# Patient Record
Sex: FEMALE | Race: WHITE | Employment: OTHER | URBAN - NONMETROPOLITAN AREA
[De-identification: names, ages, dates, MRNs, and addresses within clinical notes are randomized per-mention and may not be internally consistent; named-entity substitution may affect disease eponyms.]

---

## 2019-09-18 ENCOUNTER — HOSPITAL ENCOUNTER (OUTPATIENT)
Dept: MRI IMAGING | Age: 64
Discharge: HOME | End: 2019-09-18

## 2019-09-18 ENCOUNTER — OFFICE VISIT (OUTPATIENT)
Dept: NEUROSURGERY | Age: 64
End: 2019-09-18
Payer: MEDICARE

## 2019-09-18 VITALS
BODY MASS INDEX: 35.42 KG/M2 | SYSTOLIC BLOOD PRESSURE: 147 MMHG | HEIGHT: 66 IN | HEART RATE: 77 BPM | WEIGHT: 220.4 LBS | DIASTOLIC BLOOD PRESSURE: 86 MMHG

## 2019-09-18 DIAGNOSIS — Z00.6 EXAMINATION FOR NORMAL COMPARISON FOR CLINICAL RESEARCH: ICD-10-CM

## 2019-09-18 DIAGNOSIS — R41.89 COGNITIVE DECLINE: ICD-10-CM

## 2019-09-18 DIAGNOSIS — G93.9 BRAIN LESION: Primary | ICD-10-CM

## 2019-09-18 DIAGNOSIS — R26.89 BALANCE PROBLEM: ICD-10-CM

## 2019-09-18 DIAGNOSIS — R90.89 ABNORMAL BRAIN MRI: ICD-10-CM

## 2019-09-18 DIAGNOSIS — D17.79 BRAIN LIPOMA: ICD-10-CM

## 2019-09-18 PROCEDURE — 3209999900 MRI COMPARISON OF OUTSIDE FILMS

## 2019-09-18 PROCEDURE — 99204 OFFICE O/P NEW MOD 45 MIN: CPT | Performed by: NEUROLOGICAL SURGERY

## 2019-09-18 RX ORDER — OMEPRAZOLE 40 MG/1
40 CAPSULE, DELAYED RELEASE ORAL DAILY
Refills: 2 | COMMUNITY
Start: 2019-09-07

## 2019-09-18 RX ORDER — LINAGLIPTIN 5 MG/1
5 TABLET, FILM COATED ORAL DAILY
Refills: 2 | Status: ON HOLD | COMMUNITY
Start: 2019-06-27 | End: 2019-12-19 | Stop reason: HOSPADM

## 2019-09-18 RX ORDER — ATORVASTATIN CALCIUM 80 MG/1
80 TABLET, FILM COATED ORAL DAILY
Status: ON HOLD | COMMUNITY
Start: 2019-06-27 | End: 2019-11-04 | Stop reason: ALTCHOICE

## 2019-09-18 RX ORDER — POTASSIUM CHLORIDE 20 MEQ/1
20 TABLET, EXTENDED RELEASE ORAL DAILY
Status: ON HOLD | COMMUNITY
Start: 2019-06-27 | End: 2019-11-04 | Stop reason: ALTCHOICE

## 2019-09-18 RX ORDER — SERTRALINE HYDROCHLORIDE 100 MG/1
100 TABLET, FILM COATED ORAL DAILY
Refills: 2 | COMMUNITY
Start: 2019-08-04

## 2019-09-18 RX ORDER — ASPIRIN 325 MG
325 TABLET, DELAYED RELEASE (ENTERIC COATED) ORAL DAILY
COMMUNITY

## 2019-09-18 RX ORDER — DIPHENHYDRAMINE HCL 25 MG
50 CAPSULE ORAL NIGHTLY
COMMUNITY

## 2019-09-18 RX ORDER — FLUTICASONE PROPIONATE 50 MCG
SPRAY, SUSPENSION (ML) NASAL DAILY
Refills: 3 | Status: ON HOLD | COMMUNITY
Start: 2019-06-27 | End: 2019-11-04 | Stop reason: ALTCHOICE

## 2019-09-18 ASSESSMENT — ENCOUNTER SYMPTOMS
ABDOMINAL PAIN: 0
CHEST TIGHTNESS: 0
TROUBLE SWALLOWING: 1

## 2019-09-18 NOTE — LETTER
Cerebellar function:WNL  Sensation: Grossly intact  Straight leg raising test:Negativ. Reviewed MRI Type:  Film and Report    No results found for: WBC, HGB, HCT, PLT, CHOL, TRIG, HDL, LDLDIRECT, ALT, AST, NA, K, CL, CREATININE, BUN, CO2, TSH, PSA, INR, GLUF, LABA1C, LABMICR    Assessment and Plan      Diagnosis Orders   1. Brain lesion     2. Abnormal brain MRI     3. Cognitive decline     4. Balance problem     5. Brain lipoma         -I had long discussion with the patient and her  about the current patient neurological symptoms and the finding of her brain MRI. 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 I told them I do not believe this lesion is responsible about her current neurological symptoms that she has now. - However, patient and her , that given the  findings of her brain MRI and the combination of symptoms that you have namely the balance issue, cognitive decline and the episode of losing the controlling of her urination, the normal normal pressure hydrocephalus should be considered as underlying pathological process. - For  This reason,  I explained for the patient our normal pressure hydrocephalus protocol which include the lumbar drain trial associated with daily evaluation by our PT OT and speech (cognative team)  teams.  -All questions and concerns were addressed and answered.  -Patient and her  showed a deep interest in going through the lumbar drain trial for normal pressure hydrocephalus. In fact, patient told me that somebody told her in the past that may she need a shunt but she did not remember the details exactly. -The plan now for Amena Plush is to schedule her her for lumbar drain trial according our normal pressure hydrocephalus protocol. If you have questions, please do not hesitate to call me. I look forward to following Amena Plush along with you.     Sincerely, Lisa Alonso MD    CC providers:  Erica Davison, [address]  Alonzo Del Valle [address]: [phone]

## 2019-09-18 NOTE — PROGRESS NOTES
fluticasone (FLONASE) 50 MCG/ACT nasal spray, by Each Nostril route daily, Disp: , Rfl: 3    diphenhydrAMINE (BENADRYL) 25 MG capsule, Take 50 mg by mouth nightly, Disp: , Rfl:     atorvastatin (LIPITOR) 80 MG tablet, Take 80 mg by mouth daily, Disp: , Rfl:     aspirin 325 MG EC tablet, Take 325 mg by mouth daily, Disp: , Rfl:     The patient has No Known Allergies. Past Medical History  Kaiser Ty  has a past medical history of Depression, psychotic ([facility] [address] Diabetes ([facility] [address] Hyperlipidemia, and Hypertension. Past Surgical History  The patient  has a past surgical history that includes knee surgery (Right); Cholecystectomy; Appendectomy; Breast cyst excision (Right); and Tubal ligation. Family History  This patient's family history includes Depression in her mother; Heart Attack in her sister. Social History  Kaiser Ty  reports that she has been smoking cigarettes. She has never used smokeless tobacco. She reports that she drank alcohol. She reports that she does not use drugs. Subjective:      Review of Systems   Constitutional: Positive for activity change. Negative for fever. HENT: Positive for trouble swallowing (sometimes). Eyes: Positive for visual disturbance. Respiratory: Negative for chest tightness. Cardiovascular: Negative for chest pain. Gastrointestinal: Negative for abdominal pain. Genitourinary: Negative for difficulty urinating. Musculoskeletal: Positive for gait problem. Neurological: Positive for dizziness and headaches. Negative for seizures, weakness and numbness. Psychiatric/Behavioral: The patient is nervous/anxious.         Objective:     Ht 5' 6\" (1.676 m)   Wt 220 lb 6.4 oz (100 kg)   BMI 35.57 kg/m²      Examination of carotid arteries (puls, amplitude, bruits) or Examination of peripheral vascular system  (swelling, varicosities and pulses, temperature, edema,tenderness : WNL  Patient is A/A/Ox3  Recent and remote memory: Decline  Attention span and

## 2019-09-23 NOTE — COMMUNICATION BODY
Assessment:    HPI:     This is a 62-year old female who was referred to my office initially after she had a brain MRI that showed finding suggestive of intracranial lipoma. Patient underwent that brain MRI because of progressive balance issues that started last February and has been progressively getting worse with time. Patient had a couple of falls recently. Patient and her  also stated that the I have noticed a decline in patient cognitive and memory function since the last February also and has been progressively getting worse with time. Patient stated that she has a difficulty in controlling her urination sometimes. She stated that she has a blurry vision and headache from times to times. She denied any significant focal weakness or numbness. She de denied any seizures. On physical exam:    Examination of carotid arteries (puls, amplitude, bruits) or Examination of peripheral vascular system  (swelling, varicosities and pulses, temperature, edema,tenderness : WNL  Patient is A/A/Ox3  Recent and remote memory: Decline  Attention span and concentration: Declien  Language (naming objects;repeating phrases;spontaneous speech): slurred/scaned sppech  Fund of knowledge: decline  Cranial nerves:2-12: grossly inatct  Muscle strength, tone in all 4 extremities:5/5 thought out. DTR in all 4 extremities:2+  Babinski:Down response   Gait: slight wide based gait with balance issue. Cerebellar function:WNL  Sensation: Grossly intact  Straight leg raising test:Negativ. Reviewed MRI Type:  Film and Report    No results found for: WBC, HGB, HCT, PLT, CHOL, TRIG, HDL, LDLDIRECT, ALT, AST, NA, K, CL, CREATININE, BUN, CO2, TSH, PSA, INR, GLUF, LABA1C, LABMICR    Assessment and Plan      Diagnosis Orders   1. Brain lesion     2. Abnormal brain MRI     3. Cognitive decline     4. Balance problem     5.  Brain lipoma         -I had long discussion with the patient and her  about the current patient neurological symptoms and the finding of her brain MRI. 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 I told them I do not believe this lesion is responsible about her current neurological symptoms that she has now. - However, patient and her , that given the  findings of her brain MRI and the combination of symptoms that you have namely the balance issue, cognitive decline and the episode of losing the controlling of her urination, the normal normal pressure hydrocephalus should be considered as underlying pathological process. - For  This reason,  I explained for the patient our normal pressure hydrocephalus protocol which include the lumbar drain trial associated with daily evaluation by our PT OT and speech (cognative team)  teams.  -All questions and concerns were addressed and answered.  -Patient and her  showed a deep interest in going through the lumbar drain trial for normal pressure hydrocephalus. In fact, patient told me that somebody told her in the past that may she need a shunt but she did not remember the details exactly. -The plan now for Chava Grant is to schedule her her for lumbar drain trial according our normal pressure hydrocephalus protocol.

## 2019-10-22 ENCOUNTER — TELEPHONE (OUTPATIENT)
Dept: NEUROSURGERY | Age: 64
End: 2019-10-22

## 2019-11-04 ENCOUNTER — APPOINTMENT (OUTPATIENT)
Dept: INTERVENTIONAL RADIOLOGY/VASCULAR | Age: 64
DRG: 057 | End: 2019-11-04
Attending: INTERNAL MEDICINE
Payer: MEDICARE

## 2019-11-04 ENCOUNTER — HOSPITAL ENCOUNTER (INPATIENT)
Age: 64
LOS: 3 days | Discharge: HOME | DRG: 057 | End: 2019-11-07
Attending: INTERNAL MEDICINE | Admitting: INTERNAL MEDICINE
Payer: MEDICARE

## 2019-11-04 PROBLEM — D17.79 BRAIN LIPOMA: Status: ACTIVE | Noted: 2019-11-04

## 2019-11-04 PROBLEM — G91.2 NPH (NORMAL PRESSURE HYDROCEPHALUS) (HCC): Status: ACTIVE | Noted: 2019-11-04

## 2019-11-04 LAB
ANION GAP SERPL CALCULATED.3IONS-SCNC: 14 MEQ/L (ref 8–16)
APTT: 30.5 SECONDS (ref 22–38)
BUN BLDV-MCNC: 13 MG/DL (ref 7–22)
CALCIUM SERPL-MCNC: 8.9 MG/DL (ref 8.5–10.5)
CHLORIDE BLD-SCNC: 97 MEQ/L (ref 98–111)
CO2: 25 MEQ/L (ref 23–33)
CREAT SERPL-MCNC: 0.6 MG/DL (ref 0.4–1.2)
ERYTHROCYTE [DISTWIDTH] IN BLOOD BY AUTOMATED COUNT: 14.4 % (ref 11.5–14.5)
ERYTHROCYTE [DISTWIDTH] IN BLOOD BY AUTOMATED COUNT: 44.3 FL (ref 35–45)
GFR SERPL CREATININE-BSD FRML MDRD: > 90 ML/MIN/1.73M2
GLUCOSE BLD-MCNC: 230 MG/DL (ref 70–108)
GLUCOSE BLD-MCNC: 265 MG/DL (ref 70–108)
GLUCOSE BLD-MCNC: 293 MG/DL (ref 70–108)
HCT VFR BLD CALC: 37.2 % (ref 37–47)
HEMOGLOBIN: 11.8 GM/DL (ref 12–16)
INR BLD: 1.09 (ref 0.85–1.13)
MCH RBC QN AUTO: 27.2 PG (ref 26–33)
MCHC RBC AUTO-ENTMCNC: 31.7 GM/DL (ref 32.2–35.5)
MCV RBC AUTO: 85.7 FL (ref 81–99)
PLATELET # BLD: 274 THOU/MM3 (ref 130–400)
PMV BLD AUTO: 10.4 FL (ref 9.4–12.4)
POTASSIUM SERPL-SCNC: 4 MEQ/L (ref 3.5–5.2)
RBC # BLD: 4.34 MILL/MM3 (ref 4.2–5.4)
SODIUM BLD-SCNC: 136 MEQ/L (ref 135–145)
WBC # BLD: 9.5 THOU/MM3 (ref 4.8–10.8)

## 2019-11-04 PROCEDURE — 97166 OT EVAL MOD COMPLEX 45 MIN: CPT

## 2019-11-04 PROCEDURE — 82948 REAGENT STRIP/BLOOD GLUCOSE: CPT

## 2019-11-04 PROCEDURE — 2709999900 HC NON-CHARGEABLE SUPPLY

## 2019-11-04 PROCEDURE — 6360000002 HC RX W HCPCS

## 2019-11-04 PROCEDURE — 62272 THER SPI PNXR DRG CSF: CPT | Performed by: RADIOLOGY

## 2019-11-04 PROCEDURE — 6360000002 HC RX W HCPCS: Performed by: RADIOLOGY

## 2019-11-04 PROCEDURE — C1729 CATH, DRAINAGE: HCPCS

## 2019-11-04 PROCEDURE — 6370000000 HC RX 637 (ALT 250 FOR IP)

## 2019-11-04 PROCEDURE — 85730 THROMBOPLASTIN TIME PARTIAL: CPT

## 2019-11-04 PROCEDURE — 2060000000 HC ICU INTERMEDIATE R&B

## 2019-11-04 PROCEDURE — 85027 COMPLETE CBC AUTOMATED: CPT

## 2019-11-04 PROCEDURE — 80048 BASIC METABOLIC PNL TOTAL CA: CPT

## 2019-11-04 PROCEDURE — 2500000003 HC RX 250 WO HCPCS

## 2019-11-04 PROCEDURE — 92523 SPEECH SOUND LANG COMPREHEN: CPT

## 2019-11-04 PROCEDURE — 36415 COLL VENOUS BLD VENIPUNCTURE: CPT

## 2019-11-04 PROCEDURE — 77003 FLUOROGUIDE FOR SPINE INJECT: CPT | Performed by: RADIOLOGY

## 2019-11-04 PROCEDURE — 6370000000 HC RX 637 (ALT 250 FOR IP): Performed by: INTERNAL MEDICINE

## 2019-11-04 PROCEDURE — 85610 PROTHROMBIN TIME: CPT

## 2019-11-04 PROCEDURE — 97161 PT EVAL LOW COMPLEX 20 MIN: CPT

## 2019-11-04 PROCEDURE — 97535 SELF CARE MNGMENT TRAINING: CPT

## 2019-11-04 PROCEDURE — 009U30Z DRAINAGE OF SPINAL CANAL WITH DRAINAGE DEVICE, PERCUTANEOUS APPROACH: ICD-10-PCS | Performed by: RADIOLOGY

## 2019-11-04 PROCEDURE — 6370000000 HC RX 637 (ALT 250 FOR IP): Performed by: PHYSICIAN ASSISTANT

## 2019-11-04 RX ORDER — MIDAZOLAM HYDROCHLORIDE 1 MG/ML
1 INJECTION INTRAMUSCULAR; INTRAVENOUS ONCE
Status: COMPLETED | OUTPATIENT
Start: 2019-11-04 | End: 2019-11-04

## 2019-11-04 RX ORDER — ACETAMINOPHEN 325 MG/1
650 TABLET ORAL EVERY 4 HOURS PRN
Status: DISCONTINUED | OUTPATIENT
Start: 2019-11-04 | End: 2019-11-07 | Stop reason: HOSPADM

## 2019-11-04 RX ORDER — FENTANYL CITRATE 50 UG/ML
50 INJECTION, SOLUTION INTRAMUSCULAR; INTRAVENOUS ONCE
Status: COMPLETED | OUTPATIENT
Start: 2019-11-04 | End: 2019-11-04

## 2019-11-04 RX ORDER — PANTOPRAZOLE SODIUM 40 MG/1
40 TABLET, DELAYED RELEASE ORAL
Status: DISCONTINUED | OUTPATIENT
Start: 2019-11-05 | End: 2019-11-07 | Stop reason: HOSPADM

## 2019-11-04 RX ORDER — POTASSIUM CHLORIDE 1.5 G/1.77G
20 POWDER, FOR SOLUTION ORAL 2 TIMES DAILY
Status: ON HOLD | COMMUNITY
End: 2019-11-04 | Stop reason: ALTCHOICE

## 2019-11-04 RX ORDER — ATORVASTATIN CALCIUM 80 MG/1
80 TABLET, FILM COATED ORAL NIGHTLY
Status: DISCONTINUED | OUTPATIENT
Start: 2019-11-04 | End: 2019-11-07 | Stop reason: HOSPADM

## 2019-11-04 RX ORDER — POTASSIUM CHLORIDE 1500 MG/1
20 TABLET, FILM COATED, EXTENDED RELEASE ORAL DAILY
COMMUNITY

## 2019-11-04 RX ORDER — CALCIUM CARBONATE 200(500)MG
500 TABLET,CHEWABLE ORAL 3 TIMES DAILY PRN
Status: DISCONTINUED | OUTPATIENT
Start: 2019-11-04 | End: 2019-11-07 | Stop reason: HOSPADM

## 2019-11-04 RX ORDER — NICOTINE POLACRILEX 4 MG
15 LOZENGE BUCCAL PRN
Status: DISCONTINUED | OUTPATIENT
Start: 2019-11-04 | End: 2019-11-07 | Stop reason: HOSPADM

## 2019-11-04 RX ORDER — DIPHENHYDRAMINE HCL 25 MG
50 TABLET ORAL NIGHTLY
Status: DISCONTINUED | OUTPATIENT
Start: 2019-11-04 | End: 2019-11-07 | Stop reason: HOSPADM

## 2019-11-04 RX ORDER — DEXTROSE MONOHYDRATE 25 G/50ML
12.5 INJECTION, SOLUTION INTRAVENOUS PRN
Status: DISCONTINUED | OUTPATIENT
Start: 2019-11-04 | End: 2019-11-07 | Stop reason: HOSPADM

## 2019-11-04 RX ORDER — ACETAMINOPHEN 325 MG/1
650 TABLET ORAL EVERY 4 HOURS PRN
Status: DISCONTINUED | OUTPATIENT
Start: 2019-11-04 | End: 2019-11-04 | Stop reason: SDUPTHER

## 2019-11-04 RX ORDER — ONDANSETRON 2 MG/ML
4 INJECTION INTRAMUSCULAR; INTRAVENOUS EVERY 6 HOURS PRN
Status: DISCONTINUED | OUTPATIENT
Start: 2019-11-04 | End: 2019-11-07 | Stop reason: HOSPADM

## 2019-11-04 RX ORDER — SODIUM CHLORIDE 0.9 % (FLUSH) 0.9 %
10 SYRINGE (ML) INJECTION PRN
Status: DISCONTINUED | OUTPATIENT
Start: 2019-11-04 | End: 2019-11-07 | Stop reason: HOSPADM

## 2019-11-04 RX ORDER — SERTRALINE HYDROCHLORIDE 100 MG/1
100 TABLET, FILM COATED ORAL DAILY
Status: DISCONTINUED | OUTPATIENT
Start: 2019-11-04 | End: 2019-11-07 | Stop reason: HOSPADM

## 2019-11-04 RX ORDER — ATORVASTATIN CALCIUM 80 MG/1
80 TABLET, FILM COATED ORAL NIGHTLY
COMMUNITY

## 2019-11-04 RX ORDER — CEFAZOLIN SODIUM 1 G/50ML
1 INJECTION, SOLUTION INTRAVENOUS
Status: DISCONTINUED | OUTPATIENT
Start: 2019-11-04 | End: 2019-11-07 | Stop reason: HOSPADM

## 2019-11-04 RX ORDER — POTASSIUM CHLORIDE 750 MG/1
20 TABLET, FILM COATED, EXTENDED RELEASE ORAL DAILY
Status: DISCONTINUED | OUTPATIENT
Start: 2019-11-04 | End: 2019-11-07 | Stop reason: HOSPADM

## 2019-11-04 RX ORDER — FLUTICASONE PROPIONATE 50 MCG
1 SPRAY, SUSPENSION (ML) NASAL DAILY PRN
COMMUNITY

## 2019-11-04 RX ORDER — DEXTROSE MONOHYDRATE 50 MG/ML
100 INJECTION, SOLUTION INTRAVENOUS PRN
Status: DISCONTINUED | OUTPATIENT
Start: 2019-11-04 | End: 2019-11-07 | Stop reason: HOSPADM

## 2019-11-04 RX ORDER — SODIUM CHLORIDE 0.9 % (FLUSH) 0.9 %
10 SYRINGE (ML) INJECTION EVERY 12 HOURS SCHEDULED
Status: DISCONTINUED | OUTPATIENT
Start: 2019-11-04 | End: 2019-11-07 | Stop reason: HOSPADM

## 2019-11-04 RX ORDER — FLUTICASONE PROPIONATE 50 MCG
1 SPRAY, SUSPENSION (ML) NASAL DAILY PRN
Status: DISCONTINUED | OUTPATIENT
Start: 2019-11-04 | End: 2019-11-07 | Stop reason: HOSPADM

## 2019-11-04 RX ORDER — INSULIN GLARGINE 100 [IU]/ML
10 INJECTION, SOLUTION SUBCUTANEOUS DAILY
Status: DISCONTINUED | OUTPATIENT
Start: 2019-11-04 | End: 2019-11-07 | Stop reason: HOSPADM

## 2019-11-04 RX ORDER — SODIUM CHLORIDE 450 MG/100ML
INJECTION, SOLUTION INTRAVENOUS CONTINUOUS
Status: DISCONTINUED | OUTPATIENT
Start: 2019-11-04 | End: 2019-11-06

## 2019-11-04 RX ADMIN — ACETAMINOPHEN 650 MG: 325 TABLET ORAL at 19:11

## 2019-11-04 RX ADMIN — FENTANYL CITRATE 50 MCG: 50 INJECTION INTRAMUSCULAR; INTRAVENOUS at 11:10

## 2019-11-04 RX ADMIN — METOPROLOL TARTRATE 25 MG: 25 TABLET ORAL at 20:41

## 2019-11-04 RX ADMIN — POTASSIUM CHLORIDE 20 MEQ: 750 TABLET, EXTENDED RELEASE ORAL at 20:41

## 2019-11-04 RX ADMIN — MIDAZOLAM 1 MG: 1 INJECTION INTRAMUSCULAR; INTRAVENOUS at 11:10

## 2019-11-04 RX ADMIN — SERTRALINE 100 MG: 100 TABLET, FILM COATED ORAL at 20:41

## 2019-11-04 RX ADMIN — LINAGLIPTIN 5 MG: 5 TABLET, FILM COATED ORAL at 20:41

## 2019-11-04 RX ADMIN — INSULIN GLARGINE 10 UNITS: 100 INJECTION, SOLUTION SUBCUTANEOUS at 20:43

## 2019-11-04 RX ADMIN — MIDAZOLAM 1 MG: 1 INJECTION INTRAMUSCULAR; INTRAVENOUS at 11:17

## 2019-11-04 RX ADMIN — ANTACID TABLETS 500 MG: 500 TABLET, CHEWABLE ORAL at 22:21

## 2019-11-04 RX ADMIN — FENTANYL CITRATE 50 MCG: 50 INJECTION INTRAMUSCULAR; INTRAVENOUS at 11:17

## 2019-11-04 RX ADMIN — ATORVASTATIN CALCIUM 80 MG: 80 TABLET, FILM COATED ORAL at 20:42

## 2019-11-04 RX ADMIN — ACETAMINOPHEN 650 MG: 325 TABLET ORAL at 23:12

## 2019-11-04 RX ADMIN — INSULIN LISPRO 3 UNITS: 100 INJECTION, SOLUTION INTRAVENOUS; SUBCUTANEOUS at 20:44

## 2019-11-04 RX ADMIN — DIPHENHYDRAMINE HCL 50 MG: 25 TABLET ORAL at 20:42

## 2019-11-04 RX ADMIN — METFORMIN HYDROCHLORIDE 1000 MG: 500 TABLET ORAL at 20:41

## 2019-11-04 ASSESSMENT — PAIN DESCRIPTION - DESCRIPTORS
DESCRIPTORS: ACHING

## 2019-11-04 ASSESSMENT — PAIN DESCRIPTION - LOCATION
LOCATION: HEAD;KNEE

## 2019-11-04 ASSESSMENT — PAIN SCALES - GENERAL
PAINLEVEL_OUTOF10: 0
PAINLEVEL_OUTOF10: 0
PAINLEVEL_OUTOF10: 3
PAINLEVEL_OUTOF10: 0
PAINLEVEL_OUTOF10: 5
PAINLEVEL_OUTOF10: 3
PAINLEVEL_OUTOF10: 0
PAINLEVEL_OUTOF10: 4
PAINLEVEL_OUTOF10: 0
PAINLEVEL_OUTOF10: 5
PAINLEVEL_OUTOF10: 0
PAINLEVEL_OUTOF10: 3
PAINLEVEL_OUTOF10: 0

## 2019-11-04 ASSESSMENT — PAIN DESCRIPTION - PAIN TYPE
TYPE: CHRONIC PAIN

## 2019-11-04 ASSESSMENT — PAIN DESCRIPTION - FREQUENCY
FREQUENCY: CONTINUOUS

## 2019-11-04 ASSESSMENT — PAIN DESCRIPTION - ONSET
ONSET: ON-GOING

## 2019-11-04 ASSESSMENT — PAIN DESCRIPTION - ORIENTATION
ORIENTATION: RIGHT;LEFT

## 2019-11-04 ASSESSMENT — PAIN DESCRIPTION - PROGRESSION
CLINICAL_PROGRESSION: GRADUALLY IMPROVING
CLINICAL_PROGRESSION: NOT CHANGED

## 2019-11-04 ASSESSMENT — PAIN - FUNCTIONAL ASSESSMENT
PAIN_FUNCTIONAL_ASSESSMENT: PREVENTS OR INTERFERES SOME ACTIVE ACTIVITIES AND ADLS

## 2019-11-04 ASSESSMENT — 9 HOLE PEG TEST
TESTTIME_SECONDS: 32.33
TESTTIME_SECONDS: 30.38

## 2019-11-05 LAB
ANION GAP SERPL CALCULATED.3IONS-SCNC: 12 MEQ/L (ref 8–16)
BASOPHILS # BLD: 0.5 %
BASOPHILS ABSOLUTE: 0.1 THOU/MM3 (ref 0–0.1)
BUN BLDV-MCNC: 13 MG/DL (ref 7–22)
CALCIUM SERPL-MCNC: 9.2 MG/DL (ref 8.5–10.5)
CHLORIDE BLD-SCNC: 97 MEQ/L (ref 98–111)
CO2: 26 MEQ/L (ref 23–33)
CREAT SERPL-MCNC: 0.7 MG/DL (ref 0.4–1.2)
EOSINOPHIL # BLD: 2.9 %
EOSINOPHILS ABSOLUTE: 0.3 THOU/MM3 (ref 0–0.4)
ERYTHROCYTE [DISTWIDTH] IN BLOOD BY AUTOMATED COUNT: 14.6 % (ref 11.5–14.5)
ERYTHROCYTE [DISTWIDTH] IN BLOOD BY AUTOMATED COUNT: 45.7 FL (ref 35–45)
GFR SERPL CREATININE-BSD FRML MDRD: 84 ML/MIN/1.73M2
GLUCOSE BLD-MCNC: 185 MG/DL (ref 70–108)
GLUCOSE BLD-MCNC: 198 MG/DL (ref 70–108)
GLUCOSE BLD-MCNC: 220 MG/DL (ref 70–108)
GLUCOSE BLD-MCNC: 228 MG/DL (ref 70–108)
GLUCOSE BLD-MCNC: 237 MG/DL (ref 70–108)
HCT VFR BLD CALC: 37.3 % (ref 37–47)
HEMOGLOBIN: 11.8 GM/DL (ref 12–16)
IMMATURE GRANS (ABS): 0.09 THOU/MM3 (ref 0–0.07)
IMMATURE GRANULOCYTES: 0.8 %
LYMPHOCYTES # BLD: 33.2 %
LYMPHOCYTES ABSOLUTE: 4 THOU/MM3 (ref 1–4.8)
MCH RBC QN AUTO: 27.2 PG (ref 26–33)
MCHC RBC AUTO-ENTMCNC: 31.6 GM/DL (ref 32.2–35.5)
MCV RBC AUTO: 85.9 FL (ref 81–99)
MONOCYTES # BLD: 6.5 %
MONOCYTES ABSOLUTE: 0.8 THOU/MM3 (ref 0.4–1.3)
NUCLEATED RED BLOOD CELLS: 0 /100 WBC
PLATELET # BLD: 292 THOU/MM3 (ref 130–400)
PMV BLD AUTO: 10.6 FL (ref 9.4–12.4)
POTASSIUM REFLEX MAGNESIUM: 4.2 MEQ/L (ref 3.5–5.2)
RBC # BLD: 4.34 MILL/MM3 (ref 4.2–5.4)
SEG NEUTROPHILS: 56.1 %
SEGMENTED NEUTROPHILS ABSOLUTE COUNT: 6.7 THOU/MM3 (ref 1.8–7.7)
SODIUM BLD-SCNC: 135 MEQ/L (ref 135–145)
WBC # BLD: 11.9 THOU/MM3 (ref 4.8–10.8)

## 2019-11-05 PROCEDURE — 85025 COMPLETE CBC W/AUTO DIFF WBC: CPT

## 2019-11-05 PROCEDURE — 97530 THERAPEUTIC ACTIVITIES: CPT

## 2019-11-05 PROCEDURE — 6370000000 HC RX 637 (ALT 250 FOR IP): Performed by: INTERNAL MEDICINE

## 2019-11-05 PROCEDURE — 2709999900 HC NON-CHARGEABLE SUPPLY

## 2019-11-05 PROCEDURE — 97535 SELF CARE MNGMENT TRAINING: CPT

## 2019-11-05 PROCEDURE — 80048 BASIC METABOLIC PNL TOTAL CA: CPT

## 2019-11-05 PROCEDURE — 97116 GAIT TRAINING THERAPY: CPT

## 2019-11-05 PROCEDURE — 36415 COLL VENOUS BLD VENIPUNCTURE: CPT

## 2019-11-05 PROCEDURE — 2060000000 HC ICU INTERMEDIATE R&B

## 2019-11-05 PROCEDURE — 97127 HC SP THER IVNTJ W/FOCUS COG FUNCJ: CPT

## 2019-11-05 PROCEDURE — 94760 N-INVAS EAR/PLS OXIMETRY 1: CPT

## 2019-11-05 PROCEDURE — 99221 1ST HOSP IP/OBS SF/LOW 40: CPT | Performed by: NEUROLOGICAL SURGERY

## 2019-11-05 PROCEDURE — 82948 REAGENT STRIP/BLOOD GLUCOSE: CPT

## 2019-11-05 RX ADMIN — SERTRALINE 100 MG: 100 TABLET, FILM COATED ORAL at 10:37

## 2019-11-05 RX ADMIN — DIPHENHYDRAMINE HCL 50 MG: 25 TABLET ORAL at 20:55

## 2019-11-05 RX ADMIN — INSULIN GLARGINE 10 UNITS: 100 INJECTION, SOLUTION SUBCUTANEOUS at 10:37

## 2019-11-05 RX ADMIN — LINAGLIPTIN 5 MG: 5 TABLET, FILM COATED ORAL at 10:37

## 2019-11-05 RX ADMIN — POTASSIUM CHLORIDE 20 MEQ: 750 TABLET, EXTENDED RELEASE ORAL at 10:37

## 2019-11-05 RX ADMIN — METOPROLOL TARTRATE 25 MG: 25 TABLET ORAL at 10:37

## 2019-11-05 RX ADMIN — ACETAMINOPHEN 650 MG: 325 TABLET ORAL at 10:49

## 2019-11-05 RX ADMIN — PANTOPRAZOLE SODIUM 40 MG: 40 TABLET, DELAYED RELEASE ORAL at 10:37

## 2019-11-05 RX ADMIN — INSULIN LISPRO 2 UNITS: 100 INJECTION, SOLUTION INTRAVENOUS; SUBCUTANEOUS at 18:29

## 2019-11-05 RX ADMIN — INSULIN LISPRO 4 UNITS: 100 INJECTION, SOLUTION INTRAVENOUS; SUBCUTANEOUS at 13:27

## 2019-11-05 RX ADMIN — INSULIN LISPRO 2 UNITS: 100 INJECTION, SOLUTION INTRAVENOUS; SUBCUTANEOUS at 20:50

## 2019-11-05 ASSESSMENT — PAIN SCALES - GENERAL
PAINLEVEL_OUTOF10: 0
PAINLEVEL_OUTOF10: 3
PAINLEVEL_OUTOF10: 0
PAINLEVEL_OUTOF10: 3
PAINLEVEL_OUTOF10: 0
PAINLEVEL_OUTOF10: 0

## 2019-11-05 ASSESSMENT — PAIN - FUNCTIONAL ASSESSMENT
PAIN_FUNCTIONAL_ASSESSMENT: PREVENTS OR INTERFERES SOME ACTIVE ACTIVITIES AND ADLS
PAIN_FUNCTIONAL_ASSESSMENT: ACTIVITIES ARE NOT PREVENTED

## 2019-11-05 ASSESSMENT — PAIN DESCRIPTION - FREQUENCY: FREQUENCY: CONTINUOUS

## 2019-11-05 ASSESSMENT — PAIN DESCRIPTION - PAIN TYPE
TYPE: ACUTE PAIN
TYPE: CHRONIC PAIN
TYPE: ACUTE PAIN

## 2019-11-05 ASSESSMENT — PAIN DESCRIPTION - LOCATION
LOCATION: HEAD
LOCATION: HEAD
LOCATION: HEAD;KNEE

## 2019-11-05 ASSESSMENT — PAIN DESCRIPTION - DESCRIPTORS
DESCRIPTORS: ACHING
DESCRIPTORS: HEADACHE
DESCRIPTORS: HEADACHE

## 2019-11-05 ASSESSMENT — PAIN DESCRIPTION - ORIENTATION: ORIENTATION: RIGHT;LEFT

## 2019-11-05 ASSESSMENT — PAIN DESCRIPTION - PROGRESSION: CLINICAL_PROGRESSION: NOT CHANGED

## 2019-11-05 ASSESSMENT — PAIN DESCRIPTION - ONSET: ONSET: ON-GOING

## 2019-11-06 LAB
GLUCOSE BLD-MCNC: 118 MG/DL (ref 70–108)
GLUCOSE BLD-MCNC: 145 MG/DL (ref 70–108)
GLUCOSE BLD-MCNC: 175 MG/DL (ref 70–108)
GLUCOSE BLD-MCNC: 236 MG/DL (ref 70–108)

## 2019-11-06 PROCEDURE — 97116 GAIT TRAINING THERAPY: CPT

## 2019-11-06 PROCEDURE — 97530 THERAPEUTIC ACTIVITIES: CPT

## 2019-11-06 PROCEDURE — 2709999900 HC NON-CHARGEABLE SUPPLY

## 2019-11-06 PROCEDURE — 6370000000 HC RX 637 (ALT 250 FOR IP): Performed by: INTERNAL MEDICINE

## 2019-11-06 PROCEDURE — 97127 HC SP THER IVNTJ W/FOCUS COG FUNCJ: CPT

## 2019-11-06 PROCEDURE — 2060000000 HC ICU INTERMEDIATE R&B

## 2019-11-06 PROCEDURE — 94760 N-INVAS EAR/PLS OXIMETRY 1: CPT

## 2019-11-06 PROCEDURE — 99232 SBSQ HOSP IP/OBS MODERATE 35: CPT | Performed by: NEUROLOGICAL SURGERY

## 2019-11-06 PROCEDURE — 82948 REAGENT STRIP/BLOOD GLUCOSE: CPT

## 2019-11-06 RX ADMIN — DIPHENHYDRAMINE HCL 50 MG: 25 TABLET ORAL at 20:41

## 2019-11-06 RX ADMIN — INSULIN LISPRO 2 UNITS: 100 INJECTION, SOLUTION INTRAVENOUS; SUBCUTANEOUS at 13:29

## 2019-11-06 RX ADMIN — POTASSIUM CHLORIDE 20 MEQ: 750 TABLET, EXTENDED RELEASE ORAL at 09:07

## 2019-11-06 RX ADMIN — INSULIN LISPRO 1 UNITS: 100 INJECTION, SOLUTION INTRAVENOUS; SUBCUTANEOUS at 20:42

## 2019-11-06 RX ADMIN — METOPROLOL TARTRATE 25 MG: 25 TABLET ORAL at 09:08

## 2019-11-06 RX ADMIN — INSULIN GLARGINE 10 UNITS: 100 INJECTION, SOLUTION SUBCUTANEOUS at 09:34

## 2019-11-06 RX ADMIN — ATORVASTATIN CALCIUM 80 MG: 80 TABLET, FILM COATED ORAL at 20:42

## 2019-11-06 RX ADMIN — LINAGLIPTIN 5 MG: 5 TABLET, FILM COATED ORAL at 09:08

## 2019-11-06 RX ADMIN — PANTOPRAZOLE SODIUM 40 MG: 40 TABLET, DELAYED RELEASE ORAL at 09:09

## 2019-11-06 RX ADMIN — METOPROLOL TARTRATE 25 MG: 25 TABLET ORAL at 20:40

## 2019-11-06 RX ADMIN — SERTRALINE 100 MG: 100 TABLET, FILM COATED ORAL at 09:08

## 2019-11-06 RX ADMIN — INSULIN LISPRO 2 UNITS: 100 INJECTION, SOLUTION INTRAVENOUS; SUBCUTANEOUS at 09:35

## 2019-11-06 ASSESSMENT — PAIN SCALES - GENERAL
PAINLEVEL_OUTOF10: 0

## 2019-11-07 VITALS
OXYGEN SATURATION: 96 % | TEMPERATURE: 97.8 F | HEIGHT: 66 IN | SYSTOLIC BLOOD PRESSURE: 124 MMHG | RESPIRATION RATE: 18 BRPM | DIASTOLIC BLOOD PRESSURE: 58 MMHG | BODY MASS INDEX: 32.35 KG/M2 | HEART RATE: 60 BPM | WEIGHT: 201.3 LBS

## 2019-11-07 LAB
GLUCOSE BLD-MCNC: 193 MG/DL (ref 70–108)
GLUCOSE BLD-MCNC: 241 MG/DL (ref 70–108)

## 2019-11-07 PROCEDURE — 2580000003 HC RX 258: Performed by: INTERNAL MEDICINE

## 2019-11-07 PROCEDURE — 6370000000 HC RX 637 (ALT 250 FOR IP): Performed by: INTERNAL MEDICINE

## 2019-11-07 PROCEDURE — 2709999900 HC NON-CHARGEABLE SUPPLY

## 2019-11-07 PROCEDURE — 82948 REAGENT STRIP/BLOOD GLUCOSE: CPT

## 2019-11-07 PROCEDURE — 99232 SBSQ HOSP IP/OBS MODERATE 35: CPT | Performed by: NEUROLOGICAL SURGERY

## 2019-11-07 RX ADMIN — INSULIN LISPRO 2 UNITS: 100 INJECTION, SOLUTION INTRAVENOUS; SUBCUTANEOUS at 09:37

## 2019-11-07 RX ADMIN — POTASSIUM CHLORIDE 20 MEQ: 750 TABLET, EXTENDED RELEASE ORAL at 09:34

## 2019-11-07 RX ADMIN — SERTRALINE 100 MG: 100 TABLET, FILM COATED ORAL at 09:34

## 2019-11-07 RX ADMIN — METOPROLOL TARTRATE 25 MG: 25 TABLET ORAL at 09:35

## 2019-11-07 RX ADMIN — INSULIN GLARGINE 10 UNITS: 100 INJECTION, SOLUTION SUBCUTANEOUS at 12:20

## 2019-11-07 RX ADMIN — Medication 10 ML: at 09:34

## 2019-11-07 RX ADMIN — ACETAMINOPHEN 650 MG: 325 TABLET ORAL at 09:34

## 2019-11-07 RX ADMIN — LINAGLIPTIN 5 MG: 5 TABLET, FILM COATED ORAL at 09:35

## 2019-11-07 ASSESSMENT — PAIN SCALES - GENERAL
PAINLEVEL_OUTOF10: 3
PAINLEVEL_OUTOF10: 7
PAINLEVEL_OUTOF10: 0
PAINLEVEL_OUTOF10: 0

## 2019-11-07 ASSESSMENT — PAIN DESCRIPTION - DESCRIPTORS: DESCRIPTORS: HEADACHE

## 2019-11-07 ASSESSMENT — PAIN DESCRIPTION - LOCATION: LOCATION: HEAD

## 2019-11-07 ASSESSMENT — PAIN DESCRIPTION - FREQUENCY: FREQUENCY: CONTINUOUS

## 2019-11-07 ASSESSMENT — PAIN DESCRIPTION - ONSET: ONSET: GRADUAL

## 2019-11-14 ENCOUNTER — TELEPHONE (OUTPATIENT)
Dept: NEUROSURGERY | Age: 64
End: 2019-11-14

## 2019-11-18 ENCOUNTER — HOSPITAL ENCOUNTER (INPATIENT)
Age: 64
LOS: 22 days | Discharge: NURSING, REHAB OR LONG-TERM CARE | DRG: 856 | End: 2019-12-10
Attending: INTERNAL MEDICINE | Admitting: HOSPITALIST
Payer: MEDICARE

## 2019-11-18 ENCOUNTER — APPOINTMENT (OUTPATIENT)
Dept: GENERAL RADIOLOGY | Age: 64
DRG: 856 | End: 2019-11-18
Attending: INTERNAL MEDICINE
Payer: MEDICARE

## 2019-11-18 PROBLEM — R73.9 HYPERGLYCEMIA: Status: ACTIVE | Noted: 2019-11-18

## 2019-11-18 LAB
EKG ATRIAL RATE: 170 BPM
EKG Q-T INTERVAL: 298 MS
EKG QRS DURATION: 84 MS
EKG QTC CALCULATION (BAZETT): 498 MS
EKG R AXIS: 2 DEGREES
EKG T AXIS: 82 DEGREES
EKG VENTRICULAR RATE: 168 BPM
GLUCOSE BLD-MCNC: 247 MG/DL (ref 70–108)
LACTIC ACID: 3.5 MMOL/L (ref 0.5–2.2)

## 2019-11-18 PROCEDURE — 94002 VENT MGMT INPAT INIT DAY: CPT

## 2019-11-18 PROCEDURE — 2000000000 HC ICU R&B

## 2019-11-18 PROCEDURE — 0BH18EZ INSERTION OF ENDOTRACHEAL AIRWAY INTO TRACHEA, VIA NATURAL OR ARTIFICIAL OPENING ENDOSCOPIC: ICD-10-PCS | Performed by: HOSPITALIST

## 2019-11-18 PROCEDURE — 82010 KETONE BODYS QUAN: CPT

## 2019-11-18 PROCEDURE — 5A1945Z RESPIRATORY VENTILATION, 24-96 CONSECUTIVE HOURS: ICD-10-PCS | Performed by: INTERNAL MEDICINE

## 2019-11-18 PROCEDURE — 82948 REAGENT STRIP/BLOOD GLUCOSE: CPT

## 2019-11-18 PROCEDURE — 83605 ASSAY OF LACTIC ACID: CPT

## 2019-11-18 PROCEDURE — 31500 INSERT EMERGENCY AIRWAY: CPT | Performed by: PHYSICIAN ASSISTANT

## 2019-11-18 PROCEDURE — 80048 BASIC METABOLIC PNL TOTAL CA: CPT

## 2019-11-18 PROCEDURE — 71045 X-RAY EXAM CHEST 1 VIEW: CPT

## 2019-11-18 PROCEDURE — 6370000000 HC RX 637 (ALT 250 FOR IP)

## 2019-11-18 PROCEDURE — 2500000003 HC RX 250 WO HCPCS

## 2019-11-18 PROCEDURE — 51702 INSERT TEMP BLADDER CATH: CPT

## 2019-11-18 PROCEDURE — 85025 COMPLETE CBC W/AUTO DIFF WBC: CPT

## 2019-11-18 PROCEDURE — 93005 ELECTROCARDIOGRAM TRACING: CPT | Performed by: INTERNAL MEDICINE

## 2019-11-18 PROCEDURE — 2500000003 HC RX 250 WO HCPCS: Performed by: PHYSICIAN ASSISTANT

## 2019-11-18 PROCEDURE — 6360000002 HC RX W HCPCS: Performed by: PHYSICIAN ASSISTANT

## 2019-11-18 PROCEDURE — 36415 COLL VENOUS BLD VENIPUNCTURE: CPT

## 2019-11-18 PROCEDURE — 2580000003 HC RX 258: Performed by: PHYSICIAN ASSISTANT

## 2019-11-18 PROCEDURE — 6360000002 HC RX W HCPCS

## 2019-11-18 RX ORDER — DEXTROSE MONOHYDRATE 50 MG/ML
100 INJECTION, SOLUTION INTRAVENOUS PRN
Status: DISCONTINUED | OUTPATIENT
Start: 2019-11-18 | End: 2019-12-10 | Stop reason: HOSPADM

## 2019-11-18 RX ORDER — ACETAMINOPHEN 650 MG/1
650 SUPPOSITORY RECTAL EVERY 4 HOURS PRN
Status: DISCONTINUED | OUTPATIENT
Start: 2019-11-18 | End: 2019-12-10 | Stop reason: HOSPADM

## 2019-11-18 RX ORDER — SUCCINYLCHOLINE CHLORIDE 20 MG/ML
INJECTION INTRAMUSCULAR; INTRAVENOUS
Status: COMPLETED | OUTPATIENT
Start: 2019-11-18 | End: 2019-11-18

## 2019-11-18 RX ORDER — PROPOFOL 10 MG/ML
20 INJECTION, EMULSION INTRAVENOUS
Status: DISCONTINUED | OUTPATIENT
Start: 2019-11-19 | End: 2019-11-19

## 2019-11-18 RX ORDER — SODIUM CHLORIDE 0.9 % (FLUSH) 0.9 %
10 SYRINGE (ML) INJECTION PRN
Status: DISCONTINUED | OUTPATIENT
Start: 2019-11-18 | End: 2019-11-25 | Stop reason: SDUPTHER

## 2019-11-18 RX ORDER — ETOMIDATE 2 MG/ML
INJECTION INTRAVENOUS
Status: COMPLETED | OUTPATIENT
Start: 2019-11-18 | End: 2019-11-18

## 2019-11-18 RX ORDER — DILTIAZEM HYDROCHLORIDE 5 MG/ML
10 INJECTION INTRAVENOUS ONCE
Status: COMPLETED | OUTPATIENT
Start: 2019-11-19 | End: 2019-11-18

## 2019-11-18 RX ORDER — DILTIAZEM HYDROCHLORIDE 5 MG/ML
INJECTION INTRAVENOUS
Status: COMPLETED
Start: 2019-11-18 | End: 2019-11-18

## 2019-11-18 RX ORDER — 0.9 % SODIUM CHLORIDE 0.9 %
500 INTRAVENOUS SOLUTION INTRAVENOUS ONCE
Status: COMPLETED | OUTPATIENT
Start: 2019-11-19 | End: 2019-11-18

## 2019-11-18 RX ORDER — SODIUM CHLORIDE 0.9 % (FLUSH) 0.9 %
10 SYRINGE (ML) INJECTION EVERY 12 HOURS SCHEDULED
Status: DISCONTINUED | OUTPATIENT
Start: 2019-11-19 | End: 2019-11-25 | Stop reason: SDUPTHER

## 2019-11-18 RX ORDER — POTASSIUM CHLORIDE 7.45 MG/ML
10 INJECTION INTRAVENOUS PRN
Status: DISCONTINUED | OUTPATIENT
Start: 2019-11-18 | End: 2019-12-10 | Stop reason: HOSPADM

## 2019-11-18 RX ORDER — NICOTINE POLACRILEX 4 MG
15 LOZENGE BUCCAL PRN
Status: DISCONTINUED | OUTPATIENT
Start: 2019-11-18 | End: 2019-12-10 | Stop reason: HOSPADM

## 2019-11-18 RX ORDER — DEXTROSE MONOHYDRATE 25 G/50ML
12.5 INJECTION, SOLUTION INTRAVENOUS PRN
Status: DISCONTINUED | OUTPATIENT
Start: 2019-11-18 | End: 2019-12-10 | Stop reason: HOSPADM

## 2019-11-18 RX ORDER — PROPOFOL 10 MG/ML
INJECTION, EMULSION INTRAVENOUS
Status: COMPLETED
Start: 2019-11-18 | End: 2019-11-18

## 2019-11-18 RX ORDER — ONDANSETRON 2 MG/ML
4 INJECTION INTRAMUSCULAR; INTRAVENOUS EVERY 6 HOURS PRN
Status: DISCONTINUED | OUTPATIENT
Start: 2019-11-18 | End: 2019-11-25 | Stop reason: SDUPTHER

## 2019-11-18 RX ORDER — ACETAMINOPHEN 650 MG/1
SUPPOSITORY RECTAL
Status: COMPLETED
Start: 2019-11-18 | End: 2019-11-18

## 2019-11-18 RX ADMIN — PROPOFOL 20 MCG/KG/MIN: 10 INJECTION, EMULSION INTRAVENOUS at 23:41

## 2019-11-18 RX ADMIN — SODIUM CHLORIDE 500 ML: 9 INJECTION, SOLUTION INTRAVENOUS at 23:20

## 2019-11-18 RX ADMIN — SUCCINYLCHOLINE CHLORIDE 150 MG: 20 INJECTION, SOLUTION INTRAMUSCULAR; INTRAVENOUS at 23:27

## 2019-11-18 RX ADMIN — ACETAMINOPHEN 650 MG: 650 SUPPOSITORY RECTAL at 23:57

## 2019-11-18 RX ADMIN — DILTIAZEM HYDROCHLORIDE 10 MG: 5 INJECTION INTRAVENOUS at 23:49

## 2019-11-18 RX ADMIN — ETOMIDATE 20 MG: 2 INJECTION INTRAVENOUS at 23:26

## 2019-11-18 ASSESSMENT — PULMONARY FUNCTION TESTS: PIF_VALUE: 23

## 2019-11-19 ENCOUNTER — APPOINTMENT (OUTPATIENT)
Dept: INTERVENTIONAL RADIOLOGY/VASCULAR | Age: 64
DRG: 856 | End: 2019-11-19
Attending: INTERNAL MEDICINE
Payer: MEDICARE

## 2019-11-19 ENCOUNTER — APPOINTMENT (OUTPATIENT)
Dept: GENERAL RADIOLOGY | Age: 64
DRG: 856 | End: 2019-11-19
Attending: INTERNAL MEDICINE
Payer: MEDICARE

## 2019-11-19 PROBLEM — A41.9 SEPSIS (HCC): Status: ACTIVE | Noted: 2019-11-19

## 2019-11-19 PROBLEM — I48.91 ATRIAL FIBRILLATION WITH RVR (HCC): Status: ACTIVE | Noted: 2019-11-19

## 2019-11-19 PROBLEM — J96.01 ACUTE RESPIRATORY FAILURE WITH HYPOXIA (HCC): Status: ACTIVE | Noted: 2019-11-19

## 2019-11-19 PROBLEM — G93.40 ENCEPHALOPATHY: Status: ACTIVE | Noted: 2019-11-19

## 2019-11-19 PROBLEM — E11.10 DIABETIC KETOACIDOSIS ASSOCIATED WITH TYPE 2 DIABETES MELLITUS (HCC): Status: ACTIVE | Noted: 2019-11-19

## 2019-11-19 LAB
ALLEN TEST: POSITIVE
ALLEN TEST: POSITIVE
ANION GAP SERPL CALCULATED.3IONS-SCNC: 15 MEQ/L (ref 8–16)
ANION GAP SERPL CALCULATED.3IONS-SCNC: 16 MEQ/L (ref 8–16)
ANION GAP SERPL CALCULATED.3IONS-SCNC: 17 MEQ/L (ref 8–16)
ANION GAP SERPL CALCULATED.3IONS-SCNC: 19 MEQ/L (ref 8–16)
ANION GAP SERPL CALCULATED.3IONS-SCNC: 20 MEQ/L (ref 8–16)
ANION GAP SERPL CALCULATED.3IONS-SCNC: 24 MEQ/L (ref 8–16)
APTT: 25.9 SECONDS (ref 22–38)
APTT: 29.9 SECONDS (ref 22–38)
BASE EXCESS (CALCULATED): -2.5 MMOL/L (ref -2.5–2.5)
BASE EXCESS (CALCULATED): -2.9 MMOL/L (ref -2.5–2.5)
BASOPHILS # BLD: 0.2 %
BASOPHILS # BLD: 0.2 %
BASOPHILS ABSOLUTE: 0.1 THOU/MM3 (ref 0–0.1)
BASOPHILS ABSOLUTE: 0.1 THOU/MM3 (ref 0–0.1)
BETA-HYDROXYBUTYRATE: 25.04 MG/DL (ref 0.2–2.81)
BUN BLDV-MCNC: 19 MG/DL (ref 7–22)
BUN BLDV-MCNC: 20 MG/DL (ref 7–22)
BUN BLDV-MCNC: 23 MG/DL (ref 7–22)
BUN BLDV-MCNC: 25 MG/DL (ref 7–22)
BUN BLDV-MCNC: 28 MG/DL (ref 7–22)
BUN BLDV-MCNC: 29 MG/DL (ref 7–22)
CALCIUM SERPL-MCNC: 7.6 MG/DL (ref 8.5–10.5)
CALCIUM SERPL-MCNC: 7.9 MG/DL (ref 8.5–10.5)
CALCIUM SERPL-MCNC: 8 MG/DL (ref 8.5–10.5)
CALCIUM SERPL-MCNC: 8.2 MG/DL (ref 8.5–10.5)
CALCIUM SERPL-MCNC: 8.8 MG/DL (ref 8.5–10.5)
CALCIUM SERPL-MCNC: 8.8 MG/DL (ref 8.5–10.5)
CHARACTER, CSF: ABNORMAL
CHLORIDE BLD-SCNC: 101 MEQ/L (ref 98–111)
CHLORIDE BLD-SCNC: 103 MEQ/L (ref 98–111)
CHLORIDE BLD-SCNC: 104 MEQ/L (ref 98–111)
CO2: 16 MEQ/L (ref 23–33)
CO2: 17 MEQ/L (ref 23–33)
CO2: 20 MEQ/L (ref 23–33)
COLLECTED BY:: ABNORMAL
COLLECTED BY:: ABNORMAL
COLOR CSF: ABNORMAL
CORTISOL COLLECTION INFO: NORMAL
CORTISOL: 25.05 UG/DL
CREAT SERPL-MCNC: 0.9 MG/DL (ref 0.4–1.2)
CREAT SERPL-MCNC: 0.9 MG/DL (ref 0.4–1.2)
CREAT SERPL-MCNC: 1 MG/DL (ref 0.4–1.2)
CREAT SERPL-MCNC: 1.1 MG/DL (ref 0.4–1.2)
CRYPTOCOCCUS NEOFORMANS/GATTI CSF FILM ARR.: NOT DETECTED
CYTOMEGALOVIRUS (CMV) CSF FILM ARRAY: NOT DETECTED
DEVICE: ABNORMAL
DEVICE: ABNORMAL
DIFFERENTIAL TYPE: ABNORMAL
ENTEROVIRUS DETECTION PCR: NOT DETECTED
EOSINOPHIL # BLD: 0 %
EOSINOPHIL # BLD: 0 %
EOSINOPHILS ABSOLUTE: 0 THOU/MM3 (ref 0–0.4)
EOSINOPHILS ABSOLUTE: 0 THOU/MM3 (ref 0–0.4)
ERYTHROCYTE [DISTWIDTH] IN BLOOD BY AUTOMATED COUNT: 14.9 % (ref 11.5–14.5)
ERYTHROCYTE [DISTWIDTH] IN BLOOD BY AUTOMATED COUNT: 15.1 % (ref 11.5–14.5)
ERYTHROCYTE [DISTWIDTH] IN BLOOD BY AUTOMATED COUNT: 44 FL (ref 35–45)
ERYTHROCYTE [DISTWIDTH] IN BLOOD BY AUTOMATED COUNT: 44.1 FL (ref 35–45)
ESCHERICHIA COLI K1 CSF FILM ARRAY: NOT DETECTED
GFR SERPL CREATININE-BSD FRML MDRD: 50 ML/MIN/1.73M2
GFR SERPL CREATININE-BSD FRML MDRD: 56 ML/MIN/1.73M2
GFR SERPL CREATININE-BSD FRML MDRD: 63 ML/MIN/1.73M2
GFR SERPL CREATININE-BSD FRML MDRD: 63 ML/MIN/1.73M2
GLUCOSE BLD-MCNC: 117 MG/DL (ref 70–108)
GLUCOSE BLD-MCNC: 135 MG/DL (ref 70–108)
GLUCOSE BLD-MCNC: 145 MG/DL (ref 70–108)
GLUCOSE BLD-MCNC: 147 MG/DL (ref 70–108)
GLUCOSE BLD-MCNC: 148 MG/DL (ref 70–108)
GLUCOSE BLD-MCNC: 149 MG/DL (ref 70–108)
GLUCOSE BLD-MCNC: 150 MG/DL (ref 70–108)
GLUCOSE BLD-MCNC: 151 MG/DL (ref 70–108)
GLUCOSE BLD-MCNC: 157 MG/DL (ref 70–108)
GLUCOSE BLD-MCNC: 168 MG/DL (ref 70–108)
GLUCOSE BLD-MCNC: 170 MG/DL (ref 70–108)
GLUCOSE BLD-MCNC: 171 MG/DL (ref 70–108)
GLUCOSE BLD-MCNC: 178 MG/DL (ref 70–108)
GLUCOSE BLD-MCNC: 188 MG/DL (ref 70–108)
GLUCOSE BLD-MCNC: 208 MG/DL (ref 70–108)
GLUCOSE BLD-MCNC: 209 MG/DL (ref 70–108)
GLUCOSE BLD-MCNC: 216 MG/DL (ref 70–108)
GLUCOSE BLD-MCNC: 225 MG/DL (ref 70–108)
GLUCOSE BLD-MCNC: 244 MG/DL (ref 70–108)
GLUCOSE BLD-MCNC: 250 MG/DL (ref 70–108)
GLUCOSE BLD-MCNC: 251 MG/DL (ref 70–108)
GLUCOSE BLD-MCNC: 49 MG/DL (ref 70–108)
GLUCOSE, CSF: 9 MG/DL (ref 40–80)
GLUCOSE, WHOLE BLOOD: 147 MG/DL (ref 70–108)
GLUCOSE, WHOLE BLOOD: 152 MG/DL (ref 70–108)
GLUCOSE, WHOLE BLOOD: 207 MG/DL (ref 70–108)
HAEMOPHILUS INFLUENZA CSF FILM ARRAY: NOT DETECTED
HCO3: 18 MMOL/L (ref 23–28)
HCO3: 19 MMOL/L (ref 23–28)
HCT VFR BLD CALC: 34.6 % (ref 37–47)
HCT VFR BLD CALC: 36 % (ref 37–47)
HEMOGLOBIN: 11.4 GM/DL (ref 12–16)
HEMOGLOBIN: 11.8 GM/DL (ref 12–16)
HHV-6 (HERPESVIRUS 6) CSF FILM ARRAY: NOT DETECTED
HSV-1 CSF FILM ARRAY: NOT DETECTED
HSV-2 CSF FILM ARRAY: NOT DETECTED
IFIO2: 100
IFIO2: 40
IMMATURE GRANS (ABS): 0.54 THOU/MM3 (ref 0–0.07)
IMMATURE GRANS (ABS): 0.57 THOU/MM3 (ref 0–0.07)
IMMATURE GRANULOCYTES: 1.6 %
IMMATURE GRANULOCYTES: 1.9 %
LACTIC ACID: 3.3 MMOL/L (ref 0.5–2.2)
LACTIC ACID: 3.4 MMOL/L (ref 0.5–2.2)
LISTERIA MONOCYTOGENES CSF FILM ARRAY: NOT DETECTED
LYMPHOCYTES # BLD: 5.3 %
LYMPHOCYTES # BLD: 9 %
LYMPHOCYTES ABSOLUTE: 1.7 THOU/MM3 (ref 1–4.8)
LYMPHOCYTES ABSOLUTE: 2.7 THOU/MM3 (ref 1–4.8)
LYMPHS CSF: 4 % (ref 0–90)
MAGNESIUM: 1.4 MG/DL (ref 1.6–2.4)
MAGNESIUM: 1.8 MG/DL (ref 1.6–2.4)
MAGNESIUM: 1.8 MG/DL (ref 1.6–2.4)
MAGNESIUM: 2.3 MG/DL (ref 1.6–2.4)
MCH RBC QN AUTO: 26.9 PG (ref 26–33)
MCH RBC QN AUTO: 27 PG (ref 26–33)
MCHC RBC AUTO-ENTMCNC: 32.8 GM/DL (ref 32.2–35.5)
MCHC RBC AUTO-ENTMCNC: 32.9 GM/DL (ref 32.2–35.5)
MCV RBC AUTO: 81.8 FL (ref 81–99)
MCV RBC AUTO: 82 FL (ref 81–99)
MODE: ABNORMAL
MODE: ABNORMAL
MONOCYTE, CSF: 2 % (ref 0–45)
MONOCYTES # BLD: 6 %
MONOCYTES # BLD: 8.8 %
MONOCYTES ABSOLUTE: 1.8 THOU/MM3 (ref 0.4–1.3)
MONOCYTES ABSOLUTE: 2.9 THOU/MM3 (ref 0.4–1.3)
MRSA SCREEN RT-PCR: NEGATIVE
NEISSERIA MENIGITIDIS CSF FILM ARRAY: NOT DETECTED
NUCLEATED RED BLOOD CELLS: 0 /100 WBC
NUCLEATED RED BLOOD CELLS: 0 /100 WBC
O2 SATURATION: 100 %
O2 SATURATION: 100 %
PARECHOVIRUS CSF FILM ARRAY: NOT DETECTED
PATHOLOGIST REVIEW: ABNORMAL
PATHOLOGIST REVIEW: ABNORMAL
PCO2 (TEMP CORRECTED): 20 (ref 35–45)
PCO2: 19 MMHG (ref 35–45)
PCO2: 23 MMHG (ref 35–45)
PH (TEMPERATURE CORRECTED): 7.56 (ref 7.35–7.45)
PH BLOOD GAS: 7.52 (ref 7.35–7.45)
PH BLOOD GAS: 7.58 (ref 7.35–7.45)
PHOSPHORUS: 1.1 MG/DL (ref 2.4–4.7)
PHOSPHORUS: 1.1 MG/DL (ref 2.4–4.7)
PHOSPHORUS: 1.8 MG/DL (ref 2.4–4.7)
PHOSPHORUS: 1.9 MG/DL (ref 2.4–4.7)
PHOSPHORUS: < 0.4 MG/DL (ref 2.4–4.7)
PLATELET # BLD: 429 THOU/MM3 (ref 130–400)
PLATELET # BLD: 498 THOU/MM3 (ref 130–400)
PLATELET ESTIMATE: ABNORMAL
PMV BLD AUTO: 10.1 FL (ref 9.4–12.4)
PMV BLD AUTO: 10.5 FL (ref 9.4–12.4)
PO2 (TEMP CORRECTED): 183 (ref 71–104)
PO2: 175 MMHG (ref 71–104)
PO2: 506 MMHG (ref 71–104)
POTASSIUM SERPL-SCNC: 2 MEQ/L (ref 3.5–5.2)
POTASSIUM SERPL-SCNC: 2.1 MEQ/L (ref 3.5–5.2)
POTASSIUM SERPL-SCNC: 2.2 MEQ/L (ref 3.5–5.2)
POTASSIUM SERPL-SCNC: 2.2 MEQ/L (ref 3.5–5.2)
POTASSIUM SERPL-SCNC: 2.4 MEQ/L (ref 3.5–5.2)
POTASSIUM SERPL-SCNC: 3.3 MEQ/L (ref 3.5–5.2)
PROTEIN CSF: > 600 MG/DL (ref 12–60)
RBC # BLD: 4.23 MILL/MM3 (ref 4.2–5.4)
RBC # BLD: 4.39 MILL/MM3 (ref 4.2–5.4)
RBC CSF: 3000 /CUMM
REASON FOR REJECTION: NORMAL
REJECTED TEST: NORMAL
SCAN OF BLOOD SMEAR: NORMAL
SEG NEUTROPHILS: 82.9 %
SEG NEUTROPHILS: 84.1 %
SEGMENTED NEUTROPHILS ABSOLUTE COUNT: 24.5 THOU/MM3 (ref 1.8–7.7)
SEGMENTED NEUTROPHILS ABSOLUTE COUNT: 27.6 THOU/MM3 (ref 1.8–7.7)
SEGS, CSF: 94 % (ref 0–6)
SET PEEP: 6 MMHG
SET PEEP: 6 MMHG
SET PRESS SUPP: 14 CMH2O
SET PRESS SUPP: 14 CMH2O
SET RESPIRATORY RATE: 12 BPM
SET RESPIRATORY RATE: 12 BPM
SMUDGE CELLS: PRESENT
SODIUM BLD-SCNC: 136 MEQ/L (ref 135–145)
SODIUM BLD-SCNC: 138 MEQ/L (ref 135–145)
SODIUM BLD-SCNC: 140 MEQ/L (ref 135–145)
SODIUM BLD-SCNC: 141 MEQ/L (ref 135–145)
SOURCE, BLOOD GAS: ABNORMAL
SOURCE, BLOOD GAS: ABNORMAL
STREPTOCOCCUS AGALACTIAE CSF FILM ARRAY: NOT DETECTED
STREPTOCOCCUS PNEUMONIAE CSF FILM ARRAY: NOT DETECTED
TEMPERATURE: 38.4
TOTAL CK: 780 U/L (ref 30–135)
TOTAL NUCLEATED CELLS CSF: ABNORMAL /CUMM (ref 0–5)
TROPONIN T: 0.01 NG/ML
TROPONIN T: 0.02 NG/ML
TUBE VOLUME RECEIVED CSF: 2 ML
VANCOMYCIN RESISTANT ENTEROCOCCUS: NEGATIVE
VARICELLA-ZOSTER, PCR: NOT DETECTED
WBC # BLD: 29.5 THOU/MM3 (ref 4.8–10.8)
WBC # BLD: 32.8 THOU/MM3 (ref 4.8–10.8)

## 2019-11-19 PROCEDURE — 36556 INSERT NON-TUNNEL CV CATH: CPT | Performed by: INTERNAL MEDICINE

## 2019-11-19 PROCEDURE — 99291 CRITICAL CARE FIRST HOUR: CPT | Performed by: INTERNAL MEDICINE

## 2019-11-19 PROCEDURE — 87081 CULTURE SCREEN ONLY: CPT

## 2019-11-19 PROCEDURE — 87077 CULTURE AEROBIC IDENTIFY: CPT

## 2019-11-19 PROCEDURE — 6360000002 HC RX W HCPCS: Performed by: PHYSICIAN ASSISTANT

## 2019-11-19 PROCEDURE — 87801 DETECT AGNT MULT DNA AMPLI: CPT

## 2019-11-19 PROCEDURE — 87641 MR-STAPH DNA AMP PROBE: CPT

## 2019-11-19 PROCEDURE — 2500000003 HC RX 250 WO HCPCS

## 2019-11-19 PROCEDURE — 87070 CULTURE OTHR SPECIMN AEROBIC: CPT

## 2019-11-19 PROCEDURE — 84484 ASSAY OF TROPONIN QUANT: CPT

## 2019-11-19 PROCEDURE — 6370000000 HC RX 637 (ALT 250 FOR IP): Performed by: NURSE PRACTITIONER

## 2019-11-19 PROCEDURE — 85025 COMPLETE CBC W/AUTO DIFF WBC: CPT

## 2019-11-19 PROCEDURE — 83735 ASSAY OF MAGNESIUM: CPT

## 2019-11-19 PROCEDURE — 87798 DETECT AGENT NOS DNA AMP: CPT

## 2019-11-19 PROCEDURE — 87040 BLOOD CULTURE FOR BACTERIA: CPT

## 2019-11-19 PROCEDURE — 2700000000 HC OXYGEN THERAPY PER DAY

## 2019-11-19 PROCEDURE — 87147 CULTURE TYPE IMMUNOLOGIC: CPT

## 2019-11-19 PROCEDURE — 2580000003 HC RX 258: Performed by: PHYSICIAN ASSISTANT

## 2019-11-19 PROCEDURE — 99292 CRITICAL CARE ADDL 30 MIN: CPT | Performed by: INTERNAL MEDICINE

## 2019-11-19 PROCEDURE — 80048 BASIC METABOLIC PNL TOTAL CA: CPT

## 2019-11-19 PROCEDURE — 02HV33Z INSERTION OF INFUSION DEVICE INTO SUPERIOR VENA CAVA, PERCUTANEOUS APPROACH: ICD-10-PCS | Performed by: INTERNAL MEDICINE

## 2019-11-19 PROCEDURE — 87205 SMEAR GRAM STAIN: CPT

## 2019-11-19 PROCEDURE — 87500 VANOMYCIN DNA AMP PROBE: CPT

## 2019-11-19 PROCEDURE — 82947 ASSAY GLUCOSE BLOOD QUANT: CPT

## 2019-11-19 PROCEDURE — 6370000000 HC RX 637 (ALT 250 FOR IP): Performed by: INTERNAL MEDICINE

## 2019-11-19 PROCEDURE — 82803 BLOOD GASES ANY COMBINATION: CPT

## 2019-11-19 PROCEDURE — 71045 X-RAY EXAM CHEST 1 VIEW: CPT

## 2019-11-19 PROCEDURE — 2000000000 HC ICU R&B

## 2019-11-19 PROCEDURE — 89051 BODY FLUID CELL COUNT: CPT

## 2019-11-19 PROCEDURE — 36592 COLLECT BLOOD FROM PICC: CPT

## 2019-11-19 PROCEDURE — 87075 CULTR BACTERIA EXCEPT BLOOD: CPT

## 2019-11-19 PROCEDURE — 82945 GLUCOSE OTHER FLUID: CPT

## 2019-11-19 PROCEDURE — 62272 THER SPI PNXR DRG CSF: CPT | Performed by: RADIOLOGY

## 2019-11-19 PROCEDURE — 82533 TOTAL CORTISOL: CPT

## 2019-11-19 PROCEDURE — 99223 1ST HOSP IP/OBS HIGH 75: CPT | Performed by: PHYSICIAN ASSISTANT

## 2019-11-19 PROCEDURE — 6360000002 HC RX W HCPCS

## 2019-11-19 PROCEDURE — 82550 ASSAY OF CK (CPK): CPT

## 2019-11-19 PROCEDURE — 94003 VENT MGMT INPAT SUBQ DAY: CPT

## 2019-11-19 PROCEDURE — 2709999900 HC NON-CHARGEABLE SUPPLY

## 2019-11-19 PROCEDURE — 94761 N-INVAS EAR/PLS OXIMETRY MLT: CPT

## 2019-11-19 PROCEDURE — 85730 THROMBOPLASTIN TIME PARTIAL: CPT

## 2019-11-19 PROCEDURE — 83874 ASSAY OF MYOGLOBIN: CPT

## 2019-11-19 PROCEDURE — 2580000003 HC RX 258: Performed by: NURSE PRACTITIONER

## 2019-11-19 PROCEDURE — 009U3ZX DRAINAGE OF SPINAL CANAL, PERCUTANEOUS APPROACH, DIAGNOSTIC: ICD-10-PCS | Performed by: RADIOLOGY

## 2019-11-19 PROCEDURE — 87086 URINE CULTURE/COLONY COUNT: CPT

## 2019-11-19 PROCEDURE — 77003 FLUOROGUIDE FOR SPINE INJECT: CPT | Performed by: RADIOLOGY

## 2019-11-19 PROCEDURE — 6360000002 HC RX W HCPCS: Performed by: NURSE PRACTITIONER

## 2019-11-19 PROCEDURE — 6360000002 HC RX W HCPCS: Performed by: INTERNAL MEDICINE

## 2019-11-19 PROCEDURE — 36556 INSERT NON-TUNNEL CV CATH: CPT

## 2019-11-19 PROCEDURE — 2500000003 HC RX 250 WO HCPCS: Performed by: PHYSICIAN ASSISTANT

## 2019-11-19 PROCEDURE — 84100 ASSAY OF PHOSPHORUS: CPT

## 2019-11-19 PROCEDURE — 84157 ASSAY OF PROTEIN OTHER: CPT

## 2019-11-19 PROCEDURE — 83605 ASSAY OF LACTIC ACID: CPT

## 2019-11-19 PROCEDURE — 36600 WITHDRAWAL OF ARTERIAL BLOOD: CPT

## 2019-11-19 PROCEDURE — 6370000000 HC RX 637 (ALT 250 FOR IP): Performed by: PHYSICIAN ASSISTANT

## 2019-11-19 PROCEDURE — 82948 REAGENT STRIP/BLOOD GLUCOSE: CPT

## 2019-11-19 PROCEDURE — 2580000003 HC RX 258: Performed by: INTERNAL MEDICINE

## 2019-11-19 PROCEDURE — 36415 COLL VENOUS BLD VENIPUNCTURE: CPT

## 2019-11-19 PROCEDURE — 87186 SC STD MICRODIL/AGAR DIL: CPT

## 2019-11-19 RX ORDER — SODIUM CHLORIDE, SODIUM LACTATE, POTASSIUM CHLORIDE, CALCIUM CHLORIDE 600; 310; 30; 20 MG/100ML; MG/100ML; MG/100ML; MG/100ML
INJECTION, SOLUTION INTRAVENOUS CONTINUOUS
Status: DISCONTINUED | OUTPATIENT
Start: 2019-11-20 | End: 2019-11-19 | Stop reason: SDUPTHER

## 2019-11-19 RX ORDER — CHLORHEXIDINE GLUCONATE 0.12 MG/ML
15 RINSE ORAL 2 TIMES DAILY
Status: DISCONTINUED | OUTPATIENT
Start: 2019-11-19 | End: 2019-11-22

## 2019-11-19 RX ORDER — CISATRACURIUM BESYLATE 2 MG/ML
INJECTION, SOLUTION INTRAVENOUS
Status: DISCONTINUED
Start: 2019-11-19 | End: 2019-11-19 | Stop reason: WASHOUT

## 2019-11-19 RX ORDER — DEXTROSE MONOHYDRATE 25 G/50ML
12.5 INJECTION, SOLUTION INTRAVENOUS PRN
Status: DISCONTINUED | OUTPATIENT
Start: 2019-11-19 | End: 2019-12-10 | Stop reason: HOSPADM

## 2019-11-19 RX ORDER — SODIUM CHLORIDE, SODIUM LACTATE, POTASSIUM CHLORIDE, CALCIUM CHLORIDE 600; 310; 30; 20 MG/100ML; MG/100ML; MG/100ML; MG/100ML
INJECTION, SOLUTION INTRAVENOUS CONTINUOUS
Status: ACTIVE | OUTPATIENT
Start: 2019-11-19 | End: 2019-11-19

## 2019-11-19 RX ORDER — FENTANYL CITRATE 50 UG/ML
50 INJECTION, SOLUTION INTRAMUSCULAR; INTRAVENOUS ONCE
Status: COMPLETED | OUTPATIENT
Start: 2019-11-19 | End: 2019-11-19

## 2019-11-19 RX ORDER — PROPOFOL 10 MG/ML
20 INJECTION, EMULSION INTRAVENOUS
Status: DISCONTINUED | OUTPATIENT
Start: 2019-11-19 | End: 2019-11-22

## 2019-11-19 RX ORDER — HEPARIN SODIUM 10000 [USP'U]/100ML
18 INJECTION, SOLUTION INTRAVENOUS CONTINUOUS
Status: DISCONTINUED | OUTPATIENT
Start: 2019-11-19 | End: 2019-11-19

## 2019-11-19 RX ORDER — SODIUM CHLORIDE, SODIUM LACTATE, POTASSIUM CHLORIDE, CALCIUM CHLORIDE 600; 310; 30; 20 MG/100ML; MG/100ML; MG/100ML; MG/100ML
INJECTION, SOLUTION INTRAVENOUS CONTINUOUS
Status: DISCONTINUED | OUTPATIENT
Start: 2019-11-20 | End: 2019-11-19

## 2019-11-19 RX ORDER — IBUPROFEN 200 MG
CAPSULE ORAL ONCE
Status: DISCONTINUED | OUTPATIENT
Start: 2019-11-19 | End: 2019-11-19 | Stop reason: SDUPTHER

## 2019-11-19 RX ORDER — POTASSIUM CHLORIDE 7.45 MG/ML
10 INJECTION INTRAVENOUS
Status: DISCONTINUED | OUTPATIENT
Start: 2019-11-19 | End: 2019-11-19

## 2019-11-19 RX ORDER — DEXTROSE AND SODIUM CHLORIDE 5; .45 G/100ML; G/100ML
INJECTION, SOLUTION INTRAVENOUS CONTINUOUS PRN
Status: DISCONTINUED | OUTPATIENT
Start: 2019-11-19 | End: 2019-11-19

## 2019-11-19 RX ORDER — FENTANYL CITRATE 50 UG/ML
INJECTION, SOLUTION INTRAMUSCULAR; INTRAVENOUS
Status: COMPLETED
Start: 2019-11-19 | End: 2019-11-19

## 2019-11-19 RX ORDER — IBUPROFEN 200 MG
CAPSULE ORAL ONCE
Status: COMPLETED | OUTPATIENT
Start: 2019-11-19 | End: 2019-11-19

## 2019-11-19 RX ORDER — SODIUM CHLORIDE 9 MG/ML
INJECTION, SOLUTION INTRAVENOUS CONTINUOUS
Status: DISCONTINUED | OUTPATIENT
Start: 2019-11-19 | End: 2019-11-19

## 2019-11-19 RX ORDER — POTASSIUM CHLORIDE 29.8 MG/ML
20 INJECTION INTRAVENOUS
Status: COMPLETED | OUTPATIENT
Start: 2019-11-19 | End: 2019-11-19

## 2019-11-19 RX ORDER — HEPARIN SODIUM 1000 [USP'U]/ML
40 INJECTION, SOLUTION INTRAVENOUS; SUBCUTANEOUS PRN
Status: DISCONTINUED | OUTPATIENT
Start: 2019-11-19 | End: 2019-11-19

## 2019-11-19 RX ORDER — SODIUM CHLORIDE, SODIUM LACTATE, POTASSIUM CHLORIDE, CALCIUM CHLORIDE 600; 310; 30; 20 MG/100ML; MG/100ML; MG/100ML; MG/100ML
INJECTION, SOLUTION INTRAVENOUS CONTINUOUS
Status: DISCONTINUED | OUTPATIENT
Start: 2019-11-20 | End: 2019-11-24

## 2019-11-19 RX ORDER — POTASSIUM CHLORIDE AND SODIUM CHLORIDE 900; 300 MG/100ML; MG/100ML
INJECTION, SOLUTION INTRAVENOUS CONTINUOUS
Status: DISCONTINUED | OUTPATIENT
Start: 2019-11-19 | End: 2019-11-19 | Stop reason: SDUPTHER

## 2019-11-19 RX ORDER — DEXAMETHASONE SODIUM PHOSPHATE 4 MG/ML
4 INJECTION, SOLUTION INTRA-ARTICULAR; INTRALESIONAL; INTRAMUSCULAR; INTRAVENOUS; SOFT TISSUE EVERY 12 HOURS
Status: COMPLETED | OUTPATIENT
Start: 2019-11-19 | End: 2019-11-22

## 2019-11-19 RX ORDER — HEPARIN SODIUM 1000 [USP'U]/ML
80 INJECTION, SOLUTION INTRAVENOUS; SUBCUTANEOUS PRN
Status: DISCONTINUED | OUTPATIENT
Start: 2019-11-19 | End: 2019-11-19

## 2019-11-19 RX ORDER — HEPARIN SODIUM 1000 [USP'U]/ML
80 INJECTION, SOLUTION INTRAVENOUS; SUBCUTANEOUS ONCE
Status: COMPLETED | OUTPATIENT
Start: 2019-11-19 | End: 2019-11-19

## 2019-11-19 RX ORDER — POTASSIUM CHLORIDE AND SODIUM CHLORIDE 900; 300 MG/100ML; MG/100ML
INJECTION, SOLUTION INTRAVENOUS CONTINUOUS
Status: DISCONTINUED | OUTPATIENT
Start: 2019-11-19 | End: 2019-11-19

## 2019-11-19 RX ORDER — MAGNESIUM SULFATE IN WATER 40 MG/ML
2 INJECTION, SOLUTION INTRAVENOUS ONCE
Status: COMPLETED | OUTPATIENT
Start: 2019-11-19 | End: 2019-11-19

## 2019-11-19 RX ADMIN — POTASSIUM CHLORIDE 10 MEQ: 7.46 INJECTION, SOLUTION INTRAVENOUS at 04:28

## 2019-11-19 RX ADMIN — CEFTRIAXONE SODIUM 2 G: 2 INJECTION, POWDER, FOR SOLUTION INTRAMUSCULAR; INTRAVENOUS at 09:23

## 2019-11-19 RX ADMIN — Medication 15 ML: at 21:10

## 2019-11-19 RX ADMIN — POTASSIUM CHLORIDE: 2 INJECTION, SOLUTION, CONCENTRATE INTRAVENOUS at 17:12

## 2019-11-19 RX ADMIN — VANCOMYCIN HYDROCHLORIDE 1250 MG: 5 INJECTION, POWDER, LYOPHILIZED, FOR SOLUTION INTRAVENOUS at 03:24

## 2019-11-19 RX ADMIN — MAGNESIUM SULFATE HEPTAHYDRATE 1 G: 500 INJECTION, SOLUTION INTRAMUSCULAR; INTRAVENOUS at 21:36

## 2019-11-19 RX ADMIN — Medication 10 ML: at 09:33

## 2019-11-19 RX ADMIN — POTASSIUM BICARBONATE 40 MEQ: 782 TABLET, EFFERVESCENT ORAL at 02:07

## 2019-11-19 RX ADMIN — HEPARIN SODIUM 18 UNITS/KG/HR: 10000 INJECTION, SOLUTION INTRAVENOUS at 01:38

## 2019-11-19 RX ADMIN — POTASSIUM BICARBONATE 40 MEQ: 782 TABLET, EFFERVESCENT ORAL at 05:46

## 2019-11-19 RX ADMIN — POTASSIUM CHLORIDE 20 MEQ: 29.8 INJECTION, SOLUTION INTRAVENOUS at 22:56

## 2019-11-19 RX ADMIN — DEXMEDETOMIDINE 0.2 MCG/KG/HR: 100 INJECTION, SOLUTION, CONCENTRATE INTRAVENOUS at 03:25

## 2019-11-19 RX ADMIN — FAMOTIDINE 20 MG: 10 INJECTION, SOLUTION INTRAVENOUS at 21:11

## 2019-11-19 RX ADMIN — Medication 5.4 UNITS/HR: at 16:43

## 2019-11-19 RX ADMIN — POTASSIUM CHLORIDE 10 MEQ: 7.46 INJECTION, SOLUTION INTRAVENOUS at 02:57

## 2019-11-19 RX ADMIN — PROPOFOL 50 MCG/KG/MIN: 10 INJECTION, EMULSION INTRAVENOUS at 19:15

## 2019-11-19 RX ADMIN — POTASSIUM & SODIUM PHOSPHATES POWDER PACK 280-160-250 MG 250 MG: 280-160-250 PACK at 09:33

## 2019-11-19 RX ADMIN — POTASSIUM BICARBONATE 40 MEQ: 782 TABLET, EFFERVESCENT ORAL at 21:35

## 2019-11-19 RX ADMIN — PROPOFOL 50 MCG/KG/MIN: 10 INJECTION, EMULSION INTRAVENOUS at 03:41

## 2019-11-19 RX ADMIN — POTASSIUM & SODIUM PHOSPHATES POWDER PACK 280-160-250 MG 250 MG: 280-160-250 PACK at 13:44

## 2019-11-19 RX ADMIN — FAMOTIDINE 20 MG: 10 INJECTION, SOLUTION INTRAVENOUS at 09:53

## 2019-11-19 RX ADMIN — Medication 2 MG: at 05:28

## 2019-11-19 RX ADMIN — FENTANYL CITRATE 50 MCG: 50 INJECTION, SOLUTION INTRAMUSCULAR; INTRAVENOUS at 05:50

## 2019-11-19 RX ADMIN — DEXMEDETOMIDINE 0.6 MCG/KG/HR: 100 INJECTION, SOLUTION, CONCENTRATE INTRAVENOUS at 21:31

## 2019-11-19 RX ADMIN — Medication 15 ML: at 02:02

## 2019-11-19 RX ADMIN — POTASSIUM & SODIUM PHOSPHATES POWDER PACK 280-160-250 MG 250 MG: 280-160-250 PACK at 17:49

## 2019-11-19 RX ADMIN — POTASSIUM CHLORIDE: 2 INJECTION, SOLUTION, CONCENTRATE INTRAVENOUS at 08:33

## 2019-11-19 RX ADMIN — SODIUM PHOSPHATE, MONOBASIC, MONOHYDRATE 20 MMOL: 276; 142 INJECTION, SOLUTION INTRAVENOUS at 21:36

## 2019-11-19 RX ADMIN — DILTIAZEM HYDROCHLORIDE 5 MG/HR: 5 INJECTION INTRAVENOUS at 01:10

## 2019-11-19 RX ADMIN — SODIUM CHLORIDE, POTASSIUM CHLORIDE, SODIUM LACTATE AND CALCIUM CHLORIDE: 600; 310; 30; 20 INJECTION, SOLUTION INTRAVENOUS at 07:16

## 2019-11-19 RX ADMIN — POTASSIUM & SODIUM PHOSPHATES POWDER PACK 280-160-250 MG 250 MG: 280-160-250 PACK at 21:35

## 2019-11-19 RX ADMIN — POTASSIUM CHLORIDE 20 MEQ: 29.8 INJECTION, SOLUTION INTRAVENOUS at 21:51

## 2019-11-19 RX ADMIN — Medication 15 ML: at 09:36

## 2019-11-19 RX ADMIN — PROPOFOL 45 MCG/KG/MIN: 10 INJECTION, EMULSION INTRAVENOUS at 22:57

## 2019-11-19 RX ADMIN — VANCOMYCIN HYDROCHLORIDE 1250 MG: 5 INJECTION, POWDER, LYOPHILIZED, FOR SOLUTION INTRAVENOUS at 21:32

## 2019-11-19 RX ADMIN — MAGNESIUM SULFATE HEPTAHYDRATE 2 G: 40 INJECTION, SOLUTION INTRAVENOUS at 12:59

## 2019-11-19 RX ADMIN — POTASSIUM CHLORIDE 10 MEQ: 7.46 INJECTION, SOLUTION INTRAVENOUS at 01:49

## 2019-11-19 RX ADMIN — PROPOFOL 50 MCG/KG/MIN: 10 INJECTION, EMULSION INTRAVENOUS at 15:25

## 2019-11-19 RX ADMIN — HEPARIN SODIUM 7150 UNITS: 1000 INJECTION INTRAVENOUS; SUBCUTANEOUS at 01:35

## 2019-11-19 RX ADMIN — ACETAMINOPHEN 650 MG: 650 SUPPOSITORY RECTAL at 04:47

## 2019-11-19 RX ADMIN — SODIUM PHOSPHATE, MONOBASIC, MONOHYDRATE 20 MMOL: 276; 142 INJECTION, SOLUTION INTRAVENOUS at 06:39

## 2019-11-19 RX ADMIN — PROPOFOL 50 MCG/KG/MIN: 10 INJECTION, EMULSION INTRAVENOUS at 07:30

## 2019-11-19 RX ADMIN — FAMOTIDINE 20 MG: 10 INJECTION, SOLUTION INTRAVENOUS at 02:03

## 2019-11-19 RX ADMIN — POTASSIUM CHLORIDE 10 MEQ: 7.46 INJECTION, SOLUTION INTRAVENOUS at 05:43

## 2019-11-19 RX ADMIN — DEXAMETHASONE SODIUM PHOSPHATE 4 MG: 4 INJECTION, SOLUTION INTRAMUSCULAR; INTRAVENOUS at 15:04

## 2019-11-19 RX ADMIN — PIPERACILLIN AND TAZOBACTAM 3.38 G: 3; .375 INJECTION, POWDER, LYOPHILIZED, FOR SOLUTION INTRAVENOUS at 01:44

## 2019-11-19 RX ADMIN — PIPERACILLIN AND TAZOBACTAM 3.38 G: 3; .375 INJECTION, POWDER, LYOPHILIZED, FOR SOLUTION INTRAVENOUS at 11:03

## 2019-11-19 RX ADMIN — PIPERACILLIN AND TAZOBACTAM 3.38 G: 3; .375 INJECTION, POWDER, LYOPHILIZED, FOR SOLUTION INTRAVENOUS at 17:49

## 2019-11-19 RX ADMIN — POTASSIUM CHLORIDE 20 MEQ: 29.8 INJECTION, SOLUTION INTRAVENOUS at 21:04

## 2019-11-19 RX ADMIN — DEXMEDETOMIDINE 0.6 MCG/KG/HR: 100 INJECTION, SOLUTION, CONCENTRATE INTRAVENOUS at 12:59

## 2019-11-19 RX ADMIN — PROPOFOL 50 MCG/KG/MIN: 10 INJECTION, EMULSION INTRAVENOUS at 11:09

## 2019-11-19 RX ADMIN — Medication 4.4 UNITS/HR: at 00:43

## 2019-11-19 RX ADMIN — DEXTROSE AND SODIUM CHLORIDE 150 ML/HR: 5; 450 INJECTION, SOLUTION INTRAVENOUS at 01:13

## 2019-11-19 RX ADMIN — DILTIAZEM HYDROCHLORIDE 15 MG/HR: 5 INJECTION INTRAVENOUS at 09:09

## 2019-11-19 RX ADMIN — CEFTRIAXONE SODIUM 2 G: 2 INJECTION, POWDER, FOR SOLUTION INTRAMUSCULAR; INTRAVENOUS at 21:08

## 2019-11-19 RX ADMIN — Medication: at 12:58

## 2019-11-19 ASSESSMENT — PULMONARY FUNCTION TESTS
PIF_VALUE: 20
PIF_VALUE: 15
PIF_VALUE: 19
PIF_VALUE: 19
PIF_VALUE: 20

## 2019-11-19 NOTE — PLAN OF CARE
Problem: Nutrition  Goal: Optimal nutrition therapy  Outcome: Ongoing   Nutrition Problem: Inadequate oral intake  Intervention: Food and/or Nutrient Delivery: (If pt continues intubated, when able to start TF, start Pivot 1.5 TF & goal is 30 ml/hr & flush 1 ( 2.5 oz) liquid protein daily to provide 1184 kcals TF ( 1891 kcals with diprivan ), 94 grams protein  & 124 grams CHO/24 hours. )  Nutritional Goals: adequate nutrtition within 1-4 days

## 2019-11-19 NOTE — PROGRESS NOTES
Patient arrived to unit via [address] with RN, [employer] from [facility]. GCS of 8. Patient's respirations upon arrival were 44 and extremely labored. Heart rate was in the 160s, and it was unclear if rhythm was atrial fibrillation with rapid ventricular response--EKG was called to perform STAT. Rapid response called due to the patient's airway concerns and rapid heart rate. Patient was transferred to the ICU for stabilization and closer monitoring.

## 2019-11-19 NOTE — CARE COORDINATION
11/19/19, 9:20 AM  DISCHARGE PLANNING EVALUATION:    Mustapha Fermin       Admitted from:  from [facility] 11/18/2019/ [facility] day: 1   Location: [address] [place] [address]- Reason for admit: Hyperglycemia [R73.9] Status: IP  Admit order signed?: yes  PMH:  has a past medical history of Brain lipoma, Depression, psychotic ([address] GERD (gastroesophageal reflux disease), Hyperlipidemia, Hypertension, Nicotine abuse, NPH (normal pressure hydrocephalus) ([facility] [address] and Type 2 diabetes mellitus ([facility] [address] Procedure:   11/19 Intubated  11/19 CVC Left subclavian  11/19 CXR: Persistent slightly decreased lung volumes with coarse markings; Minimal right effusion not excluded  Medications:  Scheduled Meds:   chlorhexidine  15 mL Mouth/Throat BID    piperacillin-tazobactam  3.375 g Intravenous Q8H    vancomycin (VANCOCIN) intermittent dosing (placeholder)   Other RX Placeholder    vancomycin  1,250 mg Intravenous Q24H    sodium phosphate IVPB  20 mmol Intravenous Once    cefTRIAXone (ROCEPHIN) IV  2 g Intravenous Q12H    potassium & sodium phosphates  1 packet Oral 4x Daily    metoprolol tartrate  25 mg Oral BID    sodium chloride flush  10 mL Intravenous 2 times per day    famotidine (PEPCID) injection  20 mg Intravenous BID     Continuous Infusions:   propofol 50 mcg/kg/min (11/19/19 0730)    insulin 5.9 Units/hr (11/19/19 0713)    dexmedetomidine 0.5 mcg/kg/hr (11/19/19 0545)    norepinephrine 2 mcg/min (11/19/19 0703)    IV infusion builder 125 mL/hr at 11/19/19 0833    Followed by   Jade Oneal ON 11/20/2019] lactated ringers      diltiazem (CARDIZEM) 125 mg in dextrose 5% 125 mL infusion 15 mg/hr (11/19/19 0909)    dextrose        Pertinent Info/Orders/Treatment Plan: Presented to [facility] with c/o excruciating back pain x 5 days, difficulty ambulating, diarrhea, and difficulty urinating.  also reported patinet had 1-2 day history of hallucinating and confusion.  Transferred to [facility] with hyperglycemia and EKG spouse  Expected Discharge date:  11/21/19  Follow Up Appointment: Best Day/ Time: Tuesday AM    Patient Goals/Plan/Treatment Preferences: Spoke with Sonali's , Gene Layne; states Cathy Gibbs did not use any DME or have any HH services PTA. He states she has a cane, but won't use it. He states she takes her pills when she wants to and takes the ones she wants to. She does not drive, she uses [employer] for transportation, and has a PCP. He states she did not go to her hospital follow-up appointment with her PCP on 11/14; he reminded her, but she told him she didn't need to go. Bodelroy Roverto states plan is for Cathy Gibbs to return home at discharge, denies needs, and declines HH at this time. Continue to monitor as clinical course progresses. Transportation/Food Security/Housekeeping Addressed:  No issues identified.  Evaluation: no    ICU Understanding Delirium pamphlet given to patient's family.

## 2019-11-19 NOTE — CONSULTS
Assessment and Plan:        1. Septic shock: Etiology undetermined. Cultures are pending. Grossly, urine has pyuria and at the very least a urinary tract infection is contributing to the septic shock. Patient has a recent lumbar drain removal and subsequent back pain. Agree with MRI lumbar spine. Patient will undergo lumbar puncture. In the interim, patient will receive therapy to cover possible meningitis until the meningitis is excluded. Patient will receive high-dose Rocephin, vancomycin, and Zosyn. Patient receiving pressors to maintain blood pressure. Patient undergoing volume resuscitation. 2. Acute respiratory failure: Patient intubated 11/18/2019 for impending respiratory arrest.  Are going lung protection strategies targeting a peak pressure of 35 or less and a plateau pressure of 30 or less. 3. Atrial fibrillation with rapid ventricular response: New onset. [employer] can Jean-Pierre to septic shock. Heparin drip on hold pending lumbar puncture. Rate control with Cardizem. 4. Delirium: Secondary to septic shock with poor perfusion state. Possible association with meningitis. Awaiting results of lumbar puncture. 5. Lactic acidosis: Secondary to septic shock with poor perfusion. Undergoing volume resuscitation and support with pressors. Trending. 6. Hyperglycemia: On insulin drip. Convert to SQ insulin once gap closes. 7. Ketoacidosis: Diabetic vs poor oral intake. I suspect the latter with all the electrolyte abnormalities. 8. Type 2 diabetes mellitus: Currently on insulin drip. 9. Electrolyte abnormalities: Replacing phosphorus, potassium and magnesium. 10. Back pain: MRI back. 11. Brain lipoma: Identified 11/4/19.  12. Urinary tract infection:  Cultures pending. 13. Respiratory alkalosis: Secondary to septic shock. 14. Hypotension: Volume resuscitate. Pressors. Screen for adrenal insufficiency. CC:  Septic Shock. HPI: Patient is a 15-year-old white female active smoker.   She has a  insulin 4.5 Units/hr (11/19/19 3682)    dextrose 5 % and 0.45 % NaCl 150 mL/hr (11/19/19 0113)    dexmedetomidine 0.2 mcg/kg/hr (11/19/19 0325)    norepinephrine 4 mcg/min (11/19/19 0662)    0.9% NaCl with KCl 40 mEq      Followed by   Amrik Becerriler ON 11/20/2019] lactated ringers      lactated ringers      diltiazem (CARDIZEM) 125 mg in dextrose 5% 125 mL infusion 15 mg/hr (11/19/19 0232)    dextrose        chlorhexidine  15 mL Mouth/Throat BID    piperacillin-tazobactam  3.375 g Intravenous Q8H    vancomycin (VANCOCIN) intermittent dosing (placeholder)   Other RX Placeholder    vancomycin  1,250 mg Intravenous Q24H    sodium phosphate IVPB  20 mmol Intravenous Once    cefTRIAXone (ROCEPHIN) IV  2 g Intravenous Q12H    metoprolol tartrate  25 mg Oral BID    sodium chloride flush  10 mL Intravenous 2 times per day    famotidine (PEPCID) injection  20 mg Intravenous BID     Vital Signs: T: 101: P: 97 RR: 25 B/P: 88/66: FiO2: 30: O2 Sat:97: I/O: 1927/100  CAM-ICU:   RASS -2. General:   Acutely ill appearing white female. HEENT:  normocephalic and atraumatic. No scleral icterus. PERR  Neck: supple. No Thyromegaly. Lungs: clear to auscultation. No retractions. Positive tachypnea. Cardiac: Irregular. No JVD. Abdomen: soft. Nontender. Extremities:  No clubbing, cyanosis, or edema x 4. Vasculature: capillary refill < 3 seconds. Palpable dorsalis pedis pulses. Skin: Cool and pale. Psych:  Sedated on mechanical ventilator. Lymph:  No supraclavicular adenopathy. Neurologic:  No seizures. Data: (All radiographs, tracings, PFTs, and imaging are personally viewed and interpreted unless otherwise noted).  Telemetry shows atrial fibrillation rate .  CXR shows no infiltrates.  Sodium 140, potassium 2, chloride 103, bicarb 17, BUN 28, creatinine 1.1, glucose 188. Lactic acid 3.5. Anion gap 20. Phosphorus less than 0.4. . Troponin 0 0.014. Beta hydroxybutyrate 25.   White blood cell count 29.5, hemoglobin 11.4, platelets 799.  Urine is grossly purulent. CC time 80 minutes. Time does not include procedures. Electronically signed by Orion Mojica M.D.

## 2019-11-19 NOTE — FLOWSHEET NOTE
2320 To ICU from 4A; pt arrived with EMS as direct admit in resp. distress on 4A. A rapid response was called upon the patient's arrival and was immediately brought to ICU 13 per attending provider, Cheyenne RAY and RRT. Pt appears flushed & hot, with jerking and tremoring movements, eyes were also noted to be deviated up and roving side to side (as per report of RR nurse). Orders received to prepare for emergent intubation.  ml bolus initiated and Insulin gtt continued at 2 units/hr. D5NS infusion discontinued. Pt tachycardic, 160's, on arrival.      2331 Pt intubated and placed on [place] vent support (see sedation narrator for details). 2336 OGT inserted  w/o difficulty and placed to LIWS. 2337 Glucosestabilizer initiated for insulin infusion management     2339 Existing Cade catheter removed and 16fr temp sensing cade inserted, urine culture obtained per protocol    2341 Pt restless, Diprivan gtt initiated     2350 Pt remains tachy with 's, EKG reveals Afib with RVR. Cardizem 10 mg IVP given    2355 PCXR obtained for ETT placement and reviewed by Anna Marie Hoodma. Orders received to retract ETT by 2 cm. 0000 ETT pulled back to the 21 cm per RT staff. Rectal temp 102.8; tylenol supp given. 0006 PCXR repeated and reviewed by PA; ETT now in good position     0010 Pt's  in waiting room and updated per PA. Orders received to initiate Cardizem gtt for continued Afib with RVR.    0015 Care assumed by Sergio Lai RN.

## 2019-11-19 NOTE — H&P
Assessment and Plan:        1. Acute respiratory failure: intubation, setting by protocol  2. Sepsis: unknown source, start IV Zosyn and Vancomycin, need further imaging of the lumbar spine to ensure no paraspinal abscess  3. A fib with RVR: start cardizem drip and heparin as no history prior  4. Encephalopathy: most likely infectious, will continue to monitor  5. Hyperglycemia/DKA: continue insulin drip, q 4 hour BMPs  6. NPH: noted  7. Brain lipoma: noted    CC:  Hyperglycemia  HPI: Patient was transferred from [facility] with a complaint of hyperglycemia and EKG changes. En route to [facility]. Shauna's the patient was less responsive, tachypneic, hypoxic, and became very tachycardic. She was hot to touch. Upon arrival to the facility the patient was diverted to the ICU and promptly intubated to protect her airway. The patient was found to be febrile with a WBC 32.8, along with the respiratory rate of 44 was diagnosed with sepsis. Current source is unknown. Patient was recently admitted for a lumbar drain trial.  She reportedly removed the drain on her own and signed out of the hospital AMA. The patient's  reports a five day history of excruciating back pain, difficulty ambulating along with diarrhea and difficulty urinating. He reports a one to two day history of hallucinating and confusion.     ROS: Review of Systems   Unable to perform ROS: Intubated     PMH:    Past Medical History:   Diagnosis Date    Brain lipoma 11/4/2019    Depression, psychotic ([facility] [address]     GERD (gastroesophageal reflux disease)     Hyperlipidemia     Hypertension     Nicotine abuse     NPH (normal pressure hydrocephalus) ([facility] [address] 11/4/2019    Type 2 diabetes mellitus ([facility] [address]        SHX:    Social History     Socioeconomic History    Marital status:      Spouse name: Juan Brantley Number of children: 3    Years of education: Not on file    Highest education level: Not on file   Occupational History    Not on file   Social Needs    Financial resource strain: Not on file    Food insecurity:     Worry: Not on file     Inability: Not on file    Transportation needs:     Medical: Not on file     Non-medical: Not on file   Tobacco Use    Smoking status: Current Some Day Smoker     Packs/day: 0.50     Types: Cigarettes    Smokeless tobacco: Never Used   Substance and Sexual Activity    Alcohol use: Not Currently    Drug use: Never    Sexual activity: Not Currently   Lifestyle    Physical activity:     Days per week: Not on file     Minutes per session: Not on file    Stress: Not on file   Relationships    Social connections:     Talks on phone: Not on file     Gets together: Not on file     Attends [employer] service: Not on file     Active member of club or organization: Not on file     Attends meetings of clubs or organizations: Not on file     Relationship status: Not on file    Intimate partner violence:     Fear of current or ex partner: Not on file     Emotionally abused: Not on file     Physically abused: Not on file     Forced sexual activity: Not on file   Other Topics Concern    Not on file   Social History Narrative    Not on file       FHX:   Family History   Problem Relation Age of Onset    Depression Mother     Heart Attack Sister        Allergies: No Known Allergies    Medications:     insulin      propofol 25 mcg/kg/min (11/18/19 6722)    diltiazem (CARDIZEM) 125 mg in dextrose 5% 125 mL infusion      dextrose        insulin detemir  10 Units Subcutaneous Nightly    metoprolol tartrate  25 mg Oral BID    sodium chloride flush  10 mL Intravenous 2 times per day    enoxaparin  40 mg Subcutaneous Daily    famotidine (PEPCID) injection  20 mg Intravenous BID     sodium chloride flush, 10 mL, PRN  magnesium hydroxide, 30 mL, Daily PRN  ondansetron, 4 mg, Q6H PRN  potassium chloride, 10 mEq, PRN  magnesium sulfate, 1 g, PRN  acetaminophen, 650 mg, Q4H PRN  glucose, 15 g, PRN  dextrose, 12.5 g, Base Excess (Calculated) -2.9 (L) -2.5 - 2.5 mmol/l    O2 Sat 100 %    IFIO2 100     DEVICE Adult Vent     Mode PC     SET RESPIRATORY RATE 12 bpm    SET PEEP 6.0 mmhg    SET PRESS SUPP 14 cmH2O    [place] Test Positive     Source: L Radial     COLLECTED BY: [id number]    Glucose, Whole Blood    Collection Time: 11/19/19 12:40 AM   Result Value Ref Range    Glucose, Whole Blood 207 (H) 70 - 108 mg/dl   APTT    Collection Time: 11/19/19  1:07 AM   Result Value Ref Range    aPTT 25.9 22.0 - 38.0 seconds   SPECIMEN REJECTION    Collection Time: 11/19/19  1:26 AM   Result Value Ref Range    Rejected Test tropt     Reason for Rejection see below    Troponin    Collection Time: 11/19/19  1:30 AM   Result Value Ref Range    Troponin T 0.021 (A) ng/ml   CK    Collection Time: 11/19/19  1:30 AM   Result Value Ref Range    Total  (H) 30 - 135 U/L   POCT glucose    Collection Time: 11/19/19  1:41 AM   Result Value Ref Range    POC Glucose 49 (L) 70 - 108 mg/dl   POCT glucose    Collection Time: 11/19/19  1:43 AM   Result Value Ref Range    POC Glucose 209 (H) 70 - 108 mg/dl   POCT glucose - every hour    Collection Time: 11/19/19  2:53 AM   Result Value Ref Range    POC Glucose 216 (H) 70 - 108 mg/dl   POCT glucose - every hour    Collection Time: 11/19/19  3:59 AM   Result Value Ref Range    POC Glucose 178 (H) 70 - 108 mg/dl         Vital Signs: T: 102.8F P: 151 RR: 26 B/P: 147/104: FiO2: 100%: O2 Sat:100%: I/O: No intake or output data in the 24 hours ending 11/19/19 0425      General:   Moderate distress, unresponsive, breathing rapidly, unable to focus attention,   HEENT:  normocephalic and atraumatic. No scleral icterus. PEARLA, mucous membranes very dry  Neck: supple. Trachea midline. No JVD. Full ROM, no meningismus. Lungs: clear to auscultation. No retractions, no accessory muscle use. Breathing is very rapid, shallow  Cardiac: tachycardic and irregular, no murmur, weak peripheral pulses  Abdomen: soft. Nontender. Bowel sounds active  Extremities:  No clubbing, cyanosis x 4, no edema    Vasculature: capillary refill < 3 seconds. Skin:  hot and dry. no visible rashes  Psych: unable to answer questions, only responsive to painful stimuli x 4  Lymph:  No supraclavicular adenopathy. Neurologic:  . No focal deficit. Data: (All radiographs, tracings, PFTs, and imaging are personally viewed and interpreted unless otherwise noted).  Outside records reviewed   EKG on presentation A fib with RVR - new onset to family and medical record does not indicate a history   WBC 32.8, lactic 3.5    Greater than 55 minutes spent in the evaluation and treatment of the patient, greater than 1/2 of that time spent face to face with patient and family.     Electronically signed by  Daria Pulido PA-C

## 2019-11-19 NOTE — PROGRESS NOTES
1140 Patient received in IR for lumbar puncture. Pt. Intubated and sedated. ICU nurse with pt and RRT. Pt. on the monitor. 1142 This procedure has been fully reviewed with the family and written informed consent has been obtained from the family. 1151 Procedure started with Dr. Abby Grove. 1155 Puncture at L3-L4.  1201 2.5ml of cloudy, yellow fluid received in tube 1  1219 2.5ml of cloudy, yellow fluid in tube 2  1220 1ml of cloudy, yellow fluid in tube 3. Increased Levophed drip to 6mcg per min. BP 88/52 (61)  1221 JASMINA Campuzano RT took one staple out of the back. Site red and closed. 1233 1ml of cloudy, yellow fluid in tube 4  1234 BP 87/57 (63) increased Levophed to 8mcg/min  1236 Procedure completed; patient tolerated well. Bandaids to the sites and triple antibiotic ointment applied the one where the staple was removed; no bleeding noted. 1240 Patient on bed; comfort ensured. [id number] Patient taken to 4d via vent and monitor with ICU nurse and RRT.

## 2019-11-19 NOTE — PLAN OF CARE
Problem: Impaired respiratory status  Goal: Normal spontaneous ventilation  Outcome: Ongoing   Pt still requiring ventilatory support=will continue with current plan

## 2019-11-19 NOTE — PROGRESS NOTES
Nutrition Assessment    Type and Reason for Visit: Initial, Consult(TF per vent protocol)    Nutrition Recommendations: If pt continues intubated, recommend Pivot 1.5 TF increase to goal of 30 ml/hr & flush 1 ( 2.5 oz) liquid protein daily with current diprivan rate. This is in Dr's sticky notes. When pt extubated, recommend swallow evaluation. Nutrition Assessment:   Pt. nutritionally compromised AEB intubated & NPO. At risk for further nutrition compromise r/t admitted with AMS, hyperglycemia, UTI, ? meningitis, sepsis & s/p recent lumbar drain  removal. and underlying medical condition (back pain, DM, GERD, HTN & tobacco use. ). K+ 2- replacement, PO4 0.8- replacement, WBC 29.4, glucose 188. meds include insulin drip, electrolyte   replacement, precedex, & ATB. Nutrition recommendations/interventions as per above. Malnutrition Assessment:  · Malnutrition Status: At risk for malnutrition    Nutrition Risk Level: High    Nutrient Needs:  · Estimated Daily Total Kcal: ~1788 kcals ( 20 kcals/kg on admit weight of 89.4 kg)  · Estimated Daily Protein (g): ~ grams ( 1.5-1.7 grams protein/kg on IBW of 59 kg)  Monitor renal status    Nutrition Diagnosis:   · Problem: Inadequate oral intake  · Etiology: related to Impaired respiratory function-inability to consume food     Signs and symptoms:  as evidenced by NPO status due to medical condition, Intubation    Objective Information:  · Nutrition-Focused Physical Findings: Pt intubated & sedated with diprivan & precedex, NPO.  Plan  LP & MRI today, Reported some dificulty with swallowing harder foods  · Wound Type: (to include left lower back incision, back bruises , left foot bruising, left ankle abrasions. )  · Current Nutrition Therapies:  · Oral Diet Orders: NPO   · Oral Diet intake: NPO  · Oral Nutrition Supplement (ONS) Orders: None  · ONS intake: NPO  · Anthropometric Measures:  · Ht: 5' 6\" (167.6 cm)   · Current Body Wt: 197 lb 1.5 oz (89.4

## 2019-11-19 NOTE — PROGRESS NOTES
Spoke with Dr. Frederick Rutledge at [facility] re; Matty Bautista. She has been in bed for past 3-4 days with \"back issues\"  Glucose=687  WBC=30  Hem=12.3  K+=2.4  Cr=1.7  NEG troponin  NEG CT of Head  NEG CXR  UA + glucose/ketones  ABG= pH-7.37             PCO2-218             Bicarb-10.3             Sat 97% RA  K+ given. Insulin drip started 8u/hr. Glucose now 431. She is not talking to staff. Family says she was confused prior to arrival. This is not normal for her. Dr Berta Barrera admitted pt at the beginning of the month so transfer center was asked to call him to see if he wants to admit. Thanked Dr. Frederick Rutledge for transfer. 1945- per transfer center Dr Berta Barrera wants hospitalist to admit. Accepted pt on behalf of Dr Naomi Wheeler. Message sent to Dr Naomi Wheeler.

## 2019-11-20 ENCOUNTER — APPOINTMENT (OUTPATIENT)
Dept: MRI IMAGING | Age: 64
DRG: 856 | End: 2019-11-20
Attending: INTERNAL MEDICINE
Payer: MEDICARE

## 2019-11-20 LAB
ACINETOBACTER BAUMANNII FILM ARRAY: NOT DETECTED
ANION GAP SERPL CALCULATED.3IONS-SCNC: 16 MEQ/L (ref 8–16)
BOTTLE TYPE: ABNORMAL
BUN BLDV-MCNC: 16 MG/DL (ref 7–22)
BUN BLDV-MCNC: 17 MG/DL (ref 7–22)
BUN BLDV-MCNC: 17 MG/DL (ref 7–22)
CALCIUM IONIZED: 1.08 MMOL/L (ref 1.12–1.32)
CALCIUM SERPL-MCNC: 7.6 MG/DL (ref 8.5–10.5)
CALCIUM SERPL-MCNC: 7.9 MG/DL (ref 8.5–10.5)
CALCIUM SERPL-MCNC: 8 MG/DL (ref 8.5–10.5)
CANDIDA ALBICANS FILM ARRAY: NOT DETECTED
CANDIDA GLABRATA FILM ARRAY: NOT DETECTED
CANDIDA KRUSEI FILM ARRAY: NOT DETECTED
CANDIDA PARAPSILOSIS FILM ARRAY: NOT DETECTED
CANDIDA TROPICALIS FILM ARRAY: NOT DETECTED
CARBAPENEM RESITANT FILM ARRAY: ABNORMAL
CHLORIDE BLD-SCNC: 104 MEQ/L (ref 98–111)
CHLORIDE BLD-SCNC: 104 MEQ/L (ref 98–111)
CHLORIDE BLD-SCNC: 108 MEQ/L (ref 98–111)
CO2: 16 MEQ/L (ref 23–33)
CO2: 17 MEQ/L (ref 23–33)
CO2: 17 MEQ/L (ref 23–33)
CREAT SERPL-MCNC: 0.8 MG/DL (ref 0.4–1.2)
ENTERBACTER CLOACAE FILM ARRAY: NOT DETECTED
ENTERBACTERIACEAE FILM ARRAY: NOT DETECTED
ENTEROCOCCUS FILM ARRAY: NOT DETECTED
ESCHERICHIA COLI FILM ARRAY: NOT DETECTED
GFR SERPL CREATININE-BSD FRML MDRD: 72 ML/MIN/1.73M2
GLUCOSE BLD-MCNC: 115 MG/DL (ref 70–108)
GLUCOSE BLD-MCNC: 116 MG/DL (ref 70–108)
GLUCOSE BLD-MCNC: 139 MG/DL (ref 70–108)
GLUCOSE BLD-MCNC: 180 MG/DL (ref 70–108)
GLUCOSE BLD-MCNC: 198 MG/DL (ref 70–108)
GLUCOSE BLD-MCNC: 217 MG/DL (ref 70–108)
GLUCOSE BLD-MCNC: 231 MG/DL (ref 70–108)
GLUCOSE BLD-MCNC: 233 MG/DL (ref 70–108)
GLUCOSE BLD-MCNC: 246 MG/DL (ref 70–108)
GLUCOSE BLD-MCNC: 249 MG/DL (ref 70–108)
GLUCOSE BLD-MCNC: 347 MG/DL (ref 70–108)
HAEMOPHILUS INFLUENZA FILM ARRAY: NOT DETECTED
KLEBSIELLA OXYTOCA FILM ARRAY: NOT DETECTED
KLEBSIELLA PNEUMONIAE FILM ARRAY: NOT DETECTED
LACTIC ACID: 1.8 MMOL/L (ref 0.5–2.2)
LACTIC ACID: 3.5 MMOL/L (ref 0.5–2.2)
LACTIC ACID: 3.8 MMOL/L (ref 0.5–2.2)
LISTERIA MONOCYTOGENES FILM ARRAY: NOT DETECTED
MAGNESIUM: 1.5 MG/DL (ref 1.6–2.4)
MAGNESIUM: 1.6 MG/DL (ref 1.6–2.4)
MAGNESIUM: 1.8 MG/DL (ref 1.6–2.4)
MAGNESIUM: 2 MG/DL (ref 1.6–2.4)
METHICILLIN RESISTANT FILM ARRAY: NOT DETECTED
NEISSERIA MENIGITIDIS FILM ARRAY: NOT DETECTED
PHOSPHORUS: 1.9 MG/DL (ref 2.4–4.7)
PHOSPHORUS: 2.3 MG/DL (ref 2.4–4.7)
PHOSPHORUS: 3.3 MG/DL (ref 2.4–4.7)
POTASSIUM SERPL-SCNC: 3.2 MEQ/L (ref 3.5–5.2)
POTASSIUM SERPL-SCNC: 4.1 MEQ/L (ref 3.5–5.2)
POTASSIUM SERPL-SCNC: 4.2 MEQ/L (ref 3.5–5.2)
PROTEUS FILM ARRAY: NOT DETECTED
PSEUDOMONAS AERUGINOSA FILM ARRAY: NOT DETECTED
SERRATIA MARCESCENS FILM ARRAY: NOT DETECTED
SODIUM BLD-SCNC: 137 MEQ/L (ref 135–145)
SODIUM BLD-SCNC: 137 MEQ/L (ref 135–145)
SODIUM BLD-SCNC: 140 MEQ/L (ref 135–145)
SOURCE OF BLOOD CULTURE: ABNORMAL
STAPH AUREUS FILM ARRAY: DETECTED
STAPHYLOCOCCUS FILM ARRAY: DETECTED
STREP AGALACTIAE FILM ARRAY: NOT DETECTED
STREP PNEUMONIAE FILM ARRAY: NOT DETECTED
STREP PYOCGENES FILM ARRAY: NOT DETECTED
STREPTOCOCCUS FILM ARRAY: NOT DETECTED
VANCOMYCIN RESISTANT FILM ARRAY: ABNORMAL

## 2019-11-20 PROCEDURE — 94003 VENT MGMT INPAT SUBQ DAY: CPT

## 2019-11-20 PROCEDURE — 94770 HC ETCO2 MONITOR DAILY: CPT

## 2019-11-20 PROCEDURE — 2000000000 HC ICU R&B

## 2019-11-20 PROCEDURE — 2580000003 HC RX 258: Performed by: PHYSICIAN ASSISTANT

## 2019-11-20 PROCEDURE — 6360000002 HC RX W HCPCS: Performed by: INTERNAL MEDICINE

## 2019-11-20 PROCEDURE — 84100 ASSAY OF PHOSPHORUS: CPT

## 2019-11-20 PROCEDURE — 2500000003 HC RX 250 WO HCPCS: Performed by: INTERNAL MEDICINE

## 2019-11-20 PROCEDURE — 6360000004 HC RX CONTRAST MEDICATION: Performed by: INTERNAL MEDICINE

## 2019-11-20 PROCEDURE — 99291 CRITICAL CARE FIRST HOUR: CPT | Performed by: NEUROLOGICAL SURGERY

## 2019-11-20 PROCEDURE — 2700000000 HC OXYGEN THERAPY PER DAY

## 2019-11-20 PROCEDURE — 6370000000 HC RX 637 (ALT 250 FOR IP): Performed by: PHYSICIAN ASSISTANT

## 2019-11-20 PROCEDURE — 2500000003 HC RX 250 WO HCPCS: Performed by: PHYSICIAN ASSISTANT

## 2019-11-20 PROCEDURE — 36415 COLL VENOUS BLD VENIPUNCTURE: CPT

## 2019-11-20 PROCEDURE — 72157 MRI CHEST SPINE W/O & W/DYE: CPT

## 2019-11-20 PROCEDURE — 99291 CRITICAL CARE FIRST HOUR: CPT | Performed by: INTERNAL MEDICINE

## 2019-11-20 PROCEDURE — 2709999900 HC NON-CHARGEABLE SUPPLY

## 2019-11-20 PROCEDURE — 6370000000 HC RX 637 (ALT 250 FOR IP): Performed by: NURSE PRACTITIONER

## 2019-11-20 PROCEDURE — A9579 GAD-BASE MR CONTRAST NOS,1ML: HCPCS | Performed by: INTERNAL MEDICINE

## 2019-11-20 PROCEDURE — 6360000002 HC RX W HCPCS: Performed by: PHYSICIAN ASSISTANT

## 2019-11-20 PROCEDURE — 83605 ASSAY OF LACTIC ACID: CPT

## 2019-11-20 PROCEDURE — 6370000000 HC RX 637 (ALT 250 FOR IP): Performed by: INTERNAL MEDICINE

## 2019-11-20 PROCEDURE — 2500000003 HC RX 250 WO HCPCS

## 2019-11-20 PROCEDURE — 2580000003 HC RX 258: Performed by: INTERNAL MEDICINE

## 2019-11-20 PROCEDURE — 94761 N-INVAS EAR/PLS OXIMETRY MLT: CPT

## 2019-11-20 PROCEDURE — 84132 ASSAY OF SERUM POTASSIUM: CPT

## 2019-11-20 PROCEDURE — 82330 ASSAY OF CALCIUM: CPT

## 2019-11-20 PROCEDURE — 82948 REAGENT STRIP/BLOOD GLUCOSE: CPT

## 2019-11-20 PROCEDURE — 83735 ASSAY OF MAGNESIUM: CPT

## 2019-11-20 PROCEDURE — 6360000002 HC RX W HCPCS

## 2019-11-20 PROCEDURE — 72158 MRI LUMBAR SPINE W/O & W/DYE: CPT

## 2019-11-20 PROCEDURE — 6360000002 HC RX W HCPCS: Performed by: NURSE PRACTITIONER

## 2019-11-20 PROCEDURE — 80048 BASIC METABOLIC PNL TOTAL CA: CPT

## 2019-11-20 RX ORDER — METOPROLOL TARTRATE 5 MG/5ML
5 INJECTION INTRAVENOUS EVERY 6 HOURS
Status: DISCONTINUED | OUTPATIENT
Start: 2019-11-20 | End: 2019-11-22

## 2019-11-20 RX ORDER — LORAZEPAM 2 MG/ML
INJECTION INTRAMUSCULAR
Status: COMPLETED
Start: 2019-11-20 | End: 2019-11-20

## 2019-11-20 RX ORDER — MEPERIDINE HYDROCHLORIDE 25 MG/ML
25 INJECTION INTRAMUSCULAR; INTRAVENOUS; SUBCUTANEOUS ONCE
Status: COMPLETED | OUTPATIENT
Start: 2019-11-20 | End: 2019-11-20

## 2019-11-20 RX ORDER — INSULIN GLARGINE 100 [IU]/ML
30 INJECTION, SOLUTION SUBCUTANEOUS 2 TIMES DAILY
Status: DISCONTINUED | OUTPATIENT
Start: 2019-11-20 | End: 2019-11-21

## 2019-11-20 RX ORDER — POTASSIUM CHLORIDE 29.8 MG/ML
20 INJECTION INTRAVENOUS
Status: COMPLETED | OUTPATIENT
Start: 2019-11-20 | End: 2019-11-20

## 2019-11-20 RX ADMIN — FAMOTIDINE 20 MG: 10 INJECTION, SOLUTION INTRAVENOUS at 20:20

## 2019-11-20 RX ADMIN — POTASSIUM & SODIUM PHOSPHATES POWDER PACK 280-160-250 MG 250 MG: 280-160-250 PACK at 09:45

## 2019-11-20 RX ADMIN — DEXAMETHASONE SODIUM PHOSPHATE 4 MG: 4 INJECTION, SOLUTION INTRAMUSCULAR; INTRAVENOUS at 14:48

## 2019-11-20 RX ADMIN — SODIUM CHLORIDE, POTASSIUM CHLORIDE, SODIUM LACTATE AND CALCIUM CHLORIDE: 600; 310; 30; 20 INJECTION, SOLUTION INTRAVENOUS at 07:54

## 2019-11-20 RX ADMIN — NAFCILLIN SODIUM 2 G: 2 INJECTION, POWDER, LYOPHILIZED, FOR SOLUTION INTRAMUSCULAR; INTRAVENOUS at 14:00

## 2019-11-20 RX ADMIN — SODIUM PHOSPHATE, MONOBASIC, MONOHYDRATE 10 MMOL: 276; 142 INJECTION, SOLUTION INTRAVENOUS at 09:00

## 2019-11-20 RX ADMIN — INSULIN GLARGINE 30 UNITS: 100 INJECTION, SOLUTION SUBCUTANEOUS at 21:09

## 2019-11-20 RX ADMIN — MEPERIDINE HYDROCHLORIDE 25 MG: 25 INJECTION INTRAMUSCULAR; INTRAVENOUS; SUBCUTANEOUS at 04:59

## 2019-11-20 RX ADMIN — INSULIN GLARGINE 30 UNITS: 100 INJECTION, SOLUTION SUBCUTANEOUS at 09:47

## 2019-11-20 RX ADMIN — NAFCILLIN SODIUM 2 G: 2 INJECTION, POWDER, LYOPHILIZED, FOR SOLUTION INTRAMUSCULAR; INTRAVENOUS at 21:43

## 2019-11-20 RX ADMIN — POTASSIUM CHLORIDE 20 MEQ: 29.8 INJECTION, SOLUTION INTRAVENOUS at 03:14

## 2019-11-20 RX ADMIN — PIPERACILLIN AND TAZOBACTAM 3.38 G: 3; .375 INJECTION, POWDER, LYOPHILIZED, FOR SOLUTION INTRAVENOUS at 01:55

## 2019-11-20 RX ADMIN — METOPROLOL TARTRATE 5 MG: 5 INJECTION INTRAVENOUS at 21:47

## 2019-11-20 RX ADMIN — PROPOFOL 35 MCG/KG/MIN: 10 INJECTION, EMULSION INTRAVENOUS at 05:19

## 2019-11-20 RX ADMIN — DEXAMETHASONE SODIUM PHOSPHATE 4 MG: 4 INJECTION, SOLUTION INTRAMUSCULAR; INTRAVENOUS at 02:04

## 2019-11-20 RX ADMIN — Medication 10 ML: at 20:20

## 2019-11-20 RX ADMIN — Medication 15 ML: at 08:59

## 2019-11-20 RX ADMIN — GADOTERIDOL 20 ML: 279.3 INJECTION, SOLUTION INTRAVENOUS at 13:38

## 2019-11-20 RX ADMIN — PROPOFOL 45 MCG/KG/MIN: 10 INJECTION, EMULSION INTRAVENOUS at 09:42

## 2019-11-20 RX ADMIN — INSULIN LISPRO 8 UNITS: 100 INJECTION, SOLUTION INTRAVENOUS; SUBCUTANEOUS at 21:09

## 2019-11-20 RX ADMIN — PROPOFOL 45 MCG/KG/MIN: 10 INJECTION, EMULSION INTRAVENOUS at 16:01

## 2019-11-20 RX ADMIN — Medication 15 ML: at 20:20

## 2019-11-20 RX ADMIN — POTASSIUM & SODIUM PHOSPHATES POWDER PACK 280-160-250 MG 250 MG: 280-160-250 PACK at 14:47

## 2019-11-20 RX ADMIN — PROPOFOL 40 MCG/KG/MIN: 10 INJECTION, EMULSION INTRAVENOUS at 20:10

## 2019-11-20 RX ADMIN — METOPROLOL TARTRATE 5 MG: 5 INJECTION INTRAVENOUS at 15:42

## 2019-11-20 RX ADMIN — ACETAMINOPHEN 650 MG: 650 SUPPOSITORY RECTAL at 07:49

## 2019-11-20 RX ADMIN — FAMOTIDINE 20 MG: 10 INJECTION, SOLUTION INTRAVENOUS at 08:59

## 2019-11-20 RX ADMIN — SODIUM CHLORIDE, POTASSIUM CHLORIDE, SODIUM LACTATE AND CALCIUM CHLORIDE: 600; 310; 30; 20 INJECTION, SOLUTION INTRAVENOUS at 20:20

## 2019-11-20 RX ADMIN — PROPOFOL 45 MCG/KG/MIN: 10 INJECTION, EMULSION INTRAVENOUS at 11:22

## 2019-11-20 RX ADMIN — NAFCILLIN SODIUM 2 G: 2 INJECTION, POWDER, LYOPHILIZED, FOR SOLUTION INTRAMUSCULAR; INTRAVENOUS at 18:26

## 2019-11-20 RX ADMIN — VANCOMYCIN HYDROCHLORIDE 1250 MG: 5 INJECTION, POWDER, LYOPHILIZED, FOR SOLUTION INTRAVENOUS at 15:41

## 2019-11-20 RX ADMIN — POTASSIUM & SODIUM PHOSPHATES POWDER PACK 280-160-250 MG 250 MG: 280-160-250 PACK at 20:20

## 2019-11-20 RX ADMIN — PROPOFOL 45 MCG/KG/MIN: 10 INJECTION, EMULSION INTRAVENOUS at 23:35

## 2019-11-20 RX ADMIN — POTASSIUM CHLORIDE 20 MEQ: 29.8 INJECTION, SOLUTION INTRAVENOUS at 04:35

## 2019-11-20 RX ADMIN — POTASSIUM CHLORIDE: 2 INJECTION, SOLUTION, CONCENTRATE INTRAVENOUS at 00:29

## 2019-11-20 ASSESSMENT — PULMONARY FUNCTION TESTS
PIF_VALUE: 19
PIF_VALUE: 16
PIF_VALUE: 19
PIF_VALUE: 19
PIF_VALUE: 20
PIF_VALUE: 22

## 2019-11-20 NOTE — FLOWSHEET NOTE
0800  Assessment completed. Pt responds to painful stimuli. Intubated and sedated with diprivan and precedex. Oral care provided. Old bloody brown secretions noted from mouth. Scant amount clear sputum suctioned from ETT.  at bedside and updated. 1120  Transported to IR for LP. Consent obtained from . Assisted with turning pt onto abdomen in specials. Staple to lower mid back removed. Small amount purulent drainage noted at site. Antibiotic ointment and bandaid applied. 1255  Back to room from specials. Tolerated LP well. 1320  Discussed plan of care with Dr. Jessica Roman. MRI rescheduled until am due to critical condition of pt at this time. Multiple drips infusing and not compatible with MRI.    1400  Updated Dr. Jessica Roman on lab results. Continuing to replace electrolytes per protocol. Melody Woods continues with insulin drip infusing. Updated pts  several times at bedside. 1800  Condition unchanged. Family at bedside and updated. Electrolyte replacements continue. Band aids to back D/I,  Lumbar puncture and staple removal site.

## 2019-11-20 NOTE — PLAN OF CARE
Problem: Nutrition  Goal: Optimal nutrition therapy  Outcome: Ongoing   Nutrition Problem: Inadequate oral intake  Intervention: Food and/or Nutrient Delivery: Continue NPO, Start Tube Feeding  Nutritional Goals: TF to provide % nutrient needs while intubated

## 2019-11-20 NOTE — CONSULTS
Historical Provider, MD   aspirin 325 MG EC tablet Take 325 mg by mouth daily    Historical Provider, MD       Current Facility-Administered Medications   Medication Dose Route Frequency Provider Last Rate Last Dose    insulin glargine (LANTUS) injection vial 30 Units  30 Units Subcutaneous BID Eddie Mock MD   30 Units at 11/20/19 0947    metoprolol (LOPRESSOR) injection 5 mg  5 mg Intravenous Q6H Eddie Mock MD   5 mg at 11/20/19 1542    nafcillin 2 g in dextrose 5 % 100 mL IVPB  2 g Intravenous Q4H Juwan Marcos MD   Stopped at 11/20/19 1500    chlorhexidine (PERIDEX) 0.12 % solution 15 mL  15 mL Mouth/Throat BID Fredrica Monica, PA-C   15 mL at 11/20/19 0859    propofol injection  20 mcg/kg/min Intravenous Titrated Eddie Mock MD 24.1 mL/hr at 11/20/19 1122 45 mcg/kg/min at 11/20/19 1122    dextrose 50 % IV solution  12.5 g Intravenous PRN Fredrica Monica, PA-C        sodium phosphate 10 mmol in dextrose 5 % 250 mL IVPB  10 mmol Intravenous PRN Fredrica Monica, PA-C        Or    sodium phosphate 15 mmol in dextrose 5 % 250 mL IVPB  15 mmol Intravenous PRN Fredrica Monica, PA-C        Or    sodium phosphate 20 mmol in dextrose 5 % 500 mL IVPB  20 mmol Intravenous PRN Fredrica Monica, PA-C        vancomycin (VANCOCIN) intermittent dosing (placeholder)   Other RX Placeholder [facility]        potassium & sodium phosphates (PHOS-NAK) 280-160-250 MG packet 250 mg  1 packet Oral 4x Daily Eddie Mock MD   250 mg at 11/20/19 1447    lactated ringers infusion   Intravenous Continuous Eddie Mock  mL/hr at 11/20/19 0754      Dexamethasone Sodium Phosphate injection 4 mg  4 mg Intravenous Q12H Eddie Mock MD   4 mg at 11/20/19 1448    vancomycin (VANCOCIN) 1,250 mg in dextrose 5 % 250 mL IVPB  1,250 mg Intravenous Q18H Fredrica Monica, PA-C 166.7 mL/hr at 11/20/19 1541 1,250 mg at 11/20/19 1541    potassium (CARDIAC) replacement protocol   Other RX Placeholder Maricarmen Romero, Hyperlipidemia, Hypertension, Nicotine abuse, NPH (normal pressure hydrocephalus) ([facility] [address] and Type 2 diabetes mellitus ([facility] [address] PAST SURGICAL  HISTORY:    has a past surgical history that includes knee surgery (Right); Cholecystectomy; Appendectomy; Breast cyst excision (Right); and Tubal ligation. SOCIAL HISTORY:   Social History     Tobacco Use    Smoking status: Current Some Day Smoker     Packs/day: 0.50     Types: Cigarettes    Smokeless tobacco: Never Used   Substance Use Topics    Alcohol use: Not Currently       FAMILY HISTORY:  Family History   Problem Relation Age of Onset    Depression Mother     Heart Attack Sister        LABS  None    RADIOLOGY:  Pertinent images have been reviewed. EXAMINATION:  Physical Exam    It is noted to have been demonstrated of purposeful movement in all 4 extremities prior to intubation and sedation.       Johnnie Samuel PA-C  Electronically signed 11/20/2019 at 17:13

## 2019-11-20 NOTE — FLOWSHEET NOTE
Patient on vent and not awake. ; offered prayer at bedside         11/20/19 9548   Encounter Summary   Services provided to: Patient   Referral/Consult From: Rounding   Continue Visiting Yes  (11/20 nr)   Complexity of Encounter Low   Length of Encounter 15 minutes   Spiritual/Quaker   Type Spiritual support   Assessment Unable to respond   Intervention Prayer   Outcome Did not respond

## 2019-11-20 NOTE — PROGRESS NOTES
[facility] 60  PHYSICAL THERAPY MISSED TREATMENT NOTE  ACUTE CARE    Date: 2019  Patient Name: Delma Tapia        MRN: [id number]   : 1955  (62 y.o.)  Gender: female                REASON FOR MISSED TREATMENT:  Hold treatment per nursing request.  Pt continues to be intubated, poorly responsive, and not following commands on very little sedation. Pt also to be going for MRI today. Will check back tomorrow. Eric Pettit.  Malinda Ayoub [address]

## 2019-11-20 NOTE — PROGRESS NOTES
Assessment and Plan:        1. Septic shock: Secondary to septicemia and meningitis due to Staphylococcus aureus. Initial antibiotic selection was high-dose Rocephin, Zosyn, and and vancomycin. Antibiotics simplified to vancomycin on 11/20/2019.  2. Meningitis: Secondary to Staphylococcus aureus. Awaiting sensitivities. On vancomycin. On Decadron to improve permeability of antibiotics. 3. Septicemia: Secondary to Staphylococcus aureus. Great concern for osteomyelitis versus epidural abscess. Awaiting MRI thoracic and lumbar spine. Continue with vancomycin. 4. Acute respiratory failure: Patient intubated 11/18/2019 for impending respiratory arrest.  Undergoing lung protection strategies targeting a peak pressure of 35 or less and a plateau pressure of 30 or less. 5. Atrial fibrillation with rapid ventricular response: New onset. Felt secondary to septic shock. Converted to sinus dysrhythmia. Transition to beta blocker therapy. No anticoagulation. 6. Delirium: Secondary to septic shock with meningitis. 7. Lactic acidosis: Secondary to septic shock with poor perfusion. S/P volume resuscitation and pressors. Remains elevated. Assessing MRI back and spine. 8. Ketoacidosis: Diabetic vs poor oral intake. I suspect the latter with all the electrolyte abnormalities. 9. Type 2 diabetes mellitus: Gap closed. Transition from insulin drip to SQ Lantus. 10. Electrolyte abnormalities: Replaced phosphorus, potassium and magnesium. 11. Back pain: MRI back pending with and without contrast.  Assess for osteomyelitis and epidural abscess. 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  13. Urinary tract infection:  Cultures pending. Will be related to Septicemia. 14. Respiratory alkalosis: Secondary to septic shock. Resolved. 15. Hypotension: Volume resuscitated. S/P Pressors. Screen for adrenal insufficiency is negative. Resolved. 16. Hyperglycemia: Resolved. .     CC:  Septic Shock.   HPI: Patient is a

## 2019-11-20 NOTE — PROGRESS NOTES
Orders: NPO   · Tube Feeding (TF) Orders:   · Feeding Route: Orogastric  · Formula: Immune Enhancing(Pivot 11/20)  · Rate (ml/hr):20ml/hour increase in 4h to goal of 30ml/hour as tolerated    · Volume (ml/day): 720ml at goal  · Duration: Continuous  · Additives/Modulars: Protein(1 2.5ounce liquid protein bottle daily)  · Water Flushes: per MD  · Goal TF & Flush Orders Provides: 9169 kcals (2322 with diprivan), 94 grams protein, 124 grams cho/24h  · Anthropometric Measures:  · Ht: 5' 6\" (167.6 cm)   · Current Body Wt: 197 lb (89.4 kg)(11/18 edema NR)  · Admission Body Wt: 197 lb 1.5 oz (89.4 kg)(11/18 edema not documented )  · Usual Body Wt: 201 lb 4.5 oz (91.3 kg)(11/4/19 bedscale per EMR)  · Ideal Body Wt: 130 lb (59 kg),   · BMI Classification: BMI 30.0 - 34.9 Obese Class I(31.9)    Nutrition Interventions:   Continue NPO, Start Tube Feeding  Continued Inpatient Monitoring, Education not appropriate at this time, Coordination of Care    Nutrition Evaluation:   · Evaluation: Progressing toward goals   · Goals: TF to provide % nutrient needs while intubated   · Monitoring: Nutrition Progression, TF Intake, TF Tolerance, Skin Integrity, Wound Healing, Mental Status/Confusion, Weight, Pertinent Labs, Chewing/Swallowing, Monitor Bowel Function      Electronically signed by Sanjuana Lr RD, PATRICE on 11/20/19 at 2:47 PM    Contact Number: [phone]

## 2019-11-20 NOTE — FLOWSHEET NOTE
[address] [place] THERAPY MISSED TREATMENT NOTE  [facility] ICU 4D  4D-13/013-A      Date: 2019  Patient Name: Cruzito Vail        CSN: [id number]   : 1955  (59 y.o.)  Gender: female                REASON FOR MISSED TREATMENT: Hold treatment per nursing request.  Pt continues to be intubated, poorly responsive, and not following commands on very little sedation. Pt also to be going for MRI today. Will check back tomorrow.

## 2019-11-20 NOTE — PLAN OF CARE
Problem: Falls - Risk of:  Goal: Will remain free from falls  Description  Will remain free from falls  Outcome: Ongoing  Note:   No falls this shift. Fall band on and falling star posted. Non skid socks on and use of gait belt when up. Side rails up and call light within reach. Problem: Falls - Risk of:  Goal: Absence of physical injury  Description  Absence of physical injury  Outcome: Met This Shift     Problem: Risk for Impaired Skin Integrity  Goal: Tissue integrity - skin and mucous membranes  Description  Structural intactness and normal physiological function of skin and  mucous membranes. Outcome: Met This Shift  Note:   No new skin breakdown noted this shift. Scattered bruising noted t/o. Staple to mid lower back with scant amount purulent drainage noted. Staple removed, antibiotic ointment and bandaid applied to site. Area slightly purple/red in color. Problem: Nutrition  Goal: Optimal nutrition therapy  11/19/2019 2013 by Juan Manuel Gutierrez RN  Outcome: Ongoing  Note:   NPO at this time OG tube to LIWS. Care plan reviewed with patient and family. Patient is intubated and sedated. Family verbalize understanding of the plan of care and contribute to goal setting.

## 2019-11-20 NOTE — PROGRESS NOTES
0800- Assessment complete. Patient is not restrained at this time. No need as she is not moving extremities or following commands. Will continue to monitor.

## 2019-11-20 NOTE — PLAN OF CARE
patient and . Patient unable to verbalize understanding of the plan of care and contribute to goal setting.

## 2019-11-21 ENCOUNTER — APPOINTMENT (OUTPATIENT)
Dept: CT IMAGING | Age: 64
DRG: 856 | End: 2019-11-21
Attending: INTERNAL MEDICINE
Payer: MEDICARE

## 2019-11-21 LAB
ALBUMIN SERPL-MCNC: 2.4 G/DL (ref 3.5–5.1)
ALP BLD-CCNC: 70 U/L (ref 38–126)
ALT SERPL-CCNC: 14 U/L (ref 11–66)
ANION GAP SERPL CALCULATED.3IONS-SCNC: 16 MEQ/L (ref 8–16)
AST SERPL-CCNC: 18 U/L (ref 5–40)
BILIRUB SERPL-MCNC: 0.5 MG/DL (ref 0.3–1.2)
BILIRUBIN DIRECT: 0.3 MG/DL (ref 0–0.3)
BLOOD CULTURE, ROUTINE: ABNORMAL
BLOOD CULTURE, ROUTINE: ABNORMAL
BUN BLDV-MCNC: 14 MG/DL (ref 7–22)
CALCIUM SERPL-MCNC: 8 MG/DL (ref 8.5–10.5)
CHLORIDE BLD-SCNC: 97 MEQ/L (ref 98–111)
CO2: 22 MEQ/L (ref 23–33)
CREAT SERPL-MCNC: 0.7 MG/DL (ref 0.4–1.2)
ERYTHROCYTE [DISTWIDTH] IN BLOOD BY AUTOMATED COUNT: 16.1 % (ref 11.5–14.5)
ERYTHROCYTE [DISTWIDTH] IN BLOOD BY AUTOMATED COUNT: 47 FL (ref 35–45)
GFR SERPL CREATININE-BSD FRML MDRD: 84 ML/MIN/1.73M2
GLUCOSE BLD-MCNC: 223 MG/DL (ref 70–108)
GLUCOSE BLD-MCNC: 241 MG/DL (ref 70–108)
GLUCOSE BLD-MCNC: 246 MG/DL (ref 70–108)
GLUCOSE BLD-MCNC: 262 MG/DL (ref 70–108)
GLUCOSE BLD-MCNC: 266 MG/DL (ref 70–108)
GLUCOSE BLD-MCNC: 285 MG/DL (ref 70–108)
GLUCOSE BLD-MCNC: 302 MG/DL (ref 70–108)
HCT VFR BLD CALC: 30.1 % (ref 37–47)
HEMOGLOBIN: 10 GM/DL (ref 12–16)
LACTIC ACID: 1.7 MMOL/L (ref 0.5–2.2)
MAGNESIUM: 1.8 MG/DL (ref 1.6–2.4)
MCH RBC QN AUTO: 27.2 PG (ref 26–33)
MCHC RBC AUTO-ENTMCNC: 33.2 GM/DL (ref 32.2–35.5)
MCV RBC AUTO: 81.8 FL (ref 81–99)
MRSA SCREEN: NORMAL
MYOGLOBIN: 468 NG/ML (ref 25–58)
ORGANISM: ABNORMAL
PLATELET # BLD: 321 THOU/MM3 (ref 130–400)
PMV BLD AUTO: 11 FL (ref 9.4–12.4)
POTASSIUM SERPL-SCNC: 3.1 MEQ/L (ref 3.5–5.2)
POTASSIUM SERPL-SCNC: 3.4 MEQ/L (ref 3.5–5.2)
POTASSIUM SERPL-SCNC: 3.5 MEQ/L (ref 3.5–5.2)
POTASSIUM SERPL-SCNC: 3.7 MEQ/L (ref 3.5–5.2)
POTASSIUM SERPL-SCNC: 3.8 MEQ/L (ref 3.5–5.2)
RBC # BLD: 3.68 MILL/MM3 (ref 4.2–5.4)
SODIUM BLD-SCNC: 135 MEQ/L (ref 135–145)
TOTAL PROTEIN: 6.2 G/DL (ref 6.1–8)
URINE CULTURE, ROUTINE: NORMAL
VANCOMYCIN TROUGH: 8.4 UG/ML (ref 5–15)
WBC # BLD: 27.4 THOU/MM3 (ref 4.8–10.8)

## 2019-11-21 PROCEDURE — 2709999900 HC NON-CHARGEABLE SUPPLY

## 2019-11-21 PROCEDURE — 6370000000 HC RX 637 (ALT 250 FOR IP): Performed by: NURSE PRACTITIONER

## 2019-11-21 PROCEDURE — 94003 VENT MGMT INPAT SUBQ DAY: CPT

## 2019-11-21 PROCEDURE — 87186 SC STD MICRODIL/AGAR DIL: CPT

## 2019-11-21 PROCEDURE — 84132 ASSAY OF SERUM POTASSIUM: CPT

## 2019-11-21 PROCEDURE — 87075 CULTR BACTERIA EXCEPT BLOOD: CPT

## 2019-11-21 PROCEDURE — 2500000003 HC RX 250 WO HCPCS: Performed by: INTERNAL MEDICINE

## 2019-11-21 PROCEDURE — C1894 INTRO/SHEATH, NON-LASER: HCPCS

## 2019-11-21 PROCEDURE — 2700000000 HC OXYGEN THERAPY PER DAY

## 2019-11-21 PROCEDURE — 94770 HC ETCO2 MONITOR DAILY: CPT

## 2019-11-21 PROCEDURE — 36592 COLLECT BLOOD FROM PICC: CPT

## 2019-11-21 PROCEDURE — 87205 SMEAR GRAM STAIN: CPT

## 2019-11-21 PROCEDURE — 6360000002 HC RX W HCPCS: Performed by: NURSE PRACTITIONER

## 2019-11-21 PROCEDURE — 6360000002 HC RX W HCPCS

## 2019-11-21 PROCEDURE — 2500000003 HC RX 250 WO HCPCS: Performed by: PHYSICIAN ASSISTANT

## 2019-11-21 PROCEDURE — 80053 COMPREHEN METABOLIC PANEL: CPT

## 2019-11-21 PROCEDURE — 6360000002 HC RX W HCPCS: Performed by: INTERNAL MEDICINE

## 2019-11-21 PROCEDURE — 99291 CRITICAL CARE FIRST HOUR: CPT | Performed by: INTERNAL MEDICINE

## 2019-11-21 PROCEDURE — 0W9L3ZZ DRAINAGE OF LOWER BACK, PERCUTANEOUS APPROACH: ICD-10-PCS | Performed by: RADIOLOGY

## 2019-11-21 PROCEDURE — 6370000000 HC RX 637 (ALT 250 FOR IP): Performed by: PHYSICIAN ASSISTANT

## 2019-11-21 PROCEDURE — 6360000002 HC RX W HCPCS: Performed by: PHYSICIAN ASSISTANT

## 2019-11-21 PROCEDURE — 6370000000 HC RX 637 (ALT 250 FOR IP): Performed by: INTERNAL MEDICINE

## 2019-11-21 PROCEDURE — C1769 GUIDE WIRE: HCPCS

## 2019-11-21 PROCEDURE — 2580000003 HC RX 258: Performed by: INTERNAL MEDICINE

## 2019-11-21 PROCEDURE — 2000000000 HC ICU R&B

## 2019-11-21 PROCEDURE — 10140 I&D HMTMA SEROMA/FLUID COLLJ: CPT

## 2019-11-21 PROCEDURE — 83735 ASSAY OF MAGNESIUM: CPT

## 2019-11-21 PROCEDURE — 87070 CULTURE OTHR SPECIMN AEROBIC: CPT

## 2019-11-21 PROCEDURE — 87147 CULTURE TYPE IMMUNOLOGIC: CPT

## 2019-11-21 PROCEDURE — 36415 COLL VENOUS BLD VENIPUNCTURE: CPT

## 2019-11-21 PROCEDURE — 85027 COMPLETE CBC AUTOMATED: CPT

## 2019-11-21 PROCEDURE — 94761 N-INVAS EAR/PLS OXIMETRY MLT: CPT

## 2019-11-21 PROCEDURE — 2580000003 HC RX 258: Performed by: PHYSICIAN ASSISTANT

## 2019-11-21 PROCEDURE — 80202 ASSAY OF VANCOMYCIN: CPT

## 2019-11-21 PROCEDURE — 75989 ABSCESS DRAINAGE UNDER X-RAY: CPT

## 2019-11-21 PROCEDURE — 82248 BILIRUBIN DIRECT: CPT

## 2019-11-21 PROCEDURE — 83605 ASSAY OF LACTIC ACID: CPT

## 2019-11-21 PROCEDURE — C1729 CATH, DRAINAGE: HCPCS

## 2019-11-21 PROCEDURE — 82948 REAGENT STRIP/BLOOD GLUCOSE: CPT

## 2019-11-21 PROCEDURE — 87077 CULTURE AEROBIC IDENTIFY: CPT

## 2019-11-21 RX ORDER — INSULIN GLARGINE 100 [IU]/ML
35 INJECTION, SOLUTION SUBCUTANEOUS 2 TIMES DAILY
Status: DISCONTINUED | OUTPATIENT
Start: 2019-11-21 | End: 2019-11-27

## 2019-11-21 RX ORDER — POTASSIUM CHLORIDE 29.8 MG/ML
20 INJECTION INTRAVENOUS
Status: COMPLETED | OUTPATIENT
Start: 2019-11-21 | End: 2019-11-21

## 2019-11-21 RX ORDER — POTASSIUM CHLORIDE 29.8 MG/ML
20 INJECTION INTRAVENOUS ONCE
Status: COMPLETED | OUTPATIENT
Start: 2019-11-21 | End: 2019-11-21

## 2019-11-21 RX ORDER — MAGNESIUM SULFATE IN WATER 40 MG/ML
2 INJECTION, SOLUTION INTRAVENOUS ONCE
Status: COMPLETED | OUTPATIENT
Start: 2019-11-21 | End: 2019-11-21

## 2019-11-21 RX ADMIN — INSULIN GLARGINE 35 UNITS: 100 INJECTION, SOLUTION SUBCUTANEOUS at 20:27

## 2019-11-21 RX ADMIN — PROPOFOL 30 MCG/KG/MIN: 10 INJECTION, EMULSION INTRAVENOUS at 20:26

## 2019-11-21 RX ADMIN — INSULIN GLARGINE 30 UNITS: 100 INJECTION, SOLUTION SUBCUTANEOUS at 08:29

## 2019-11-21 RX ADMIN — POTASSIUM CHLORIDE 20 MEQ: 29.8 INJECTION, SOLUTION INTRAVENOUS at 03:13

## 2019-11-21 RX ADMIN — VANCOMYCIN HYDROCHLORIDE 1250 MG: 5 INJECTION, POWDER, LYOPHILIZED, FOR SOLUTION INTRAVENOUS at 09:02

## 2019-11-21 RX ADMIN — NAFCILLIN SODIUM 2 G: 2 INJECTION, POWDER, LYOPHILIZED, FOR SOLUTION INTRAMUSCULAR; INTRAVENOUS at 01:46

## 2019-11-21 RX ADMIN — POTASSIUM CHLORIDE 20 MEQ: 29.8 INJECTION, SOLUTION INTRAVENOUS at 23:55

## 2019-11-21 RX ADMIN — METOPROLOL TARTRATE 5 MG: 5 INJECTION INTRAVENOUS at 16:26

## 2019-11-21 RX ADMIN — INSULIN LISPRO 6 UNITS: 100 INJECTION, SOLUTION INTRAVENOUS; SUBCUTANEOUS at 17:01

## 2019-11-21 RX ADMIN — VANCOMYCIN HYDROCHLORIDE 1250 MG: 5 INJECTION, POWDER, LYOPHILIZED, FOR SOLUTION INTRAVENOUS at 21:27

## 2019-11-21 RX ADMIN — NAFCILLIN SODIUM 2 G: 2 INJECTION, POWDER, LYOPHILIZED, FOR SOLUTION INTRAMUSCULAR; INTRAVENOUS at 17:56

## 2019-11-21 RX ADMIN — NAFCILLIN SODIUM 2 G: 2 INJECTION, POWDER, LYOPHILIZED, FOR SOLUTION INTRAMUSCULAR; INTRAVENOUS at 22:41

## 2019-11-21 RX ADMIN — POTASSIUM CHLORIDE 20 MEQ: 29.8 INJECTION, SOLUTION INTRAVENOUS at 08:29

## 2019-11-21 RX ADMIN — SODIUM CHLORIDE, POTASSIUM CHLORIDE, SODIUM LACTATE AND CALCIUM CHLORIDE: 600; 310; 30; 20 INJECTION, SOLUTION INTRAVENOUS at 21:28

## 2019-11-21 RX ADMIN — POTASSIUM & SODIUM PHOSPHATES POWDER PACK 280-160-250 MG 250 MG: 280-160-250 PACK at 16:27

## 2019-11-21 RX ADMIN — SODIUM CHLORIDE, POTASSIUM CHLORIDE, SODIUM LACTATE AND CALCIUM CHLORIDE: 600; 310; 30; 20 INJECTION, SOLUTION INTRAVENOUS at 06:40

## 2019-11-21 RX ADMIN — POTASSIUM CHLORIDE 20 MEQ: 29.8 INJECTION, SOLUTION INTRAVENOUS at 17:59

## 2019-11-21 RX ADMIN — POTASSIUM CHLORIDE 20 MEQ: 29.8 INJECTION, SOLUTION INTRAVENOUS at 04:04

## 2019-11-21 RX ADMIN — INSULIN LISPRO 6 UNITS: 100 INJECTION, SOLUTION INTRAVENOUS; SUBCUTANEOUS at 01:53

## 2019-11-21 RX ADMIN — Medication 10 ML: at 08:45

## 2019-11-21 RX ADMIN — Medication 10 ML: at 20:28

## 2019-11-21 RX ADMIN — PROPOFOL 45 MCG/KG/MIN: 10 INJECTION, EMULSION INTRAVENOUS at 03:19

## 2019-11-21 RX ADMIN — Medication 15 ML: at 20:27

## 2019-11-21 RX ADMIN — Medication 15 ML: at 08:28

## 2019-11-21 RX ADMIN — POTASSIUM CHLORIDE 20 MEQ: 29.8 INJECTION, SOLUTION INTRAVENOUS at 13:17

## 2019-11-21 RX ADMIN — NAFCILLIN SODIUM 2 G: 2 INJECTION, POWDER, LYOPHILIZED, FOR SOLUTION INTRAMUSCULAR; INTRAVENOUS at 06:38

## 2019-11-21 RX ADMIN — INSULIN LISPRO 4 UNITS: 100 INJECTION, SOLUTION INTRAVENOUS; SUBCUTANEOUS at 20:27

## 2019-11-21 RX ADMIN — FAMOTIDINE 20 MG: 10 INJECTION, SOLUTION INTRAVENOUS at 08:29

## 2019-11-21 RX ADMIN — METOPROLOL TARTRATE 5 MG: 5 INJECTION INTRAVENOUS at 04:12

## 2019-11-21 RX ADMIN — METOPROLOL TARTRATE 5 MG: 5 INJECTION INTRAVENOUS at 09:14

## 2019-11-21 RX ADMIN — POTASSIUM & SODIUM PHOSPHATES POWDER PACK 280-160-250 MG 250 MG: 280-160-250 PACK at 20:27

## 2019-11-21 RX ADMIN — POTASSIUM CHLORIDE 20 MEQ: 29.8 INJECTION, SOLUTION INTRAVENOUS at 21:54

## 2019-11-21 RX ADMIN — METOPROLOL TARTRATE 5 MG: 5 INJECTION INTRAVENOUS at 21:56

## 2019-11-21 RX ADMIN — MAGNESIUM SULFATE HEPTAHYDRATE 2 G: 40 INJECTION, SOLUTION INTRAVENOUS at 01:59

## 2019-11-21 RX ADMIN — INSULIN LISPRO 4 UNITS: 100 INJECTION, SOLUTION INTRAVENOUS; SUBCUTANEOUS at 08:33

## 2019-11-21 RX ADMIN — NAFCILLIN SODIUM 2 G: 2 INJECTION, POWDER, LYOPHILIZED, FOR SOLUTION INTRAMUSCULAR; INTRAVENOUS at 10:17

## 2019-11-21 RX ADMIN — POTASSIUM & SODIUM PHOSPHATES POWDER PACK 280-160-250 MG 250 MG: 280-160-250 PACK at 08:43

## 2019-11-21 RX ADMIN — POTASSIUM CHLORIDE 20 MEQ: 29.8 INJECTION, SOLUTION INTRAVENOUS at 14:42

## 2019-11-21 RX ADMIN — PROPOFOL 45 MCG/KG/MIN: 10 INJECTION, EMULSION INTRAVENOUS at 11:37

## 2019-11-21 RX ADMIN — DEXAMETHASONE SODIUM PHOSPHATE 4 MG: 4 INJECTION, SOLUTION INTRAMUSCULAR; INTRAVENOUS at 01:53

## 2019-11-21 RX ADMIN — INSULIN LISPRO 8 UNITS: 100 INJECTION, SOLUTION INTRAVENOUS; SUBCUTANEOUS at 05:51

## 2019-11-21 RX ADMIN — NAFCILLIN SODIUM 2 G: 2 INJECTION, POWDER, LYOPHILIZED, FOR SOLUTION INTRAMUSCULAR; INTRAVENOUS at 13:30

## 2019-11-21 RX ADMIN — FAMOTIDINE 20 MG: 10 INJECTION, SOLUTION INTRAVENOUS at 20:27

## 2019-11-21 RX ADMIN — DEXAMETHASONE SODIUM PHOSPHATE 4 MG: 4 INJECTION, SOLUTION INTRAMUSCULAR; INTRAVENOUS at 13:10

## 2019-11-21 RX ADMIN — INSULIN LISPRO 4 UNITS: 100 INJECTION, SOLUTION INTRAVENOUS; SUBCUTANEOUS at 13:15

## 2019-11-21 RX ADMIN — POTASSIUM & SODIUM PHOSPHATES POWDER PACK 280-160-250 MG 250 MG: 280-160-250 PACK at 13:09

## 2019-11-21 ASSESSMENT — PULMONARY FUNCTION TESTS
PIF_VALUE: 19
PIF_VALUE: 15
PIF_VALUE: 16
PIF_VALUE: 19
PIF_VALUE: 20
PIF_VALUE: 20

## 2019-11-21 NOTE — ADT AUTH CERT
Patient Demographics     Name Patient ID SSN Gender Identity Birth Date   Quinton George [id number]  Female [birth year] (59 yrs)   Address Phone Email Employer    [employer] [id number] (A)  [phone] (M)  [address] [employer] Occup[employer] [address] - Disabled    Reg Status PCP Date Last Verified Next Review Date    [address] [facility]  [phone] 11/04/19 12/04/19    Admission Date Discharge Date Admitting Provider     11/18/19  Shreya Nam DO     Marital Status Bahai       None      Emergency Contact 1 Emergency Contact 2 Emergency Contact 3 Emergency Contact [address] [place] [address])  [phone] [place]) Avelino Mattock [phone] [place]) Cindy Steiner (3) [phone] Torsten Saucedo) Delonte Jones Sister (1)  [phone] (M)   Utilization Reviews         Respiratory Failure GRG - Care Day 3 (11/20/2019) by Sage Marion RN         Review Status Review Entered   Completed 11/20/2019 12:43       Criteria Review      Care Day: 3 Care Date: 11/20/2019 Level of Care:    Guideline Day 3    Level Of Care    ( ) * Activity level acceptable    Clinical Status    ( ) * Ventilation status appropriate    ( ) * Airway status acceptable    ( ) * Respiratory status acceptable    ( ) * Stable chest findings    ( ) * Neurologic status acceptable    ( ) * Temperature status acceptable    ( ) * No infection, or status acceptable    ( ) * General Discharge Criteria met    Interventions    (X) * No chest tube, or status acceptable    ( ) * Intake acceptable    ( ) * No inpatient interventions needed    * Milestone   Additional Notes   11/20   Remains ICU intubated and sedated on propofol iv gtt      100.2 (37.9)  21(vent)  78  92/60       Intensivist   Assessment and Plan:         1.  Septic shock: Secondary to septicemia and meningitis due to Staphylococcus aureus.  Initial antibiotic selection was high-dose Rocephin, Zosyn, and and vancomycin.  Antibiotics simplified to vancomycin on 11/20/2019.   2. Meningitis: Secondary to Staphylococcus aureus.  Awaiting sensitivities.  On vancomycin.  On Decadron to improve permeability of antibiotics. 3. Septicemia: Secondary to Staphylococcus aureus.  Great concern for osteomyelitis versus epidural abscess.  Awaiting MRI thoracic and lumbar spine.  Continue with vancomycin. 4. Acute respiratory failure: Patient intubated 11/18/2019 for impending respiratory arrest.  Undergoing lung protection strategies targeting a peak pressure of 35 or less and a plateau pressure of 30 or less. 5. Atrial fibrillation with rapid ventricular response: New onset.  Felt secondary to septic shock.  Converted to sinus dysrhythmia. Transition to beta blocker therapy.  No anticoagulation. 6. Delirium: Secondary to septic shock with meningitis.     7. Lactic acidosis: Secondary to septic shock with poor perfusion. S/P volume resuscitation and pressors. Remains elevated.  Assessing MRI back and spine. 8. Ketoacidosis: Diabetic vs poor oral intake.  I suspect the latter with all the electrolyte abnormalities. 9. Type 2 diabetes mellitus: Gap closed.  Transition from insulin drip to SQ Lantus. 10. Electrolyte abnormalities: Replaced phosphorus, potassium and magnesium. 11. Back pain: MRI back pending with and without contrast.  Assess for osteomyelitis and epidural abscess. 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   13. Urinary tract infection:  Cultures pending.  Will be related to Septicemia. 14. Respiratory alkalosis: Secondary to septic shock.  Resolved. 15. Hypotension: Volume resuscitated.  S/P Pressors.  Screen for adrenal insufficiency is negative.  Resolved. 16. Hyperglycemia: Resolved. .      Cont , propofol iv gtt, vanco iv q 18, nafcillin iv q 4, lopressor iv q 6, lantus bid, ssi qid, decadron 4mg iv qd,

## 2019-11-21 NOTE — PROGRESS NOTES
Formulation and discussion of sedation / procedure plans, risks, benefits, side effects and alternatives with patient and/or responsible adult completed.     Electronically signed by Carrington Escobar MD on 11/21/2019 at 1:16 PM

## 2019-11-21 NOTE — SIGNIFICANT EVENT
I did reach out to IR and spoke to Dr. Filiberto Gaffney per Dr. Kevin Aranda request.   Concerns to fluid collection/abcess Lumbar MRI. Will plan for either IR or CT guided procedure tomorrow, likely after noon. NPO after midnight, and hold Lovenox the morning of procedure. Dr. Kevin Aranda was updated and agreeable to plan.

## 2019-11-21 NOTE — PROGRESS NOTES
Nutrition Assessment (Enteral Nutrition)    Type and Reason for Visit: Reassess, Consult(TF adjustment d/t diprivan change)    Nutrition Recommendations: TF goal adjusted to Pivot 50ml/hour and protein modular DC today with change in diprivan. Monitoring labs, tolerance. Free H20 per MD.     Nutrition Assessment: Pt. nutritionally compromised AEB intubated; NPO; s/p abscess drainage this am in IR; need for nutrition support.    At risk for further nutrition compromise r/t admitted with AMS, hyperglycemia, UTI, LP done and Dx with meningitis, sepsis , s/p recent lumbar drain removal, brain lipoma and underlying medical conditions including back pain, DM, GERD, HTN & tobacco use.  Meds include lantus, humalog, ATB, diprivan  electrolyte replacements  Glucose 223  BUN 14  Creatinine 0.7  Phosphorus 2.3  Nutrition recommendations/interventions as per above. Malnutrition Assessment:  · Malnutrition Status:  At risk for malnutrition    Nutrition Risk Level: High    Nutrition Needs:  · Estimated Daily Total Kcal: ~1788 kcals ( 20 kcals/kg on admit weight of 89.4 kg)  · Estimated Daily Protein (g): ~ grams ( 1.5-1.7 grams protein/kg on IBW of 59 kg)  Monitor renal status  · Estimated Daily Fluid (ml/day): per MD    Nutrition Diagnosis:   · Problem: Inadequate oral intake  · Etiology: related to Impaired respiratory function-inability to consume food     Signs and symptoms:  as evidenced by NPO status due to medical condition, Intubation, Nutrition support - EN    Objective Information:  · Nutrition-Focused Physical Findings: intubated; sedated; s/p abscess drainage in IR this am; was lying in bed 5d pta; flexiseal output 150ml  · Wound Type: (to include left lower back incision, back bruises , left foot bruising, left ankle abrasions. )  · Current Nutrition Therapies:  · Oral Diet Orders: NPO   · Tube Feeding (TF) Orders:   · Feeding Route: Orogastric  · Formula: Immune Enhancing(Pivot 11/20)  · Rate

## 2019-11-21 NOTE — FLOWSHEET NOTE
Pt was unresponsive but she was blessed.       11/21/19 1210   Encounter Summary   Services provided to: Patient   Referral/Consult From: Rounding   Continue Visiting No   Volunteer Visit Yes  (11/21 NR)   Length of Encounter 15 minutes   Routine   Type Follow up   Assessment Unable to respond   Intervention Prayer

## 2019-11-21 NOTE — PLAN OF CARE
Problem: Falls - Risk of:  Goal: Will remain free from falls  Description  Will remain free from falls  11/21/2019 0929 by Nisha Izaguirre RN  Outcome: Ongoing  Note:   No falls this shift. Bed in lowest position and locked. Unable to use call light. Bed alarm activated. Problem: Risk for Impaired Skin Integrity  Goal: Tissue integrity - skin and mucous membranes  Description  Structural intactness and normal physiological function of skin and  mucous membranes. 11/21/2019 0929 by Nisha Izaguirre RN  Outcome: Ongoing  Note:   No new skin breakdown noted. Turn q 2 hours and PRN. Problem: Nutrition  Goal: Optimal nutrition therapy  11/21/2019 0929 by Nisha Izaguirre RN  Outcome: Ongoing  Note:   Tube feeding on hold for procedure. Will restart after. Tolerating prior. Problem: Restraint Use - Nonviolent/Non-Self-Destructive Behavior:  Goal: Absence of restraint indications  Description  Absence of restraint indications  11/21/2019 0929 by Nisha Izaguirre RN  Outcome: Ongoing  Note:   Patient still attempts to pull at tubes. No evidence of learning when educated. Problem: Restraint Use - Nonviolent/Non-Self-Destructive Behavior:  Goal: Absence of restraint-related injury  Description  Absence of restraint-related injury  11/21/2019 0929 by Nisha Izaguirre RN  Outcome: Ongoing  Note:   No restraint-related injury. Problem: Discharge Planning:  Goal: Discharged to appropriate level of care  Description  Discharged to appropriate level of care  11/21/2019 0929 by Nisha Izaguirre RN  Outcome: Ongoing  Note:   Requiring ICU bed at this time. Going for aspiration of abscess on lumbar spine with possible drain placement. Problem: Airway Clearance - Ineffective:  Goal: Ability to maintain a clear airway will improve  Description  Ability to maintain a clear airway will improve  11/21/2019 0929 by Nisha Izaguirre RN  Outcome: Ongoing  Note:   ETT in place. Problem:  Bowel Function -

## 2019-11-21 NOTE — PROGRESS NOTES
Assessment and Plan:        1. Septic shock: Secondary to septicemia and meningitis due to Staphylococcus aureus. Initial antibiotic selection was high-dose Rocephin, Zosyn, and and vancomycin. Antibiotics simplified to vancomycin on 11/20/2019. Secondary to MSSA bacteremia. This is secondary to soft tissue abscess communicating with small epidural abscess. 2. Abscess: Affecting lumbar region and involving soft tissue and communicating with epidural abscess. Secondary to MSSA. Status post lumbar drain placement 11/21/2019. Day #2 nafcillin. .  3. Meningitis: Secondary to MSSA. On Nafcillin. On Decadron to improve permeability of antibiotics. 4. Septicemia: Secondary to MSSA secondary to soft tissue abscess communicating with small epidural abscess. Day 2 Nafcillin. Previously on vancomycin. 5. Acute respiratory failure: Patient intubated 11/18/2019 for impending respiratory arrest.  Undergoing lung protection strategies targeting a peak pressure of 35 or less and a plateau pressure of 30 or less. On driving pressure 12. Hope to extubate tomorrow. 6. Atrial fibrillation with rapid ventricular response: New onset. Felt secondary to septic shock.  Converted to sinus dysrhythmia. Transition to beta blocker therapy. No anticoagulation. 7. Delirium: Secondary to septic shock with meningitis.    8. Lactic acidosis: Secondary to septic shock with poor perfusion. S/P volume resuscitation and pressors. Remains elevated. Assessing MRI back and spine. 9. Type 2 diabetes mellitus: Gap closed. Transitioned from insulin drip to SQ Lantus. 10. Electrolyte abnormalities: Replaced phosphorus, potassium and magnesium. 11. Back pain: MRI back positive for soft tissue abscess and small epidural abscess. 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  13. Urinary tract infection:  Related to Septicemia. 14. Respiratory alkalosis: Secondary to septic shock. Resolved. 15. Hypotension: Volume resuscitated.  S/P Pressors. subsequently grew Staphylococcus species. Antibiotics simplified to vancomycin for meningitis. MRI lumbar spine demonstrated soft tissue abscess communicating with small epidural abscess. Lumbar soft tissue drain placed 11/21/19. Culture positive for MSSA. Antibiotics converted to Nafcillin 11/20/19. .  For further details, please review the assessment and plan. ROS: Sedated on mechanical ventilator. PMH:  Per HPI  SHX:  Active tobacco abuse at 1/2 PPD. FHX: Positive for CAD  Allergies: NKDA  Medications:     propofol 30 mcg/kg/min (11/21/19 1500)    lactated ringers 125 mL/hr at 11/21/19 0640    dextrose        vancomycin  1,250 mg Intravenous Q12H    insulin glargine  30 Units Subcutaneous BID    metoprolol  5 mg Intravenous Q6H    nafcillin  2 g Intravenous Q4H    insulin lispro  0-12 Units Subcutaneous Q4H    chlorhexidine  15 mL Mouth/Throat BID    vancomycin (VANCOCIN) intermittent dosing (placeholder)   Other RX Placeholder    potassium & sodium phosphates  1 packet Oral 4x Daily    dexamethasone  4 mg Intravenous Q12H    potassium (CARDIAC) replacement protocol   Other RX Placeholder    sodium chloride flush  10 mL Intravenous 2 times per day    famotidine (PEPCID) injection  20 mg Intravenous BID     Vital Signs:   T: 98.6: P: 51: RR: 14: B/P: 129/100: FiO2: 21: O2 Sat: 99: I/O: 1300/1910  CAM-ICU:  RASS -1  General:   Acutely ill appearing white female. HEENT:  normocephalic and atraumatic.  No scleral icterus. PERR  Neck: supple.  No Thyromegaly. Lungs: clear to auscultation.  No retractions. Cardiac: Irregular.  No JVD. Telemetry shows sinus rhythm. Abdomen: soft.  Nontender. Extremities:  No clubbing, cyanosis, or edema x 4.    Vasculature: capillary refill < 3 seconds. Palpable dorsalis pedis pulses. Skin: Pale but warm. Psych:  Sedated on mechanical ventilator. Will open eyes but not track. Lymph:  No supraclavicular adenopathy.   Neurologic:  No seizures.     Data: (All radiographs, tracings, PFTs, and imaging are personally viewed and interpreted unless otherwise noted). · Telemetry shows sinus rhythm. · Urine is grossly purulent. · 2/2 blood cultures positive for MSSA. · Cerebrospinal fluid culture positive for MSSA. Protein greater than 600. Glucose 9. 15,626 white blood cells. · MRI lumbar and thoracic spine pending. · Sodium 135, potassium 3.1, chloride 97, bicarb 22, BUN 14, creatinine 0.7, glucose 223. WBC 27.4, Hgb 10, Plt 321. .  CC time 40 minutes.  Case discussed with Dr. Mali Mcgrath  Electronically signed by Joseph Conway.  Kayley Villatoro M.D.

## 2019-11-21 NOTE — FLOWSHEET NOTE
[address] [facility] THERAPY MISSED TREATMENT NOTE  [facility] ICU 4D  4D-13/013-A      Date: 2019  Patient Name: Irma Malhotra        CSN: [id number]   : 1955  (59 y.o.)  Gender: female                REASON FOR MISSED TREATMENT: Hold treatment per nursing request.  Pt continues to be intubated and is to have procedure to drain an abcess in low back today. Will discontinue order and requested new order when pt becomes appropriate.

## 2019-11-21 NOTE — PROGRESS NOTES
Pharmacy Vancomycin Consult     Vancomycin Day: 3  Current Dosing: vancomycin 1250 mg Q18H    Temp max:  98.4    Recent Labs     11/20/19  1100 11/21/19  0545   BUN 17 14       Recent Labs     11/20/19  1100 11/21/19  0545   CREATININE 0.8 0.7       Recent Labs     11/19/19  0412 11/21/19  0545   WBC 29.5* 27.4*         Intake/Output Summary (Last 24 hours) at 11/21/2019 1241  Last data filed at 11/21/2019 0549  Gross per 24 hour   Intake 3679 ml   Output 4575 ml   Net -896 ml       Culture Date Source Results   11/19/19 Blood x 2 MSSA   11/19/19 Urine ngtd   11/19/2019 CSF culture/Panel  MSSA   11/19/2019 Respiratory Culture  MSSA       Ht Readings from Last 1 Encounters:   11/18/19 5' 6\" (1.676 m)        Wt Readings from Last 1 Encounters:   11/21/19 195 lb 15.8 oz (88.9 kg)         Body mass index is 31.63 kg/m². Estimated Creatinine Clearance: 91 mL/min (based on SCr of 0.7 mg/dL). Trough: 9.8    Assessment/Plan:  Scr remains stable. Will increase vancomycin to 1250 mg Q12H. Will plan to check trough prior to 4th dose of new regimen.     Ramandeep Sam, PharmD  11/21/2019  12:41 PM

## 2019-11-21 NOTE — PROGRESS NOTES
Department of Radiology  Post Procedure Progress Note      Pre-Procedure Diagnosis:  Posterior lumbar paraspinal abscess    Procedure Performed:  CT guided abscess drainage with placement of 8 fr locking pigtail drainage catheter    Anesthesia: local    Findings: successful abscess drainage catheter placement. 15 mL tan purulent fluid was aspirated and sent for gram stain and culture. Immediate Complications:  None    Estimated Blood Loss: minimal    SEE DICTATED PROCEDURE NOTE FOR COMPLETE DETAILS.     Claudean Lynn Decanio   11/21/2019 1:16 PM

## 2019-11-21 NOTE — PLAN OF CARE
Patient continues on ventilator; tolerating well.   Will continue to monitor patients respiratory status

## 2019-11-21 NOTE — PROGRESS NOTES
1145 pt arrived to ct per bed with icu nurse and resp therapy for a lumbar aspiration / drainage . Floor obtained consent from patient . Pt remains on a ventilator/ sedated. 1150 assist pt to table on belly. Remains on vent. 1200 dr Constance Shahid at bedside to look at pre ct scans. Dr Constance Shahid discussed case with yousif paredes on the phone last margaret.   [phone] lower back area prepped. 1205 procedure started per dr Constance Shahid to back area    Pt return of pus yellow drainage noted. 1216 prepare  to insert 8 fr mpd to lower back area. Tube secured.  aspirated 14 ml and sent to lab. 1220 post ct scan performed. opsite appled over catheter. Flushed per dr Constance Shahid. 1225 return patient to bed.   1230 pt to icu per bed with resp therapy and icu , nurse, Faye Hickman. Report given to her. Drain intact to lumbar area and with truclose bag intact.

## 2019-11-21 NOTE — PLAN OF CARE
Problem: Nutrition  Goal: Optimal nutrition therapy  11/21/2019 1441 by Madina Sanchez RD, LD  Outcome: Ongoing   Nutrition Problem: Inadequate oral intake  Intervention: Food and/or Nutrient Delivery: Continue NPO, Modify current Tube Feeding  Nutritional Goals: TF to provide % nutrient needs while intubated

## 2019-11-21 NOTE — PLAN OF CARE
Bowel Function - Altered:  Goal: Bowel elimination is within specified parameters  Description  Bowel elimination is within specified parameters  11/20/2019 2232 by Tonio Mendoza RN  Outcome: Ongoing  Note:   [place] Kand in place, patient having frequently loose stools      Problem: Cardiac Output - Decreased:  Goal: Hemodynamic stability will improve  Description  Hemodynamic stability will improve  11/20/2019 2232 by Tonio Mendoza RN  Outcome: Ongoing  Note:   Off vasopressors. Monitoring vitals throughout shift      Problem: Urinary Elimination:  Goal: Signs and symptoms of infection will decrease  Description  Signs and symptoms of infection will decrease  Outcome: Ongoing  Note:   Urine yellow, cloudy. Afebrile so far this shift. Problem: Urinary Elimination:  Goal: Complications related to the disease process, condition or treatment will be avoided or minimized  Description  Complications related to the disease process, condition or treatment will be avoided or minimized  Outcome: Ongoing  Note:   Bernstein care to be completed this shift     Problem: Infection - Central Venous Catheter-Associated Bloodstream Infection:  Goal: Will show no infection signs and symptoms  Description  Will show no infection signs and symptoms  Outcome: Ongoing  Note:   Site clean, intact. No redness or new drainage noted at site. Problem: Airway Clearance - Ineffective:  Goal: Ability to maintain a clear airway will improve  Description  Ability to maintain a clear airway will improve  11/20/2019 2232 by Tonio Mendoza RN  Outcome: Not Met This Shift  Note:   Remains intubated     Care plan reviewed with patient. Patient unable to verbalize understanding of the plan of care and contribute to goal setting.

## 2019-11-22 LAB
BLOOD CULTURE, ROUTINE: ABNORMAL
BLOOD CULTURE, ROUTINE: ABNORMAL
GLUCOSE BLD-MCNC: 109 MG/DL (ref 70–108)
GLUCOSE BLD-MCNC: 200 MG/DL (ref 70–108)
GLUCOSE BLD-MCNC: 208 MG/DL (ref 70–108)
GLUCOSE BLD-MCNC: 242 MG/DL (ref 70–108)
GLUCOSE BLD-MCNC: 99 MG/DL (ref 70–108)
ORGANISM: ABNORMAL
POTASSIUM SERPL-SCNC: 3.5 MEQ/L (ref 3.5–5.2)
POTASSIUM SERPL-SCNC: 4 MEQ/L (ref 3.5–5.2)

## 2019-11-22 PROCEDURE — 2580000003 HC RX 258

## 2019-11-22 PROCEDURE — 6370000000 HC RX 637 (ALT 250 FOR IP): Performed by: PHYSICIAN ASSISTANT

## 2019-11-22 PROCEDURE — 2500000003 HC RX 250 WO HCPCS: Performed by: INTERNAL MEDICINE

## 2019-11-22 PROCEDURE — 6360000002 HC RX W HCPCS: Performed by: NURSE PRACTITIONER

## 2019-11-22 PROCEDURE — 94003 VENT MGMT INPAT SUBQ DAY: CPT

## 2019-11-22 PROCEDURE — 94761 N-INVAS EAR/PLS OXIMETRY MLT: CPT

## 2019-11-22 PROCEDURE — 2580000003 HC RX 258: Performed by: PHYSICIAN ASSISTANT

## 2019-11-22 PROCEDURE — 6360000002 HC RX W HCPCS: Performed by: INTERNAL MEDICINE

## 2019-11-22 PROCEDURE — 6370000000 HC RX 637 (ALT 250 FOR IP): Performed by: NURSE PRACTITIONER

## 2019-11-22 PROCEDURE — 2709999900 HC NON-CHARGEABLE SUPPLY

## 2019-11-22 PROCEDURE — 84132 ASSAY OF SERUM POTASSIUM: CPT

## 2019-11-22 PROCEDURE — 6370000000 HC RX 637 (ALT 250 FOR IP)

## 2019-11-22 PROCEDURE — 2580000003 HC RX 258: Performed by: INTERNAL MEDICINE

## 2019-11-22 PROCEDURE — 94770 HC ETCO2 MONITOR DAILY: CPT

## 2019-11-22 PROCEDURE — 6370000000 HC RX 637 (ALT 250 FOR IP): Performed by: INTERNAL MEDICINE

## 2019-11-22 PROCEDURE — 2000000000 HC ICU R&B

## 2019-11-22 PROCEDURE — 82948 REAGENT STRIP/BLOOD GLUCOSE: CPT

## 2019-11-22 PROCEDURE — 36415 COLL VENOUS BLD VENIPUNCTURE: CPT

## 2019-11-22 PROCEDURE — 99291 CRITICAL CARE FIRST HOUR: CPT | Performed by: INTERNAL MEDICINE

## 2019-11-22 PROCEDURE — 2500000003 HC RX 250 WO HCPCS: Performed by: PHYSICIAN ASSISTANT

## 2019-11-22 RX ORDER — ACETAMINOPHEN 325 MG/1
650 TABLET ORAL EVERY 4 HOURS PRN
Status: DISCONTINUED | OUTPATIENT
Start: 2019-11-22 | End: 2019-12-10 | Stop reason: HOSPADM

## 2019-11-22 RX ORDER — POTASSIUM CHLORIDE 29.8 MG/ML
20 INJECTION INTRAVENOUS
Status: COMPLETED | OUTPATIENT
Start: 2019-11-22 | End: 2019-11-22

## 2019-11-22 RX ORDER — CIPROFLOXACIN 2 MG/ML
400 INJECTION, SOLUTION INTRAVENOUS EVERY 12 HOURS
Status: DISCONTINUED | OUTPATIENT
Start: 2019-11-22 | End: 2019-11-23

## 2019-11-22 RX ORDER — ACETAMINOPHEN 325 MG/1
TABLET ORAL
Status: COMPLETED
Start: 2019-11-22 | End: 2019-11-22

## 2019-11-22 RX ADMIN — METOPROLOL TARTRATE 25 MG: 25 TABLET ORAL at 20:01

## 2019-11-22 RX ADMIN — ACETAMINOPHEN 650 MG: 325 TABLET ORAL at 18:09

## 2019-11-22 RX ADMIN — Medication 10 ML: at 08:45

## 2019-11-22 RX ADMIN — RIFAMPIN 300 MG: 300 CAPSULE ORAL at 20:01

## 2019-11-22 RX ADMIN — INSULIN GLARGINE 35 UNITS: 100 INJECTION, SOLUTION SUBCUTANEOUS at 08:31

## 2019-11-22 RX ADMIN — POTASSIUM CHLORIDE 20 MEQ: 29.8 INJECTION, SOLUTION INTRAVENOUS at 05:47

## 2019-11-22 RX ADMIN — NAFCILLIN SODIUM 2 G: 2 INJECTION, POWDER, LYOPHILIZED, FOR SOLUTION INTRAMUSCULAR; INTRAVENOUS at 02:38

## 2019-11-22 RX ADMIN — VANCOMYCIN HYDROCHLORIDE 1250 MG: 5 INJECTION, POWDER, LYOPHILIZED, FOR SOLUTION INTRAVENOUS at 08:40

## 2019-11-22 RX ADMIN — Medication 10 ML: at 20:01

## 2019-11-22 RX ADMIN — CIPROFLOXACIN 400 MG: 2 INJECTION, SOLUTION INTRAVENOUS at 12:27

## 2019-11-22 RX ADMIN — INSULIN LISPRO 4 UNITS: 100 INJECTION, SOLUTION INTRAVENOUS; SUBCUTANEOUS at 14:00

## 2019-11-22 RX ADMIN — INSULIN LISPRO 4 UNITS: 100 INJECTION, SOLUTION INTRAVENOUS; SUBCUTANEOUS at 08:31

## 2019-11-22 RX ADMIN — NAFCILLIN SODIUM 2 G: 2 INJECTION, POWDER, LYOPHILIZED, FOR SOLUTION INTRAMUSCULAR; INTRAVENOUS at 09:44

## 2019-11-22 RX ADMIN — RIFAMPIN 300 MG: 300 CAPSULE ORAL at 12:27

## 2019-11-22 RX ADMIN — NAFCILLIN SODIUM 2 G: 2 INJECTION, POWDER, LYOPHILIZED, FOR SOLUTION INTRAMUSCULAR; INTRAVENOUS at 05:47

## 2019-11-22 RX ADMIN — NAFCILLIN SODIUM 2 G: 2 INJECTION, POWDER, LYOPHILIZED, FOR SOLUTION INTRAMUSCULAR; INTRAVENOUS at 22:28

## 2019-11-22 RX ADMIN — NAFCILLIN SODIUM 2 G: 2 INJECTION, POWDER, LYOPHILIZED, FOR SOLUTION INTRAMUSCULAR; INTRAVENOUS at 13:43

## 2019-11-22 RX ADMIN — POTASSIUM & SODIUM PHOSPHATES POWDER PACK 280-160-250 MG 250 MG: 280-160-250 PACK at 17:47

## 2019-11-22 RX ADMIN — POTASSIUM & SODIUM PHOSPHATES POWDER PACK 280-160-250 MG 250 MG: 280-160-250 PACK at 12:35

## 2019-11-22 RX ADMIN — Medication 15 ML: at 08:38

## 2019-11-22 RX ADMIN — METOPROLOL TARTRATE 5 MG: 5 INJECTION INTRAVENOUS at 15:16

## 2019-11-22 RX ADMIN — POTASSIUM & SODIUM PHOSPHATES POWDER PACK 280-160-250 MG 250 MG: 280-160-250 PACK at 08:27

## 2019-11-22 RX ADMIN — WATER 10 ML: 1 INJECTION INTRAMUSCULAR; INTRAVENOUS; SUBCUTANEOUS at 20:01

## 2019-11-22 RX ADMIN — METOPROLOL TARTRATE 5 MG: 5 INJECTION INTRAVENOUS at 08:42

## 2019-11-22 RX ADMIN — PROPOFOL 30 MCG/KG/MIN: 10 INJECTION, EMULSION INTRAVENOUS at 01:44

## 2019-11-22 RX ADMIN — DEXAMETHASONE SODIUM PHOSPHATE 4 MG: 4 INJECTION, SOLUTION INTRAMUSCULAR; INTRAVENOUS at 02:38

## 2019-11-22 RX ADMIN — INSULIN LISPRO 4 UNITS: 100 INJECTION, SOLUTION INTRAVENOUS; SUBCUTANEOUS at 02:48

## 2019-11-22 RX ADMIN — SODIUM CHLORIDE, POTASSIUM CHLORIDE, SODIUM LACTATE AND CALCIUM CHLORIDE: 600; 310; 30; 20 INJECTION, SOLUTION INTRAVENOUS at 11:08

## 2019-11-22 RX ADMIN — INSULIN LISPRO 4 UNITS: 100 INJECTION, SOLUTION INTRAVENOUS; SUBCUTANEOUS at 05:52

## 2019-11-22 RX ADMIN — METOPROLOL TARTRATE 5 MG: 5 INJECTION INTRAVENOUS at 03:57

## 2019-11-22 RX ADMIN — POTASSIUM & SODIUM PHOSPHATES POWDER PACK 280-160-250 MG 250 MG: 280-160-250 PACK at 20:03

## 2019-11-22 RX ADMIN — POTASSIUM CHLORIDE 20 MEQ: 29.8 INJECTION, SOLUTION INTRAVENOUS at 03:55

## 2019-11-22 RX ADMIN — FAMOTIDINE 20 MG: 10 INJECTION, SOLUTION INTRAVENOUS at 20:01

## 2019-11-22 RX ADMIN — NAFCILLIN SODIUM 2 G: 2 INJECTION, POWDER, LYOPHILIZED, FOR SOLUTION INTRAMUSCULAR; INTRAVENOUS at 17:47

## 2019-11-22 RX ADMIN — FAMOTIDINE 20 MG: 10 INJECTION, SOLUTION INTRAVENOUS at 08:27

## 2019-11-22 ASSESSMENT — PULMONARY FUNCTION TESTS
PIF_VALUE: 17
PIF_VALUE: 16
PIF_VALUE: 16

## 2019-11-22 ASSESSMENT — PAIN SCALES - GENERAL: PAINLEVEL_OUTOF10: 0

## 2019-11-22 NOTE — FLOWSHEET NOTE
[address] [facility] Mrs Ricarda Chester remains very lethargic. She was able to take some PO tylenol for a fever. No

## 2019-11-22 NOTE — PLAN OF CARE
Problem: Impaired respiratory status  Goal: Normal spontaneous ventilation  Outcome: Completed  Note:   Patient extubate

## 2019-11-22 NOTE — PROGRESS NOTES
Assessment and Plan:        1. Septic shock: Secondary to septicemia and meningitis due to Staphylococcus aureus.  Initial antibiotic selection was high-dose Rocephin, Zosyn, and and vancomycin.  Antibiotics simplified to vancomycin on 11/20/2019. Secondary to MSSA bacteremia. This is secondary to soft tissue abscess communicating with small epidural abscess. 2. Abscess: Affecting lumbar region and involving soft tissue and communicating with epidural abscess. Secondary to MSSA. Status post lumbar drain placement 11/21/2019. Day #3 nafcillin. Day #1/3 rifampin synergy. 3. Meningitis: Secondary to MSSA. On Nafcillin. Rifampin added for improved tissue penetrance and synergy.  S/P Decadron to improve permeability of antibiotics. 4. Septicemia: Secondary to MSSA secondary to soft tissue abscess communicating with small epidural abscess. Day 3 Nafcillin. Previously on vancomycin. Day #1/3 right Rodríguez pin synergy. 5. Pneumonia: Positive for gram-negative rods. Cipro initiated 11/22/2019 pending culture results. Positive for MSSA. Secondary to seeding from bloodstream secondary to soft tissue abscess. Was present at admission. On nafcillin as well. 6. Acute respiratory failure: Patient intubated 11/18/2019 for impending respiratory arrest.  Undergoing lung protection strategies targeting a peak pressure of 35 or less and a plateau pressure of 30 or less. Patient did well with spontaneous breathing trial for 3 hours. Proceed with extubation. 7. Atrial fibrillation with rapid ventricular response: New onset. Felt secondary to septic shock.  Converted to sinus dysrhythmia. Transition to beta blocker therapy.  No anticoagulation. 8. Delirium: Secondary to septic shock with meningitis.    9. Lactic acidosis: Secondary to septic shock with poor perfusion.  S/P volume resuscitation and pressors. Trending.   10. Type 2 diabetes mellitus: Gap closed.  Transitioned from insulin drip to SQ Lantus. 11. Electrolyte abnormalities: Replaced phosphorus, potassium and magnesium. 12. Back pain: MRI back positive for soft tissue abscess and small epidural abscess. 13. 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  14. Urinary tract infection:  Related to Septicemia. Resolved. 15. Respiratory alkalosis: Secondary to septic shock.  Resolved. 16. Hypotension: Volume resuscitated.  S/P Pressors.  Screen for adrenal insufficiency is negative.  Resolved. 17. Hyperglycemia: Resolved. 25. Ketoacidosis: Resolved. .     CC:  Septic Shock. HPI: Patient is a 49-year-old white female active smoker. Maxi Roe has a remote history of appendectomy and cholecystectomy. Maxi Roe has a history of type 2 diabetes mellitus, hypertension, hyperlipidemia, depression, and a history of brain lipoma diagnosed 11/4/2019. Patient was hospitalized 11/4/2019 through 11/7/2019 for evaluation of possible normal pressure hydrocephalus.  She had symptoms consistent with normal pressure hydrocephalus including progressive gait disturbance, incontinence, and cognitive decline associated with blurred vision and headaches.  She was admitted and had a lumbar drain placed.  Final assessment of efficacy of lumbar drain was documented 11/7/2019 by Dr. Sarai Arambula that note he determined there was no significant benefit from the lumbar drain trial.  Findings did not support the diagnosis of normal pressure hydrocephalus.  Patient was determined not to be a good candidate for  shunt and lumbar drain was removed.  Patient was discharged home on 11/7/2019.   Patient was received in transfer from [facility] facility to [facility] on 11/18/2019.  Symptoms prior to hospitalization included a 5-day history of back pain associated with difficulty ambulating, difficulty urinating and diarrhea.  Additionally she had a 2-day history of confusion with hallucinations.  Immediately upon arrival, nursing staff had concern regarding patient's respiratory rate at 44 and heart rate at 160.  She was transferred immediately to the intensive care unit.  Patient was in overt septic shock and was intubated 11/18/2019.  She was started on Zosyn and vancomycin.  Cultures were sent. Anders Rodriguez underwent volume resuscitation and was started on pressors.  Rocephin was subsequently added given concerns for possible meningitis. Lumbar puncture was performed and subsequently grew Staphylococcus species. Antibiotics simplified to vancomycin for meningitis.  MRI lumbar spine demonstrated soft tissue abscess communicating with small epidural abscess. Lumbar soft tissue drain placed 11/21/19. Culture positive for MSSA. Antibiotics converted to Nafcillin 11/20/19. Rifampin added on 11/22/2019 for synergy. Day #1/3 of rifampin. Sputum culture started to grow gram-negative rods and Cipro was added on 11/22/2019. Tae Gamble For further details, please review the assessment and plan. ROS: Sedated on mechanical ventilator. PMH:  Per HPI  SHX:  Active tobacco abuse at 1/2 PPD. FHX: Positive for CAD  Allergies: NKDA  Medications:     lactated ringers 125 mL/hr at 11/22/19 1108    dextrose        ciprofloxacin  400 mg Intravenous Q12H    rifampin  300 mg Oral BID    insulin glargine  35 Units Subcutaneous BID    metoprolol  5 mg Intravenous Q6H    nafcillin  2 g Intravenous Q4H    insulin lispro  0-12 Units Subcutaneous Q4H    potassium & sodium phosphates  1 packet Oral 4x Daily    potassium (CARDIAC) replacement protocol   Other RX Placeholder    sodium chloride flush  10 mL Intravenous 2 times per day    famotidine (PEPCID) injection  20 mg Intravenous BID     Vital Signs:   T: 100.2: P: 99: RR: 32: B/P: 145/77: FiO2: Room air: O2 Sat: 97%: I/O: 2340/2800  CAM-ICU:  RASS -2. General:   Acutely ill appearing white female. HEENT:  normocephalic and atraumatic.  No scleral icterus. PERR  Neck: supple.  No Thyromegaly. Lungs: clear to auscultation.  No retractions.   Flor Tyler JVD.  Telemetry shows sinus rhythm. Abdomen: soft.  Nontender. Extremities:  No clubbing, cyanosis, or edema x 4.    Vasculature: capillary refill < 3 seconds. Palpable dorsalis pedis pulses. Skin: Pale but warm. Psych:  Sedated on mechanical ventilator. Will open eyes and follow simple requests. Lymph:  No supraclavicular adenopathy. Neurologic:  No seizures.     Data: (All radiographs, tracings, PFTs, and imaging are personally viewed and interpreted unless otherwise noted). · Telemetry shows sinus rhythm. · Urine is grossly purulent. · 2/2 blood cultures positive for MSSA. · Cerebrospinal fluid culture positive for MSSA.  Protein greater than 600.  Glucose 9. 15,626 white blood cells. · MRI lumbar and thoracic spine shows lumbar soft tissue abscess with small epidural abscess. · Sputum culture growing MSSA, in addition to gram-negative migue. · Potassium 4.0.  Glucose 200. .  CC time 30 minutes.  Case discussed with Dr. Liang Heck  Electronically signed by Laura Skelton.  Christopher Santiago M.D.

## 2019-11-22 NOTE — CARE COORDINATION
11/22/19, 3:06 PM    DISCHARGE ON GOING [address] [place] [address] day: 4  Location: [address] [place] [address]-A Reason for admit: Hyperglycemia [R73.9]   Procedure:   11/19 Intubated  11/19 CVC Left subclavian  11/19 LP  11/20 MRI Thoracic spine: No evidence of acute abnormality of the thoracic spine; Old minimal compression deformity of T12 with approximately 10% anterior height loss; Left paracentral extrusion at T9-10 causing mild spinal canal stenosis and contacting the ventral aspect of the cord without abnormal signal in the cord  11/20 MRI Lumbar spine: Prominent complex fluid collection in the paraspinal musculature on the left at the L3-L4 level which appears to communicate anteriorly with a tiny epidural abscess at the L3 level and posteriorly with a large subcutaneous component abscess; Arachnoiditis  11/21 Posterior lumbar paraspinal abcess drain placed in IR  11/22 Extubated  Treatment Plan of Care: Extubated today to room air. Tmax 100.2. NSR. PT/OT ordered today. Intensivist and Neurosurgery following. Lumbar abscess +staphylococcus. Blood, CSF, and respiratory cultures +MSSA. Truclose drain. Cardiac monitoring, I&O, daily weight, drain care, SCDs, flexiseal, cade care. IVF, IV Cipro, pepcid, lantus, SSI Q4H, IV lopressor Q6H, IV nafcillin Q4H, phos-nak, po rifampin, Electrolyte replacement protocols. PCP: ROMULO Glynn  Readmission Risk Score: 16%  Patient Goals/Plan/Treatment Preferences: From home with . Plan pending clinical course. Will likely need long-term IV ATB.

## 2019-11-22 NOTE — PLAN OF CARE
Airway Clearance - Ineffective:  Goal: Ability to maintain a clear airway will improve  Description  Ability to maintain a clear airway will improve  Outcome: Ongoing  Note:   Patient has strong productive cough. Patient is producing moderate amounts of creamy, white, thick sputum. Problem: Bowel Function - Altered:  Goal: Bowel elimination is within specified parameters  Description  Bowel elimination is within specified parameters  Outcome: Ongoing  Note:   Flexiseal remains in place at this time. Patient has hyperactive bowel sounds in all 4 quadrants. Patient continues to have thin, watery stools. Problem: Cardiac Output - Decreased:  Goal: Hemodynamic stability will improve  Description  Hemodynamic stability will improve  Outcome: Ongoing  Note:   Vitals:    11/22/19 0600   BP: 124/65   Pulse: 73   Resp: 12   Temp:    SpO2:      Continuously monitoring vital signs at this time. Vital signs remain WNL. Problem: Urinary Elimination:  Goal: Signs and symptoms of infection will decrease  Description  Signs and symptoms of infection will decrease  Outcome: Ongoing  Note:   Bernstein catheter remains in place at this time. Patient is producing large amount of clear, yellow urine. Bernstein care provided. Goal: Complications related to the disease process, condition or treatment will be avoided or minimized  Description  Complications related to the disease process, condition or treatment will be avoided or minimized  Outcome: Ongoing     Problem: Infection - Central Venous Catheter-Associated Bloodstream Infection:  Goal: Will show no infection signs and symptoms  Description  Will show no infection signs and symptoms  Outcome: Ongoing  Note:   CVC remains in place in left subclavian. Dressing is clean, dry, and intact. Antimicrobial patch in place. CVC is positional when drawing blood. Care plan reviewed with patient and .   Patient and  verbalize understanding of the plan of care and contribute to goal setting.

## 2019-11-22 NOTE — PLAN OF CARE
Problem: Falls - Risk of:  Goal: Will remain free from falls  Description  Will remain free from falls  11/22/2019 1139 by Iesha Amezcua RN  Outcome: Ongoing  Note:   bedrest     Problem: Risk for Impaired Skin Integrity  Goal: Tissue integrity - skin and mucous membranes  Description  Structural intactness and normal physiological function of skin and  mucous membranes. 11/22/2019 1139 by Iesha Amezcua RN  Outcome: Ongoing  Note:   Frequent position changes. Frequent oral care maintained. Problem: Nutrition  Goal: Optimal nutrition therapy  11/22/2019 1139 by Iesha Amezcua RN  Outcome: Ongoing  Note:   She tolerates tube feedings well at goal.     Problem: Restraint Use - Nonviolent/Non-Self-Destructive Behavior:  Goal: Absence of restraint indications  Description  Absence of restraint indications  11/22/2019 1139 by Iesha Amezcua RN  Outcome: Ongoing  Note:   Maintain restraints while unwilling to refrain from pulling at lines or tubes. Problem: Restraint Use - Nonviolent/Non-Self-Destructive Behavior:  Goal: Absence of restraint-related injury  Description  Absence of restraint-related injury  11/22/2019 1139 by Iesha Amezcua RN  Outcome: Ongoing  Note:   No restraint related injury noted. Problem: Discharge Planning:  Goal: Discharged to appropriate level of care  Description  Discharged to appropriate level of care  11/22/2019 1139 by Iesha Amezcua RN  Outcome: Ongoing  Note:   Plan discharge to home. Problem: Airway Clearance - Ineffective:  Goal: Ability to maintain a clear airway will improve  Description  Ability to maintain a clear airway will improve  11/22/2019 1139 by Iesha Amezcua RN  Outcome: Ongoing  Note:   She continues to require ventilator support. Problem:  Bowel Function - Altered:  Goal: Bowel elimination is within specified parameters  Description  Bowel elimination is within specified parameters  11/22/2019 1139 by Iesha Amezcua RN  Outcome: Ongoing  Note:   She has a flexacil in place. Problem: Cardiac Output - Decreased:  Goal: Hemodynamic stability will improve  Description  Hemodynamic stability will improve  11/22/2019 1139 by Mumtaz Chew RN  Outcome: Ongoing  Note:   Remains hemodynamically stable off pressors. Problem: Urinary Elimination:  Goal: Signs and symptoms of infection will decrease  Description  Signs and symptoms of infection will decrease  11/22/2019 1139 by Mumtaz Chew RN  Outcome: Ongoing  Note:   Her cade remains in place. Problem: Infection - Central Venous Catheter-Associated Bloodstream Infection:  Goal: Will show no infection signs and symptoms  Description  Will show no infection signs and symptoms  11/22/2019 1139 by Mumtaz Chew RN  Outcome: Ongoing   Care plan reviewed with patient and family. Patient and family verbalize understanding of the plan of care and contribute to goal setting.

## 2019-11-22 NOTE — PROGRESS NOTES
Patient has been successfully weaned from Mechanical Ventilation. Patient extubated and placed on room air. Post extubation SpO2 is 98% with HR  86 bpm and RR 19 breaths/min. Patient had moderate cough that was non-productive. Extubation Well tolerated by patient. Rylie Galo

## 2019-11-23 ENCOUNTER — APPOINTMENT (OUTPATIENT)
Dept: GENERAL RADIOLOGY | Age: 64
DRG: 856 | End: 2019-11-23
Attending: INTERNAL MEDICINE
Payer: MEDICARE

## 2019-11-23 LAB
ANION GAP SERPL CALCULATED.3IONS-SCNC: 14 MEQ/L (ref 8–16)
BASOPHILS # BLD: 0.3 %
BASOPHILS ABSOLUTE: 0.1 THOU/MM3 (ref 0–0.1)
BUN BLDV-MCNC: 9 MG/DL (ref 7–22)
CALCIUM IONIZED: 0.85 MMOL/L (ref 1.12–1.32)
CALCIUM SERPL-MCNC: 7.2 MG/DL (ref 8.5–10.5)
CHLORIDE BLD-SCNC: 88 MEQ/L (ref 98–111)
CO2: 30 MEQ/L (ref 23–33)
CREAT SERPL-MCNC: 0.5 MG/DL (ref 0.4–1.2)
EOSINOPHIL # BLD: 0.2 %
EOSINOPHILS ABSOLUTE: 0 THOU/MM3 (ref 0–0.4)
ERYTHROCYTE [DISTWIDTH] IN BLOOD BY AUTOMATED COUNT: 15.9 % (ref 11.5–14.5)
ERYTHROCYTE [DISTWIDTH] IN BLOOD BY AUTOMATED COUNT: 48.3 FL (ref 35–45)
GFR SERPL CREATININE-BSD FRML MDRD: > 90 ML/MIN/1.73M2
GLUCOSE BLD-MCNC: 111 MG/DL (ref 70–108)
GLUCOSE BLD-MCNC: 128 MG/DL (ref 70–108)
GLUCOSE BLD-MCNC: 131 MG/DL (ref 70–108)
GLUCOSE BLD-MCNC: 137 MG/DL (ref 70–108)
GLUCOSE BLD-MCNC: 190 MG/DL (ref 70–108)
GLUCOSE BLD-MCNC: 88 MG/DL (ref 70–108)
GRAM STAIN RESULT: ABNORMAL
HCT VFR BLD CALC: 33.3 % (ref 37–47)
HEMOGLOBIN: 10.5 GM/DL (ref 12–16)
IMMATURE GRANS (ABS): 0.92 THOU/MM3 (ref 0–0.07)
IMMATURE GRANULOCYTES: 4.3 %
LACTIC ACID: 1.5 MMOL/L (ref 0.5–2.2)
LYMPHOCYTES # BLD: 12.7 %
LYMPHOCYTES ABSOLUTE: 2.7 THOU/MM3 (ref 1–4.8)
MCH RBC QN AUTO: 26.6 PG (ref 26–33)
MCHC RBC AUTO-ENTMCNC: 31.5 GM/DL (ref 32.2–35.5)
MCV RBC AUTO: 84.5 FL (ref 81–99)
MONOCYTES # BLD: 6.1 %
MONOCYTES ABSOLUTE: 1.3 THOU/MM3 (ref 0.4–1.3)
NUCLEATED RED BLOOD CELLS: 0 /100 WBC
ORGANISM: ABNORMAL
ORGANISM: ABNORMAL
PLATELET # BLD: 287 THOU/MM3 (ref 130–400)
PMV BLD AUTO: 9.7 FL (ref 9.4–12.4)
POTASSIUM SERPL-SCNC: 2.4 MEQ/L (ref 3.5–5.2)
POTASSIUM SERPL-SCNC: 2.9 MEQ/L (ref 3.5–5.2)
POTASSIUM SERPL-SCNC: 3.4 MEQ/L (ref 3.5–5.2)
RBC # BLD: 3.94 MILL/MM3 (ref 4.2–5.4)
REASON FOR REJECTION: NORMAL
REJECTED TEST: NORMAL
RESPIRATORY CULTURE: ABNORMAL
SEG NEUTROPHILS: 76.4 %
SEGMENTED NEUTROPHILS ABSOLUTE COUNT: 16.4 THOU/MM3 (ref 1.8–7.7)
SODIUM BLD-SCNC: 132 MEQ/L (ref 135–145)
WBC # BLD: 21.5 THOU/MM3 (ref 4.8–10.8)

## 2019-11-23 PROCEDURE — 2709999900 HC NON-CHARGEABLE SUPPLY

## 2019-11-23 PROCEDURE — 99233 SBSQ HOSP IP/OBS HIGH 50: CPT | Performed by: INTERNAL MEDICINE

## 2019-11-23 PROCEDURE — 2500000003 HC RX 250 WO HCPCS: Performed by: INTERNAL MEDICINE

## 2019-11-23 PROCEDURE — 71045 X-RAY EXAM CHEST 1 VIEW: CPT

## 2019-11-23 PROCEDURE — 2580000003 HC RX 258: Performed by: INTERNAL MEDICINE

## 2019-11-23 PROCEDURE — 99233 SBSQ HOSP IP/OBS HIGH 50: CPT | Performed by: NEUROLOGICAL SURGERY

## 2019-11-23 PROCEDURE — 2500000003 HC RX 250 WO HCPCS: Performed by: PHYSICIAN ASSISTANT

## 2019-11-23 PROCEDURE — 6370000000 HC RX 637 (ALT 250 FOR IP): Performed by: INTERNAL MEDICINE

## 2019-11-23 PROCEDURE — 85025 COMPLETE CBC W/AUTO DIFF WBC: CPT

## 2019-11-23 PROCEDURE — 6360000002 HC RX W HCPCS: Performed by: INTERNAL MEDICINE

## 2019-11-23 PROCEDURE — 6360000002 HC RX W HCPCS: Performed by: PHYSICIAN ASSISTANT

## 2019-11-23 PROCEDURE — 80048 BASIC METABOLIC PNL TOTAL CA: CPT

## 2019-11-23 PROCEDURE — 82330 ASSAY OF CALCIUM: CPT

## 2019-11-23 PROCEDURE — 6360000002 HC RX W HCPCS: Performed by: NURSE PRACTITIONER

## 2019-11-23 PROCEDURE — 2060000000 HC ICU INTERMEDIATE R&B

## 2019-11-23 PROCEDURE — 2580000003 HC RX 258: Performed by: PHYSICIAN ASSISTANT

## 2019-11-23 PROCEDURE — 83605 ASSAY OF LACTIC ACID: CPT

## 2019-11-23 PROCEDURE — 84132 ASSAY OF SERUM POTASSIUM: CPT

## 2019-11-23 PROCEDURE — 82948 REAGENT STRIP/BLOOD GLUCOSE: CPT

## 2019-11-23 PROCEDURE — 36415 COLL VENOUS BLD VENIPUNCTURE: CPT

## 2019-11-23 RX ORDER — POTASSIUM CHLORIDE 29.8 MG/ML
20 INJECTION INTRAVENOUS
Status: COMPLETED | OUTPATIENT
Start: 2019-11-23 | End: 2019-11-23

## 2019-11-23 RX ORDER — POTASSIUM CHLORIDE 29.8 MG/ML
20 INJECTION INTRAVENOUS
Status: DISPENSED | OUTPATIENT
Start: 2019-11-23 | End: 2019-11-23

## 2019-11-23 RX ORDER — POTASSIUM CHLORIDE 29.8 MG/ML
20 INJECTION INTRAVENOUS
Status: COMPLETED | OUTPATIENT
Start: 2019-11-23 | End: 2019-11-24

## 2019-11-23 RX ADMIN — CIPROFLOXACIN 400 MG: 2 INJECTION, SOLUTION INTRAVENOUS at 00:02

## 2019-11-23 RX ADMIN — METOPROLOL TARTRATE 25 MG: 25 TABLET ORAL at 21:15

## 2019-11-23 RX ADMIN — POTASSIUM CHLORIDE 20 MEQ: 29.8 INJECTION, SOLUTION INTRAVENOUS at 08:10

## 2019-11-23 RX ADMIN — CIPROFLOXACIN 400 MG: 2 INJECTION, SOLUTION INTRAVENOUS at 12:14

## 2019-11-23 RX ADMIN — POTASSIUM CHLORIDE 20 MEQ: 29.8 INJECTION, SOLUTION INTRAVENOUS at 16:59

## 2019-11-23 RX ADMIN — POTASSIUM & SODIUM PHOSPHATES POWDER PACK 280-160-250 MG 250 MG: 280-160-250 PACK at 16:17

## 2019-11-23 RX ADMIN — NAFCILLIN SODIUM 2 G: 2 INJECTION, POWDER, LYOPHILIZED, FOR SOLUTION INTRAMUSCULAR; INTRAVENOUS at 18:24

## 2019-11-23 RX ADMIN — POTASSIUM CHLORIDE 20 MEQ: 29.8 INJECTION, SOLUTION INTRAVENOUS at 15:44

## 2019-11-23 RX ADMIN — NAFCILLIN SODIUM 2 G: 2 INJECTION, POWDER, LYOPHILIZED, FOR SOLUTION INTRAMUSCULAR; INTRAVENOUS at 02:28

## 2019-11-23 RX ADMIN — POTASSIUM CHLORIDE 20 MEQ: 29.8 INJECTION, SOLUTION INTRAVENOUS at 23:37

## 2019-11-23 RX ADMIN — NAFCILLIN SODIUM 2 G: 2 INJECTION, POWDER, LYOPHILIZED, FOR SOLUTION INTRAMUSCULAR; INTRAVENOUS at 13:14

## 2019-11-23 RX ADMIN — METOPROLOL TARTRATE 25 MG: 25 TABLET ORAL at 08:15

## 2019-11-23 RX ADMIN — FAMOTIDINE 20 MG: 10 INJECTION, SOLUTION INTRAVENOUS at 21:22

## 2019-11-23 RX ADMIN — Medication 10 ML: at 08:16

## 2019-11-23 RX ADMIN — RIFAMPIN 300 MG: 300 CAPSULE ORAL at 08:15

## 2019-11-23 RX ADMIN — POTASSIUM & SODIUM PHOSPHATES POWDER PACK 280-160-250 MG 250 MG: 280-160-250 PACK at 08:15

## 2019-11-23 RX ADMIN — NAFCILLIN SODIUM 2 G: 2 INJECTION, POWDER, LYOPHILIZED, FOR SOLUTION INTRAMUSCULAR; INTRAVENOUS at 22:48

## 2019-11-23 RX ADMIN — CALCIUM GLUCONATE 1.5 G: 98 INJECTION, SOLUTION INTRAVENOUS at 08:15

## 2019-11-23 RX ADMIN — POTASSIUM CHLORIDE 20 MEQ: 29.8 INJECTION, SOLUTION INTRAVENOUS at 06:34

## 2019-11-23 RX ADMIN — NAFCILLIN SODIUM 2 G: 2 INJECTION, POWDER, LYOPHILIZED, FOR SOLUTION INTRAMUSCULAR; INTRAVENOUS at 09:51

## 2019-11-23 RX ADMIN — CEFTRIAXONE SODIUM 2 G: 2 INJECTION, POWDER, FOR SOLUTION INTRAMUSCULAR; INTRAVENOUS at 15:44

## 2019-11-23 RX ADMIN — RIFAMPIN 300 MG: 300 CAPSULE ORAL at 21:14

## 2019-11-23 RX ADMIN — FAMOTIDINE 20 MG: 10 INJECTION, SOLUTION INTRAVENOUS at 08:23

## 2019-11-23 RX ADMIN — NAFCILLIN SODIUM 2 G: 2 INJECTION, POWDER, LYOPHILIZED, FOR SOLUTION INTRAMUSCULAR; INTRAVENOUS at 04:32

## 2019-11-23 RX ADMIN — INSULIN LISPRO 2 UNITS: 100 INJECTION, SOLUTION INTRAVENOUS; SUBCUTANEOUS at 12:36

## 2019-11-23 RX ADMIN — POTASSIUM CHLORIDE 20 MEQ: 29.8 INJECTION, SOLUTION INTRAVENOUS at 22:53

## 2019-11-23 RX ADMIN — SODIUM CHLORIDE, POTASSIUM CHLORIDE, SODIUM LACTATE AND CALCIUM CHLORIDE: 600; 310; 30; 20 INJECTION, SOLUTION INTRAVENOUS at 13:15

## 2019-11-23 RX ADMIN — ACETAMINOPHEN 650 MG: 325 TABLET ORAL at 16:17

## 2019-11-23 RX ADMIN — POTASSIUM CHLORIDE 20 MEQ: 29.8 INJECTION, SOLUTION INTRAVENOUS at 09:50

## 2019-11-23 RX ADMIN — POTASSIUM & SODIUM PHOSPHATES POWDER PACK 280-160-250 MG 250 MG: 280-160-250 PACK at 21:14

## 2019-11-23 RX ADMIN — INSULIN GLARGINE 35 UNITS: 100 INJECTION, SOLUTION SUBCUTANEOUS at 12:36

## 2019-11-23 RX ADMIN — ACETAMINOPHEN 650 MG: 325 TABLET ORAL at 03:13

## 2019-11-23 RX ADMIN — Medication 10 ML: at 21:15

## 2019-11-23 RX ADMIN — ONDANSETRON 4 MG: 2 INJECTION INTRAMUSCULAR; INTRAVENOUS at 12:10

## 2019-11-23 ASSESSMENT — PAIN SCALES - GENERAL
PAINLEVEL_OUTOF10: 0
PAINLEVEL_OUTOF10: 3
PAINLEVEL_OUTOF10: 0

## 2019-11-23 ASSESSMENT — PAIN DESCRIPTION - LOCATION: LOCATION: BACK

## 2019-11-23 ASSESSMENT — PAIN DESCRIPTION - DESCRIPTORS: DESCRIPTORS: ACHING;DISCOMFORT

## 2019-11-23 ASSESSMENT — PAIN DESCRIPTION - PAIN TYPE: TYPE: ACUTE PAIN

## 2019-11-23 NOTE — PROGRESS NOTES
female. HEENT:  normocephalic and atraumatic.  No scleral icterus. PERR  Neck: supple.  No Thyromegaly. Lungs: clear to auscultation.  No retractions. Keri Cordon JVD.  Telemetry shows sinus rhythm. Abdomen: soft.  Nontender. Extremities:  No clubbing, cyanosis, or edema x 4.    Vasculature: capillary refill < 3 seconds. Palpable dorsalis pedis pulses. Skin: Pale but warm. Psych:  calm.  Will open eyes and follow simple requests. Lymph:  No supraclavicular adenopathy. CAM-ICU:  RASS -2. Neurologic:  No seizures. Generalized weakness  DATA:  CBC:   Lab Results   Component Value Date    WBC 21.5 11/23/2019    RBC 3.94 11/23/2019    HGB 10.5 11/23/2019    HCT 33.3 11/23/2019    MCV 84.5 11/23/2019     11/23/2019     CMP:    Lab Results   Component Value Date     11/23/2019    K 2.4 11/23/2019    K 4.2 11/05/2019    CL 88 11/23/2019    CO2 30 11/23/2019    BUN 9 11/23/2019    CREATININE 0.5 11/23/2019    LABGLOM >90 11/23/2019    GLUCOSE 111 11/23/2019    PROT 6.2 11/21/2019    CALCIUM 7.2 11/23/2019    BILITOT 0.5 11/21/2019    ALKPHOS 70 11/21/2019    AST 18 11/21/2019    ALT 14 11/21/2019       KEY ISSUES/FINDINGS/ASSESSMENT AND PLAN:    Patient is a 27-year-old white female active smoker. James Awad has a remote history of appendectomy and cholecystectomy. James Awad has a history of type 2 diabetes mellitus, hypertension, hyperlipidemia, depression, and a history of brain lipoma diagnosed 11/4/2019.   Patient was hospitalized 11/4/2019 through 11/7/2019 for evaluation of possible normal pressure hydrocephalus.  She had symptoms consistent with normal pressure hydrocephalus including progressive gait disturbance, incontinence, and cognitive decline associated with blurred vision and headaches.  She was admitted and had a lumbar drain placed.  Final assessment of efficacy of lumbar drain was documented 11/7/2019 by Dr. Hank Cardenas that note he determined there was no significant benefit from the

## 2019-11-23 NOTE — PROGRESS NOTES
spine. Axial T2-weighted images were obtained through the discs. Postcontrast axial and sagittal T1-weighted images were obtained. 20 mL ProHance was injected in the existing IV site. FINDINGS:  There is a minimal anterior wedge shape configuration of the T12 vertebral body with approximately 10% anterior height loss. There is a tiny Schmorl's node at superior endplate of T7. There is otherwise anatomic vertebral body height and alignment. Marrow signal is within normal limits. The thoracic spinal cord is normal in caliber without focal area of abnormal T2 signal identified. No epidural fluid collection is identified. No abnormal enhancement is identified within the cord. At T9-10 there is a left paracentral extrusion which mildly indents thecal sac and contacts the ventral aspect of spinal cord secondary to the curvature of the spine without abnormal signal in the cord. There is no significant neural foraminal stenosis. There is no significant spinal canal or neuroforaminal stenosis at any other thoracic level. 1. No evidence of acute abnormality of the thoracic spine. 2. Old minimal compression deformity of T12 with approximately 10% anterior height loss. 3. Left paracentral extrusion at T9-10 causing mild spinal canal stenosis and contacting the ventral aspect of the cord without abnormal signal in the cord. **This report has been created using voice recognition software. It may contain minor errors which are inherent in voice recognition technology. ** Final report electronically signed by Dr. Dot Hanna MD on 11/20/2019 3:04 PM    Mri Lumbar Spine W Wo Contrast    Result Date: 11/20/2019  PROCEDURE: MRI LUMBAR SPINE W WO CONTRAST CLINICAL INFORMATION: increased low back pain, recent lumbar drain, now sepsis, unable to walk at home. COMPARISON: No prior study. TECHNIQUE: Sagittal and axial T1 and T2-weighted images were obtained to the lumbar spine.  Postcontrast axial and sagittal T1-weighted using voice recognition software. It may contain minor errors which are inherent in voice recognition technology. ** Final report electronically signed by Dr. Yunier Robles on 11/23/2019 3:11 AM    Xr Chest Portable    Result Date: 11/19/2019  PROCEDURE: XR CHEST PORTABLE CLINICAL INFORMATION: CVC placement. COMPARISON: November 19, 2019 TECHNIQUE: AP upright view of the chest. FINDINGS: The heart and mediastinum are stable. There is redemonstration of an endotracheal tube and nasogastric tubes in stable locations. Lung volumes are unchanged with mildly coarse markings present. There is redemonstration of a calcified granuloma in the left mid thorax. Central venous catheter from the left subclavian is present within the superior vena cava. There is no visualized pneumothorax. 1. Status post left subclavian central line placement. Tip location the superior vena cava. There is no visualized pneumothorax. 2. Stable appearance of coarse lung markings and slightly decreased lung volumes. **This report has been created using voice recognition software. It may contain minor errors which are inherent in voice recognition technology. ** Final report electronically signed by Dr. Yunier Robles on 11/19/2019 7:14 AM    Xr Chest Portable    Result Date: 11/19/2019  PROCEDURE: XR CHEST PORTABLE CLINICAL INFORMATION: ETT/OGT placement. COMPARISON: No prior study. TECHNIQUE: AP upright view of the chest. FINDINGS: The lung volumes are shallow. There is an endotracheal tube 2.5 cm above the mesfin. A nasogastric tube is in the body of the stomach. There are coarse lung markings likely age-related. There is a small calcified density in the left perihilar region. A granuloma is suspected. Minimal retrocardiac atelectasis or infiltrate cannot be excluded. There is no venous congestion present. A small left basilar effusion or atelectasis blunts the costophrenic angle. The skeleton is negative for active pathology.      1. Visualized endotracheal tube above the mesfin. 2. Nasogastric tube within the stomach. 3. Diminished lung volumes with crowding of markings. Left retrocardiac atelectasis or infiltrate is not excluded. A small left effusion is considered. **This report has been created using voice recognition software. It may contain minor errors which are inherent in voice recognition technology. ** Final report electronically signed by Dr. Nabeel Lopez on 11/19/2019 12:29 AM    Xr Chest Portable    Result Date: 11/19/2019  PROCEDURE: XR CHEST PORTABLE CLINICAL INFORMATION: et tube placement. COMPARISON: November 18, 2019 TECHNIQUE: AP upright view of the chest. FINDINGS: The heart and mediastinum remain upper normal. There is an endotracheal tube visualized 3 cm above the mesfin. The nasogastric tube is in the mid body of the stomach. Lung volumes remain mildly shallow with redemonstration of calcified granuloma in the left mid thorax. Trace effusion at the right lung base cannot be excluded. There is no obvious congestive failure. Postsurgical clips in the right upper abdomen are stable compared to prior study. There is no acute change of the skeleton. 1. Visualized endotracheal and nasogastric tubes discussed above. 2. Persistent slightly decreased lung volumes with coarse markings. 3. Minimal right effusion not excluded. **This report has been created using voice recognition software. It may contain minor errors which are inherent in voice recognition technology. ** Final report electronically signed by Dr. Nabeel Lopez on 11/19/2019 12:27 AM    Ir Lumbar Puncture For Diagnosis    Result Date: 11/19/2019  LUMBAR PUNCTURE WITH FLUOROSCOPIC GUIDANCE: CLINICAL INFORMATION:  Lumbar puncture for possible meningitis PERFORMED BY: Marilyn Mack M.D.  APPROACH: L3-L4 interlaminar approach NEEDLE: 20-gauge spinal needle FLUID: 6 mL yellow turbid fluid FLUOROSCOPY TIME: 1 minute 5 seconds FLUOROSCOPIC IMAGES: 3 Dose (mGy):63 by insertion of progressively larger dilators up to an 8 [place] size. An 8 [place] multi-side-hole drainage catheter was then passed over the wire and was coiled within the abscess pocket. The retaining suture was then locked in the standard fashion. Catheter was then hooked to a Domingo-Close drainage bag and the catheter was flushed several times with sterile saline. The catheter was stabilized to the skin with a Percu-Stay device. A sample of the pus was sent to laboratory for culture and sensitivity testing. Status post successful abscess drainage procedure. . **This report has been created using voice recognition software. It may contain minor errors which are inherent in voice recognition technology. ** Final report electronically signed by Dr. Dot Becker on 11/21/2019 1:13 PM    A/P: S/P remains status post admission and subsequent placement of lumbar drain by interventional radiology evacuate of infection. Already the placement of the drain and is resting comfortably today and is without significant complaints other than drain site irritation. She is awake and alert, responding to commands with purposeful movement in all 4 extremities. Agree with the recommendation that should be transferred to the floor for continued observation and care. Neurosurgery to follow    Electronically signed by Nafisa Mckeon PA-C on 11/23/2019 at 11:40 AM     Attending Note:     Patient seen and examined in the ICU  in conjunction with neurosurgery PA Nafisa Mckeon PA-C). Discussed with patient and her nurse, ICU team as well. All data and imaging reviewed by myself. I agree with examination assessment and plan as documented above.    - Patient underwent percutaneously drainage of lumbar spine abscess per IR team.  - Pateint is doing better today.  -She was extubated yesterday.  -She is awake and alert alert and oriented following commands by 4.  - Had episode of fever.   -The current medical management per ICU

## 2019-11-23 NOTE — PROGRESS NOTES
Patient arrived to 4K26 from ICU in bed, on room air. Patient remains in stable condition, on telemetry at this time.

## 2019-11-23 NOTE — PLAN OF CARE
Problem: Falls - Risk of:  Goal: Will remain free from falls  Description  Will remain free from falls  Outcome: Ongoing  Note:   No falls this shift. Bed in lowest position and locked. Call light within reach. Bed alarm activated. Problem: Nutrition  Goal: Optimal nutrition therapy  Outcome: Ongoing  Note:   Tolerating CC diet. Missing multiple teeth, needs soft/chopped meat. Problem: Discharge Planning:  Goal: Discharged to appropriate level of care  Description  Discharged to appropriate level of care  Outcome: Ongoing  Note:   Plans to transfer out of ICU today per Intensivist.      Problem: Airway Clearance - Ineffective:  Goal: Ability to maintain a clear airway will improve  Description  Ability to maintain a clear airway will improve  Outcome: Ongoing  Note:   Maintaining clear airway. Problem: Bowel Function - Altered:  Goal: Bowel elimination is within specified parameters  Description  Bowel elimination is within specified parameters  Outcome: Ongoing  Note:   Loose stools continue. Started solid food today. Problem: Urinary Elimination:  Goal: Signs and symptoms of infection will decrease  Description  Signs and symptoms of infection will decrease  Outcome: Ongoing  Note:   Bernstein catheter remains in place. High urine output. Problem: Urinary Elimination:  Goal: Complications related to the disease process, condition or treatment will be avoided or minimized  Description  Complications related to the disease process, condition or treatment will be avoided or minimized  Outcome: Ongoing  Note:   Bernstein care q shift and PRN. Problem: Infection - Central Venous Catheter-Associated Bloodstream Infection:  Goal: Will show no infection signs and symptoms  Description  Will show no infection signs and symptoms  Outcome: Ongoing  Note:   No signs of central line infection. Care plan reviewed with patient and .   Patient and  verbalize understanding of the plan of care

## 2019-11-24 ENCOUNTER — APPOINTMENT (OUTPATIENT)
Dept: CT IMAGING | Age: 64
DRG: 856 | End: 2019-11-24
Attending: INTERNAL MEDICINE
Payer: MEDICARE

## 2019-11-24 LAB
ALBUMIN SERPL-MCNC: 2.6 G/DL (ref 3.5–5.1)
ALP BLD-CCNC: 65 U/L (ref 38–126)
ALT SERPL-CCNC: 22 U/L (ref 11–66)
ANAEROBIC CULTURE: ABNORMAL
ANION GAP SERPL CALCULATED.3IONS-SCNC: 14 MEQ/L (ref 8–16)
APTT: 29.7 SECONDS (ref 22–38)
AST SERPL-CCNC: 32 U/L (ref 5–40)
BILIRUB SERPL-MCNC: 0.4 MG/DL (ref 0.3–1.2)
BUN BLDV-MCNC: 8 MG/DL (ref 7–22)
CALCIUM IONIZED: 0.85 MMOL/L (ref 1.12–1.32)
CALCIUM IONIZED: 0.85 MMOL/L (ref 1.12–1.32)
CALCIUM SERPL-MCNC: 7.4 MG/DL (ref 8.5–10.5)
CHLORIDE BLD-SCNC: 86 MEQ/L (ref 98–111)
CO2: 26 MEQ/L (ref 23–33)
CREAT SERPL-MCNC: 0.7 MG/DL (ref 0.4–1.2)
CSF CULTURE: ABNORMAL
ERYTHROCYTE [DISTWIDTH] IN BLOOD BY AUTOMATED COUNT: 16.5 % (ref 11.5–14.5)
ERYTHROCYTE [DISTWIDTH] IN BLOOD BY AUTOMATED COUNT: 50 FL (ref 35–45)
GFR SERPL CREATININE-BSD FRML MDRD: 84 ML/MIN/1.73M2
GLUCOSE BLD-MCNC: 102 MG/DL (ref 70–108)
GLUCOSE BLD-MCNC: 104 MG/DL (ref 70–108)
GLUCOSE BLD-MCNC: 121 MG/DL (ref 70–108)
GLUCOSE BLD-MCNC: 126 MG/DL (ref 70–108)
GLUCOSE BLD-MCNC: 94 MG/DL (ref 70–108)
GRAM STAIN RESULT: ABNORMAL
HCT VFR BLD CALC: 34.8 % (ref 37–47)
HEMOGLOBIN: 11 GM/DL (ref 12–16)
INR BLD: 1.21 (ref 0.85–1.13)
LACTIC ACID: 1.3 MMOL/L (ref 0.5–2.2)
MAGNESIUM: 1.1 MG/DL (ref 1.6–2.4)
MCH RBC QN AUTO: 27 PG (ref 26–33)
MCHC RBC AUTO-ENTMCNC: 31.6 GM/DL (ref 32.2–35.5)
MCV RBC AUTO: 85.5 FL (ref 81–99)
ORGANISM: ABNORMAL
OSMOLALITY URINE: 342 MOSMOL/KG (ref 250–750)
PHOSPHORUS: 3.2 MG/DL (ref 2.4–4.7)
PLATELET # BLD: 357 THOU/MM3 (ref 130–400)
PMV BLD AUTO: 10.1 FL (ref 9.4–12.4)
POTASSIUM SERPL-SCNC: 3.1 MEQ/L (ref 3.5–5.2)
POTASSIUM SERPL-SCNC: 3.2 MEQ/L (ref 3.5–5.2)
POTASSIUM SERPL-SCNC: 3.3 MEQ/L (ref 3.5–5.2)
POTASSIUM SERPL-SCNC: 3.8 MEQ/L (ref 3.5–5.2)
RBC # BLD: 4.07 MILL/MM3 (ref 4.2–5.4)
SODIUM BLD-SCNC: 126 MEQ/L (ref 135–145)
SODIUM URINE: 126 MEQ/L
TOTAL PROTEIN: 6.6 G/DL (ref 6.1–8)
TROPONIN T: < 0.01 NG/ML
WBC # BLD: 24.4 THOU/MM3 (ref 4.8–10.8)

## 2019-11-24 PROCEDURE — 2580000003 HC RX 258: Performed by: INTERNAL MEDICINE

## 2019-11-24 PROCEDURE — 80053 COMPREHEN METABOLIC PANEL: CPT

## 2019-11-24 PROCEDURE — 2500000003 HC RX 250 WO HCPCS: Performed by: INTERNAL MEDICINE

## 2019-11-24 PROCEDURE — 85730 THROMBOPLASTIN TIME PARTIAL: CPT

## 2019-11-24 PROCEDURE — 87040 BLOOD CULTURE FOR BACTERIA: CPT

## 2019-11-24 PROCEDURE — 82948 REAGENT STRIP/BLOOD GLUCOSE: CPT

## 2019-11-24 PROCEDURE — 2709999900 HC NON-CHARGEABLE SUPPLY

## 2019-11-24 PROCEDURE — 85610 PROTHROMBIN TIME: CPT

## 2019-11-24 PROCEDURE — 84484 ASSAY OF TROPONIN QUANT: CPT

## 2019-11-24 PROCEDURE — 84100 ASSAY OF PHOSPHORUS: CPT

## 2019-11-24 PROCEDURE — 2060000000 HC ICU INTERMEDIATE R&B

## 2019-11-24 PROCEDURE — 99233 SBSQ HOSP IP/OBS HIGH 50: CPT | Performed by: NEUROLOGICAL SURGERY

## 2019-11-24 PROCEDURE — 83935 ASSAY OF URINE OSMOLALITY: CPT

## 2019-11-24 PROCEDURE — 83605 ASSAY OF LACTIC ACID: CPT

## 2019-11-24 PROCEDURE — 6370000000 HC RX 637 (ALT 250 FOR IP): Performed by: INTERNAL MEDICINE

## 2019-11-24 PROCEDURE — 82330 ASSAY OF CALCIUM: CPT

## 2019-11-24 PROCEDURE — 84132 ASSAY OF SERUM POTASSIUM: CPT

## 2019-11-24 PROCEDURE — 36415 COLL VENOUS BLD VENIPUNCTURE: CPT

## 2019-11-24 PROCEDURE — 85027 COMPLETE CBC AUTOMATED: CPT

## 2019-11-24 PROCEDURE — 6360000002 HC RX W HCPCS: Performed by: INTERNAL MEDICINE

## 2019-11-24 PROCEDURE — 2580000003 HC RX 258: Performed by: OPHTHALMOLOGY

## 2019-11-24 PROCEDURE — 83735 ASSAY OF MAGNESIUM: CPT

## 2019-11-24 PROCEDURE — 94760 N-INVAS EAR/PLS OXIMETRY 1: CPT

## 2019-11-24 PROCEDURE — 70450 CT HEAD/BRAIN W/O DYE: CPT

## 2019-11-24 PROCEDURE — 99232 SBSQ HOSP IP/OBS MODERATE 35: CPT | Performed by: OPHTHALMOLOGY

## 2019-11-24 PROCEDURE — 84300 ASSAY OF URINE SODIUM: CPT

## 2019-11-24 RX ORDER — POTASSIUM CHLORIDE 29.8 MG/ML
20 INJECTION INTRAVENOUS
Status: COMPLETED | OUTPATIENT
Start: 2019-11-24 | End: 2019-11-25

## 2019-11-24 RX ORDER — MAGNESIUM SULFATE IN WATER 40 MG/ML
4 INJECTION, SOLUTION INTRAVENOUS ONCE
Status: COMPLETED | OUTPATIENT
Start: 2019-11-24 | End: 2019-11-24

## 2019-11-24 RX ORDER — POTASSIUM CHLORIDE 29.8 MG/ML
20 INJECTION INTRAVENOUS
Status: COMPLETED | OUTPATIENT
Start: 2019-11-24 | End: 2019-11-24

## 2019-11-24 RX ORDER — SODIUM CHLORIDE 9 MG/ML
INJECTION, SOLUTION INTRAVENOUS CONTINUOUS
Status: DISCONTINUED | OUTPATIENT
Start: 2019-11-24 | End: 2019-11-25

## 2019-11-24 RX ADMIN — POTASSIUM & SODIUM PHOSPHATES POWDER PACK 280-160-250 MG 250 MG: 280-160-250 PACK at 18:45

## 2019-11-24 RX ADMIN — POTASSIUM & SODIUM PHOSPHATES POWDER PACK 280-160-250 MG 250 MG: 280-160-250 PACK at 14:08

## 2019-11-24 RX ADMIN — POTASSIUM CHLORIDE 20 MEQ: 29.8 INJECTION, SOLUTION INTRAVENOUS at 05:00

## 2019-11-24 RX ADMIN — POTASSIUM CHLORIDE 20 MEQ: 29.8 INJECTION, SOLUTION INTRAVENOUS at 23:22

## 2019-11-24 RX ADMIN — POTASSIUM CHLORIDE 20 MEQ: 29.8 INJECTION, SOLUTION INTRAVENOUS at 00:41

## 2019-11-24 RX ADMIN — FAMOTIDINE 20 MG: 10 INJECTION, SOLUTION INTRAVENOUS at 10:05

## 2019-11-24 RX ADMIN — NAFCILLIN SODIUM 2 G: 2 INJECTION, POWDER, LYOPHILIZED, FOR SOLUTION INTRAMUSCULAR; INTRAVENOUS at 06:05

## 2019-11-24 RX ADMIN — POTASSIUM CHLORIDE 20 MEQ: 29.8 INJECTION, SOLUTION INTRAVENOUS at 15:50

## 2019-11-24 RX ADMIN — POTASSIUM & SODIUM PHOSPHATES POWDER PACK 280-160-250 MG 250 MG: 280-160-250 PACK at 09:58

## 2019-11-24 RX ADMIN — SODIUM CHLORIDE, POTASSIUM CHLORIDE, SODIUM LACTATE AND CALCIUM CHLORIDE: 600; 310; 30; 20 INJECTION, SOLUTION INTRAVENOUS at 09:54

## 2019-11-24 RX ADMIN — SODIUM CHLORIDE: 9 INJECTION, SOLUTION INTRAVENOUS at 14:00

## 2019-11-24 RX ADMIN — MAGNESIUM SULFATE HEPTAHYDRATE 4 G: 40 INJECTION, SOLUTION INTRAVENOUS at 13:31

## 2019-11-24 RX ADMIN — FAMOTIDINE 20 MG: 10 INJECTION, SOLUTION INTRAVENOUS at 20:14

## 2019-11-24 RX ADMIN — Medication 10 ML: at 20:14

## 2019-11-24 RX ADMIN — METOPROLOL TARTRATE 25 MG: 25 TABLET ORAL at 10:03

## 2019-11-24 RX ADMIN — POTASSIUM CHLORIDE 20 MEQ: 29.8 INJECTION, SOLUTION INTRAVENOUS at 06:05

## 2019-11-24 RX ADMIN — CALCIUM GLUCONATE 1.5 G: 98 INJECTION, SOLUTION INTRAVENOUS at 14:37

## 2019-11-24 RX ADMIN — POTASSIUM CHLORIDE 20 MEQ: 29.8 INJECTION, SOLUTION INTRAVENOUS at 22:15

## 2019-11-24 RX ADMIN — NAFCILLIN SODIUM 2 G: 2 INJECTION, POWDER, LYOPHILIZED, FOR SOLUTION INTRAMUSCULAR; INTRAVENOUS at 02:40

## 2019-11-24 RX ADMIN — POTASSIUM & SODIUM PHOSPHATES POWDER PACK 280-160-250 MG 250 MG: 280-160-250 PACK at 20:14

## 2019-11-24 RX ADMIN — METOPROLOL TARTRATE 25 MG: 25 TABLET ORAL at 20:14

## 2019-11-24 RX ADMIN — Medication 10 ML: at 10:04

## 2019-11-24 RX ADMIN — POTASSIUM CHLORIDE 20 MEQ: 29.8 INJECTION, SOLUTION INTRAVENOUS at 16:33

## 2019-11-24 RX ADMIN — NAFCILLIN SODIUM 12 G: 2 INJECTION, POWDER, LYOPHILIZED, FOR SOLUTION INTRAMUSCULAR; INTRAVENOUS at 11:51

## 2019-11-24 ASSESSMENT — PAIN SCALES - GENERAL
PAINLEVEL_OUTOF10: 0
PAINLEVEL_OUTOF10: 0

## 2019-11-24 NOTE — PROGRESS NOTES
underwent volume resuscitation and was started on pressors.  Rocephin was subsequently added given concerns for possible meningitis. Lumbar puncture was performed and subsequently grew Staphylococcus species.  Antibiotics simplified to vancomycin for meningitis.  MRI lumbar spine demonstrated soft tissue abscess communicating with small epidural abscess.  Lumbar soft tissue drain placed 11/21/19.  Culture positive for MSSA.  Antibiotics converted to Nafcillin 11/20/19.  Rifampin added on 11/22/2019 for synergy.  Day #2/3 of rifampin.  Sputum culture started to grow gram-negative rods and Cipro was added on 11/22/2019     Subjective (past 24 hours)  Hallucination today  Diet:  DIET CARB CONTROL;      Medications:  Scheduled Meds:   nafcillin 12 g continuous infusion  12 g Intravenous Q24H    calcium replacement protocol   Other RX Placeholder    insulin lispro  0-12 Units Subcutaneous TID WC    insulin lispro  0-6 Units Subcutaneous Nightly    metoprolol tartrate  25 mg Oral BID    insulin glargine  35 Units Subcutaneous BID    potassium & sodium phosphates  1 packet Oral 4x Daily    potassium (CARDIAC) replacement protocol   Other RX Placeholder    sodium chloride flush  10 mL Intravenous 2 times per day    famotidine (PEPCID) injection  20 mg Intravenous BID     Continuous Infusions:   lactated ringers 75 mL/hr at 11/24/19 0954    dextrose       PRN Meds:acetaminophen, dextrose, sodium phosphate IVPB **OR** sodium phosphate IVPB **OR** sodium phosphate IVPB, sodium chloride flush, magnesium hydroxide, ondansetron, potassium chloride, magnesium sulfate, acetaminophen, glucose, dextrose, glucagon (rDNA), dextrose    Objective:    Review of Labs and Diagnostic Testing:  Labs:     Recent Labs     11/23/19  0543   WBC 21.5*   HGB 10.5*   HCT 33.3*        Recent Labs     11/23/19  0453  11/23/19  2048 11/24/19  0315 11/24/19  0854   *  --   --   --   --    K 2.4*   < > 2.9* 3.2* 3.8   CL 88*  -- --   --   --    CO2 30  --   --   --   --    BUN 9  --   --   --   --    CREATININE 0.5  --   --   --   --    CALCIUM 7.2*  --   --   --   --     < > = values in this interval not displayed. No results for input(s): AST, ALT, BILIDIR, BILITOT, ALKPHOS in the last 72 hours. No results found for: AMYLASE  No results for input(s): INR in the last 72 hours. No results for input(s): TROPONINT in the last 72 hours. No results for input(s): BNP in the last 72 hours. Recent Labs     11/23/19  0453   LACTA 1.5     No results found for: PROCAL  No results found for: LABA1C    Urinalysis:   No results found for: Minna Tolentino, [address], [address], RBCUA, BLOODU, [address], [address]  Radiology:   Reviewed: see report for details  CXR: I have reviewed the CXR  EKG:  No acute changes:  XR CHEST PORTABLE   Final Result   Interval endotracheal and nasogastric tube removal with persistent diminished lung volumes. No acute process compared to prior study. **This report has been created using voice recognition software. It may contain minor errors which are inherent in voice recognition technology. **      Final report electronically signed by Dr. Glorya Habermann on 11/23/2019 3:11 AM      CT ABSCESS DRAINAGE W CATH PLACEMENT S&I   Final Result   Status post successful abscess drainage procedure. .               **This report has been created using voice recognition software. It may contain minor errors which are inherent in voice recognition technology. **         Final report electronically signed by Dr. Cici Vargas on 11/21/2019 1:13 PM      CT DRAINAGE HEMATOMA/SEROMA/FLUID COLLECTION   Final Result   Status post successful abscess drainage procedure. .               **This report has been created using voice recognition software. It may contain minor errors which are inherent in voice recognition technology. **         Final report electronically signed by Dr. Cici Vargas on 11/21/2019 1:13 PM      [place] [id number] inherent in voice recognition technology. **      Final report electronically signed by Dr. Rashaad Emerson on 11/19/2019 7:14 AM      XR CHEST PORTABLE   Final Result   1. Visualized endotracheal and nasogastric tubes discussed above. 2. Persistent slightly decreased lung volumes with coarse markings. 3. Minimal right effusion not excluded. **This report has been created using voice recognition software. It may contain minor errors which are inherent in voice recognition technology. **      Final report electronically signed by Dr. Rashaad Emerson on 11/19/2019 12:27 AM      XR CHEST PORTABLE   Final Result   1. Visualized endotracheal tube above the mesfin. 2. Nasogastric tube within the stomach. 3. Diminished lung volumes with crowding of markings. Left retrocardiac atelectasis or infiltrate is not excluded. A small left effusion is considered. **This report has been created using voice recognition software. It may contain minor errors which are inherent in voice recognition technology. **      Final report electronically signed by Dr. Rashaad Emerson on 11/19/2019 12:29 AM      MRI LUMBAR SPINE W [address]    (Results Pending)       Physical Exam:  BP (!) 151/71   Pulse 81   Temp 98.6 °F (37 °C) (Oral)   Resp 18   Ht 5' 6\" (1.676 m)   Wt 198 lb (89.8 kg)   SpO2 99%   BMI 31.96 kg/m²   General appearance - alert, well appearing, and in no distress   Chest - clear to auscultation, no wheezes, rales or rhonchi, symmetric air entry   Distant Breath Sounds: No   Heart - normal rate, regular rhythm, normal S1, S2, no murmurs, rubs, clicks or gallops   Abdomen - soft, not tender, nondistended, no masses or organomegaly   Obese: No; Protuberant: Bowel sounds heard  Neurological - A x oriented to her name.   , normal speech, no focal findings or movement disorder noted   Musculoskeletal - no joint tenderness, deformity or swelling   Extremities - peripheral pulses normal, no pedal edema,  Resolved. 14. Hyperglycemia: Resolved. 13. Ketoacidosis: Resolved. 16. Acute respiratory failure: extubated , doing well. Lactic acidosis: Secondary to septic shock with poor perfusion.  S/P volume resuscitation and pressors. Dejuan Truong      Code Status: Full Code    Donna Stockton MD on 11/24/2019 at 11:18 AM  Rounding Hospitalist

## 2019-11-24 NOTE — PROGRESS NOTES
Neurosurgery Progress Note    Patient:  Bert Small      Unit/Bed:4K-26/026-A    YOB: 1955    MRN: [id number]     Acct: [de-identified]     Admit date: 11/18/2019    No chief complaint on file. Patient Seen, Chart, Physician notes, Labs, Radiology studies reviewed. Subjective: She is awake but more somnolent on exam this morning. She continus resting comfortably having tolerated placement of a drain which was intact and functioning. The patient's , who is present during the exam of participated in discussions involving her care noted some hallucinations experienced by the patient and this was confirmed by nursing. Past, Family, Social History unchanged from admission. Diet:  DIET CARB CONTROL;    Medications:  Scheduled Meds:   nafcillin 12 g continuous infusion  12 g Intravenous Q24H    calcium replacement protocol   Other RX Placeholder    insulin lispro  0-12 Units Subcutaneous TID WC    insulin lispro  0-6 Units Subcutaneous Nightly    metoprolol tartrate  25 mg Oral BID    insulin glargine  35 Units Subcutaneous BID    potassium & sodium phosphates  1 packet Oral 4x Daily    potassium (CARDIAC) replacement protocol   Other RX Placeholder    sodium chloride flush  10 mL Intravenous 2 times per day    famotidine (PEPCID) injection  20 mg Intravenous BID     Continuous Infusions:   lactated ringers 75 mL/hr at 11/24/19 0954    dextrose       PRN Meds:acetaminophen, dextrose, sodium phosphate IVPB **OR** sodium phosphate IVPB **OR** sodium phosphate IVPB, sodium chloride flush, magnesium hydroxide, ondansetron, potassium chloride, magnesium sulfate, acetaminophen, glucose, dextrose, glucagon (rDNA), dextrose    Objective: Patient is resting with the head of the bed elevated at least 30 degrees with the lumbar drain intact and functioning. Patient was noticeably more somnolent on exam today and did not participate in discussions involving her care.     Vitals: BP lower lumbar paraspinal abscess APPROACH: Posterior CATHETER: 8 Western Cornelia multipurpose drainage catheter. FLUID OBTAINED: 15 mL purulent tan fluid SEDATION: None. Patient was ventilated on propofol drip. The patient was sedated for 30 minutes during this procedure and monitored with EKG and pulse ox monitoring devices by a registered nurse. PROCEDURE: Signed informed consent was obtained prior to performing this procedure. The patient was placed on the CT scanner and sedated, as indicated above. CT images were initially obtained to determine appropriate puncture site. The skin was marked, prepped, and draped in a sterile fashion. Following local anesthesia and utilizing aseptic technique, a needle was successfully passed into the abscess pocket. A small amount of pus was aspirated to confirm appropriate needle position. A guidewire was then passed through the needle followed by insertion of progressively larger dilators up to an 8 Western Cornelia size. An 8 Western Cornelia multi-side-hole drainage catheter was then passed over the wire and was coiled within the abscess pocket. The retaining suture was then locked in the standard fashion. Catheter was then hooked to a Domingo-Close drainage bag and the catheter was flushed several times with sterile saline. The catheter was stabilized to the skin with a Percu-Stay device. A sample of the pus was sent to laboratory for culture and sensitivity testing. Status post successful abscess drainage procedure. . **This report has been created using voice recognition software. It may contain minor errors which are inherent in voice recognition technology. ** Final report electronically signed by Dr. Dot Becker on 11/21/2019 1:13 PM    Mri Thoracic Spine W Wo Contrast    Result Date: 11/20/2019  PROCEDURE: MRI THORACIC SPINE W WO CONTRAST CLINICAL INFORMATION: increased low back pain, recent lumbar drain, sepsis, unable to walk at [facility]. COMPARISON: No prior study.  TECHNIQUE: Sagittal T1, T2 and STIR sequences were obtained through the thoracic spine. Axial T2-weighted images were obtained through the discs. Postcontrast axial and sagittal T1-weighted images were obtained. 20 mL ProHance was injected in the existing IV site. FINDINGS:  There is a minimal anterior wedge shape configuration of the T12 vertebral body with approximately 10% anterior height loss. There is a tiny Schmorl's node at superior endplate of T7. There is otherwise anatomic vertebral body height and alignment. Marrow signal is within normal limits. The thoracic spinal cord is normal in caliber without focal area of abnormal T2 signal identified. No epidural fluid collection is identified. No abnormal enhancement is identified within the cord. At T9-10 there is a left paracentral extrusion which mildly indents thecal sac and contacts the ventral aspect of spinal cord secondary to the curvature of the spine without abnormal signal in the cord. There is no significant neural foraminal stenosis. There is no significant spinal canal or neuroforaminal stenosis at any other thoracic level. 1. No evidence of acute abnormality of the thoracic spine. 2. Old minimal compression deformity of T12 with approximately 10% anterior height loss. 3. Left paracentral extrusion at T9-10 causing mild spinal canal stenosis and contacting the ventral aspect of the cord without abnormal signal in the cord. **This report has been created using voice recognition software. It may contain minor errors which are inherent in voice recognition technology. ** Final report electronically signed by Dr. Idania Martinez MD on 11/20/2019 3:04 PM    Mri Lumbar Spine W Wo Contrast    Result Date: 11/20/2019  PROCEDURE: MRI LUMBAR SPINE W WO CONTRAST CLINICAL INFORMATION: increased low back pain, recent lumbar drain, now sepsis, unable to walk at home. COMPARISON: No prior study. TECHNIQUE: Sagittal and axial T1 and T2-weighted images were obtained to the lumbar spine. Postcontrast axial and sagittal T1-weighted images were also obtained. 20 mL ProHance was injected in the existing IV site. FINDINGS: Redemonstration of a chronic anterior wedge shape configuration of the T12 vertebral body with approximately 20% anterior height loss. There is otherwise anatomic vertebral body height and alignment. Marrow signal is within normal limits. The conus terminates in a normal fashion at the L1 level. There is a irregularly-shaped focal fluid collection in the subcutaneous fat superficial to the paraspinal musculature at the L3 level which measures 6.8 x 2.6 x 4.7 cm in greatest mL, AP and CC dimensions,  respectively. This has rim enhancement and some thin enhancing septations. In addition, this collection communicates with a deeper complex collection in the left paraspinal musculature at the L3 and L4 levels with a multilobulated cystic complex measuring approximately 5.5 x 3.2 x 2.4 cm in greatest CC, AP and lateral dimensions. This has multiple septations with rim enhancement. In addition, there appears to be a subtle communication with this complex collection in the paraspinal musculature with the epidural space at the L3 level. There is a 1.3 x 0.6 x 0.5 cm rim-enhancing collection in the posterior epidural space at this level. There is mild thickening of the cauda equina nerve roots at the L3 level with subtle enhancement. At T12-L1 there is a small right paracentral protrusion which mildly indents thecal sac and may contact the ventral aspect of the conus secondary to the curvature of the spine. There is no significant neural foraminal stenosis. At L1-L2 there is no significant spinal canal or neuroforaminal stenosis. At L2-3 there is a shallow disc bulge which minimally indents thecal sac and contributes to mild bilateral neural foraminal stenosis in association with mild facet hypertrophy and ligamentum flavum thickening.  There is also contribution of mild stenosis from the small posterior epidural enhancing collection. At L3-4 there is minimal disc bulge without significant spinal canal stenosis and mild bilateral neural foraminal stenosis in association with facet hypertrophy and ligamentum flavum thickening. At L4-5 there is a shallow disc bulge without significant spinal canal stenosis or neural foraminal stenosis. There is persistent thickening of the cauda equina this level. At L5-S1 there is a disc bulge with superimposed central protrusion which mildly indents thecal sac and may contact traversing S1 nerve roots bilaterally. There is moderate bilateral neural foraminal stenosis in association with facet hypertrophy and ligamentum flavum thickening. 1. Prominent complex fluid collection in the paraspinal musculature on the left at the L3-L4 level which appears to communicate anteriorly with a tiny epidural abscess at the L3 level and posteriorly with a large subcutaneous component abscess. 2. Arachnoiditis. Findings were discussed with Philippe Lorenz RN via telephone at the time of interpretation. **This report has been created using voice recognition software. It may contain minor errors which are inherent in voice recognition technology. ** Final report electronically signed by Dr. Yennifer Sevilla MD on 11/20/2019 3:18 PM    Xr Chest Portable    Result Date: 11/23/2019  PROCEDURE: XR CHEST PORTABLE CLINICAL INFORMATION: resp failure. COMPARISON: November 19, 2019 TECHNIQUE: AP upright view of the chest. FINDINGS: There has been interval removal of the endotracheal and nasogastric tubes. The lung volumes are slightly diminished. Coarse markings are stable with no focal infiltrate noted. The left mid thoracic nodular density or granuloma is again noted. Central venous structures are stable. The central venous catheter remains in the superior vena cava. Interval endotracheal and nasogastric tube removal with persistent diminished lung volumes.  No acute process compared to negative for active pathology. 1. Visualized endotracheal tube above the mesfin. 2. Nasogastric tube within the stomach. 3. Diminished lung volumes with crowding of markings. Left retrocardiac atelectasis or infiltrate is not excluded. A small left effusion is considered. **This report has been created using voice recognition software. It may contain minor errors which are inherent in voice recognition technology. ** Final report electronically signed by Dr. Tess Lozano on 11/19/2019 12:29 AM    Xr Chest Portable    Result Date: 11/19/2019  PROCEDURE: XR CHEST PORTABLE CLINICAL INFORMATION: et tube placement. COMPARISON: November 18, 2019 TECHNIQUE: AP upright view of the chest. FINDINGS: The heart and mediastinum remain upper normal. There is an endotracheal tube visualized 3 cm above the mesfin. The nasogastric tube is in the mid body of the stomach. Lung volumes remain mildly shallow with redemonstration of calcified granuloma in the left mid thorax. Trace effusion at the right lung base cannot be excluded. There is no obvious congestive failure. Postsurgical clips in the right upper abdomen are stable compared to prior study. There is no acute change of the skeleton. 1. Visualized endotracheal and nasogastric tubes discussed above. 2. Persistent slightly decreased lung volumes with coarse markings. 3. Minimal right effusion not excluded. **This report has been created using voice recognition software. It may contain minor errors which are inherent in voice recognition technology. ** Final report electronically signed by Dr. Tess Lozano on 11/19/2019 12:27 AM    Ir Lumbar Puncture For Diagnosis    Result Date: 11/19/2019  LUMBAR PUNCTURE WITH FLUOROSCOPIC GUIDANCE: CLINICAL INFORMATION:  Lumbar puncture for possible meningitis PERFORMED BY: Ana Almeida M.D.  APPROACH: L3-L4 interlaminar approach NEEDLE: 20-gauge spinal needle FLUID: 6 mL yellow turbid fluid FLUOROSCOPY TIME: 1 minute 5 seconds position was documented fluoroscopically. A few fluoroscopic spot films were obtained. Only a small amount of CSF could be aspirated through the needle. The drainage catheter was then passed coaxially through the Touhy needle and very easily passed cephalad to the level listed above. This was performed with fluoroscopic guidance. Fluoroscopic images were obtained for documentation. A small amount of fluid was seen to drain through the catheter. The catheter was stabilized to the skin with tape and a sterile OpSite dressing. The CSF drainage bag was sent to the floor with the patient. . The patient was then discharged from our department. Status post successful lumbar spine/CSF drainage catheter insertion. **This report has been created using voice recognition software. It may contain minor errors which are inherent in voice recognition technology. ** Final report electronically signed by Dr. Olman Almanza on 11/4/2019 11:38 AM    Ct Drainage Hematoma/seroma/fluid Collection    Result Date: 11/21/2019  CT-GUIDED ABSCESS DRAINAGE PERFORMED BY: JOSETTE Leon Lynsey: Posterior lower lumbar paraspinal abscess APPROACH: Posterior CATHETER: 8 Uzbek multipurpose drainage catheter. FLUID OBTAINED: 15 mL purulent tan fluid SEDATION: None. Patient was ventilated on propofol drip. The patient was sedated for 30 minutes during this procedure and monitored with EKG and pulse ox monitoring devices by a registered nurse. PROCEDURE: Signed informed consent was obtained prior to performing this procedure. The patient was placed on the CT scanner and sedated, as indicated above. CT images were initially obtained to determine appropriate puncture site. The skin was marked, prepped, and draped in a sterile fashion. Following local anesthesia and utilizing aseptic technique, a needle was successfully passed into the abscess pocket. A small amount of pus was aspirated to confirm appropriate needle position.  A guidewire was then passed through the needle followed by insertion of progressively larger dilators up to an 8 Western Cornelia size. An 8 [place]elia multi-side-hole drainage catheter was then passed over the wire and was coiled within the abscess pocket. The retaining suture was then locked in the standard fashion. Catheter was then hooked to a Domingo-Close drainage bag and the catheter was flushed several times with sterile saline. The catheter was stabilized to the skin with a Percu-Stay device. A sample of the pus was sent to laboratory for culture and sensitivity testing. Status post successful abscess drainage procedure. . **This report has been created using voice recognition software. It may contain minor errors which are inherent in voice recognition technology. ** Final report electronically signed by Dr. Philip Minor on 11/21/2019 1:13 PM    A/P: S/P remains status post admission and subsequent placement of lumbar drain by interventional radiology evacuate of infection. He is noticeably more somnolent on exam this morning. Surgery recommends an MRI of the lumbar spine be repeated for review in the morning the patient be maintained n.p.o. deception of IV fluids surgical intervention be deemed necessary. Lovenox at midnight is additionally recommended with a coagulation profile study ordered. This case was discussed with infectious disease Adaliddeven Mahmoodbernard is aware of her current status. The plan of care was additionally discussed between Dr. Melissa Kirk and the hospitalist assigned to the case regarding a possible reassignment ICU. Neurosurgery to follow    Electronically signed by Dontrell Monroe PA-C on 11/24/2019 at 12:32 PM     Attending Note:     Patient seen and examined in floor in conjunction with neurosurgery PA Dontrell Monroe PA-C). Discussed with patient, her family  her nurse, Dr. Roise Hashimoto and patient's primary as well. All data and imaging reviewed by myself.  I agree with examination assessment and

## 2019-11-24 NOTE — PLAN OF CARE
flushed and patent. Care plan reviewed with patient. Patient verbalized understanding of the plan of care and contribute to goal setting.

## 2019-11-25 ENCOUNTER — APPOINTMENT (OUTPATIENT)
Dept: MRI IMAGING | Age: 64
DRG: 856 | End: 2019-11-25
Attending: INTERNAL MEDICINE
Payer: MEDICARE

## 2019-11-25 ENCOUNTER — ANESTHESIA (OUTPATIENT)
Dept: OPERATING ROOM | Age: 64
DRG: 856 | End: 2019-11-25
Payer: MEDICARE

## 2019-11-25 ENCOUNTER — ANESTHESIA EVENT (OUTPATIENT)
Dept: OPERATING ROOM | Age: 64
DRG: 856 | End: 2019-11-25
Payer: MEDICARE

## 2019-11-25 ENCOUNTER — APPOINTMENT (OUTPATIENT)
Dept: GENERAL RADIOLOGY | Age: 64
DRG: 856 | End: 2019-11-25
Attending: INTERNAL MEDICINE
Payer: MEDICARE

## 2019-11-25 VITALS
DIASTOLIC BLOOD PRESSURE: 47 MMHG | TEMPERATURE: 97.7 F | RESPIRATION RATE: 2 BRPM | OXYGEN SATURATION: 100 % | SYSTOLIC BLOOD PRESSURE: 80 MMHG

## 2019-11-25 LAB
ANION GAP SERPL CALCULATED.3IONS-SCNC: 14 MEQ/L (ref 8–16)
ANION GAP SERPL CALCULATED.3IONS-SCNC: 15 MEQ/L (ref 8–16)
BUN BLDV-MCNC: 8 MG/DL (ref 7–22)
BUN BLDV-MCNC: 9 MG/DL (ref 7–22)
CALCIUM IONIZED: 1.02 MMOL/L (ref 1.12–1.32)
CALCIUM SERPL-MCNC: 7.4 MG/DL (ref 8.5–10.5)
CALCIUM SERPL-MCNC: 7.9 MG/DL (ref 8.5–10.5)
CHLORIDE BLD-SCNC: 87 MEQ/L (ref 98–111)
CHLORIDE BLD-SCNC: 87 MEQ/L (ref 98–111)
CO2: 22 MEQ/L (ref 23–33)
CO2: 23 MEQ/L (ref 23–33)
CREAT SERPL-MCNC: 0.8 MG/DL (ref 0.4–1.2)
CREAT SERPL-MCNC: 0.9 MG/DL (ref 0.4–1.2)
ERYTHROCYTE [DISTWIDTH] IN BLOOD BY AUTOMATED COUNT: 16.4 % (ref 11.5–14.5)
ERYTHROCYTE [DISTWIDTH] IN BLOOD BY AUTOMATED COUNT: 54.8 FL (ref 35–45)
GFR SERPL CREATININE-BSD FRML MDRD: 63 ML/MIN/1.73M2
GFR SERPL CREATININE-BSD FRML MDRD: 72 ML/MIN/1.73M2
GLUCOSE BLD-MCNC: 105 MG/DL (ref 70–108)
GLUCOSE BLD-MCNC: 108 MG/DL (ref 70–108)
GLUCOSE BLD-MCNC: 116 MG/DL (ref 70–108)
GLUCOSE BLD-MCNC: 133 MG/DL (ref 70–108)
GLUCOSE BLD-MCNC: 141 MG/DL (ref 70–108)
GLUCOSE BLD-MCNC: 147 MG/DL (ref 70–108)
GLUCOSE BLD-MCNC: 168 MG/DL (ref 70–108)
HCT VFR BLD CALC: 36.5 % (ref 37–47)
HEMOGLOBIN: 10.7 GM/DL (ref 12–16)
MAGNESIUM: 1.7 MG/DL (ref 1.6–2.4)
MCH RBC QN AUTO: 27 PG (ref 26–33)
MCHC RBC AUTO-ENTMCNC: 29.3 GM/DL (ref 32.2–35.5)
MCV RBC AUTO: 91.9 FL (ref 81–99)
MRSA SCREEN RT-PCR: NEGATIVE
OSMOLALITY URINE: 385 MOSMOL/KG (ref 250–750)
PLATELET # BLD: 282 THOU/MM3 (ref 130–400)
PMV BLD AUTO: 10.1 FL (ref 9.4–12.4)
POTASSIUM SERPL-SCNC: 3.2 MEQ/L (ref 3.5–5.2)
POTASSIUM SERPL-SCNC: 3.3 MEQ/L (ref 3.5–5.2)
POTASSIUM SERPL-SCNC: 3.9 MEQ/L (ref 3.5–5.2)
RBC # BLD: 3.97 MILL/MM3 (ref 4.2–5.4)
SODIUM BLD-SCNC: 123 MEQ/L (ref 135–145)
SODIUM BLD-SCNC: 125 MEQ/L (ref 135–145)
SODIUM URINE: 76 MEQ/L
VANCOMYCIN RESISTANT ENTEROCOCCUS: NEGATIVE
WBC # BLD: 22.4 THOU/MM3 (ref 4.8–10.8)

## 2019-11-25 PROCEDURE — 84300 ASSAY OF URINE SODIUM: CPT

## 2019-11-25 PROCEDURE — 2500000003 HC RX 250 WO HCPCS: Performed by: INTERNAL MEDICINE

## 2019-11-25 PROCEDURE — 99233 SBSQ HOSP IP/OBS HIGH 50: CPT | Performed by: PHYSICIAN ASSISTANT

## 2019-11-25 PROCEDURE — 3700000001 HC ADD 15 MINUTES (ANESTHESIA): Performed by: NEUROLOGICAL SURGERY

## 2019-11-25 PROCEDURE — 2580000003 HC RX 258: Performed by: OPHTHALMOLOGY

## 2019-11-25 PROCEDURE — 72158 MRI LUMBAR SPINE W/O & W/DYE: CPT

## 2019-11-25 PROCEDURE — 2709999900 HC NON-CHARGEABLE SUPPLY

## 2019-11-25 PROCEDURE — 87077 CULTURE AEROBIC IDENTIFY: CPT

## 2019-11-25 PROCEDURE — 83935 ASSAY OF URINE OSMOLALITY: CPT

## 2019-11-25 PROCEDURE — 2700000000 HC OXYGEN THERAPY PER DAY

## 2019-11-25 PROCEDURE — 2580000003 HC RX 258: Performed by: INTERNAL MEDICINE

## 2019-11-25 PROCEDURE — 6360000002 HC RX W HCPCS: Performed by: INTERNAL MEDICINE

## 2019-11-25 PROCEDURE — 2709999900 HC NON-CHARGEABLE SUPPLY: Performed by: NEUROLOGICAL SURGERY

## 2019-11-25 PROCEDURE — 83735 ASSAY OF MAGNESIUM: CPT

## 2019-11-25 PROCEDURE — 99233 SBSQ HOSP IP/OBS HIGH 50: CPT | Performed by: INTERNAL MEDICINE

## 2019-11-25 PROCEDURE — 00NY0ZZ RELEASE LUMBAR SPINAL CORD, OPEN APPROACH: ICD-10-PCS | Performed by: NEUROLOGICAL SURGERY

## 2019-11-25 PROCEDURE — 2720000010 HC SURG SUPPLY STERILE: Performed by: NEUROLOGICAL SURGERY

## 2019-11-25 PROCEDURE — 2500000003 HC RX 250 WO HCPCS: Performed by: REGISTERED NURSE

## 2019-11-25 PROCEDURE — 009630Z DRAINAGE OF CEREBRAL VENTRICLE WITH DRAINAGE DEVICE, PERCUTANEOUS APPROACH: ICD-10-PCS | Performed by: NEUROLOGICAL SURGERY

## 2019-11-25 PROCEDURE — 87075 CULTR BACTERIA EXCEPT BLOOD: CPT

## 2019-11-25 PROCEDURE — 80048 BASIC METABOLIC PNL TOTAL CA: CPT

## 2019-11-25 PROCEDURE — 2000000000 HC ICU R&B

## 2019-11-25 PROCEDURE — 3600000014 HC SURGERY LEVEL 4 ADDTL 15MIN: Performed by: NEUROLOGICAL SURGERY

## 2019-11-25 PROCEDURE — 99999 PR OFFICE/OUTPT VISIT,PROCEDURE ONLY: CPT | Performed by: NEUROLOGICAL SURGERY

## 2019-11-25 PROCEDURE — 6360000004 HC RX CONTRAST MEDICATION: Performed by: PHYSICIAN ASSISTANT

## 2019-11-25 PROCEDURE — 97162 PT EVAL MOD COMPLEX 30 MIN: CPT

## 2019-11-25 PROCEDURE — 87205 SMEAR GRAM STAIN: CPT

## 2019-11-25 PROCEDURE — 2580000003 HC RX 258: Performed by: PHYSICIAN ASSISTANT

## 2019-11-25 PROCEDURE — A9579 GAD-BASE MR CONTRAST NOS,1ML: HCPCS | Performed by: PHYSICIAN ASSISTANT

## 2019-11-25 PROCEDURE — 87500 VANOMYCIN DNA AMP PROBE: CPT

## 2019-11-25 PROCEDURE — 97110 THERAPEUTIC EXERCISES: CPT

## 2019-11-25 PROCEDURE — 6360000002 HC RX W HCPCS: Performed by: PHYSICIAN ASSISTANT

## 2019-11-25 PROCEDURE — 2500000003 HC RX 250 WO HCPCS: Performed by: PHYSICIAN ASSISTANT

## 2019-11-25 PROCEDURE — 87186 SC STD MICRODIL/AGAR DIL: CPT

## 2019-11-25 PROCEDURE — 89051 BODY FLUID CELL COUNT: CPT

## 2019-11-25 PROCEDURE — 7100000000 HC PACU RECOVERY - FIRST 15 MIN: Performed by: NEUROLOGICAL SURGERY

## 2019-11-25 PROCEDURE — C1729 CATH, DRAINAGE: HCPCS | Performed by: NEUROLOGICAL SURGERY

## 2019-11-25 PROCEDURE — 2580000003 HC RX 258: Performed by: REGISTERED NURSE

## 2019-11-25 PROCEDURE — 82948 REAGENT STRIP/BLOOD GLUCOSE: CPT

## 2019-11-25 PROCEDURE — 87147 CULTURE TYPE IMMUNOLOGIC: CPT

## 2019-11-25 PROCEDURE — 87081 CULTURE SCREEN ONLY: CPT

## 2019-11-25 PROCEDURE — 6360000002 HC RX W HCPCS: Performed by: NURSE PRACTITIONER

## 2019-11-25 PROCEDURE — 87210 SMEAR WET MOUNT SALINE/INK: CPT

## 2019-11-25 PROCEDURE — 87641 MR-STAPH DNA AMP PROBE: CPT

## 2019-11-25 PROCEDURE — 87102 FUNGUS ISOLATION CULTURE: CPT

## 2019-11-25 PROCEDURE — 0JB70ZZ EXCISION OF BACK SUBCUTANEOUS TISSUE AND FASCIA, OPEN APPROACH: ICD-10-PCS | Performed by: NEUROLOGICAL SURGERY

## 2019-11-25 PROCEDURE — 3209999900 FLUORO FOR SURGICAL PROCEDURES

## 2019-11-25 PROCEDURE — 87070 CULTURE OTHR SPECIMN AEROBIC: CPT

## 2019-11-25 PROCEDURE — 61210 BURR HOLE IMPLT VENTR CATH: CPT | Performed by: NEUROLOGICAL SURGERY

## 2019-11-25 PROCEDURE — 3700000000 HC ANESTHESIA ATTENDED CARE: Performed by: NEUROLOGICAL SURGERY

## 2019-11-25 PROCEDURE — 94761 N-INVAS EAR/PLS OXIMETRY MLT: CPT

## 2019-11-25 PROCEDURE — 6360000002 HC RX W HCPCS

## 2019-11-25 PROCEDURE — 36415 COLL VENOUS BLD VENIPUNCTURE: CPT

## 2019-11-25 PROCEDURE — 87798 DETECT AGENT NOS DNA AMP: CPT

## 2019-11-25 PROCEDURE — 6370000000 HC RX 637 (ALT 250 FOR IP): Performed by: PHYSICIAN ASSISTANT

## 2019-11-25 PROCEDURE — 6360000002 HC RX W HCPCS: Performed by: REGISTERED NURSE

## 2019-11-25 PROCEDURE — 63267 EXCISE INTRSPINL LESION LMBR: CPT | Performed by: NEUROLOGICAL SURGERY

## 2019-11-25 PROCEDURE — 7100000001 HC PACU RECOVERY - ADDTL 15 MIN: Performed by: NEUROLOGICAL SURGERY

## 2019-11-25 PROCEDURE — 85027 COMPLETE CBC AUTOMATED: CPT

## 2019-11-25 PROCEDURE — 6370000000 HC RX 637 (ALT 250 FOR IP): Performed by: INTERNAL MEDICINE

## 2019-11-25 PROCEDURE — 97530 THERAPEUTIC ACTIVITIES: CPT

## 2019-11-25 PROCEDURE — 82330 ASSAY OF CALCIUM: CPT

## 2019-11-25 PROCEDURE — 0K9G0ZZ DRAINAGE OF LEFT TRUNK MUSCLE, OPEN APPROACH: ICD-10-PCS | Performed by: NEUROLOGICAL SURGERY

## 2019-11-25 PROCEDURE — 3600000004 HC SURGERY LEVEL 4 BASE: Performed by: NEUROLOGICAL SURGERY

## 2019-11-25 PROCEDURE — 84132 ASSAY OF SERUM POTASSIUM: CPT

## 2019-11-25 PROCEDURE — 87116 MYCOBACTERIA CULTURE: CPT

## 2019-11-25 PROCEDURE — 70553 MRI BRAIN STEM W/O & W/DYE: CPT

## 2019-11-25 PROCEDURE — 72020 X-RAY EXAM OF SPINE 1 VIEW: CPT

## 2019-11-25 PROCEDURE — 6370000000 HC RX 637 (ALT 250 FOR IP): Performed by: NEUROLOGICAL SURGERY

## 2019-11-25 RX ORDER — DOCUSATE SODIUM 100 MG/1
100 CAPSULE, LIQUID FILLED ORAL 2 TIMES DAILY
Status: DISCONTINUED | OUTPATIENT
Start: 2019-11-25 | End: 2019-12-03

## 2019-11-25 RX ORDER — MAGNESIUM SULFATE HEPTAHYDRATE 500 MG/ML
1 INJECTION, SOLUTION INTRAMUSCULAR; INTRAVENOUS ONCE
Status: DISCONTINUED | OUTPATIENT
Start: 2019-11-25 | End: 2019-11-25 | Stop reason: SDUPTHER

## 2019-11-25 RX ORDER — SUCCINYLCHOLINE/SOD CL,ISO/PF 200MG/10ML
SYRINGE (ML) INTRAVENOUS PRN
Status: DISCONTINUED | OUTPATIENT
Start: 2019-11-25 | End: 2019-11-25 | Stop reason: SDUPTHER

## 2019-11-25 RX ORDER — FENTANYL CITRATE 50 UG/ML
50 INJECTION, SOLUTION INTRAMUSCULAR; INTRAVENOUS
Status: DISCONTINUED | OUTPATIENT
Start: 2019-11-25 | End: 2019-11-30

## 2019-11-25 RX ORDER — SODIUM CHLORIDE AND POTASSIUM CHLORIDE .9; .15 G/100ML; G/100ML
SOLUTION INTRAVENOUS CONTINUOUS
Status: DISPENSED | OUTPATIENT
Start: 2019-11-25 | End: 2019-11-26

## 2019-11-25 RX ORDER — SODIUM CHLORIDE, SODIUM LACTATE, POTASSIUM CHLORIDE, CALCIUM CHLORIDE 600; 310; 30; 20 MG/100ML; MG/100ML; MG/100ML; MG/100ML
INJECTION, SOLUTION INTRAVENOUS CONTINUOUS PRN
Status: DISCONTINUED | OUTPATIENT
Start: 2019-11-25 | End: 2019-11-25 | Stop reason: SDUPTHER

## 2019-11-25 RX ORDER — POTASSIUM CHLORIDE 29.8 MG/ML
20 INJECTION INTRAVENOUS
Status: COMPLETED | OUTPATIENT
Start: 2019-11-25 | End: 2019-11-25

## 2019-11-25 RX ORDER — LABETALOL 20 MG/4 ML (5 MG/ML) INTRAVENOUS SYRINGE
10 EVERY 10 MIN PRN
Status: DISCONTINUED | OUTPATIENT
Start: 2019-11-25 | End: 2019-11-25 | Stop reason: HOSPADM

## 2019-11-25 RX ORDER — POTASSIUM CHLORIDE 29.8 MG/ML
20 INJECTION INTRAVENOUS
Status: DISPENSED | OUTPATIENT
Start: 2019-11-25 | End: 2019-11-25

## 2019-11-25 RX ORDER — LIDOCAINE HYDROCHLORIDE 20 MG/ML
INJECTION, SOLUTION EPIDURAL; INFILTRATION; INTRACAUDAL; PERINEURAL PRN
Status: DISCONTINUED | OUTPATIENT
Start: 2019-11-25 | End: 2019-11-25 | Stop reason: SDUPTHER

## 2019-11-25 RX ORDER — DEXAMETHASONE SODIUM PHOSPHATE 4 MG/ML
INJECTION, SOLUTION INTRA-ARTICULAR; INTRALESIONAL; INTRAMUSCULAR; INTRAVENOUS; SOFT TISSUE PRN
Status: DISCONTINUED | OUTPATIENT
Start: 2019-11-25 | End: 2019-11-25 | Stop reason: SDUPTHER

## 2019-11-25 RX ORDER — GINSENG 100 MG
CAPSULE ORAL PRN
Status: DISCONTINUED | OUTPATIENT
Start: 2019-11-25 | End: 2019-11-25 | Stop reason: ALTCHOICE

## 2019-11-25 RX ORDER — FENTANYL CITRATE 50 UG/ML
INJECTION, SOLUTION INTRAMUSCULAR; INTRAVENOUS
Status: COMPLETED
Start: 2019-11-25 | End: 2019-11-25

## 2019-11-25 RX ORDER — SODIUM CHLORIDE 0.9 % (FLUSH) 0.9 %
10 SYRINGE (ML) INJECTION EVERY 12 HOURS SCHEDULED
Status: DISCONTINUED | OUTPATIENT
Start: 2019-11-25 | End: 2019-12-10 | Stop reason: HOSPADM

## 2019-11-25 RX ORDER — SODIUM CHLORIDE 0.9 % (FLUSH) 0.9 %
10 SYRINGE (ML) INJECTION PRN
Status: DISCONTINUED | OUTPATIENT
Start: 2019-11-25 | End: 2019-12-10 | Stop reason: HOSPADM

## 2019-11-25 RX ORDER — PROPOFOL 10 MG/ML
INJECTION, EMULSION INTRAVENOUS PRN
Status: DISCONTINUED | OUTPATIENT
Start: 2019-11-25 | End: 2019-11-25 | Stop reason: SDUPTHER

## 2019-11-25 RX ORDER — ONDANSETRON 2 MG/ML
INJECTION INTRAMUSCULAR; INTRAVENOUS PRN
Status: DISCONTINUED | OUTPATIENT
Start: 2019-11-25 | End: 2019-11-25 | Stop reason: SDUPTHER

## 2019-11-25 RX ORDER — EPHEDRINE SULFATE/0.9% NACL/PF 50 MG/5 ML
SYRINGE (ML) INTRAVENOUS PRN
Status: DISCONTINUED | OUTPATIENT
Start: 2019-11-25 | End: 2019-11-25 | Stop reason: SDUPTHER

## 2019-11-25 RX ORDER — POTASSIUM CHLORIDE 7.45 MG/ML
10 INJECTION INTRAVENOUS
Status: DISCONTINUED | OUTPATIENT
Start: 2019-11-25 | End: 2019-11-25 | Stop reason: CLARIF

## 2019-11-25 RX ORDER — PROMETHAZINE HYDROCHLORIDE 25 MG/ML
12.5 INJECTION, SOLUTION INTRAMUSCULAR; INTRAVENOUS
Status: DISCONTINUED | OUTPATIENT
Start: 2019-11-25 | End: 2019-11-25 | Stop reason: HOSPADM

## 2019-11-25 RX ORDER — ROCURONIUM BROMIDE 10 MG/ML
INJECTION, SOLUTION INTRAVENOUS PRN
Status: DISCONTINUED | OUTPATIENT
Start: 2019-11-25 | End: 2019-11-25 | Stop reason: SDUPTHER

## 2019-11-25 RX ORDER — FENTANYL CITRATE 50 UG/ML
25 INJECTION, SOLUTION INTRAMUSCULAR; INTRAVENOUS
Status: DISCONTINUED | OUTPATIENT
Start: 2019-11-25 | End: 2019-11-30

## 2019-11-25 RX ORDER — ONDANSETRON 2 MG/ML
4 INJECTION INTRAMUSCULAR; INTRAVENOUS EVERY 6 HOURS PRN
Status: DISCONTINUED | OUTPATIENT
Start: 2019-11-25 | End: 2019-12-10 | Stop reason: HOSPADM

## 2019-11-25 RX ORDER — FENTANYL CITRATE 50 UG/ML
INJECTION, SOLUTION INTRAMUSCULAR; INTRAVENOUS PRN
Status: DISCONTINUED | OUTPATIENT
Start: 2019-11-25 | End: 2019-11-25 | Stop reason: SDUPTHER

## 2019-11-25 RX ORDER — FENTANYL CITRATE 50 UG/ML
50 INJECTION, SOLUTION INTRAMUSCULAR; INTRAVENOUS EVERY 5 MIN PRN
Status: DISCONTINUED | OUTPATIENT
Start: 2019-11-25 | End: 2019-11-25 | Stop reason: HOSPADM

## 2019-11-25 RX ADMIN — LIDOCAINE HYDROCHLORIDE 60 MG: 20 INJECTION, SOLUTION EPIDURAL; INFILTRATION; INTRACAUDAL; PERINEURAL at 13:32

## 2019-11-25 RX ADMIN — Medication 10 MG: at 14:17

## 2019-11-25 RX ADMIN — Medication 10 ML: at 21:13

## 2019-11-25 RX ADMIN — FAMOTIDINE 20 MG: 10 INJECTION, SOLUTION INTRAVENOUS at 09:36

## 2019-11-25 RX ADMIN — FENTANYL CITRATE 50 MCG: 50 INJECTION, SOLUTION INTRAMUSCULAR; INTRAVENOUS at 18:20

## 2019-11-25 RX ADMIN — SODIUM CHLORIDE: 9 INJECTION, SOLUTION INTRAVENOUS at 03:23

## 2019-11-25 RX ADMIN — NAFCILLIN SODIUM 12 G: 2 INJECTION, POWDER, LYOPHILIZED, FOR SOLUTION INTRAMUSCULAR; INTRAVENOUS at 11:35

## 2019-11-25 RX ADMIN — PHENYLEPHRINE HYDROCHLORIDE 100 MCG: 10 INJECTION INTRAVENOUS at 14:24

## 2019-11-25 RX ADMIN — ROCURONIUM BROMIDE 10 MG: 10 INJECTION INTRAVENOUS at 14:49

## 2019-11-25 RX ADMIN — ROCURONIUM BROMIDE 40 MG: 10 INJECTION INTRAVENOUS at 13:42

## 2019-11-25 RX ADMIN — Medication 10 ML: at 09:40

## 2019-11-25 RX ADMIN — SODIUM CHLORIDE, POTASSIUM CHLORIDE, SODIUM LACTATE AND CALCIUM CHLORIDE: 600; 310; 30; 20 INJECTION, SOLUTION INTRAVENOUS at 14:40

## 2019-11-25 RX ADMIN — POTASSIUM & SODIUM PHOSPHATES POWDER PACK 280-160-250 MG 250 MG: 280-160-250 PACK at 21:15

## 2019-11-25 RX ADMIN — SODIUM CHLORIDE, POTASSIUM CHLORIDE, SODIUM LACTATE AND CALCIUM CHLORIDE: 600; 310; 30; 20 INJECTION, SOLUTION INTRAVENOUS at 15:33

## 2019-11-25 RX ADMIN — PHENYLEPHRINE HYDROCHLORIDE 200 MCG: 10 INJECTION INTRAVENOUS at 15:50

## 2019-11-25 RX ADMIN — PHENYLEPHRINE HYDROCHLORIDE 100 MCG: 10 INJECTION INTRAVENOUS at 15:02

## 2019-11-25 RX ADMIN — FENTANYL CITRATE 50 MCG: 50 INJECTION INTRAMUSCULAR; INTRAVENOUS at 15:14

## 2019-11-25 RX ADMIN — DEXAMETHASONE SODIUM PHOSPHATE 4 MG: 4 INJECTION, SOLUTION INTRAMUSCULAR; INTRAVENOUS at 13:35

## 2019-11-25 RX ADMIN — ONDANSETRON HYDROCHLORIDE 4 MG: 4 INJECTION, SOLUTION INTRAMUSCULAR; INTRAVENOUS at 15:20

## 2019-11-25 RX ADMIN — PHENYLEPHRINE HYDROCHLORIDE 200 MCG: 10 INJECTION INTRAVENOUS at 16:22

## 2019-11-25 RX ADMIN — FENTANYL CITRATE 50 MCG: 50 INJECTION INTRAMUSCULAR; INTRAVENOUS at 16:14

## 2019-11-25 RX ADMIN — PHENYLEPHRINE HYDROCHLORIDE 100 MCG: 10 INJECTION INTRAVENOUS at 14:18

## 2019-11-25 RX ADMIN — Medication 10 MG: at 14:06

## 2019-11-25 RX ADMIN — PHENYLEPHRINE HYDROCHLORIDE 200 MCG: 10 INJECTION INTRAVENOUS at 16:32

## 2019-11-25 RX ADMIN — PHENYLEPHRINE HYDROCHLORIDE 200 MCG: 10 INJECTION INTRAVENOUS at 15:42

## 2019-11-25 RX ADMIN — FENTANYL CITRATE 50 MCG: 50 INJECTION INTRAMUSCULAR; INTRAVENOUS at 15:28

## 2019-11-25 RX ADMIN — PHENYLEPHRINE HYDROCHLORIDE 200 MCG: 10 INJECTION INTRAVENOUS at 16:27

## 2019-11-25 RX ADMIN — CALCIUM GLUCONATE 1.5 G: 98 INJECTION, SOLUTION INTRAVENOUS at 12:33

## 2019-11-25 RX ADMIN — DOCUSATE SODIUM 100 MG: 100 CAPSULE, LIQUID FILLED ORAL at 21:10

## 2019-11-25 RX ADMIN — PHENYLEPHRINE HYDROCHLORIDE 200 MCG: 10 INJECTION INTRAVENOUS at 15:07

## 2019-11-25 RX ADMIN — CALCIUM GLUCONATE 1.5 G: 98 INJECTION, SOLUTION INTRAVENOUS at 01:51

## 2019-11-25 RX ADMIN — PHENYLEPHRINE HYDROCHLORIDE 200 MCG: 10 INJECTION INTRAVENOUS at 13:51

## 2019-11-25 RX ADMIN — SODIUM CHLORIDE AND POTASSIUM CHLORIDE: 9; 1.49 INJECTION, SOLUTION INTRAVENOUS at 20:19

## 2019-11-25 RX ADMIN — METOPROLOL TARTRATE 25 MG: 25 TABLET ORAL at 09:36

## 2019-11-25 RX ADMIN — Medication 20 MG: at 14:37

## 2019-11-25 RX ADMIN — Medication 100 MG: at 13:32

## 2019-11-25 RX ADMIN — PHENYLEPHRINE HYDROCHLORIDE 100 MCG: 10 INJECTION INTRAVENOUS at 14:32

## 2019-11-25 RX ADMIN — PHENYLEPHRINE HYDROCHLORIDE 200 MCG: 10 INJECTION INTRAVENOUS at 15:20

## 2019-11-25 RX ADMIN — MAGNESIUM SULFATE HEPTAHYDRATE 1 G: 500 INJECTION, SOLUTION INTRAMUSCULAR; INTRAVENOUS at 21:10

## 2019-11-25 RX ADMIN — ACETAMINOPHEN 650 MG: 325 TABLET ORAL at 23:22

## 2019-11-25 RX ADMIN — FAMOTIDINE 20 MG: 10 INJECTION, SOLUTION INTRAVENOUS at 21:10

## 2019-11-25 RX ADMIN — INSULIN LISPRO 1 UNITS: 100 INJECTION, SOLUTION INTRAVENOUS; SUBCUTANEOUS at 21:12

## 2019-11-25 RX ADMIN — PHENYLEPHRINE HYDROCHLORIDE 200 MCG: 10 INJECTION INTRAVENOUS at 15:15

## 2019-11-25 RX ADMIN — PHENYLEPHRINE HYDROCHLORIDE 200 MCG: 10 INJECTION INTRAVENOUS at 16:15

## 2019-11-25 RX ADMIN — PHENYLEPHRINE HYDROCHLORIDE 200 MCG: 10 INJECTION INTRAVENOUS at 14:00

## 2019-11-25 RX ADMIN — POTASSIUM CHLORIDE 20 MEQ: 29.8 INJECTION, SOLUTION INTRAVENOUS at 03:30

## 2019-11-25 RX ADMIN — FENTANYL CITRATE 50 MCG: 50 INJECTION INTRAMUSCULAR; INTRAVENOUS at 16:56

## 2019-11-25 RX ADMIN — POTASSIUM & SODIUM PHOSPHATES POWDER PACK 280-160-250 MG 250 MG: 280-160-250 PACK at 09:36

## 2019-11-25 RX ADMIN — PHENYLEPHRINE HYDROCHLORIDE 200 MCG: 10 INJECTION INTRAVENOUS at 14:05

## 2019-11-25 RX ADMIN — PHENYLEPHRINE HYDROCHLORIDE 200 MCG: 10 INJECTION INTRAVENOUS at 16:05

## 2019-11-25 RX ADMIN — PHENYLEPHRINE HYDROCHLORIDE 200 MCG: 10 INJECTION INTRAVENOUS at 15:26

## 2019-11-25 RX ADMIN — FENTANYL CITRATE 100 MCG: 50 INJECTION INTRAMUSCULAR; INTRAVENOUS at 13:32

## 2019-11-25 RX ADMIN — SODIUM CHLORIDE AND POTASSIUM CHLORIDE: 9; 1.49 INJECTION, SOLUTION INTRAVENOUS at 04:50

## 2019-11-25 RX ADMIN — GADOTERIDOL 20 ML: 279.3 INJECTION, SOLUTION INTRAVENOUS at 11:26

## 2019-11-25 RX ADMIN — PHENYLEPHRINE HYDROCHLORIDE 100 MCG: 10 INJECTION INTRAVENOUS at 15:29

## 2019-11-25 RX ADMIN — POTASSIUM CHLORIDE 20 MEQ: 29.8 INJECTION, SOLUTION INTRAVENOUS at 12:33

## 2019-11-25 RX ADMIN — FENTANYL CITRATE 50 MCG: 50 INJECTION, SOLUTION INTRAMUSCULAR; INTRAVENOUS at 21:17

## 2019-11-25 RX ADMIN — INSULIN GLARGINE 35 UNITS: 100 INJECTION, SOLUTION SUBCUTANEOUS at 21:10

## 2019-11-25 RX ADMIN — FENTANYL CITRATE 50 MCG: 50 INJECTION, SOLUTION INTRAMUSCULAR; INTRAVENOUS at 23:22

## 2019-11-25 RX ADMIN — FENTANYL CITRATE 50 MCG: 50 INJECTION INTRAMUSCULAR; INTRAVENOUS at 14:24

## 2019-11-25 RX ADMIN — PHENYLEPHRINE HYDROCHLORIDE 200 MCG: 10 INJECTION INTRAVENOUS at 16:10

## 2019-11-25 RX ADMIN — POTASSIUM CHLORIDE 20 MEQ: 29.8 INJECTION, SOLUTION INTRAVENOUS at 04:47

## 2019-11-25 RX ADMIN — PHENYLEPHRINE HYDROCHLORIDE 200 MCG: 10 INJECTION INTRAVENOUS at 15:34

## 2019-11-25 RX ADMIN — INSULIN LISPRO 2 UNITS: 100 INJECTION, SOLUTION INTRAVENOUS; SUBCUTANEOUS at 18:42

## 2019-11-25 RX ADMIN — SODIUM CHLORIDE, POTASSIUM CHLORIDE, SODIUM LACTATE AND CALCIUM CHLORIDE: 600; 310; 30; 20 INJECTION, SOLUTION INTRAVENOUS at 13:25

## 2019-11-25 RX ADMIN — METOPROLOL TARTRATE 25 MG: 25 TABLET ORAL at 21:14

## 2019-11-25 RX ADMIN — PROPOFOL 140 MG: 10 INJECTION, EMULSION INTRAVENOUS at 13:32

## 2019-11-25 RX ADMIN — Medication 10 MG: at 14:31

## 2019-11-25 RX ADMIN — POTASSIUM & SODIUM PHOSPHATES POWDER PACK 280-160-250 MG 250 MG: 280-160-250 PACK at 18:47

## 2019-11-25 ASSESSMENT — PULMONARY FUNCTION TESTS
PIF_VALUE: 20
PIF_VALUE: 2
PIF_VALUE: 23
PIF_VALUE: 2
PIF_VALUE: 21
PIF_VALUE: 23
PIF_VALUE: 21
PIF_VALUE: 21
PIF_VALUE: 12
PIF_VALUE: 2
PIF_VALUE: 21
PIF_VALUE: 23
PIF_VALUE: 21
PIF_VALUE: 3
PIF_VALUE: 21
PIF_VALUE: 21
PIF_VALUE: 2
PIF_VALUE: 2
PIF_VALUE: 21
PIF_VALUE: 2
PIF_VALUE: 25
PIF_VALUE: 21
PIF_VALUE: 0
PIF_VALUE: 2
PIF_VALUE: 2
PIF_VALUE: 22
PIF_VALUE: 23
PIF_VALUE: 2
PIF_VALUE: 21
PIF_VALUE: 3
PIF_VALUE: 22
PIF_VALUE: 23
PIF_VALUE: 21
PIF_VALUE: 23
PIF_VALUE: 2
PIF_VALUE: 21
PIF_VALUE: 23
PIF_VALUE: 25
PIF_VALUE: 21
PIF_VALUE: 21
PIF_VALUE: 3
PIF_VALUE: 2
PIF_VALUE: 21
PIF_VALUE: 21
PIF_VALUE: 2
PIF_VALUE: 3
PIF_VALUE: 2
PIF_VALUE: 24
PIF_VALUE: 15
PIF_VALUE: 21
PIF_VALUE: 3
PIF_VALUE: 1
PIF_VALUE: 22
PIF_VALUE: 23
PIF_VALUE: 21
PIF_VALUE: 12
PIF_VALUE: 1
PIF_VALUE: 2
PIF_VALUE: 3
PIF_VALUE: 2
PIF_VALUE: 21
PIF_VALUE: 15
PIF_VALUE: 23
PIF_VALUE: 23
PIF_VALUE: 20
PIF_VALUE: 2
PIF_VALUE: 2
PIF_VALUE: 1
PIF_VALUE: 21
PIF_VALUE: 3
PIF_VALUE: 2
PIF_VALUE: 20
PIF_VALUE: 21
PIF_VALUE: 2
PIF_VALUE: 22
PIF_VALUE: 2
PIF_VALUE: 6
PIF_VALUE: 24
PIF_VALUE: 2
PIF_VALUE: 2
PIF_VALUE: 3
PIF_VALUE: 16
PIF_VALUE: 3
PIF_VALUE: 2
PIF_VALUE: 21
PIF_VALUE: 25
PIF_VALUE: 2
PIF_VALUE: 7
PIF_VALUE: 21
PIF_VALUE: 21
PIF_VALUE: 8
PIF_VALUE: 2
PIF_VALUE: 22
PIF_VALUE: 2
PIF_VALUE: 22
PIF_VALUE: 7
PIF_VALUE: 21
PIF_VALUE: 3
PIF_VALUE: 21
PIF_VALUE: 21
PIF_VALUE: 22
PIF_VALUE: 2
PIF_VALUE: 20
PIF_VALUE: 2
PIF_VALUE: 21
PIF_VALUE: 21
PIF_VALUE: 22
PIF_VALUE: 2
PIF_VALUE: 2
PIF_VALUE: 21
PIF_VALUE: 2
PIF_VALUE: 1
PIF_VALUE: 22
PIF_VALUE: 2
PIF_VALUE: 22
PIF_VALUE: 2
PIF_VALUE: 3
PIF_VALUE: 5
PIF_VALUE: 2
PIF_VALUE: 21
PIF_VALUE: 13
PIF_VALUE: 2
PIF_VALUE: 22
PIF_VALUE: 21
PIF_VALUE: 22
PIF_VALUE: 21
PIF_VALUE: 4
PIF_VALUE: 3
PIF_VALUE: 2
PIF_VALUE: 21
PIF_VALUE: 21
PIF_VALUE: 3
PIF_VALUE: 2
PIF_VALUE: 13
PIF_VALUE: 21
PIF_VALUE: 2
PIF_VALUE: 2
PIF_VALUE: 22
PIF_VALUE: 2
PIF_VALUE: 3
PIF_VALUE: 20
PIF_VALUE: 23
PIF_VALUE: 21
PIF_VALUE: 1
PIF_VALUE: 21
PIF_VALUE: 2
PIF_VALUE: 3
PIF_VALUE: 22
PIF_VALUE: 2
PIF_VALUE: 2
PIF_VALUE: 21
PIF_VALUE: 23
PIF_VALUE: 21
PIF_VALUE: 22
PIF_VALUE: 12
PIF_VALUE: 2
PIF_VALUE: 23
PIF_VALUE: 3
PIF_VALUE: 21
PIF_VALUE: 22
PIF_VALUE: 21
PIF_VALUE: 20
PIF_VALUE: 12
PIF_VALUE: 23
PIF_VALUE: 22
PIF_VALUE: 23
PIF_VALUE: 23
PIF_VALUE: 21
PIF_VALUE: 21
PIF_VALUE: 1
PIF_VALUE: 22
PIF_VALUE: 2
PIF_VALUE: 22
PIF_VALUE: 2
PIF_VALUE: 4
PIF_VALUE: 12
PIF_VALUE: 21
PIF_VALUE: 3
PIF_VALUE: 2
PIF_VALUE: 13
PIF_VALUE: 22
PIF_VALUE: 2
PIF_VALUE: 21
PIF_VALUE: 2
PIF_VALUE: 2

## 2019-11-25 ASSESSMENT — PAIN SCALES - GENERAL
PAINLEVEL_OUTOF10: 8
PAINLEVEL_OUTOF10: 5
PAINLEVEL_OUTOF10: 0
PAINLEVEL_OUTOF10: 7
PAINLEVEL_OUTOF10: 7

## 2019-11-25 ASSESSMENT — PAIN DESCRIPTION - LOCATION: LOCATION: BACK

## 2019-11-25 ASSESSMENT — PAIN SCALES - WONG BAKER: WONGBAKER_NUMERICALRESPONSE: 0

## 2019-11-25 ASSESSMENT — PAIN DESCRIPTION - PAIN TYPE: TYPE: ACUTE PAIN

## 2019-11-25 NOTE — CARE COORDINATION
11/25/19, 11:36 AM    DISCHARGE ON GOING [address] [place] [address] day: 7  Location: [address] [place] [address]- Reason for admit: Hyperglycemia [R73.9]     Procedure: 11/19 Intubated  11/19 CVC Left subclavian  11/19 LP  11/20 MRI Thoracic spine: No evidence of acute abnormality of the thoracic spine; Old minimal compression deformity of T12 with approximately 10% anterior height loss; Left paracentral extrusion at T9-10 causing mild spinal canal stenosis and contacting the ventral aspect of the cord without abnormal signal in the cord  11/20 MRI Lumbar spine: Prominent complex fluid collection in the paraspinal musculature on the left at the L3-L4 level which appears to communicate anteriorly with a tiny epidural abscess at the L3 level and posteriorly with a large subcutaneous component abscess; Arachnoiditis  11/21 Posterior lumbar paraspinal abcess drain placed in IR  11/22 Extubated    Treatment Plan of Care: Pt currently on room air with sats in 90's. WBC's 22.4, down slightly from yesterday. Nafcillin   to infusing continually. To have MRI today and possible surgical intervention if warranted. ID following. Pt NPO due to this possibility. Marlon Holland 9 with 20 Kcl infusing at 75/hr. PCP: ROMULO Aviles  Readmission Risk Score: 19%    Patient Goals/Plan/Treatment Preferences: Pt is from home with spouse. Possible need for long term antibiotic. Plans pending medical needs as discharge approaches. CM will continue to follow pt progression.

## 2019-11-25 NOTE — PROGRESS NOTES
Neurosurgery Progress Note    Patient:  Barbara Organ      Unit/Bed:4K-26/026-A    YOB: 1955    MRN: [id number]     Acct: [de-identified]     Admit date: 11/18/2019    No chief complaint on file. Patient Seen, Chart, Physician notes, Labs, Radiology studies reviewed. Subjective: Mrs. Arnie Patel is more awake and alert on exam this morning. She is oriented to place able to participate in conversations involving her care. She did not have any specific complaints other than some drain site discomfort. Past, Family, Social History unchanged from admission. Diet:  Diet NPO, After Midnight    Medications:  Scheduled Meds:   nafcillin 12 g continuous infusion  12 g Intravenous Q24H    calcium replacement protocol   Other RX Placeholder    insulin lispro  0-12 Units Subcutaneous TID WC    insulin lispro  0-6 Units Subcutaneous Nightly    metoprolol tartrate  25 mg Oral BID    insulin glargine  35 Units Subcutaneous BID    potassium & sodium phosphates  1 packet Oral 4x Daily    potassium (CARDIAC) replacement protocol   Other RX Placeholder    sodium chloride flush  10 mL Intravenous 2 times per day    famotidine (PEPCID) injection  20 mg Intravenous BID     Continuous Infusions:   0.9% NaCl with KCl 20 mEq 75 mL/hr at 11/25/19 0450    dextrose       PRN Meds:acetaminophen, dextrose, sodium phosphate IVPB **OR** sodium phosphate IVPB **OR** sodium phosphate IVPB, sodium chloride flush, magnesium hydroxide, ondansetron, potassium chloride, magnesium sulfate, acetaminophen, glucose, dextrose, glucagon (rDNA), dextrose    Objective: She is sitting up in bed today, more alert and was oriented to place on exam.  Her drain is in place and continues to function with recently changed dry dressings intact.     Vitals: BP (!) 140/70   Pulse 81   Temp 98.3 °F (36.8 °C) (Oral)   Resp 18   Ht 5' 6\" (1.676 m)   Wt 211 lb 3.2 oz (95.8 kg)   SpO2 97%   BMI 34.09 kg/m²   Physical Exam:  Alert and attentive. Language appropriate, with no aphasia. Pupils equal.  Facial strength symmetric. Physical Exam   She continues to demonstrate purposeful movement all 4 extremities and was more alert on exam today and therefore able to obey all commands given. ROS:  Review of Systems   Patient denies headache, nausea or vomiting, and was afebrile on examination. 24 hour intake/output:    Intake/Output Summary (Last 24 hours) at 11/25/2019 1017  Last data filed at 11/25/2019 0940  Gross per 24 hour   Intake 3201.58 ml   Output 3500 ml   Net -298.42 ml     Last 3 weights: Wt Readings from Last 3 Encounters:   11/25/19 211 lb 3.2 oz (95.8 kg)   11/07/19 201 lb 4.8 oz (91.3 kg)   09/18/19 220 lb 6.4 oz (100 kg)         CBC:   Recent Labs     11/23/19  0543 11/24/19  1150 11/25/19  0206   WBC 21.5* 24.4* 22.4*   HGB 10.5* 11.0* 10.7*    357 282     BMP:    Recent Labs     11/23/19  0453  11/24/19  1150 11/24/19  1958 11/25/19  0206   *  --  126*  --  123*   K 2.4*   < > 3.1* 3.3* 3.3*   CL 88*  --  86*  --  87*   CO2 30  --  26  --  22*   BUN 9  --  8  --  8   CREATININE 0.5  --  0.7  --  0.8   GLUCOSE 111*  --  102  --  116*    < > = values in this interval not displayed. Calcium:  Recent Labs     11/25/19  0206   CALCIUM 7.4*     Magnesium:  Recent Labs     11/25/19  0206   MG 1.7     Glucose:  Recent Labs     11/24/19  1727 11/24/19  2052 11/25/19  0654   POCGLU 126* 121* 105     HgbA1C: No results for input(s): LABA1C in the last 72 hours. Lipids: No results for input(s): CHOL, TRIG, HDL, LDLCALC in the last 72 hours.     Invalid input(s): LDL    Radiology reports as per the Radiologist  Radiology: Ct Head Wo Contrast    Result Date: 11/24/2019  PROCEDURE: CT HEAD WO CONTRAST CLINICAL INFORMATION: Mental status change TECHNIQUE: CT scan of the head was performed from the vertex to the skull base without use of intravenous contrast. Axial images as well as coronal and sagittal reconstructions were obtained. All CT scans at this facility use dose modulation, iterative reconstruction, and/or weight-based dosing when appropriate to reduce radiation dose to as low as reasonably achievable. COMPARISON: None. FINDINGS: There is no mass effect, midline shift or acute hemorrhage. Ventricles and sulci are prominent. There is diffuse periventricular white matter hypoattenuation. A 1.2 cm hypoattenuating structure in the left midbrain has noncontrast mean Hounsfield units of -71 (image 18). Areas of adjacent hypoattenuation may be secondary to prior infarcts. Visualized orbits, paranasal sinuses and mastoid air cells are unremarkable. 1. No mass effect or acute hemorrhage. 2. Chronic periventricular small vessel ischemic changes and cerebral atrophy. 3. Hypoattenuating structure in the midbrain of indeterminate etiology. Brain MRI is recommended for further evaluation. **This report has been created using voice recognition software. It may contain minor errors which are inherent in voice recognition technology. ** Final report electronically signed by Dr. Estelita Garcia on 11/24/2019 1:39 PM    Ct Abscess Drainage W Cath Placement S&i    Result Date: 11/21/2019  CT-GUIDED ABSCESS DRAINAGE PERFORMED BY: JOSETTE Samayoa Reeve: Posterior lower lumbar paraspinal abscess APPROACH: Posterior CATHETER: 8 Czech multipurpose drainage catheter. FLUID OBTAINED: 15 mL purulent tan fluid SEDATION: None. Patient was ventilated on propofol drip. The patient was sedated for 30 minutes during this procedure and monitored with EKG and pulse ox monitoring devices by a registered nurse. PROCEDURE: Signed informed consent was obtained prior to performing this procedure. The patient was placed on the CT scanner and sedated, as indicated above. CT images were initially obtained to determine appropriate puncture site. The skin was marked, prepped, and draped in a sterile fashion.  Following local redemonstration of a calcified granuloma in the left mid thorax. Central venous catheter from the left subclavian is present within the superior vena cava. There is no visualized pneumothorax. 1. Status post left subclavian central line placement. Tip location the superior vena cava. There is no visualized pneumothorax. 2. Stable appearance of coarse lung markings and slightly decreased lung volumes. **This report has been created using voice recognition software. It may contain minor errors which are inherent in voice recognition technology. ** Final report electronically signed by Dr. Tess Lozano on 11/19/2019 7:14 AM    Xr Chest Portable    Result Date: 11/19/2019  PROCEDURE: XR CHEST PORTABLE CLINICAL INFORMATION: ETT/OGT placement. COMPARISON: No prior study. TECHNIQUE: AP upright view of the chest. FINDINGS: The lung volumes are shallow. There is an endotracheal tube 2.5 cm above the mesfin. A nasogastric tube is in the body of the stomach. There are coarse lung markings likely age-related. There is a small calcified density in the left perihilar region. A granuloma is suspected. Minimal retrocardiac atelectasis or infiltrate cannot be excluded. There is no venous congestion present. A small left basilar effusion or atelectasis blunts the costophrenic angle. The skeleton is negative for active pathology. 1. Visualized endotracheal tube above the mesfin. 2. Nasogastric tube within the stomach. 3. Diminished lung volumes with crowding of markings. Left retrocardiac atelectasis or infiltrate is not excluded. A small left effusion is considered. **This report has been created using voice recognition software. It may contain minor errors which are inherent in voice recognition technology. ** Final report electronically signed by Dr. Tess Lozano on 11/19/2019 12:29 AM    Xr Chest Portable    Result Date: 11/19/2019  PROCEDURE: XR CHEST PORTABLE CLINICAL INFORMATION: et tube placement.  COMPARISON: November 18, 2019 TECHNIQUE: AP upright view of the chest. FINDINGS: The heart and mediastinum remain upper normal. There is an endotracheal tube visualized 3 cm above the mesfin. The nasogastric tube is in the mid body of the stomach. Lung volumes remain mildly shallow with redemonstration of calcified granuloma in the left mid thorax. Trace effusion at the right lung base cannot be excluded. There is no obvious congestive failure. Postsurgical clips in the right upper abdomen are stable compared to prior study. There is no acute change of the skeleton. 1. Visualized endotracheal and nasogastric tubes discussed above. 2. Persistent slightly decreased lung volumes with coarse markings. 3. Minimal right effusion not excluded. **This report has been created using voice recognition software. It may contain minor errors which are inherent in voice recognition technology. ** Final report electronically signed by Dr. Glorya Habermann on 11/19/2019 12:27 AM    Ir Lumbar Puncture For Diagnosis    Result Date: 11/19/2019  LUMBAR PUNCTURE WITH FLUOROSCOPIC GUIDANCE: CLINICAL INFORMATION:  Lumbar puncture for possible meningitis PERFORMED BY: Fish Luque M.D. APPROACH: L3-L4 interlaminar approach NEEDLE: 20-gauge spinal needle FLUID: 6 mL yellow turbid fluid FLUOROSCOPY TIME: 1 minute 5 seconds FLUOROSCOPIC IMAGES: 3 Dose (mGy):63 PROCEDURE:  Signed informed consent was obtained prior to performing this procedure. Following local anesthesia and utilizing aseptic technique, a spinal needle was successfully inserted into the lumbar thecal sac at the level listed above. The amount of spinal fluid listed above was obtained in four separate vials, and sent to the laboratory for diagnostic testing. Status post successful lumbar puncture. **This report has been created using voice recognition software. It may contain minor errors which are inherent in voice recognition technology. ** Final report electronically signed by

## 2019-11-25 NOTE — PROGRESS NOTES
Notified of the abnormal MRI result of the spine and brain. Discussed with Dr Sudhakar Loredo. Patient will need I and D of the lower lumbar spine to drain the abscess. If her confusion not improving. She will also need ventriculostomy drain to control the infection. She will need to go to ICU for close monitoring. Will discuss with ICU attending.

## 2019-11-25 NOTE — PROGRESS NOTES
Assessment and Plan:        1. Abscess: Affecting lumbar region and involving soft tissue and communicating with epidural abscess.  Secondary to MSSA.  Status post lumbar drain placement 11/21/2019 which was ineffectual.  Day #6 nafcillin. Surgical drainage completed 11/25/2019.  2. Meningitis: Secondary to MSSA.  On Nafcillin. S/P Decadron to improve permeability of antibiotics. Ventriculostomy tube placed 11/25/2019. Reassess in cultures. 3. Septicemia: Secondary to MSSA secondary to soft tissue abscess communicating with small epidural abscess.  Day 6 Nafcillin.  Previously on vancomycin. Now on continuous nafcillin drip. 4. Atrial fibrillation with rapid ventricular response: New onset. Felt secondary to septic shock.  Converted to sinus dysrhythmia. Transition to beta blocker therapy.  No anticoagulation. 5. Delirium: Secondary to septic shock with meningitis.    6. Type 2 diabetes mellitus:  SQ Lantus. 7. Electrolyte abnormalities: Replaced phosphorus, potassium and magnesium. 8. Back pain: MRI back positive for soft tissue abscess and small epidural abscess. 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  10. Urinary tract infection:  Related to Septicemia. Resolved. 11. Respiratory alkalosis: Secondary to septic shock.  Resolved. 12. Hypotension: Volume resuscitated.  S/P Pressors.  Screen for adrenal insufficiency is negative.  Resolved. 13. Hyperglycemia: Resolved. 15. Ketoacidosis: Resolved. 15. Lactic acidosis: Secondary to septic shock with poor perfusion.  S/P volume resuscitation and pressors. Resolved. 16. Acute respiratory failure: Patient intubated 11/18/2019 for impending respiratory arrest. Patient extubated 11/22/19. Resolved. 16. Pneumonia: Was present upon admission. Positive for Klebsiella pneumonia and MSSA. Treated with nafcillin and Cipro. Resolved.   18. Septic shock: Secondary to septicemia and meningitis due to Staphylococcus aureus.  Initial antibiotic selection was high-dose Rocephin, Zosyn, and and vancomycin.  Antibiotics simplified to vancomycin on 11/20/2019 and subsequently nafcillin.  Secondary to MSSA bacteremia.  This is secondary to soft tissue abscess communicating with small epidural abscess. Resolved. .     CC: Lumbar abscess  HPI: Patient is a 20-year-old white female active smoker. Estella Rojas has a remote history of appendectomy and cholecystectomy. Estella Rojas has a history of type 2 diabetes mellitus, hypertension, hyperlipidemia, depression, and a history of brain lipoma diagnosed 11/4/2019. Patient was hospitalized 11/4/2019 through 11/7/2019 for evaluation of possible normal pressure hydrocephalus.  She had symptoms consistent with normal pressure hydrocephalus including progressive gait disturbance, incontinence, and cognitive decline associated with blurred vision and headaches.  She was admitted and had a lumbar drain placed.  Final assessment of efficacy of lumbar drain was documented 11/7/2019 by Dr. Valery Marsh that note he determined there was no significant benefit from the lumbar drain trial.  Findings did not support the diagnosis of normal pressure hydrocephalus.  Patient was determined not to be a good candidate for  shunt and lumbar drain was removed.  Patient was discharged home on 11/7/2019.   Patient was received in transfer from [facility] facility to [facility] on 11/18/2019.  Symptoms prior to hospitalization included a 5-day history of back pain associated with difficulty ambulating, difficulty urinating and diarrhea.  Additionally she had a 2-day history of confusion with hallucinations.  Immediately upon arrival, nursing staff had concern regarding patient's respiratory rate at 44 and heart rate at 160.  She was transferred immediately to the intensive care unit.  Patient was in overt septic shock and was intubated 11/18/2019.  She was started on Zosyn and vancomycin.  Cultures were sent. Estella Rojas underwent volume resuscitation and was started on pressors.  Rocephin was subsequently added given concerns for possible meningitis. Lumbar puncture was performed and subsequently grew Staphylococcus species. Antibiotics simplified to vancomycin for meningitis.  MRI lumbar spine demonstrated soft tissue abscess communicating with small epidural abscess.  Lumbar soft tissue drain placed 11/21/19.  Culture positive for MSSA.  Antibiotics converted to Nafcillin 11/20/19. Sputum culture started to grow pansensitive Klebsiella pneumonia. Patient was treated with Cipro. Patient was extubated 11/22/2019. Patient underwent incision and drainage of lumbar soft tissue abscess on 11/25/2019. Ventriculostomy tube was placed 11/25/2019. For further details, please review the assessment and plan. ROS: Sedated on mechanical ventilator. PMH:  Per HPI  SHX:  Active tobacco abuse at 1/2 PPD. FHX: Positive for CAD  Allergies: NKDA  Medications:     0.9% NaCl with KCl 20 mEq 75 mL/hr at 11/25/19 0450    dextrose        magnesium sulfate  1 g Intravenous Once    nafcillin 12 g continuous infusion  12 g Intravenous Q24H    calcium replacement protocol   Other RX Placeholder    insulin lispro  0-12 Units Subcutaneous TID WC    insulin lispro  0-6 Units Subcutaneous Nightly    metoprolol tartrate  25 mg Oral BID    insulin glargine  35 Units Subcutaneous BID    potassium & sodium phosphates  1 packet Oral 4x Daily    potassium (CARDIAC) replacement protocol   Other RX Placeholder    sodium chloride flush  10 mL Intravenous 2 times per day    famotidine (PEPCID) injection  20 mg Intravenous BID     Vital Signs:   T: 96.1: P: 89: RR: 17: B/P: 138/82: FiO2: 21: O2 Sat: 98: I/O: 7643/6947  General:   Acutely ill appearing white female. HEENT:  normocephalic and atraumatic.  No scleral icterus. PERR  Neck: Stiff neck.  No Thyromegaly. Lungs: clear to auscultation.  No retractions. Cardiac: Irregular.  No JVD.     Abdomen: soft.  Nontender. Extremities:  No clubbing, cyanosis, or edema x 4.    Vasculature: capillary refill < 3 seconds. Palpable dorsalis pedis pulses. Skin: Pale but warm. Psych:  Awake. Oriented to person, but not time or place or circumstance. Lymph:  No supraclavicular adenopathy. Neurologic:  No seizures. No focal deficit. Positive neck pain with flexion of the neck. Jolt sign negative.     Data: (All radiographs, tracings, PFTs, and imaging are personally viewed and interpreted unless otherwise noted). · 2/2 blood cultures positive for MSSA. · Cerebrospinal fluid culture positive for MSSA.  Protein greater than 600.  Glucose 9. 15,626 white blood cells. · MRI lumbar and thoracic spine shows lumbar soft tissue abscess with small epidural abscess. · Sputum culture growing MSSA, in addition to gram-negative migue. · Telemetry shows sinus rhythm. Premature atrial complexes. · White blood cell count 22.4, hemoglobin 10.7, platelets 614. Sodium 123, potassium 3.2, chloride 87, bicarb 22, BUN 8, creatinine 0.8, glucose 133. CC time 30 minutes.  Case discussed with Dr. Fili Adams, and Dr. Sapphire Amador. Electronically signed by Abelardo Cervantes M.D.

## 2019-11-25 NOTE — PROGRESS NOTES
Hospitalist Progress Note      [facility]    Unit/Bed[facility]4K-26/026-A  YOB: 1955  MRN: [id number]   Acct: [de-identified]   PCP: ROMULO Warren  Date of Admission: 11/18/2019    Assessment/Plan:    1. Septic shock: Secondary to septicemia and meningitis due to MSSA.  Initial antibiotic selection was high-dose Rocephin, Zosyn, and and vancomycin.  Antibiotics simplified to vancomycin on 11/20/2019. On 11/24, ABX were simplified to Nafcillin. 2/2 MSSA bacteremia.  This is secondary to soft tissue abscess communicating with small epidural abscess. ID consulted and in control of the ABX. 2. Epidural Abscess: Affecting lumbar region and involving soft tissue and communicating with epidural abscess.  Secondary to MSSA.  Status post lumbar drain placement 11/21/2019.  Neurosurgery consulted. Pt going to surgery on 11/25. 3. Meningitis: 2/2 MSSA.  On Nafcillin. S/P Decadron to improve permeability of antibiotics. 4. Septicemia: 2/2 MSSA 2/2 soft tissue abscess communicating with small epidural abscess.  Day 3 Nafcillin.  Previously on vancomycin. 5. Pneumonia: Positive for gram-negative rods. Cipro initiated 11/22/2019 pending culture results. Positive for MSSA. Secondary to seeding from bloodstream secondary to soft tissue abscess. Was present at admission. On nafcillin as well. 6. Acute respiratory failure: Resolved. Pt was extubated on 11/22.   7. Atrial fibrillation with rapid ventricular response: New onset. Felt secondary to septic shock.  Converted to sinus dysrhythmia. Transition to beta blocker therapy.  No anticoagulation. 8. Delirium: Secondary to septic shock with meningitis.  Pt is currently hallucinating. MRI of brain and spine. 9. Lactic acidosis: Resolved. 2/2 septic shock with poor perfusion.  S/P volume resuscitation and pressors. 10. Type 2 diabetes mellitus: Continue insulin regimen   11.  Electrolyte abnormalities: Hypokalemia. Hyponatremia. Potassium Replacement Protocol. 12. Back pain: MRI back positive for soft tissue abscess and small epidural abscess. Surgery planned for 11/25. 13. 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  14. Urinary tract infection:  Related to Septicemia. Resolved. 15. Respiratory alkalosis: Secondary to septic shock.  Resolved. 16. Hypotension: Volume resuscitated.  S/P Pressors.  Screen for adrenal insufficiency is negative.  Resolved. 17. Hyperglycemia: Resolved. 25. Ketoacidosis: Resolved. .    Chief Complaint: Septic Shock     Initial H and P:-    Patient is a 57-year-old white female active smoker. Edu Lopez has a remote history of appendectomy and cholecystectomy. Edu Lopez has a history of type 2 diabetes mellitus, hypertension, hyperlipidemia, depression, and a history of brain lipoma diagnosed 11/4/2019. Patient was hospitalized 11/4/2019 through 11/7/2019 for evaluation of possible normal pressure hydrocephalus.  She had symptoms consistent with normal pressure hydrocephalus including progressive gait disturbance, incontinence, and cognitive decline associated with blurred vision and headaches.  She was admitted and had a lumbar drain placed.  Final assessment of efficacy of lumbar drain was documented 11/7/2019 by Dr. Garcia Situ that note he determined there was no significant benefit from the lumbar drain trial.  Findings did not support the diagnosis of normal pressure hydrocephalus.  Patient was determined not to be a good candidate for  shunt and lumbar drain was removed.  Patient was discharged home on 11/7/2019. Patient was received in transfer from [facility] facility to [facility] on 11/18/2019.  Symptoms prior to hospitalization included a 5-day history of back pain associated with difficulty ambulating, difficulty urinating and diarrhea.  Additionally she had a 2-day history of confusion with hallucinations.  Immediately upon arrival, nursing staff had 7.4*  --  7.4*   PHOS  --   --  3.2  --   --     < > = values in this interval not displayed. Recent Labs     11/24/19  1150   AST 32   ALT 22   BILITOT 0.4   ALKPHOS 65     Recent Labs     11/24/19 1958   INR 1.21*     Microbiology:    Blood culture #1:   Lab Results   Component Value Date    BC No growth-preliminary  11/24/2019    BC No growth-preliminary  11/24/2019     Organism:  Lab Results   Component Value Date    ORG Staphylococcus aureus 11/21/2019         Lab Results   Component Value Date    LABGRAM  11/21/2019     Many segmented neutrophils observed. No epithelial cells observed. Moderate gram positive cocci in pairs and clusters. Urine culture:   Lab Results   Component Value Date    LABURIN No growth-preliminary No growth  11/19/2019     Aerobic and Anaerobic :  Lab Results   Component Value Date    LABAERO  11/21/2019     heavy growth In the treatment of gram positive infections, GENTAMICINshould be CONSIDERED a SYNERGYSTIC agent ONLY. Ciprofloxacin and Levofloxacin, regardless of in vitrosensitivity, should not be used for staphylococcal infectionsother than uncomplicated lower UTIs. Moxifloxacin, regardless of in vitro sensitivity, shouldnot be used for staphylococcal infections. Lab Results   Component Value Date    LABANAE No anaerobes isolated- preliminary  11/21/2019     Radiology:  MRI, thoracic spine Report (11/20): No evidence of acute abnormalities. MRI, lumbar spine Report (11/20): Prominent complex fluid collection in the paraspinal musculature on the left at the L3-L4 level which appears to communicate anteriorly with a tiny epidural abscess at the L3 level and posteriorly with a large subcutaneous component abscess. CT head Report (11/24): No mass effect or acute hemorrhage. Chronic periventricular small vessel ischemic changes and cerebral atrophy. Hypoattenuating structure in the midbrain of indeterminate etiology.       Electronically signed by FLOR Lawson on 11/25/2019 at 8:48 AM

## 2019-11-25 NOTE — PROGRESS NOTES
[facility],[address] ICU STEPDOWN TELEMETRY 4K - 4P-14/138-K    Time In: 7728  Time Out: 5734  Timed Code Treatment Minutes: 25 Minutes  Minutes: 43          Date: 2019  Patient Name: Cinthya Farias,  Gender:  female        MRN: [id number]  : 1955  (59 y.o.)      Referring Practitioner: Adelita Riley MD  Diagnosis: Hyperglycemia  Additional Pertinent Hx: Patient is a 60-year-old white female active smoker. She has a remote history of appendectomy and cholecystectomy. She has a history of type 2 diabetes mellitus, hypertension, hyperlipidemia, depression, and a history of brain lipoma diagnosed 2019. Patient was hospitalized 2019 through 2019 for evaluation of possible normal pressure hydrocephalus. She had symptoms consistent with normal pressure hydrocephalus including progressive gait disturbance, incontinence, and cognitive decline associated with blurred vision and headaches. She was admitted and had a lumbar drain placed. Final assessment of efficacy of lumbar drain was documented 2019 by Dr. Fili Adams. In that note he determined there was no significant benefit from the lumbar drain trial.  Findings did not support the diagnosis of normal pressure hydrocephalus. Patient was determined not to be a good candidate for  shunt and lumbar drain was removed. Patient was discharged home on 2019. Patient was received in transfer from [facility] facility to [facility] on 2019. Symptoms prior to hospitalization included a 5-day history of back pain associated with difficulty ambulating, difficulty urinating and diarrhea. Additionally she had a 2-day history of confusion with hallucinations. Immediately upon arrival, nursing staff had concern regarding patient's respiratory rate at 44 and heart rate at 160. She was transferred immediately to the intensive care unit.   Patient was in overt septic shock and was intubated 11/18/2019. She was started on Zosyn and vancomycin. Cultures were sent. She underwent volume resuscitation and was started on pressors. Rocephin was subsequently added given concerns for possible meningitis. Lumbar puncture was performed and subsequently grew Staphylococcus species. Antibiotics simplified to vancomycin for meningitis. MRI lumbar spine demonstrated soft tissue abscess communicating with small epidural abscess. Lumbar soft tissue drain placed 11/21/19. Culture positive for MSSA. Antibiotics converted to Nafcillin 11/20/19. Rifampin added on 11/22/2019 for synergy. Day #1/3 of rifampin. Sputum culture started to grow gram-negative rods and Cipro was added on 11/22/2019. Restrictions/Precautions:  Restrictions/Precautions: Fall Risk  Position Activity Restriction  Other position/activity restrictions: Confusion, lumbar pigtail drain, flexiseal    Subjective:  Chart Reviewed: Yes  Patient assessed for rehabilitation services?: Yes  Family / Caregiver Present: No  Subjective: RN approved session, pt is supine in bed, lethargic and confused, cooperative with PT. General:  Overall Orientation Status: Impaired  Orientation Level: Oriented to person, Disoriented to time, Disoriented to situation, Oriented to place    Vision: Within Functional Limits    Hearing: Within functional limits         Pain:  Other (comment)(Pt unable to quantify, shouts out in pain with AROM and mobility).           Social/Functional History:    Lives With: Spouse  Type of Home: House  Home Layout: Two level, Bed/Bath upstairs  Home Access: Stairs to enter with rails  Entrance Stairs - Number of Steps: Pt unable to state how many steps to enter  Home Equipment: (none)      Ambulation Assistance: Independent  Transfer Assistance: Independent     Additional Comments: Pt amb without AD, recent lumbar drain trial, no shunt placed; PLOF obtained from chart due to pt lethargy, limitations: Decreased functional mobility , Decreased cognition, Decreased endurance, Decreased strength, Decreased balance, Decreased safe awareness  Assessment: Pt tolerates session fair, limited by confusion, infection, weakness, has been in bed x 1 week. PT to continue to progress strength and functional mobility. Prognosis: Good    REQUIRES PT FOLLOW UP: Yes    Discharge Recommendations:  Discharge Recommendations: [address]    Patient Education:  PT Education: Goals, PT Role, Plan of Care    Equipment Recommendations: Other: will continue to assess needs pending progress    Plan:  Times per week: 5-6 X GM  Current Treatment Recommendations: Strengthening, Patient/Caregiver Education & Training, Equipment Evaluation, Education, & procurement, Balance Training, Functional Mobility Training, Endurance Training, Safety Education & Training, Positioning    Goals:  Patient goals : to get better  Short term goals  Time Frame for Short term goals: by discharge  Short term goal 1: Pt to transfer supine <--> sit min A to enable pt to get in/out of bed. Short term goal 2: Pt to sit EOB with SBA x 10 minutes to improve upright tolerance in prep for transfer training. Short term goal 3: PT to assess transfers/gait when able. Long term goals  Time Frame for Long term goals : NA due to short length of stay. Following session, patient left in safe position with all fall risk precautions in place.

## 2019-11-25 NOTE — CARE COORDINATION
11/25/19  1:15 PM    Abnormal MRI of spine and brain. Pt will be going to surgery shortly for I & D of spinal abscess and possible ventriculostomy drain. She will go to ICU following this procedure.

## 2019-11-25 NOTE — PROGRESS NOTES
Progress note: Infectious diseases    Patient - Kenisha Mcgraw,  Age - 59 y.o.    - 1955      Room Number - 5P-78/879-B   MRN -  [id number]   Acct # - [de-identified]  Date of Admission -  2019 11:22 PM    SUBJECTIVE:   She appears to be confused  Awaiting MRI of lumbar spine  OBJECTIVE   VITALS    height is 5' 6\" (1.676 m) and weight is 211 lb 3.2 oz (95.8 kg). Her oral temperature is 98.3 °F (36.8 °C). Her blood pressure is 140/70 (abnormal) and her pulse is 81. Her respiration is 18 and oxygen saturation is 97%.        Wt Readings from Last 3 Encounters:   19 211 lb 3.2 oz (95.8 kg)   19 201 lb 4.8 oz (91.3 kg)   19 220 lb 6.4 oz (100 kg)       I/O (24 Hours)    Intake/Output Summary (Last 24 hours) at 2019 0955  Last data filed at 2019 0940  Gross per 24 hour   Intake 3201.58 ml   Output 3500 ml   Net -298.42 ml       General Appearance  confused  HEENT - normocephalic, atraumatic, pink conjunctiva,  anicteric sclera  Neck - Supple, no mass  Lungs -  Bilateral good air entry, no rhonchi, no wheeze  Cardiovascular - Heart sounds are normal.     Abdomen - soft, not distended, nontender,   Neurologic -confused  Skin - No bruising or bleeding  Extremities - No edema, no cyanosis, clubbing     MEDICATIONS:      nafcillin 12 g continuous infusion  12 g Intravenous Q24H    calcium replacement protocol   Other RX Placeholder    insulin lispro  0-12 Units Subcutaneous TID WC    insulin lispro  0-6 Units Subcutaneous Nightly    metoprolol tartrate  25 mg Oral BID    insulin glargine  35 Units Subcutaneous BID    potassium & sodium phosphates  1 packet Oral 4x Daily    potassium (CARDIAC) replacement protocol   Other RX Placeholder    sodium chloride flush  10 mL Intravenous 2 times per day    famotidine (PEPCID) injection  20 mg Intravenous BID      0.9% NaCl with KCl 20 mEq 75

## 2019-11-25 NOTE — OP NOTE
placed in her bed in supine position. Her head was placed in 40 degree. The area over right frontal area was shaved and prepped. A small skin incision was made 2.5 cm lateral to midline and 1 cm anterior to coronal suture. Then a shan hole was made. Next , the dura was incised and coagulated. An external ventricular catheter was inserted. A CSF flow was observed from the first pass. Then the catheter was advanced till 6.5 cm. Following that, the catheter was  tunneled and  secured by silk suture. The catheter was connected to EVD collected system and the wound was closed in standard fashion. Next, patient  Was extubated and  transferred to the pacu for further observation and treatment. Patient tolerated her spine surgery and the EVD placement very well without intraoperative complication.           Greg Pablo MD  Electronically signed by me on 11/25/2019 at 3:29 PM

## 2019-11-26 ENCOUNTER — APPOINTMENT (OUTPATIENT)
Dept: CT IMAGING | Age: 64
DRG: 856 | End: 2019-11-26
Attending: INTERNAL MEDICINE
Payer: MEDICARE

## 2019-11-26 LAB
AEROBIC CULTURE: ABNORMAL
ANAEROBIC CULTURE: ABNORMAL
ANION GAP SERPL CALCULATED.3IONS-SCNC: 13 MEQ/L (ref 8–16)
BASOPHILIA: ABNORMAL
BASOPHILS # BLD: 0.1 %
BASOPHILS ABSOLUTE: 0 THOU/MM3 (ref 0–0.1)
BILIRUBIN URINE: NEGATIVE
BLOOD, URINE: NEGATIVE
BUN BLDV-MCNC: 9 MG/DL (ref 7–22)
CALCIUM SERPL-MCNC: 8.1 MG/DL (ref 8.5–10.5)
CHARACTER, CSF: CLEAR
CHARACTER, URINE: CLEAR
CHLORIDE BLD-SCNC: 92 MEQ/L (ref 98–111)
CO2: 24 MEQ/L (ref 23–33)
COLOR CSF: COLORLESS
COLOR: YELLOW
CREAT SERPL-MCNC: 0.9 MG/DL (ref 0.4–1.2)
CRYPTOCOCCUS NEOFORMANS/GATTI CSF FILM ARR.: NOT DETECTED
CYTOMEGALOVIRUS (CMV) CSF FILM ARRAY: NOT DETECTED
ENTEROVIRUS DETECTION PCR: NOT DETECTED
EOSINOPHIL # BLD: 1 %
EOSINOPHILS ABSOLUTE: 0.2 THOU/MM3 (ref 0–0.4)
ERYTHROCYTE [DISTWIDTH] IN BLOOD BY AUTOMATED COUNT: 16.4 % (ref 11.5–14.5)
ERYTHROCYTE [DISTWIDTH] IN BLOOD BY AUTOMATED COUNT: 50.4 FL (ref 35–45)
ESCHERICHIA COLI K1 CSF FILM ARRAY: NOT DETECTED
GFR SERPL CREATININE-BSD FRML MDRD: 63 ML/MIN/1.73M2
GLUCOSE BLD-MCNC: 100 MG/DL (ref 70–108)
GLUCOSE BLD-MCNC: 101 MG/DL (ref 70–108)
GLUCOSE BLD-MCNC: 106 MG/DL (ref 70–108)
GLUCOSE BLD-MCNC: 87 MG/DL (ref 70–108)
GLUCOSE BLD-MCNC: 92 MG/DL (ref 70–108)
GLUCOSE URINE: NEGATIVE MG/DL
GRAM STAIN RESULT: ABNORMAL
HAEMOPHILUS INFLUENZA CSF FILM ARRAY: NOT DETECTED
HCT VFR BLD CALC: 25.3 % (ref 37–47)
HEMOGLOBIN: 8 GM/DL (ref 12–16)
HHV-6 (HERPESVIRUS 6) CSF FILM ARRAY: DETECTED
HSV-1 CSF FILM ARRAY: NOT DETECTED
HSV-2 CSF FILM ARRAY: NOT DETECTED
IMMATURE GRANS (ABS): 0.81 THOU/MM3 (ref 0–0.07)
IMMATURE GRANULOCYTES: 4.2 %
KETONES, URINE: NEGATIVE
LEUKOCYTE ESTERASE, URINE: NEGATIVE
LISTERIA MONOCYTOGENES CSF FILM ARRAY: NOT DETECTED
LYMPHOCYTES # BLD: 11.2 %
LYMPHOCYTES ABSOLUTE: 2.1 THOU/MM3 (ref 1–4.8)
LYMPHS CSF: 18 % (ref 0–90)
MAGNESIUM: 1.5 MG/DL (ref 1.6–2.4)
MCH RBC QN AUTO: 27.3 PG (ref 26–33)
MCHC RBC AUTO-ENTMCNC: 31.6 GM/DL (ref 32.2–35.5)
MCV RBC AUTO: 86.3 FL (ref 81–99)
MONOCYTE, CSF: 4 % (ref 0–45)
MONOCYTES # BLD: 7.6 %
MONOCYTES ABSOLUTE: 1.5 THOU/MM3 (ref 0.4–1.3)
NEISSERIA MENIGITIDIS CSF FILM ARRAY: NOT DETECTED
NITRITE, URINE: NEGATIVE
NUCLEATED RED BLOOD CELLS: 0 /100 WBC
ORGANISM: ABNORMAL
PARECHOVIRUS CSF FILM ARRAY: NOT DETECTED
PATHOLOGIST REVIEW: ABNORMAL
PH UA: 7 (ref 5–9)
PLATELET # BLD: 292 THOU/MM3 (ref 130–400)
PMV BLD AUTO: 10.3 FL (ref 9.4–12.4)
POTASSIUM SERPL-SCNC: 3.7 MEQ/L (ref 3.5–5.2)
PROTEIN UA: NEGATIVE
RBC # BLD: 2.93 MILL/MM3 (ref 4.2–5.4)
RBC CSF: 46 /CUMM
SCAN OF BLOOD SMEAR: NORMAL
SEG NEUTROPHILS: 75.9 %
SEGMENTED NEUTROPHILS ABSOLUTE COUNT: 14.5 THOU/MM3 (ref 1.8–7.7)
SEGS, CSF: 78 % (ref 0–6)
SMUDGE CELLS: PRESENT
SODIUM BLD-SCNC: 129 MEQ/L (ref 135–145)
SPECIFIC GRAVITY, URINE: 1.01 (ref 1–1.03)
STREPTOCOCCUS AGALACTIAE CSF FILM ARRAY: NOT DETECTED
STREPTOCOCCUS PNEUMONIAE CSF FILM ARRAY: NOT DETECTED
TOTAL NUCLEATED CELLS CSF: 430 /CUMM (ref 0–5)
TUBE VOLUME RECEIVED CSF: 5 ML
UROBILINOGEN, URINE: 0.2 EU/DL (ref 0–1)
VARICELLA-ZOSTER, PCR: NOT DETECTED
WBC # BLD: 19.1 THOU/MM3 (ref 4.8–10.8)

## 2019-11-26 PROCEDURE — 83735 ASSAY OF MAGNESIUM: CPT

## 2019-11-26 PROCEDURE — 2709999900 HC NON-CHARGEABLE SUPPLY

## 2019-11-26 PROCEDURE — 70450 CT HEAD/BRAIN W/O DYE: CPT

## 2019-11-26 PROCEDURE — 6360000002 HC RX W HCPCS: Performed by: INTERNAL MEDICINE

## 2019-11-26 PROCEDURE — 2580000003 HC RX 258: Performed by: INTERNAL MEDICINE

## 2019-11-26 PROCEDURE — 6370000000 HC RX 637 (ALT 250 FOR IP): Performed by: PHYSICIAN ASSISTANT

## 2019-11-26 PROCEDURE — 82948 REAGENT STRIP/BLOOD GLUCOSE: CPT

## 2019-11-26 PROCEDURE — 36415 COLL VENOUS BLD VENIPUNCTURE: CPT

## 2019-11-26 PROCEDURE — 6360000002 HC RX W HCPCS: Performed by: NURSE PRACTITIONER

## 2019-11-26 PROCEDURE — 2000000000 HC ICU R&B

## 2019-11-26 PROCEDURE — 6360000002 HC RX W HCPCS: Performed by: FAMILY MEDICINE

## 2019-11-26 PROCEDURE — 2580000003 HC RX 258: Performed by: PHYSICIAN ASSISTANT

## 2019-11-26 PROCEDURE — 99291 CRITICAL CARE FIRST HOUR: CPT | Performed by: INTERNAL MEDICINE

## 2019-11-26 PROCEDURE — 2500000003 HC RX 250 WO HCPCS: Performed by: PHYSICIAN ASSISTANT

## 2019-11-26 PROCEDURE — 80048 BASIC METABOLIC PNL TOTAL CA: CPT

## 2019-11-26 PROCEDURE — 99233 SBSQ HOSP IP/OBS HIGH 50: CPT | Performed by: NEUROLOGICAL SURGERY

## 2019-11-26 PROCEDURE — 94761 N-INVAS EAR/PLS OXIMETRY MLT: CPT

## 2019-11-26 PROCEDURE — 6360000002 HC RX W HCPCS: Performed by: PHYSICIAN ASSISTANT

## 2019-11-26 PROCEDURE — 97167 OT EVAL HIGH COMPLEX 60 MIN: CPT

## 2019-11-26 PROCEDURE — 51702 INSERT TEMP BLADDER CATH: CPT

## 2019-11-26 PROCEDURE — 97535 SELF CARE MNGMENT TRAINING: CPT

## 2019-11-26 PROCEDURE — 97530 THERAPEUTIC ACTIVITIES: CPT

## 2019-11-26 PROCEDURE — 85025 COMPLETE CBC W/AUTO DIFF WBC: CPT

## 2019-11-26 PROCEDURE — 81003 URINALYSIS AUTO W/O SCOPE: CPT

## 2019-11-26 RX ORDER — POTASSIUM CHLORIDE 20 MEQ/1
20 TABLET, EXTENDED RELEASE ORAL ONCE
Status: COMPLETED | OUTPATIENT
Start: 2019-11-26 | End: 2019-11-26

## 2019-11-26 RX ORDER — MAGNESIUM SULFATE IN WATER 40 MG/ML
2 INJECTION, SOLUTION INTRAVENOUS ONCE
Status: COMPLETED | OUTPATIENT
Start: 2019-11-26 | End: 2019-11-26

## 2019-11-26 RX ORDER — FENTANYL CITRATE 50 UG/ML
50 INJECTION, SOLUTION INTRAMUSCULAR; INTRAVENOUS ONCE
Status: COMPLETED | OUTPATIENT
Start: 2019-11-26 | End: 2019-11-26

## 2019-11-26 RX ADMIN — METOPROLOL TARTRATE 25 MG: 25 TABLET ORAL at 21:28

## 2019-11-26 RX ADMIN — FENTANYL CITRATE 50 MCG: 50 INJECTION, SOLUTION INTRAMUSCULAR; INTRAVENOUS at 02:43

## 2019-11-26 RX ADMIN — FENTANYL CITRATE 50 MCG: 50 INJECTION, SOLUTION INTRAMUSCULAR; INTRAVENOUS at 21:26

## 2019-11-26 RX ADMIN — MAGNESIUM SULFATE HEPTAHYDRATE 2 G: 40 INJECTION, SOLUTION INTRAVENOUS at 12:45

## 2019-11-26 RX ADMIN — DOCUSATE SODIUM 100 MG: 100 CAPSULE, LIQUID FILLED ORAL at 08:38

## 2019-11-26 RX ADMIN — FAMOTIDINE 20 MG: 10 INJECTION, SOLUTION INTRAVENOUS at 08:38

## 2019-11-26 RX ADMIN — DOCUSATE SODIUM 100 MG: 100 CAPSULE, LIQUID FILLED ORAL at 21:26

## 2019-11-26 RX ADMIN — POTASSIUM & SODIUM PHOSPHATES POWDER PACK 280-160-250 MG 250 MG: 280-160-250 PACK at 12:44

## 2019-11-26 RX ADMIN — POTASSIUM & SODIUM PHOSPHATES POWDER PACK 280-160-250 MG 250 MG: 280-160-250 PACK at 08:38

## 2019-11-26 RX ADMIN — ACETAMINOPHEN 650 MG: 325 TABLET ORAL at 12:43

## 2019-11-26 RX ADMIN — FAMOTIDINE 20 MG: 10 INJECTION, SOLUTION INTRAVENOUS at 21:26

## 2019-11-26 RX ADMIN — FENTANYL CITRATE 50 MCG: 50 INJECTION, SOLUTION INTRAMUSCULAR; INTRAVENOUS at 04:08

## 2019-11-26 RX ADMIN — POTASSIUM CHLORIDE 20 MEQ: 1500 TABLET, EXTENDED RELEASE ORAL at 12:43

## 2019-11-26 RX ADMIN — NAFCILLIN SODIUM 12 G: 2 INJECTION, POWDER, LYOPHILIZED, FOR SOLUTION INTRAMUSCULAR; INTRAVENOUS at 12:45

## 2019-11-26 RX ADMIN — Medication 10 ML: at 08:38

## 2019-11-26 RX ADMIN — POTASSIUM & SODIUM PHOSPHATES POWDER PACK 280-160-250 MG 250 MG: 280-160-250 PACK at 17:18

## 2019-11-26 RX ADMIN — FENTANYL CITRATE 50 MCG: 50 INJECTION, SOLUTION INTRAMUSCULAR; INTRAVENOUS at 14:43

## 2019-11-26 RX ADMIN — POTASSIUM & SODIUM PHOSPHATES POWDER PACK 280-160-250 MG 250 MG: 280-160-250 PACK at 21:28

## 2019-11-26 RX ADMIN — GANCICLOVIR SODIUM 485 MG: 500 INJECTION, POWDER, LYOPHILIZED, FOR SOLUTION INTRAVENOUS at 22:11

## 2019-11-26 RX ADMIN — METOPROLOL TARTRATE 25 MG: 25 TABLET ORAL at 08:38

## 2019-11-26 RX ADMIN — ACYCLOVIR SODIUM 800 MG: 50 INJECTION, SOLUTION INTRAVENOUS at 08:51

## 2019-11-26 ASSESSMENT — PAIN SCALES - GENERAL
PAINLEVEL_OUTOF10: 7
PAINLEVEL_OUTOF10: 9
PAINLEVEL_OUTOF10: 0
PAINLEVEL_OUTOF10: 7
PAINLEVEL_OUTOF10: 7

## 2019-11-26 NOTE — PROGRESS NOTES
Patient transported to CT scan with 2 RNs, transport monitor, and emergency backpack. Patient tolerated well. Patient returned to room and repositioned for comfort. VS obtained.

## 2019-11-26 NOTE — FLOWSHEET NOTE
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 Sherly Barba   Provider Notification Physician   Method of Communication Secure Message   Response No new orders   Notification Time 0125     Message sent to Dr. 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 Also informed physician that results did say it cannot distinguish between latent and active infection. Physician acknowledged positive test result, stated to \"continue with the current treatment\".

## 2019-11-26 NOTE — CARE COORDINATION
11/26/19    DISCHARGE ON GOING EVALUATION    [facility] day: 8  Location: -/017-A Reason for admit: Hyperglycemia [R73.9]     Procedure:   11/19 Intubated  11/19 CVC Left subclavian  11/19 LP  11/20 MRI Thoracic spine: No evidence of acute abnormality of the thoracic spine; Old minimal compression deformity of T12 with approximately 10% anterior height loss; Left paracentral extrusion at T9-10 causing mild spinal canal stenosis and contacting the ventral aspect of the cord without abnormal signal in the cord  11/20 MRI Lumbar spine: Prominent complex fluid collection in the paraspinal musculature on the left at the L3-L4 level which appears to communicate anteriorly with a tiny epidural abscess at the L3 level and posteriorly with a large subcutaneous component abscess; Arachnoiditis  11/21 Posterior lumbar paraspinal abcess drain placed in IR  11/22 Extubated  11/25 EVACUATION AND DEBRIDEMENT L2-L3 INTRAMUSCULAR SPINAL ABSCESS, EVACUATION EPIDURAL ABSCESS L2-L3, PARTIAL LAMINECTOMY L2-L3, EVD PLACEMENT  Treatment Plan of Care: Intensivist, ID, neurosurgery following. EVD-draining 10-15ml/hr. IV naficillin. Herpes present in spinal cord fluid. Per ID start ganciclovir as acyclovir has poor coverage. Pain control. WBC 19.1. Na+ 12. Hgb 8. 96% RA. PCP: ROMULO Hutchins  Readmission Risk Score: 19%    Patient Goals/Plan/Treatment Preferences: Pt is from home with spouse. Anticipate long term infusions. [place] is following. Await final infusion plan.

## 2019-11-26 NOTE — PLAN OF CARE
Problem: Falls - Risk of:  Goal: Will remain free from falls  Description  Will remain free from falls  Outcome: Ongoing  Note:   Bed in lowest position and locked. Bed alarm activated. Educated on use of call light when in need of assistance- no evidence of learning. Visual checks performed and charted. Bernstein catheter in place. No falls during shift at this time. Armband and falling star in place. Call light within reach. Problem: Nutrition  Goal: Optimal nutrition therapy  Outcome: Ongoing  Note:   Patient was NPO for surgery yesterday. Advance diet as tolerated order in. Patient currently tolerating clear liquids. Will advance to soft diet in the morning. Problem: Discharge Planning:  Goal: Discharged to appropriate level of care  Description  Discharged to appropriate level of care  Outcome: Ongoing  Note:   Vasiliy is from home with her . Discharge planning remains in progress at this time. Patient had EVD drain placed yesterday with spinal surgery to evacuate abcesses. Problem: Airway Clearance - Ineffective:  Goal: Ability to maintain a clear airway will improve  Description  Ability to maintain a clear airway will improve  Outcome: Ongoing  Note:   Patient is on room air at this time and tolerating well. SpO2 remains >92% at this time. Problem: Bowel Function - Altered:  Goal: Bowel elimination is within specified parameters  Description  Bowel elimination is within specified parameters  Outcome: Ongoing  Note:   Flexiseal remains in place at this time. Patient continues to have medium-large amounts of watery brown stools. Problem: Urinary Elimination:  Goal: Signs and symptoms of infection will decrease  Description  Signs and symptoms of infection will decrease  Outcome: Ongoing  Note:   Bernstein catheter remains in place at this time.    Goal: Complications related to the disease process, condition or treatment will be avoided or minimized  Description  Complications related to the disease process, condition or treatment will be avoided or minimized  Outcome: Ongoing     Care plan reviewed with patient. Patient verbalized understanding of the plan of care and contributed to goal setting.

## 2019-11-26 NOTE — PROGRESS NOTES
a song playing in the background on exam this morning. The incisional site drains of the lumbar spine are intact with the right fascial drain intact and functioning of 45 cc per shift. The left pocket drain is intact but did not yield any discharge per shift. Her incisions are dry with dressings intact. Vitals: BP (!) 104/41   Pulse 85   Temp 99.5 °F (37.5 °C) (Bladder)   Resp 17   Ht 5' 6\" (1.676 m)   Wt 214 lb 8.1 oz (97.3 kg)   SpO2 99%   BMI 34.62 kg/m²   Physical Exam:  Alert and attentive. Language appropriate, with no aphasia. Pupils equal.  Facial strength symmetric. Physical Exam   Or animated and interactive on exam today with purposeful movement demonstrated for all 4 extremities. She is alert and oriented and obeys simple commands. ROS:  Review of Systems   She denies headache on exam this morning. A more comprehensive review of systems is currently unobtainable due to patient's status. 24 hour intake/output:    Intake/Output Summary (Last 24 hours) at 11/26/2019 1617  Last data filed at 11/26/2019 1502  Gross per 24 hour   Intake 4475 ml   Output 2862 ml   Net 1613 ml     Last 3 weights:   Wt Readings from Last 3 Encounters:   11/26/19 214 lb 8.1 oz (97.3 kg)   11/07/19 201 lb 4.8 oz (91.3 kg)   09/18/19 220 lb 6.4 oz (100 kg)         CBC:   Recent Labs     11/24/19  1150 11/25/19  0206 11/26/19  0705   WBC 24.4* 22.4* 19.1*   HGB 11.0* 10.7* 8.0*    282 292     BMP:    Recent Labs     11/25/19  0206 11/25/19  0934 11/25/19  1905 11/26/19  0705   *  --  125* 129*   K 3.3* 3.2* 3.9 3.7   CL 87*  --  87* 92*   CO2 22*  --  23 24   BUN 8  --  9 9   CREATININE 0.8  --  0.9 0.9   GLUCOSE 116*  --  168* 92     Calcium:  Recent Labs     11/26/19  0705   CALCIUM 8.1*     Magnesium:  Recent Labs     11/26/19  0705   MG 1.5*     Glucose:  Recent Labs     11/25/19  2109 11/26/19  0836 11/26/19  1156   POCGLU 147* 87 106     HgbA1C: No results for input(s): LABA1C in the last 72 hours. Lipids: No results for input(s): CHOL, TRIG, HDL, LDLCALC in the last 72 hours. Invalid input(s): LDL    Radiology reports as per the Radiologist  Radiology: Ct Head Wo Contrast    Result Date: 11/26/2019  PROCEDURE: CT HEAD WO CONTRAST CLINICAL INFORMATION: hydrocephalus. COMPARISON: Brain MRI dated 11/25/2019 and the brain CT dated 11/24/2019 TECHNIQUE: Noncontrast 5 mm axial images were obtained through the brain. Sagittal and coronal reconstructions were obtained and reviewed. All CT scans at this facility use dose modulation, iterative reconstruction, and/or weight-based dosing when appropriate to reduce radiation dose to as low as reasonably achievable. FINDINGS: Brain: There is no acute ischemic infarct, hemorrhage, midline shift, mass, or mass effect. There is no change in the 1.5 x 1.1 cm fat density lesion in the interpeduncular cistern to the left of midline. This may represent a lipoma or possibly a dermoid  cyst. There are stable mild to moderate deep white matter hypodensities, nonspecific in nature but probably representing small vessel chronic ischemic changes. Ventricles/basal cisterns: There has been interval placement of a right frontal  shunt catheter coursing through the right frontal horn, tip in the region of the right foramen of Felizardo Amado. There is stable mild ventriculomegaly. Skull base/calvarium/osseous structures: There is a new right frontal shan hole for placement of a right frontal ventriculostomy. Soft tissues: Unremarkable Intraorbital contents: Unremarkable Sinuses: Unremarkable; the imaged sinuses are clear without evidence of mucosal thickening or fluid levels. Mastoids: Unremarkable; the mastoid air cells are clear. Interval placement of a right frontal ventriculostomy, tip in the region of the right foramen of Porter. Stable mild hydrocephalus. Stable 1.5 x 1.1 cm fat density lesion in the interpeduncular cistern to the left of midline.  No acute ischemic infarct, hemorrhage, or mass effect. **This report has been created using voice recognition software. It may contain minor errors which are inherent in voice recognition technology. ** Final report electronically signed by Dr. Barbi Benavidez on 11/26/2019 5:53 AM    Ct Head Wo Contrast    Result Date: 11/24/2019  PROCEDURE: CT HEAD WO CONTRAST CLINICAL INFORMATION: Mental status change TECHNIQUE: CT scan of the head was performed from the vertex to the skull base without use of intravenous contrast. Axial images as well as coronal and sagittal reconstructions were obtained. All CT scans at this facility use dose modulation, iterative reconstruction, and/or weight-based dosing when appropriate to reduce radiation dose to as low as reasonably achievable. COMPARISON: None. FINDINGS: There is no mass effect, midline shift or acute hemorrhage. Ventricles and sulci are prominent. There is diffuse periventricular white matter hypoattenuation. A 1.2 cm hypoattenuating structure in the left midbrain has noncontrast mean Hounsfield units of -71 (image 18). Areas of adjacent hypoattenuation may be secondary to prior infarcts. Visualized orbits, paranasal sinuses and mastoid air cells are unremarkable. 1. No mass effect or acute hemorrhage. 2. Chronic periventricular small vessel ischemic changes and cerebral atrophy. 3. Hypoattenuating structure in the midbrain of indeterminate etiology. Brain MRI is recommended for further evaluation. **This report has been created using voice recognition software. It may contain minor errors which are inherent in voice recognition technology. ** Final report electronically signed by Dr. Ingrid Portillo on 11/24/2019 1:39 PM    Ct Abscess Drainage W Cath Placement S&i    Result Date: 11/21/2019  CT-GUIDED ABSCESS DRAINAGE PERFORMED BY: JOSETTE Spaulding: Posterior lower lumbar paraspinal abscess APPROACH: Posterior CATHETER: 8 Mongolian multipurpose drainage catheter.  FLUID OBTAINED: 15 mL purulent tan fluid SEDATION: None. Patient was ventilated on propofol drip. The patient was sedated for 30 minutes during this procedure and monitored with EKG and pulse ox monitoring devices by a registered nurse. PROCEDURE: Signed informed consent was obtained prior to performing this procedure. The patient was placed on the CT scanner and sedated, as indicated above. CT images were initially obtained to determine appropriate puncture site. The skin was marked, prepped, and draped in a sterile fashion. Following local anesthesia and utilizing aseptic technique, a needle was successfully passed into the abscess pocket. A small amount of pus was aspirated to confirm appropriate needle position. A guidewire was then passed through the needle followed by insertion of progressively larger dilators up to an 8 [place] size. An 8 [place] multi-side-hole drainage catheter was then passed over the wire and was coiled within the abscess pocket. The retaining suture was then locked in the standard fashion. Catheter was then hooked to a Domingo-Close drainage bag and the catheter was flushed several times with sterile saline. The catheter was stabilized to the skin with a Percu-Stay device. A sample of the pus was sent to laboratory for culture and sensitivity testing. Status post successful abscess drainage procedure. . **This report has been created using voice recognition software. It may contain minor errors which are inherent in voice recognition technology. ** Final report electronically signed by Dr. Harry Pope on 11/21/2019 1:13 PM    Mri Thoracic Spine W Wo Contrast    Result Date: 11/20/2019  PROCEDURE: MRI THORACIC SPINE W WO CONTRAST CLINICAL INFORMATION: increased low back pain, recent lumbar drain, sepsis, unable to walk at [facility]. COMPARISON: No prior study. TECHNIQUE: Sagittal T1, T2 and STIR sequences were obtained through the thoracic spine. Axial T2-weighted images were obtained through the discs. intravenous contrast. FINDINGS: The lumbar spine is imaged from the inferior aspect of T10 to the lower sacrum. The conus medullaris terminates at the L1 level. There has been interval worsening of diffuse enhancement involving the cauda equina nerve roots. There is redemonstration of a peripherally enhancing fluid collection within the epidural space at the L3 level which measures approximately 0.4 x 0.3 x 0.8 cm. This is again noted to likely communicate with a larger paraspinal fluid collection along the left paraspinal soft tissues. It appears similar to the prior exam. There is preservation of the expected lumbar lordosis. The vertebral body heights and alignment are maintained. There is marrow replacement and hyperintense T2 signal involving the spinous processes of L2 and L3 which is suspicious for osteomyelitis. Type II Modic changes are again noted at the endplates of P9-B3. There is redemonstration of multilevel degenerative changes which are most notable at L5-S1 with disc space narrowing and a disc osteophyte complex with a posterior midline disc protrusion. This causes mild spinal canal narrowing and mild narrowing of the right subarticular zone with moderate narrowing of the left subarticular zone. Mild to moderate neural foraminal narrowing is also present bilaterally at this level secondary to degenerative facet arthropathy and ligamentum flavum thickening. No suspicious finding is identified within the visualized retroperitoneal soft tissues. There is redemonstration of a large, peripherally enhancing fluid collection within the subcutaneous soft tissues overlying the left posterior paraspinal musculature and measuring 3.1 x 6.8 x 5.2 cm. A pigtail catheter is present within the collection. Additional, smaller fluid collections are noted along the left side of the L3 and L4 spinous processes which likely correspond to smaller abscesses.       1. There has been worsening of enhancement of the cauda paraspinal musculature on the left at the L3-L4 level which appears to communicate anteriorly with a tiny epidural abscess at the L3 level and posteriorly with a large subcutaneous component abscess. 2. Arachnoiditis. Findings were discussed with Dann Marie RN via telephone at the time of interpretation. **This report has been created using voice recognition software. It may contain minor errors which are inherent in voice recognition technology. ** Final report electronically signed by Dr. Idania Martinez MD on 11/20/2019 3:18 PM    Xr Lumbar Spine 1 Vw    Result Date: 11/25/2019  PROCEDURE: XR LUMBAR SPINE 1 VW CLINICAL INFORMATION: Lumbar laminectomy TECHNIQUE: 2 intraoperative spot radiographs of the lumbar spine COMPARISON: None FINDINGS: There is a retractor posterior to the L3-4 vertebra. There is a localizer posterior to L3. Disc space narrowing is present at the L5-S1 level. Radiopaque surgical material seen in the posterior soft tissues. Surgical localization as above. Final report electronically signed by Dr. Justina Gold on 11/25/2019 3:33 PM    Xr Chest Portable    Result Date: 11/23/2019  PROCEDURE: XR CHEST PORTABLE CLINICAL INFORMATION: resp failure. COMPARISON: November 19, 2019 TECHNIQUE: AP upright view of the chest. FINDINGS: There has been interval removal of the endotracheal and nasogastric tubes. The lung volumes are slightly diminished. Coarse markings are stable with no focal infiltrate noted. The left mid thoracic nodular density or granuloma is again noted. Central venous structures are stable. The central venous catheter remains in the superior vena cava. Interval endotracheal and nasogastric tube removal with persistent diminished lung volumes. No acute process compared to prior study. **This report has been created using voice recognition software. It may contain minor errors which are inherent in voice recognition technology. ** Final report electronically signed by  Enrique Pedro on 11/23/2019 3:11 AM    Xr Chest Portable    Result Date: 11/19/2019  PROCEDURE: XR CHEST PORTABLE CLINICAL INFORMATION: CVC placement. COMPARISON: November 19, 2019 TECHNIQUE: AP upright view of the chest. FINDINGS: The heart and mediastinum are stable. There is redemonstration of an endotracheal tube and nasogastric tubes in stable locations. Lung volumes are unchanged with mildly coarse markings present. There is redemonstration of a calcified granuloma in the left mid thorax. Central venous catheter from the left subclavian is present within the superior vena cava. There is no visualized pneumothorax. 1. Status post left subclavian central line placement. Tip location the superior vena cava. There is no visualized pneumothorax. 2. Stable appearance of coarse lung markings and slightly decreased lung volumes. **This report has been created using voice recognition software. It may contain minor errors which are inherent in voice recognition technology. ** Final report electronically signed by Dr. Enrique Pedro on 11/19/2019 7:14 AM    Xr Chest Portable    Result Date: 11/19/2019  PROCEDURE: XR CHEST PORTABLE CLINICAL INFORMATION: ETT/OGT placement. COMPARISON: No prior study. TECHNIQUE: AP upright view of the chest. FINDINGS: The lung volumes are shallow. There is an endotracheal tube 2.5 cm above the mesfin. A nasogastric tube is in the body of the stomach. There are coarse lung markings likely age-related. There is a small calcified density in the left perihilar region. A granuloma is suspected. Minimal retrocardiac atelectasis or infiltrate cannot be excluded. There is no venous congestion present. A small left basilar effusion or atelectasis blunts the costophrenic angle. The skeleton is negative for active pathology. 1. Visualized endotracheal tube above the mesfin. 2. Nasogastric tube within the stomach. 3. Diminished lung volumes with crowding of markings.  Left retrocardiac atelectasis or infiltrate is not excluded. A small left effusion is considered. **This report has been created using voice recognition software. It may contain minor errors which are inherent in voice recognition technology. ** Final report electronically signed by Dr. Nazario Calloway on 11/19/2019 12:29 AM    Xr Chest Portable    Result Date: 11/19/2019  PROCEDURE: XR CHEST PORTABLE CLINICAL INFORMATION: et tube placement. COMPARISON: November 18, 2019 TECHNIQUE: AP upright view of the chest. FINDINGS: The heart and mediastinum remain upper normal. There is an endotracheal tube visualized 3 cm above the mesfin. The nasogastric tube is in the mid body of the stomach. Lung volumes remain mildly shallow with redemonstration of calcified granuloma in the left mid thorax. Trace effusion at the right lung base cannot be excluded. There is no obvious congestive failure. Postsurgical clips in the right upper abdomen are stable compared to prior study. There is no acute change of the skeleton. 1. Visualized endotracheal and nasogastric tubes discussed above. 2. Persistent slightly decreased lung volumes with coarse markings. 3. Minimal right effusion not excluded. **This report has been created using voice recognition software. It may contain minor errors which are inherent in voice recognition technology. ** Final report electronically signed by Dr. Nazario Calloway on 11/19/2019 12:27 AM    Ir Lumbar Puncture For Diagnosis    Result Date: 11/19/2019  LUMBAR PUNCTURE WITH FLUOROSCOPIC GUIDANCE: CLINICAL INFORMATION:  Lumbar puncture for possible meningitis PERFORMED BY: Severiano Morales M.D. APPROACH: L3-L4 interlaminar approach NEEDLE: 20-gauge spinal needle FLUID: 6 mL yellow turbid fluid FLUOROSCOPY TIME: 1 minute 5 seconds FLUOROSCOPIC IMAGES: 3 Dose (mGy):63 PROCEDURE:  Signed informed consent was obtained prior to performing this procedure.   Following local anesthesia and utilizing aseptic technique, a spinal needle through the Touhy needle and very easily passed cephalad to the level listed above. This was performed with fluoroscopic guidance. Fluoroscopic images were obtained for documentation. A small amount of fluid was seen to drain through the catheter. The catheter was stabilized to the skin with tape and a sterile OpSite dressing. The CSF drainage bag was sent to the floor with the patient. . The patient was then discharged from our department. Status post successful lumbar spine/CSF drainage catheter insertion. **This report has been created using voice recognition software. It may contain minor errors which are inherent in voice recognition technology. ** Final report electronically signed by Dr. Sera Lyn on 11/4/2019 11:38 AM    Fluoro For Surgical Procedures    Result Date: 11/25/2019  Radiology exam is complete. No Radiologist dictation. Please follow up with ordering provider. Mri Brain W Wo Contrast    Result Date: 11/25/2019  PROCEDURE: MRI BRAIN W WO CONTRAST CLINICAL INFORMATION: confusion, with hx of MSSA infection of lumbar area. COMPARISON: None available. Correlation is made to CT of the head dated November 24, 2019. TECHNIQUE: Multiplanar and multiple spin echo T1 and T2-weighted images were obtained through the brain before and after the administration of 20 mL ProHance intravenous contrast. Note is made that fast scanning was performed secondary to patient motion. The repeated images are also degraded by motion. FINDINGS: The images are significantly degraded by motion artifacts which limits detailed evaluation. There is prominence of the sulcal spaces and ventricular system secondary to generalized, age-related parenchymal volume loss. Abnormal hyperintense T2 signal with restricted diffusion is noted along the posterior aspect of the left thalamus. Patchy areas of hyperintense T2 signal are also noted throughout the periventricular and deep cerebral white matter as well as the elsie.  These was then passed over the wire and was coiled within the abscess pocket. The retaining suture was then locked in the standard fashion. Catheter was then hooked to a Domingo-Close drainage bag and the catheter was flushed several times with sterile saline. The catheter was stabilized to the skin with a Percu-Stay device. A sample of the pus was sent to laboratory for culture and sensitivity testing. Status post successful abscess drainage procedure. . **This report has been created using voice recognition software. It may contain minor errors which are inherent in voice recognition technology. ** Final report electronically signed by Dr. Darian Murray on 11/21/2019 1:13 PM    A/P: S/P remains status postoperative day #1 following evacuation and drainage of lumbar abscess and concurrent placement of EVD. EVD output total overnight was 101 cc approximated to 10 to 15 cc/h. Neurosurgery agrees with ICU staff and converting measurements from centimeters of water to mmHg. Neurosurgery recommends a CT scan be repeated for review in the morning with CSF to be sampled for cultures on Friday. Neurosurgery to follow-up    Electronically signed by Francine Travis PA-C on 11/26/2019 at 4:17 PM     Attending Note:     Patient seen and examined in the ICU  in conjunction with neurosurgery PA Francine Travis PA-C). Discussed with patient, her nurse and ICU team as well. All data and imaging reviewed by myself. I agree with examination assessment and plan as documented above. -Patient underwent EVD placement and an excisional draining of lumbar spine abscesses yesterday.   -There is no acute events over the night.  -Patient is more awake and alert today.   -Morning every things and following commands by 4.  -Patient CSF study showed signs of possible viral infection.  -Day brain CT showed provement in patient hydrocephalus.  -Please continue the medical management per ICU team.  -Follow-up the ID recommendation.  -Keep the EVD at 15 mmHg at this time.  -New brain CT tomorrow.      Corey Collins MD

## 2019-11-26 NOTE — FLOWSHEET NOTE
11/25/19 2130 11/25/19 2143 11/25/19 2248   Provider Notification   Reason for Communication Review case  (Clarify EVD drain order) Review case  (Clarify EVD drain order) Review case  (Clarify EVD drain order)   Provider Name Pratima Grayson Barba   Provider Notification Physician Physician Physician   Method of Communication Secure Message Call Secure Message   Response Waiting for response Waiting for response Waiting for response   Notification Time [phone] [id number]     No answer received from [place], [place]. Message sent to Dr. Bonita Barba to clarify how much physician would like drained from EVD per hour. Physician responded at 23:49 that he was \"ok\" with 10-15ml per hour.

## 2019-11-26 NOTE — PROGRESS NOTES
11/18/2019. She was started on Zosyn and vancomycin. Cultures were sent. She underwent volume resuscitation and was started on pressors. Rocephin was subsequently added given concerns for possible meningitis. Lumbar puncture was performed and subsequently grew Staphylococcus species. Antibiotics simplified to vancomycin for meningitis. MRI lumbar spine demonstrated soft tissue abscess communicating with small epidural abscess. Lumbar soft tissue drain placed 11/21/19. Culture positive for MSSA. Antibiotics converted to Nafcillin 11/20/19. Rifampin added on 11/22/2019 for synergy. Day #1/3 of rifampin. Sputum culture started to grow gram-negative rods and Cipro was added on 11/22/2019.  s/p EVACUATION AND DEBRIDEMENT L2-L3 INTRAMUSCULAR SPINAL ABSCESS, EVACUATION EPIDURAL ABSCESS L2-L3, PARTIAL LAMINECTOMY L2-L3, EVD PLACEMENT on 11/25/19. Prior Level of Function:  Lives With: Spouse  Type of Home: House  Home Layout: Two level, Bed/Bath upstairs  Home Access: Stairs to enter with rails  Entrance Stairs - Number of Steps: Pt unable to state how many steps to enter  Home Equipment: (none)        ADL Assistance: Independent  Ambulation Assistance: Independent  Transfer Assistance: Independent  Additional Comments: Pt amb without AD, recent lumbar drain trial, no shunt placed; PLOF obtained from PT due to Pt confusion    Restrictions/Precautions:  Restrictions/Precautions: Fall Risk  Position Activity Restriction  Other position/activity restrictions: Confusion, EVD drain-needs to be clamped prior to mobility & re-leveled by nursing after session, flexiseal    SUBJECTIVE: RN approved session, pt seen in ICU s/p lumbar abscess evacuation and EVD placement yesterday. Pt confused, easily distracted, has a short attention span.     PAIN: does not quantify, shouts out with slight touch and movement, inconsistent    OBJECTIVE:  Bed Mobility:  Rolling to Left: Maximum Assistance   Supine to Sit: Maximum

## 2019-11-26 NOTE — PROGRESS NOTES
Progress note: Infectious diseases    Patient - Reshma Eden,  Age - 59 y.o.    - 1955      Room Number - 4D-17/017-A   MRN -  [id number]   Acct # - [de-identified]  Date of Admission -  2019 11:22 PM    SUBJECTIVE:   She has no new complaints  She is mentally very clear  CSF Positive for HHV-6  OBJECTIVE   VITALS    height is 5' 6\" (1.676 m) and weight is 214 lb 8.1 oz (97.3 kg). Her bladder temperature is 100.4 °F (38 °C). Her blood pressure is 134/81 and her pulse is 90. Her respiration is 13 and oxygen saturation is 98%.        Wt Readings from Last 3 Encounters:   19 214 lb 8.1 oz (97.3 kg)   19 201 lb 4.8 oz (91.3 kg)   19 220 lb 6.4 oz (100 kg)       I/O (24 Hours)    Intake/Output Summary (Last 24 hours) at 2019 0943  Last data filed at 2019 6296  Gross per 24 hour   Intake 4995 ml   Output 3046 ml   Net 1949 ml       General Appearance  Awake, alert, oriented,Ventriculostomy drain in place  HEENT - normocephalic, atraumatic, pink conjunctiva,  anicteric sclera  Neck - Supple, no mass  Lungs -  Bilateral   air entry, no rhonchi, no wheeze  Cardiovascular - Heart sounds are normal.     Abdomen - soft, not distended, nontender,   Neurologic -oriented  Skin - No bruising or bleeding  Extremities - dressed lumbar wound    MEDICATIONS:      acyclovir  800 mg Intravenous Q8H    magnesium sulfate  2 g Intravenous Once    sodium chloride flush  10 mL Intravenous 2 times per day    docusate sodium  100 mg Oral BID    nafcillin 12 g continuous infusion  12 g Intravenous Q24H    calcium replacement protocol   Other RX Placeholder    insulin lispro  0-12 Units Subcutaneous TID     insulin lispro  0-6 Units Subcutaneous Nightly    metoprolol tartrate  25 mg Oral BID    insulin glargine  35 Units Subcutaneous BID    potassium & sodium phosphates  1 packet Oral 4x Daily    potassium (CARDIAC) replacement protocol   Other RX Placeholder    famotidine (PEPCID) injection  20 mg Intravenous BID      dextrose       sodium chloride flush, ondansetron, fentanNYL **OR** fentanNYL, acetaminophen, dextrose, magnesium hydroxide, potassium chloride, magnesium sulfate, acetaminophen, glucose, dextrose, glucagon (rDNA), dextrose      LABS:     CBC:   Recent Labs     11/24/19  1150 11/25/19  0206 11/26/19  0705   WBC 24.4* 22.4* 19.1*   HGB 11.0* 10.7* 8.0*    282 292     BMP:    Recent Labs     11/25/19  0206 11/25/19  0934 11/25/19  1905 11/26/19  0705   *  --  125* 129*   K 3.3* 3.2* 3.9 3.7   CL 87*  --  87* 92*   CO2 22*  --  23 24   BUN 8  --  9 9   CREATININE 0.8  --  0.9 0.9   GLUCOSE 116*  --  168* 92     Calcium:  Recent Labs     11/26/19  0705   CALCIUM 8.1*     Ionized Calcium:No results for input(s): IONCA in the last 72 hours. Magnesium:  Recent Labs     11/26/19  0705   MG 1.5*     Phosphorus:  Recent Labs     11/24/19  1150   PHOS 3.2     BNP:No results for input(s): BNP in the last 72 hours. Glucose:  Recent Labs     11/25/19  1810 11/25/19  2109 11/26/19  0836   POCGLU 141* 147* 87     HgbA1C: No results for input(s): LABA1C in the last 72 hours. INR:   Recent Labs     11/24/19  1958   INR 1.21*     Hepatic:   Recent Labs     11/24/19  1150   ALKPHOS 65   ALT 22   AST 32   PROT 6.6   BILITOT 0.4   LABALBU 2.6*     Amylase and Lipase:  Recent Labs     11/24/19  1150   LACTA 1.3     Lactic Acid:   Recent Labs     11/24/19  1150   LACTA 1.3     Troponin: No results for input(s): CKTOTAL, CKMB, TROPONINI in the last 72 hours. BNP: No results for input(s): BNP in the last 72 hours. CULTURES:   UA: No results for input(s): SPECGRAV, PHUR, COLORU, CLARITYU, MUCUS, PROTEINU, BLOODU, RBCUA, WBCUA, BACTERIA, NITRU, GLUCOSEU, BILIRUBINUR, UROBILINOGEN, KETUA, LABCAST, LABCASTTY, AMORPHOS in the last 72 hours.     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    NPH (normal pressure hydrocephalus) ([facility]) G91.2    Hyperglycemia R73.9    Sepsis ([facility] [address] A41.9    Acute respiratory failure with hypoxia ([address] J96.01    Atrial fibrillation with RVR ([facility]) I48.91    Encephalopathy G93.40    Diabetic ketoacidosis associated with type 2 diabetes mellitus ([facility] [address] E11.10         ASSESSMENT/PLAN   Epidural and lumbar abscess due to MSSA infection: she had I and D  Confusion with Ventriculites: had a drain placed  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 Since she was clinically much improved after the drain, I was thinking of watching. Continue nafcillin.     Martha Reese MD, FACP 11/26/2019 9:43 AM

## 2019-11-26 NOTE — PLAN OF CARE
Problem: Falls - Risk of:  Goal: Will remain free from falls  Description  Will remain free from falls  11/26/2019 1620 by Nando Panchal RN  Note:   Free from falls this shift. Fall precautions remain in place at this time. Problem: Discharge Planning:  Goal: Discharged to appropriate level of care  Description  Discharged to appropriate level of care  11/26/2019 1620 by Nando Panchal RN  Outcome: Ongoing  Note:   Remains in ICU because of EVD drain      Problem: Bowel Function - Altered:  Goal: Bowel elimination is within specified parameters  Description  Bowel elimination is within specified parameters  11/26/2019 1620 by Nando Panchal RN  Outcome: Ongoing  Note:   Flexiseal in place      Problem: Urinary Elimination:  Goal: Signs and symptoms of infection will decrease  Description  Signs and symptoms of infection will decrease  11/26/2019 1620 by Nando Panchal RN  Outcome: Ongoing  Note:   Bernstein catheter removed. Awaiting for patient to void. Care plan reviewed with patient and . Patient and  verbalize understanding of the plan of care and contribute to goal setting.

## 2019-11-26 NOTE — PROGRESS NOTES
for Klebsiella pneumonia and MSSA. Treated with nafcillin and Cipro. Resolved. 18. Septic shock: Secondary to septicemia and meningitis due to Staphylococcus aureus.  Initial antibiotic selection was high-dose Rocephin, Zosyn, and and vancomycin.  Antibiotics simplified to vancomycin on 11/20/2019 and subsequently nafcillin.  Secondary to MSSA bacteremia.  This is secondary to soft tissue abscess communicating with small epidural abscess. Resolved. .     CC: Lumbar abscess  HPI: Patient is a 55-year-old white female active smoker. Nicole Byrd has a remote history of appendectomy and cholecystectomy. Nicoel Byrd has a history of type 2 diabetes mellitus, hypertension, hyperlipidemia, depression, and a history of brain lipoma diagnosed 11/4/2019. Patient was hospitalized 11/4/2019 through 11/7/2019 for evaluation of possible normal pressure hydrocephalus.  She had symptoms consistent with normal pressure hydrocephalus including progressive gait disturbance, incontinence, and cognitive decline associated with blurred vision and headaches.  She was admitted and had a lumbar drain placed.  Final assessment of efficacy of lumbar drain was documented 11/7/2019 by Dr. Nisha Jay that note he determined there was no significant benefit from the lumbar drain trial.  Findings did not support the diagnosis of normal pressure hydrocephalus.  Patient was determined not to be a good candidate for  shunt and lumbar drain was removed.  Patient was discharged home on 11/7/2019.   Patient was received in transfer from [facility] facility to [facility] on 11/18/2019.  Symptoms prior to hospitalization included a 5-day history of back pain associated with difficulty ambulating, difficulty urinating and diarrhea.  Additionally she had a 2-day history of confusion with hallucinations.  Immediately upon arrival, nursing staff had concern regarding patient's respiratory rate at 44 and heart rate at 160.  She was transferred P: 100: RR: 19: B/P: 128/76: FiO2: RA: O2 Sat: 96: I/O: 5005/3046  CAM-ICU:  GCS 15  General:   Acutely ill appearing white female. HEENT:  normocephalic. Right ventriculostomy tube present.  No scleral icterus. PERR  Neck: Stiff neck.  No Thyromegaly. Lungs: clear to auscultation.  No retractions. Cardiac: Irregular.  No JVD.     Abdomen: soft.  Nontender. Extremities:  No clubbing, cyanosis, or edema x 4.    Vasculature: capillary refill < 3 seconds. Palpable dorsalis pedis pulses. Skin: Pale but warm. Psych:  Awake. Oriented to person, but not time or place or circumstance. Lymph:  No supraclavicular adenopathy. Neurologic:  No seizures. Bilateral lower extremity weakness. Patient barely able to lift legs off the bed.     Data: (All radiographs, tracings, PFTs, and imaging are personally viewed and interpreted unless otherwise noted). · 2/2 blood cultures positive for MSSA. · Cerebrospinal fluid culture positive for MSSA.  Protein greater than 600.  Glucose 9. 15,626 white blood cells. · MRI lumbar and thoracic spine shows lumbar soft tissue abscess with small epidural abscess. · Sputum culture growing MSSA, in addition to Klebsiella pneumonia. · Telemetry shows sinus rhythm. Premature atrial complexes. · Sodium 129, potassium 3.7, chloride 92, bicarb 24, BUN 9, creatinine 0.9, magnesium 1.5, glucose 92. WBC 19.1, hemoglobin 8, platelets 353. · Abscess 11.25/19 growing Staph. 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 · CT head shows right frontal ventriculostomy tube. Mild hydrocephalus. 1.5 cm lipoma of brain. .    Case discussed with Dr. Sudhir Ledbetter, and Dr. Abdulkadir Simons. CC time 35 minutes. Electronically signed by Sandra Marshall M.D.

## 2019-11-26 NOTE — PROGRESS NOTES
EVACUATION AND DEBRIDEMENT L2-L3 INTRAMUSCULAR SPINAL ABSCESS, EVACUATION EPIDURAL ABSCESS L2-L3, PARTIAL LAMINECTOMY L2-L3, EVD PLACEMENT on 11/25/19    Restrictions/Precautions:  Restrictions/Precautions: Fall Risk  Position Activity Restriction  Other position/activity restrictions: Confusion, EVD drain-needs to be clamped prior to mobility & re-leveled by nursing after session, flexiseal    Subjective  Chart Reviewed: Yes, Orders, Progress Notes, History and Physical  Patient assessed for rehabilitation services?: Yes  Family / Caregiver Present: No    Subjective: Pt confused, short attention span, resistant to mobility, agitated at times    Pain:  Pain Assessment  Patient Currently in Pain: Yes(back-unable to give #)    Social/Functional History:  Lives With: Spouse  Type of Home: House  Home Layout: Two level, Bed/Bath upstairs  Home Access: Stairs to enter with rails  Entrance Stairs - Number of Steps: Pt unable to state how many steps to enter  Home Equipment: (none)           ADL Assistance: Independent  Ambulation Assistance: Independent  Transfer Assistance: Independent          Additional Comments: Pt amb without AD, recent lumbar drain trial, no shunt placed; PLOF obtained from PT due to Pt confusion    Cognition/Orientation:     Overall Cognitive Status: Exceptions(short attention span, confusion, oriented to person only) Reoriention provided during session. ADL's:  LE Dressing: Dependent/Total(for don/doffing slipper socks)   Eating: s/u with R elbow proped on pillows to get hand up to tray table.  Extra time required d/t incoordination, fatigue    Functional Mobility:  Bed mobility  Supine to Sit: Dependent/Total(x 2-Pt resistant & agitated with attempts to sit EOB; only able to achieve 3/4 sit EOB)  Sit to Supine: Maximum assistance(x 2)      **extra time & max encouragement required throughout session as well as reorientation & education on bed mobility strategies as well as importance of participation. Balance:   unable to assess    Transfers:   unable to assess       Upper Extremity Assessment:   LUE AROM : (guarded shoulder flexion to aprox 45, distal WFL (moderate edema throughout))  RUE AROM : (guarded shoulder flexion to aprox 45, distal WFL (moderate edema throughout))    LUE Strength  Gross LUE Strength: (3-/5 grossly)  RUE Strength  Gross RUE Strength: (3-/5 grossly)    Sensation  Overall Sensation Status: WFL(appears intact)       Activity Tolerance: Patient limited by fatigue, Patient limited by pain       Assessment:  Assessment: Pt demo decreased cognition, balance, endurance, strength, & AROM for ADLs at [facility]. Continued OT recommended to educate Pt on safety & adaptative strategies for returning to ADLs. Performance deficits / Impairments: Decreased functional mobility , Decreased endurance, Decreased ADL status, Decreased ROM, Decreased strength, Decreased balance, Decreased safe awareness, Decreased cognition  Prognosis: Fair  REQUIRES OT FOLLOW UP: Yes  Decision Making: Medium Complexity  Safety Devices in place: Yes  Type of devices: All fall risk precautions in place, Gait belt, Call light within reach, Bed alarm in place, Nurse notified    Treatment Initiated: Treatment and education initiated within context of evaluation. Evaluation time included review of current medical information, gathering information related to past medical, social and functional history, completion of standardized testing, formal and informal observation of tasks, assessment of data and development of plan of care and goals. Treatment time included skilled education and facilitation of tasks to increase safety and independence with ADL's for improved functional independence and quality of life.     Discharge Recommendations:  Continue to assess pending progress    Patient Education:  OT Education: OT Role, Plan of Care, Precautions, ADL Adaptive Strategies, Transfer Training  Barriers to Learning: decreased cognition    Equipment Recommendations: Other: Monitor pending progress    Plan:  Times per week: 5x  Current Treatment Recommendations: Balance Training, Self-Care / ADL, Functional Mobility Training, Endurance Training, Cognitive Reorientation, Safety Education & Training, ROM, Strengthening    Goals:  Patient goals : Pt did not state  Short term goals  Time Frame for Short term goals: 2 weeks  Short term goal 1: Pt will tolerate sitting EOB 4 min with mod A x 2 for eventual EOB ADLs  Short term goal 2: Pt will tolerate BUE AAROM/AROM for increased AROM BUE for ease of grooming & self feeding  Short term goal 3: Pt will tolerate further assessment of functional t/fs by OTR when appropriate  Long term goals  Time Frame for Long term goals :  No LTG set d/t short ELOS         Following session, patient left in safe position with all fall risk precautions in place.

## 2019-11-26 NOTE — PROGRESS NOTES
Nutrition Assessment    Type and Reason for Visit: Reassess(po check, wounds)    Nutrition Recommendations:   Started Glucerna tid . Consider probiotic. MVI as pt tolerated. Encouraged best effort intake stressing protein choices & glucose control   Nutrition Assessment:    Pt. nutritionally compromised AEB s/p abscess drainage 11/21 in IR, & S/P OR 11/25 & increased needs for wound healing.   At risk for further nutrition compromise r/t admitted with AMS, hyperglycemia, UTI, LP done and Dx with meningitis, sepsis , s/p recent lumbar drain removal, brain lipoma and underlying medical conditions including intubated this admit, now extubated,  back pain, DM, GERD, HTN & tobacco use. Na+ 129, WBC 19.1, Hgb 8. Recommendations above. Malnutrition Assessment:  · Malnutrition Status: At risk for malnutrition  Nutrition Risk Level: High    Nutrient Needs:  · Estimated Daily Total Kcal: ~1788 kcals ( 20 kcals/kg on admit weight of 89.4 kg)  · Estimated Daily Protein (g): ~ grams ( 1.5-1.7 grams protein/kg on IBW of 59 kg)  Monitor renal status  · Estimated Daily Total Fluid (ml/day): per MD    Nutrition Diagnosis:   · Problem: Increased nutrient needs  · Etiology: related to Insufficient energy/nutrient consumption, Increased demand for energy/nutrients     Signs and symptoms:  as evidenced by Presence of wounds    Objective Information:  · Nutrition-Focused Physical Findings: Pt seen , can not recall what she had for breakfast this morning , but thinks OJ.  pt with missing to no teeth, but reports can chew meat fine, Pt agreeable to Glucerna . Pt appears to have some confusion/delay when speaking & answering questions. 300 ml stool recorded 11/25. Per rounds will need longterm antobotics,   · Wound Type: (on admit to include to include left lower back incision, back bruises , left foot bruising, left ankle abrasions.   OR 11/25 for spinal abscess, partial laminectomy & EVD placement)  · Current Nutrition Therapies:  · Oral Diet Orders: (DB CHO control)   · Oral Diet intake: Unable to assess(Pt can not recall for sure what she ate this morning)  · Oral Nutrition Supplement (ONS) Orders: Diabetic Oral Supplement(Glucerna tid)  · ONS intake: (starting)  · Anthropometric Measures:  · Ht: 5' 6\" (167.6 cm)   · Current Body Wt: 214 lb 8.1 oz (97.3 kg)(11/26trace & non pitting  edema )  · Admission Body Wt: 197 lb 1.5 oz (89.4 kg)(11/18 edema not documented )  · Usual Body Wt: 201 lb 4.5 oz (91.3 kg)(11/4/19 bedscale per EMR)  · Ideal Body Wt: 130 lb (59 kg),     · BMI Classification: BMI 30.0 - 34.9 Obese Class I(34.7)    Nutrition Interventions:   Start ONS  Continued Inpatient Monitoring, Education Initiated, Coordination of Care    Nutrition Evaluation:   · Evaluation: Goals set   · Goals: 75% or more of healthy intake during LOS    · Monitoring: Nutrition Progression, Meal Intake, Skin Integrity, Wound Healing, Mental Status/Confusion, Weight, Pertinent Labs, Chewing/Swallowing      Electronically signed by Mary Dickinson RD, LD on 11/26/19 at 12:14 PM    Contact Number: [phone]

## 2019-11-27 ENCOUNTER — APPOINTMENT (OUTPATIENT)
Dept: CT IMAGING | Age: 64
DRG: 856 | End: 2019-11-27
Attending: INTERNAL MEDICINE
Payer: MEDICARE

## 2019-11-27 LAB
ANION GAP SERPL CALCULATED.3IONS-SCNC: 14 MEQ/L (ref 8–16)
BASOPHILS # BLD: 0.1 %
BASOPHILS ABSOLUTE: 0 THOU/MM3 (ref 0–0.1)
BUN BLDV-MCNC: 8 MG/DL (ref 7–22)
CALCIUM SERPL-MCNC: 7.4 MG/DL (ref 8.5–10.5)
CHLORIDE BLD-SCNC: 92 MEQ/L (ref 98–111)
CO2: 21 MEQ/L (ref 23–33)
CREAT SERPL-MCNC: 0.8 MG/DL (ref 0.4–1.2)
EOSINOPHIL # BLD: 1.2 %
EOSINOPHILS ABSOLUTE: 0.2 THOU/MM3 (ref 0–0.4)
ERYTHROCYTE [DISTWIDTH] IN BLOOD BY AUTOMATED COUNT: 16.5 % (ref 11.5–14.5)
ERYTHROCYTE [DISTWIDTH] IN BLOOD BY AUTOMATED COUNT: 51.2 FL (ref 35–45)
GFR SERPL CREATININE-BSD FRML MDRD: 72 ML/MIN/1.73M2
GLUCOSE BLD-MCNC: 117 MG/DL (ref 70–108)
GLUCOSE BLD-MCNC: 123 MG/DL (ref 70–108)
GLUCOSE BLD-MCNC: 205 MG/DL (ref 70–108)
GLUCOSE BLD-MCNC: 82 MG/DL (ref 70–108)
GLUCOSE BLD-MCNC: 91 MG/DL (ref 70–108)
HCT VFR BLD CALC: 24.6 % (ref 37–47)
HEMOGLOBIN: 7.5 GM/DL (ref 12–16)
IMMATURE GRANS (ABS): 0.52 THOU/MM3 (ref 0–0.07)
IMMATURE GRANULOCYTES: 3 %
LYMPHOCYTES # BLD: 14.9 %
LYMPHOCYTES ABSOLUTE: 2.6 THOU/MM3 (ref 1–4.8)
MAGNESIUM: 1.5 MG/DL (ref 1.6–2.4)
MCH RBC QN AUTO: 27 PG (ref 26–33)
MCHC RBC AUTO-ENTMCNC: 30.5 GM/DL (ref 32.2–35.5)
MCV RBC AUTO: 88.5 FL (ref 81–99)
MONOCYTES # BLD: 7.2 %
MONOCYTES ABSOLUTE: 1.3 THOU/MM3 (ref 0.4–1.3)
MRSA SCREEN: NORMAL
NUCLEATED RED BLOOD CELLS: 0 /100 WBC
PLATELET # BLD: 281 THOU/MM3 (ref 130–400)
PMV BLD AUTO: 10.2 FL (ref 9.4–12.4)
POTASSIUM SERPL-SCNC: 2.9 MEQ/L (ref 3.5–5.2)
POTASSIUM SERPL-SCNC: 3 MEQ/L (ref 3.5–5.2)
RBC # BLD: 2.78 MILL/MM3 (ref 4.2–5.4)
SEG NEUTROPHILS: 73.6 %
SEGMENTED NEUTROPHILS ABSOLUTE COUNT: 12.9 THOU/MM3 (ref 1.8–7.7)
SODIUM BLD-SCNC: 126 MEQ/L (ref 135–145)
SODIUM BLD-SCNC: 127 MEQ/L (ref 135–145)
SODIUM BLD-SCNC: 128 MEQ/L (ref 135–145)
WBC # BLD: 17.5 THOU/MM3 (ref 4.8–10.8)

## 2019-11-27 PROCEDURE — 99291 CRITICAL CARE FIRST HOUR: CPT | Performed by: INTERNAL MEDICINE

## 2019-11-27 PROCEDURE — 2580000003 HC RX 258: Performed by: INTERNAL MEDICINE

## 2019-11-27 PROCEDURE — 94760 N-INVAS EAR/PLS OXIMETRY 1: CPT

## 2019-11-27 PROCEDURE — 6360000002 HC RX W HCPCS: Performed by: PHYSICIAN ASSISTANT

## 2019-11-27 PROCEDURE — 36415 COLL VENOUS BLD VENIPUNCTURE: CPT

## 2019-11-27 PROCEDURE — 2580000003 HC RX 258: Performed by: NURSE PRACTITIONER

## 2019-11-27 PROCEDURE — 6370000000 HC RX 637 (ALT 250 FOR IP): Performed by: PHYSICIAN ASSISTANT

## 2019-11-27 PROCEDURE — 84132 ASSAY OF SERUM POTASSIUM: CPT

## 2019-11-27 PROCEDURE — 83735 ASSAY OF MAGNESIUM: CPT

## 2019-11-27 PROCEDURE — 85025 COMPLETE CBC W/AUTO DIFF WBC: CPT

## 2019-11-27 PROCEDURE — 6360000002 HC RX W HCPCS: Performed by: INTERNAL MEDICINE

## 2019-11-27 PROCEDURE — 70450 CT HEAD/BRAIN W/O DYE: CPT

## 2019-11-27 PROCEDURE — 97535 SELF CARE MNGMENT TRAINING: CPT

## 2019-11-27 PROCEDURE — 84295 ASSAY OF SERUM SODIUM: CPT

## 2019-11-27 PROCEDURE — 2000000000 HC ICU R&B

## 2019-11-27 PROCEDURE — 97530 THERAPEUTIC ACTIVITIES: CPT

## 2019-11-27 PROCEDURE — 99233 SBSQ HOSP IP/OBS HIGH 50: CPT | Performed by: NEUROLOGICAL SURGERY

## 2019-11-27 PROCEDURE — 6360000002 HC RX W HCPCS: Performed by: NURSE PRACTITIONER

## 2019-11-27 PROCEDURE — 80048 BASIC METABOLIC PNL TOTAL CA: CPT

## 2019-11-27 PROCEDURE — 82948 REAGENT STRIP/BLOOD GLUCOSE: CPT

## 2019-11-27 PROCEDURE — 2580000003 HC RX 258: Performed by: PHYSICIAN ASSISTANT

## 2019-11-27 PROCEDURE — 6370000000 HC RX 637 (ALT 250 FOR IP): Performed by: INTERNAL MEDICINE

## 2019-11-27 PROCEDURE — 2500000003 HC RX 250 WO HCPCS: Performed by: PHYSICIAN ASSISTANT

## 2019-11-27 PROCEDURE — 2709999900 HC NON-CHARGEABLE SUPPLY

## 2019-11-27 RX ORDER — POTASSIUM CHLORIDE 29.8 MG/ML
20 INJECTION INTRAVENOUS
Status: COMPLETED | OUTPATIENT
Start: 2019-11-27 | End: 2019-11-28

## 2019-11-27 RX ORDER — MAGNESIUM SULFATE IN WATER 40 MG/ML
2 INJECTION, SOLUTION INTRAVENOUS ONCE
Status: COMPLETED | OUTPATIENT
Start: 2019-11-27 | End: 2019-11-27

## 2019-11-27 RX ORDER — FAMOTIDINE 20 MG/1
20 TABLET, FILM COATED ORAL 2 TIMES DAILY
Status: DISCONTINUED | OUTPATIENT
Start: 2019-11-27 | End: 2019-12-10 | Stop reason: HOSPADM

## 2019-11-27 RX ORDER — 3% SODIUM CHLORIDE 3 G/100ML
35 INJECTION, SOLUTION INTRAVENOUS CONTINUOUS
Status: DISPENSED | OUTPATIENT
Start: 2019-11-27 | End: 2019-11-27

## 2019-11-27 RX ORDER — POTASSIUM CHLORIDE 29.8 MG/ML
20 INJECTION INTRAVENOUS
Status: COMPLETED | OUTPATIENT
Start: 2019-11-27 | End: 2019-11-27

## 2019-11-27 RX ORDER — 3% SODIUM CHLORIDE 3 G/100ML
25 INJECTION, SOLUTION INTRAVENOUS CONTINUOUS
Status: DISPENSED | OUTPATIENT
Start: 2019-11-27 | End: 2019-11-28

## 2019-11-27 RX ADMIN — MICONAZOLE NITRATE: 20 POWDER TOPICAL at 07:50

## 2019-11-27 RX ADMIN — SODIUM CHLORIDE 35 ML/HR: 3 INJECTION, SOLUTION INTRAVENOUS at 10:13

## 2019-11-27 RX ADMIN — ACETAMINOPHEN 650 MG: 325 TABLET ORAL at 18:43

## 2019-11-27 RX ADMIN — POTASSIUM & SODIUM PHOSPHATES POWDER PACK 280-160-250 MG 250 MG: 280-160-250 PACK at 18:53

## 2019-11-27 RX ADMIN — MAGNESIUM GLUCONATE 500 MG ORAL TABLET 400 MG: 500 TABLET ORAL at 20:45

## 2019-11-27 RX ADMIN — POTASSIUM CHLORIDE 20 MEQ: 29.8 INJECTION, SOLUTION INTRAVENOUS at 22:52

## 2019-11-27 RX ADMIN — Medication 10 ML: at 09:53

## 2019-11-27 RX ADMIN — MICONAZOLE NITRATE: 20 POWDER TOPICAL at 03:11

## 2019-11-27 RX ADMIN — Medication: at 21:00

## 2019-11-27 RX ADMIN — FAMOTIDINE 20 MG: 20 TABLET ORAL at 20:45

## 2019-11-27 RX ADMIN — SODIUM CHLORIDE 25 ML/HR: 3 INJECTION, SOLUTION INTRAVENOUS at 16:29

## 2019-11-27 RX ADMIN — POTASSIUM CHLORIDE 20 MEQ: 29.8 INJECTION, SOLUTION INTRAVENOUS at 08:37

## 2019-11-27 RX ADMIN — POTASSIUM CHLORIDE 20 MEQ: 29.8 INJECTION, SOLUTION INTRAVENOUS at 23:56

## 2019-11-27 RX ADMIN — NAFCILLIN SODIUM 12 G: 2 INJECTION, POWDER, LYOPHILIZED, FOR SOLUTION INTRAMUSCULAR; INTRAVENOUS at 18:46

## 2019-11-27 RX ADMIN — Medication: at 03:11

## 2019-11-27 RX ADMIN — METOPROLOL TARTRATE 25 MG: 25 TABLET ORAL at 20:45

## 2019-11-27 RX ADMIN — GANCICLOVIR SODIUM 485 MG: 500 INJECTION, POWDER, LYOPHILIZED, FOR SOLUTION INTRAVENOUS at 09:58

## 2019-11-27 RX ADMIN — POTASSIUM CHLORIDE 20 MEQ: 29.8 INJECTION, SOLUTION INTRAVENOUS at 06:44

## 2019-11-27 RX ADMIN — FAMOTIDINE 20 MG: 20 TABLET ORAL at 10:22

## 2019-11-27 RX ADMIN — INSULIN LISPRO 2 UNITS: 100 INJECTION, SOLUTION INTRAVENOUS; SUBCUTANEOUS at 20:45

## 2019-11-27 RX ADMIN — ACETAMINOPHEN 650 MG: 325 TABLET ORAL at 10:30

## 2019-11-27 RX ADMIN — POTASSIUM & SODIUM PHOSPHATES POWDER PACK 280-160-250 MG 250 MG: 280-160-250 PACK at 20:45

## 2019-11-27 RX ADMIN — POTASSIUM CHLORIDE 20 MEQ: 29.8 INJECTION, SOLUTION INTRAVENOUS at 07:25

## 2019-11-27 RX ADMIN — POTASSIUM & SODIUM PHOSPHATES POWDER PACK 280-160-250 MG 250 MG: 280-160-250 PACK at 10:19

## 2019-11-27 RX ADMIN — MICONAZOLE NITRATE: 20 POWDER TOPICAL at 21:00

## 2019-11-27 RX ADMIN — ACETAMINOPHEN 650 MG: 325 TABLET ORAL at 00:44

## 2019-11-27 RX ADMIN — POTASSIUM & SODIUM PHOSPHATES POWDER PACK 280-160-250 MG 250 MG: 280-160-250 PACK at 13:30

## 2019-11-27 RX ADMIN — FENTANYL CITRATE 50 MCG: 50 INJECTION, SOLUTION INTRAMUSCULAR; INTRAVENOUS at 15:18

## 2019-11-27 RX ADMIN — ACETAMINOPHEN 650 MG: 325 TABLET ORAL at 23:23

## 2019-11-27 RX ADMIN — MAGNESIUM SULFATE HEPTAHYDRATE 2 G: 40 INJECTION, SOLUTION INTRAVENOUS at 07:05

## 2019-11-27 RX ADMIN — GANCICLOVIR SODIUM 485 MG: 500 INJECTION, POWDER, LYOPHILIZED, FOR SOLUTION INTRAVENOUS at 20:45

## 2019-11-27 RX ADMIN — METOPROLOL TARTRATE 25 MG: 25 TABLET ORAL at 10:20

## 2019-11-27 ASSESSMENT — PAIN SCALES - GENERAL
PAINLEVEL_OUTOF10: 0
PAINLEVEL_OUTOF10: 8

## 2019-11-27 NOTE — FLOWSHEET NOTE
Ganciclovir listed as chemotherapy drug. [facility] called and notified of order for administration. Leida Salvador and CASSIE Gutierres arrived at bedside. Medication was administered per [facility] RNs (see eMAR). Per [facility] RNs, once administration is compete, medication may be disposed of by ICU staff in designated yellow bin. Yellow bin remains at bedside per policy.

## 2019-11-27 NOTE — PROGRESS NOTES
Neurosurgery Progress Note    Patient:  Poncho Clemons      Unit/Bed:4D-17/017-A    YOB: 1955    MRN: [id number]     Acct: [de-identified]     Admit date: 11/18/2019    No chief complaint on file. Patient Seen, Chart, Physician notes, Labs, Radiology studies reviewed. Subjective: She remains status postoperative day #2 from evacuation and debridement of lumbar 2 lumbar 3 intramuscular spinal abscess along with evacuation of epidural abscess the same level including concurrent partial laminectomy of lumbar 2 lumbar 3 and EVD placement. Continues more alert and interactive on exam today. Past, Family, Social History unchanged from admission. Diet:  DIET CARB CONTROL; Dietary Nutrition Supplements: Diabetic Oral Supplement    Medications:  Scheduled Meds:   famotidine  20 mg Oral BID    magnesium oxide  400 mg Oral Daily    ganciclovir (CYTOVENE) IVPB  5 mg/kg Intravenous 2 times per day    miconazole   Topical BID    sodium chloride flush  10 mL Intravenous 2 times per day    docusate sodium  100 mg Oral BID    nafcillin 12 g continuous infusion  12 g Intravenous Q24H    calcium replacement protocol   Other RX Placeholder    insulin lispro  0-12 Units Subcutaneous TID WC    insulin lispro  0-6 Units Subcutaneous Nightly    metoprolol tartrate  25 mg Oral BID    insulin glargine  35 Units Subcutaneous BID    potassium & sodium phosphates  1 packet Oral 4x Daily    potassium (CARDIAC) replacement protocol   Other RX Placeholder     Continuous Infusions:   sodium chloride 35 mL/hr (11/27/19 1013)    dextrose       PRN Meds:zinc oxide, sodium chloride flush, ondansetron, fentanNYL **OR** fentanNYL, acetaminophen, dextrose, magnesium hydroxide, potassium chloride, magnesium sulfate, acetaminophen, glucose, dextrose, glucagon (rDNA), dextrose    Objective:  She is lying in bed with the head of the bed elevated spine with EVD height requirement for CSF collection.   She is for input(s): CHOL, TRIG, HDL, LDLCALC in the last 72 hours. Invalid input(s): LDL    Radiology reports as per the Radiologist  Radiology: Ct Head Wo Contrast    Result Date: 11/27/2019  PROCEDURE: CT HEAD WO CONTRAST CLINICAL INFORMATION: follow up surgery. COMPARISON: Noncontrast brain CT dated 11/26/2019 TECHNIQUE: Noncontrast 5 mm axial images were obtained through the brain. Sagittal and coronal reconstructions were obtained and reviewed. All CT scans at this facility use dose modulation, iterative reconstruction, and/or weight-based dosing when appropriate to reduce radiation dose to as low as reasonably achievable. FINDINGS: Brain: There is no acute ischemic infarct, hemorrhage, midline shift, or mass effect. There is no change in the 1.5 x 1.1 cm fat density lesion in the interpeduncular cistern to the left of midline. No change in the mild to moderate periventricular hypodensities which may represent small vessel chronic ischemic changes although cannot exclude transependymal flow of CSF. Ventricles/basal cisterns: No change in the position of the right frontal ventriculostomy, tip at the level of the right foramen of [place]. There is stable mild ventriculomegaly. Skull base/calvarium/osseous structures: Right frontal shan hole, otherwise unremarkable. Soft tissues: Unremarkable Intraorbital contents: Unremarkable Sinuses: Unremarkable; the imaged sinuses are clear without evidence of mucosal thickening or fluid levels. Mastoids: Unremarkable; the mastoid air cells are clear. No acute ischemic infarct, hemorrhage, or mass effect. Stable position of the right frontal ventriculostomy, tip at the level of the foramen of [place]. Stable mild ventriculomegaly. Stable appearance of the brain compared to the previous study as detailed above. **This report has been created using voice recognition software. It may contain minor errors which are inherent in voice recognition technology. ** Final report electronically signed by Dr. Del Damon on 11/27/2019 4:54 AM    Ct Head Wo Contrast    Result Date: 11/26/2019  PROCEDURE: CT HEAD WO CONTRAST CLINICAL INFORMATION: hydrocephalus. COMPARISON: Brain MRI dated 11/25/2019 and the brain CT dated 11/24/2019 TECHNIQUE: Noncontrast 5 mm axial images were obtained through the brain. Sagittal and coronal reconstructions were obtained and reviewed. All CT scans at this facility use dose modulation, iterative reconstruction, and/or weight-based dosing when appropriate to reduce radiation dose to as low as reasonably achievable. FINDINGS: Brain: There is no acute ischemic infarct, hemorrhage, midline shift, mass, or mass effect. There is no change in the 1.5 x 1.1 cm fat density lesion in the interpeduncular cistern to the left of midline. This may represent a lipoma or possibly a dermoid  cyst. There are stable mild to moderate deep white matter hypodensities, nonspecific in nature but probably representing small vessel chronic ischemic changes. Ventricles/basal cisterns: There has been interval placement of a right frontal  shunt catheter coursing through the right frontal horn, tip in the region of the right foramen of Jamin Curls. There is stable mild ventriculomegaly. Skull base/calvarium/osseous structures: There is a new right frontal shan hole for placement of a right frontal ventriculostomy. Soft tissues: Unremarkable Intraorbital contents: Unremarkable Sinuses: Unremarkable; the imaged sinuses are clear without evidence of mucosal thickening or fluid levels. Mastoids: Unremarkable; the mastoid air cells are clear. Interval placement of a right frontal ventriculostomy, tip in the region of the right foramen of [place]. Stable mild hydrocephalus. Stable 1.5 x 1.1 cm fat density lesion in the interpeduncular cistern to the left of midline. No acute ischemic infarct, hemorrhage, or mass effect. **This report has been created using voice recognition software.  It may contain minor errors which are inherent in voice recognition technology. ** Final report electronically signed by Dr. Victorino Olivares on 11/26/2019 5:53 AM    Ct Head Wo Contrast    Result Date: 11/24/2019  PROCEDURE: CT HEAD WO CONTRAST CLINICAL INFORMATION: Mental status change TECHNIQUE: CT scan of the head was performed from the vertex to the skull base without use of intravenous contrast. Axial images as well as coronal and sagittal reconstructions were obtained. All CT scans at this facility use dose modulation, iterative reconstruction, and/or weight-based dosing when appropriate to reduce radiation dose to as low as reasonably achievable. COMPARISON: None. FINDINGS: There is no mass effect, midline shift or acute hemorrhage. Ventricles and sulci are prominent. There is diffuse periventricular white matter hypoattenuation. A 1.2 cm hypoattenuating structure in the left midbrain has noncontrast mean Hounsfield units of -71 (image 18). Areas of adjacent hypoattenuation may be secondary to prior infarcts. Visualized orbits, paranasal sinuses and mastoid air cells are unremarkable. 1. No mass effect or acute hemorrhage. 2. Chronic periventricular small vessel ischemic changes and cerebral atrophy. 3. Hypoattenuating structure in the midbrain of indeterminate etiology. Brain MRI is recommended for further evaluation. **This report has been created using voice recognition software. It may contain minor errors which are inherent in voice recognition technology. ** Final report electronically signed by Dr. Weston East on 11/24/2019 1:39 PM    Ct Abscess Drainage W Cath Placement S&i    Result Date: 11/21/2019  CT-GUIDED ABSCESS DRAINAGE PERFORMED BY: JOSETTE Gaytan: Posterior lower lumbar paraspinal abscess APPROACH: Posterior CATHETER: 8 Japanese multipurpose drainage catheter. FLUID OBTAINED: 15 mL purulent tan fluid SEDATION: None. Patient was ventilated on propofol drip.  The patient was CHEST PORTABLE CLINICAL INFORMATION: CVC placement. COMPARISON: November 19, 2019 TECHNIQUE: AP upright view of the chest. FINDINGS: The heart and mediastinum are stable. There is redemonstration of an endotracheal tube and nasogastric tubes in stable locations. Lung volumes are unchanged with mildly coarse markings present. There is redemonstration of a calcified granuloma in the left mid thorax. Central venous catheter from the left subclavian is present within the superior vena cava. There is no visualized pneumothorax. 1. Status post left subclavian central line placement. Tip location the superior vena cava. There is no visualized pneumothorax. 2. Stable appearance of coarse lung markings and slightly decreased lung volumes. **This report has been created using voice recognition software. It may contain minor errors which are inherent in voice recognition technology. ** Final report electronically signed by Dr. Tess Lozano on 11/19/2019 7:14 AM    Xr Chest Portable    Result Date: 11/19/2019  PROCEDURE: XR CHEST PORTABLE CLINICAL INFORMATION: ETT/OGT placement. COMPARISON: No prior study. TECHNIQUE: AP upright view of the chest. FINDINGS: The lung volumes are shallow. There is an endotracheal tube 2.5 cm above the mesfin. A nasogastric tube is in the body of the stomach. There are coarse lung markings likely age-related. There is a small calcified density in the left perihilar region. A granuloma is suspected. Minimal retrocardiac atelectasis or infiltrate cannot be excluded. There is no venous congestion present. A small left basilar effusion or atelectasis blunts the costophrenic angle. The skeleton is negative for active pathology. 1. Visualized endotracheal tube above the mesfin. 2. Nasogastric tube within the stomach. 3. Diminished lung volumes with crowding of markings. Left retrocardiac atelectasis or infiltrate is not excluded. A small left effusion is considered.  **This report has been the periventricular white matter along the posterior body of the right lateral ventricle and possibly a punctate focus at the medial aspect of the right cerebellar hemisphere (image 30 of series 5). Layering debris demonstrating restricted diffusion is present within the trigones of the bilateral lateral ventricles. There is also enhancement of the ventricular margin at these areas. The ventricles appear enlarged and out of proportion to the degree of parenchymal volume loss. There is a lesion along the left side of the interpeduncular fossa T1 and T2 signal. It measures approximately 1.2 x 0.9 x 1.5 cm. It was also noted to be low attenuating on the prior CT. No significant mass effect is identified. The basal cisterns and visualized vascular flow voids are grossly patent. The pituitary, brain stem and cervical medullary junction are grossly within normal limits. The visualized orbits are unremarkable. There is partial fluid opacification of the mastoid air cells on the left which may correspond to a mastoid effusion or mastoiditis in the proper clinical setting. The paranasal sinuses are within normal limits. 1. There are areas of restricted diffusion and hyperintense T2 signal which are most notable at the posterior aspect of the left thalamus as well as within the periventricular white matter along the posterior body of the right lateral ventricle and possibly within the medial right cerebellar hemisphere. These areas are suspicious for acute ischemia. Changes secondary to septic emboli or cerebritis are considered less likely given the lack of enhancement however, this is not completely excluded. 2. Dependently layering material with corresponding restricted diffusion is present within the trigones of the bilateral lateral ventricles. Enhancement of the ventricular margins are also noted in these regions and the findings are suggestive of purulent material and focal ventriculitis.  The ventricles are also enlarged and out of proportion to the degree of parenchymal volume loss which is suggestive of associated hydrocephalus. 3. Mild to moderate patchy foci of hyperintense T2 signal within the periventricular and deep cerebral white matter as well as the elsie likely corresponds to sequela of chronic microvascular ischemic angiopathy. The findings were conveyed to the patient's nurse, Elizabeth Auer over the phone at [phone] hours on 11/25/2019. **This report has been created using voice recognition software. It may contain minor errors which are inherent in voice recognition technology. ** Final report electronically signed by Dr. Nathaniel Araya on 11/25/2019 12:14 PM    Ct Drainage Hematoma/seroma/fluid Collection    Result Date: 11/21/2019  CT-GUIDED ABSCESS DRAINAGE PERFORMED BY: JOSETTE Samayoave: Posterior lower lumbar paraspinal abscess APPROACH: Posterior CATHETER: 8 Palestinian multipurpose drainage catheter. FLUID OBTAINED: 15 mL purulent tan fluid SEDATION: None. Patient was ventilated on propofol drip. The patient was sedated for 30 minutes during this procedure and monitored with EKG and pulse ox monitoring devices by a registered nurse. PROCEDURE: Signed informed consent was obtained prior to performing this procedure. The patient was placed on the CT scanner and sedated, as indicated above. CT images were initially obtained to determine appropriate puncture site. The skin was marked, prepped, and draped in a sterile fashion. Following local anesthesia and utilizing aseptic technique, a needle was successfully passed into the abscess pocket. A small amount of pus was aspirated to confirm appropriate needle position. A guidewire was then passed through the needle followed by insertion of progressively larger dilators up to an 8 [place] size. An 8 [place] multi-side-hole drainage catheter was then passed over the wire and was coiled within the abscess pocket.  The retaining suture was then locked in the standard fashion. Catheter was then hooked to a Domingo-Close drainage bag and the catheter was flushed several times with sterile saline. The catheter was stabilized to the skin with a Percu-Stay device. A sample of the pus was sent to laboratory for culture and sensitivity testing. Status post successful abscess drainage procedure. . **This report has been created using voice recognition software. It may contain minor errors which are inherent in voice recognition technology. ** Final report electronically signed by Dr. Alex Enriquez on 11/21/2019 1:13 PM    A/P: S/P remains status postoperative day #2 following evacuation and drainage of lumbar abscess and concurrent placement of EVD. EVD output total overnight approximated to 10 to 15 cc/h. Neurosurgery agrees with ICU staff and converting measurements from centimeters of water to mmHg. CSF to be sampled for cultures on Friday. Neurosurgery to follow-up    Electronically signed by Royce Antony PA-C on 11/27/2019 at 11:00 AM       Attending Note:     Patient seen and examined in the ICU  in conjunction with neurosurgery PA Royce Antony PA-C). Discussed with patient, her nurse and ICU team as well. All data and imaging reviewed by myself. I agree with examination assessment and plan as documented above. - Post op day 2: Patient underwent EVD placement and an excisional draining of lumbar spine abscesses.   -There is no acute events over the night.  -Patient is more awake and alert today. -Morning every things and following commands by 4.  -Patient CSF study showed signs of possible viral infection.  -today brain CT showed improvement in patient hydrocephalus.  -Please continue the medical management per ICU team.  -Follow-up the ID recommendation.  -Keep the EVD at 15 mmHg   - Transduce the EVD every 2 hours and please document  the ICP.         Carlyle Gottlieb MD

## 2019-11-27 NOTE — PROGRESS NOTES
completed multiple trials during hygiene, linen change; pt was able to slightly assist with task to reach across body and hold onto rail  Rolling to Right: Maximum Assistance, X 2 completed multiple trials during hygiene, linen change, and placing pillow under L hip for pressure relief; pt was able to slightly assist with task to reach across body and hold onto rail  Supine to Sit: Dependent, X 2 pt was able to assist with moving LEs towards EOB and off side of bed, although completely resisting movements when attempting to elevate trunk off bed; attempted X3 trials with rest break in between each attempt  Sit to Supine: Dependent, X 2 pt was able to slightly assist with bringing BLEs back into bed    ADDITIONAL ACTIVITIES:  Pt was able to lift BUEs off bed/against gravity in order for pillows to be placed (improved since yesterday), again screaming in pain with repositioning/lifting of BLEs even. Pt refused any further activities due to pain. ASSESSMENT:     Activity Tolerance:  Patient tolerance of  treatment: poor. Limited by pain. Discharge Recommendations: Continue to assess pending progress  Equipment Recommendations:  Other: Monitor pending progress  Plan: Times per week: 5x  Current Treatment Recommendations: Balance Training, Self-Care / ADL, Functional Mobility Training, Endurance Training, Cognitive Reorientation, Safety Education & Training, ROM, Strengthening    Patient Education  Patient Education: Plan of Care, ADL's and Importance of Increasing Activity    Goals  Short term goals  Time Frame for Short term goals: 2 weeks  Short term goal 1: Pt will tolerate sitting EOB 4 min with mod A x 2 for eventual EOB ADLs  Short term goal 2: Pt will tolerate BUE AAROM/AROM for increased AROM BUE for ease of grooming & self feeding  Short term goal 3: Pt will tolerate further assessment of functional t/fs by OTR when appropriate  Long term goals  Time Frame for Long term goals :  No LTG set d/t short ELOS    Following session, patient left in safe position with all fall risk precautions in place.

## 2019-11-27 NOTE — PROGRESS NOTES
respiratory rate at 44 and heart rate at 160. She was transferred immediately to the intensive care unit. Patient was in overt septic shock and was intubated 11/18/2019. She was started on Zosyn and vancomycin. Cultures were sent. She underwent volume resuscitation and was started on pressors. Rocephin was subsequently added given concerns for possible meningitis. Lumbar puncture was performed and subsequently grew Staphylococcus species. Antibiotics simplified to vancomycin for meningitis. MRI lumbar spine demonstrated soft tissue abscess communicating with small epidural abscess. Lumbar soft tissue drain placed 11/21/19. Culture positive for MSSA. Antibiotics converted to Nafcillin 11/20/19. Rifampin added on 11/22/2019 for synergy. Day #1/3 of rifampin. Sputum culture started to grow gram-negative rods and Cipro was added on 11/22/2019.  s/p EVACUATION AND DEBRIDEMENT L2-L3 INTRAMUSCULAR SPINAL ABSCESS, EVACUATION EPIDURAL ABSCESS L2-L3, PARTIAL LAMINECTOMY L2-L3, EVD PLACEMENT on 11/25/19. Prior Level of Function:  Lives With: Spouse  Type of Home: House  Home Layout: Two level, Bed/Bath upstairs  Home Access: Stairs to enter with rails  Entrance Stairs - Number of Steps: Pt unable to state how many steps to enter  Home Equipment: (none)        ADL Assistance: Independent  Ambulation Assistance: Independent  Transfer Assistance: Independent  Additional Comments: Pt amb without AD, recent lumbar drain trial, no shunt placed; PLOF obtained from PT due to Pt confusion    Restrictions/Precautions:  Restrictions/Precautions: Fall Risk  Position Activity Restriction  Other position/activity restrictions: Confusion, EVD drain-needs to be clamped prior to mobility & re-leveled by nursing after session, flexiseal    SUBJECTIVE: RN approved session, First attempt, pt was eating breakfast.  Second attempt required Max encouragement for participation, Pt would ignore therapists at times.  Pt incont of bm

## 2019-11-27 NOTE — ADT AUTH CERT
Respiratory Failure G - Care Day 8 (11/25/2019) by Lamberto Rizo RN         Review Entered Review Status   11/26/2019 14:37 Completed       Criteria Review      Care Day: 8 Care Date: 11/25/2019 Level of Care:    Guideline Day 3    Level Of Care    ( ) * Activity level acceptable    Clinical Status    ( ) * Ventilation status appropriate    ( ) * Airway status acceptable    ( ) * Respiratory status acceptable    ( ) * Stable chest findings    ( ) * Neurologic status acceptable    ( ) * Temperature status acceptable    ( ) * No infection, or status acceptable    ( ) * General Discharge Criteria met    Interventions    (X) * No chest tube, or status acceptable    ( ) * Intake acceptable    ( ) * No inpatient interventions needed    * Milestone   Additional Notes   11-  - ICU      Date of Procedure: 11/25/2019       Pre-Op Diagnosis: SEPSIS       Post-Op Diagnosis: Same         Procedure(s):   EVACUATION AND DEBRIDEMENT L2-L3 INTRAMUSCULAR SPINAL ABSCESS, EVACUATION EPIDURAL ABSCESS L2-L3, PARTIAL LAMINECTOMY L2-L3, EVD PLACEMENT       Anesthesia: General           Estimated Blood Loss (mL): 100        Respiratory Failure [facility] - Care Day 7 (11/24/2019) by Lamberto Rizo RN         Review Entered Review Status   11/26/2019 14:32 Completed       Criteria Review      Care Day: 7 Care Date: 11/24/2019 Level of Care:    Guideline Day 3    Level Of Care    ( ) * Activity level acceptable    Clinical Status    ( ) * Ventilation status appropriate    ( ) * Airway status acceptable    ( ) * Respiratory status acceptable    ( ) * Stable chest findings    ( ) * Neurologic status acceptable    ( ) * Temperature status acceptable    ( ) * No infection, or status acceptable    ( ) * General Discharge Criteria met    Interventions    (X) * No chest tube, or status acceptable    ( ) * Intake acceptable    ( ) * No inpatient interventions needed    * Milestone   Additional Notes   11- - ICU STEPDOWN       BP (!) 151/71   Pulse 81   Temp 98.6 °F (37 °C) (Oral)   Resp 18   Ht 5' 6\" (1.676 m)   Wt 198 lb (89.8 kg)   SpO2 99%   BMI 31.96 kg/m²       Assessment/Plan:  INTERNAL MEDICINE        1. Septic shock/Septicemia: Secondary to septicemia and meningitis due to Staphylococcus aureus.  Initial antibiotic selection was high-dose Rocephin, Zosyn, and and vancomycin.  Antibiotics simplified to Nafacillin, S/P Rifampin.  Secondary to MSSA bacteremia.  This is secondary to soft tissue abscess communicating with small epidural abscess. 2. Lumbar Abscess:  involving soft tissue and communicating with epidural abscess.  Secondary to MSSA.  Status post lumbar drain placement 11/21/2019.  Day #5 nafcillin.     3. Meningitis: Secondary to MSSA.  On Nafcillin.  RS/P Decadron to improve permeability of antibiotics. 4. Pneumonia: Positive for klebsiella in addition to  MSSA.  Secondary to seeding from bloodstream secondary to soft tissue abscess.  Was present at admission.  On nafcillin as well.with ceftriaxone   5. Atrial fibrillation with rapid ventricular response: New onset. Felt secondary to septic shock.  Converted to sinus dysrhythmia. Transition to beta blocker therapy.  No anticoagulation. 6. Delirium: Secondary to septic shock with meningitis.  will order head CT scan to r/o stroke   7. Type 2 diabetes mellitus: Gap closed.  Transitioned from insulin drip to SQ Lantus. 8. Electrolyte abnormalities: Replaced phosphorus, potassium and magnesium. 9. Back pain: MRI back positive for soft tissue abscess and small epidural abscess. 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   11. Urinary tract infection:  Related to Septicemia.  Resolved. 12. Respiratory alkalosis: Secondary to septic shock.  Resolved. 13. Hypotension: Volume resuscitated.  S/P Pressors.  Screen for adrenal insufficiency is negative.  Resolved. 14. Hyperglycemia: Resolved. 13. Ketoacidosis: Resolved.    16. Acute respiratory failure: extubated , doing well. Lactic acidosis: Secondary to septic shock with poor perfusion.  S/P volume resuscitation and pressors.  resolved         ** ** ** INFECTIOUS DISEASE ** ** **    RECOMMENDATIONS:  The patient is currently on ceftriaxone, nafcillin,   and rifampin.  At this time, we will stop the rocephin_, there is not any   advantage and the role of rifampin in the setting of MSSA is limited; in   fact, it may delay clearance of bacteremia and these studies did not   show any advantage other than in cases where there is a resistant   infection to coag-negative staph and resistant strep, hence I will stop   the rifampin.  We will do a CT scan of the lumbar spine for followup to   see the extent of the fluid collection if this patient would benefit   from a formal surgical drainage of the abscess. The fluid collection was extensive and I am not sure the drain placed will be enough  She will continue nafcillin continous drip.  We will continue to follow the patient.    Addendum: Discussed with neurosurgeon and suggested to do MRI and keep patient NPO for possible surgery         ** ** ** NEUROSURGERY ** ** **    A/P: S/P remains status post admission and subsequent placement of lumbar drain by interventional radiology evacuate of infection.    He is noticeably more somnolent on exam this morning.  Surgery recommends an MRI of the lumbar spine be repeated for review in the morning the patient be maintained n.p.o. deception of IV fluids surgical intervention be deemed necessary.  Lovenox at midnight is additionally recommended with a coagulation profile study ordered.  This case was discussed with infectious disease Ebony Carrera is aware of her current status.  The plan of care was additionally discussed between Dr. Brigid Nicole and the hospitalist assigned to the case regarding a possible reassignment ICU.  Neurosurgery to follow         Infusions Meds   · lactated ringers 75 mL/hr          Scheduled Medications   · 11/20/2019.  Secondary to MSSA bacteremia.  This is secondary to soft tissue abscess communicating with small epidural abscess. 2. Abscess: Affecting lumbar region and involving soft tissue and communicating with epidural abscess.  Secondary to MSSA.  Status post lumbar drain placement 11/21/2019.  Day #4 nafcillin.  Day #2/3 rifampin synergy. 3. Meningitis: Secondary to MSSA.  On Nafcillin.  Rifampin added for improved tissue penetrance and synergy.  S/P Decadron to improve permeability of antibiotics. 4. Septicemia: Secondary to MSSA secondary to soft tissue abscess communicating with small epidural abscess.  Day 3 Nafcillin.  Previously on vancomycin.     5. Pneumonia: Positive for klebsiella in addition to [facility].  Secondary to seeding from bloodstream secondary to soft tissue abscess.  Was present at admission.  On nafcillin as well.with ceftriaxone   6. ** ** ** Acute respiratory failure: extubated yesterday, doing well   7. Atrial fibrillation with rapid ventricular response: New onset. Felt secondary to septic shock.  Converted to sinus dysrhythmia. Transition to beta blocker therapy.  No anticoagulation. 8. Delirium: Secondary to septic shock with meningitis.     9. Lactic acidosis: Secondary to septic shock with poor perfusion.  S/P volume resuscitation and pressors.  resolved   10. Type 2 diabetes mellitus: Gap closed.  Transitioned from insulin drip to SQ Lantus. 11. Electrolyte abnormalities: Replaced phosphorus, potassium and magnesium. 12. Back pain: MRI back positive for soft tissue abscess and small epidural abscess. 13. 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   14. Urinary tract infection:  Related to Septicemia.  Resolved. 15. Respiratory alkalosis: Secondary to septic shock.  Resolved. 16. Hypotension: Volume resuscitated.  S/P Pressors.  Screen for adrenal insufficiency is negative.  Resolved. 17. Hyperglycemia: Resolved. 25. Ketoacidosis: Resolved. .          Infusions Meds   · MRI lumbar and thoracic spine pending. · Sodium 135, potassium 3.1, chloride 97, bicarb 22, BUN 14, creatinine 0.7, glucose 223. WBC 27.4, Hgb 10, Plt 321. .                  1. Septic shock: Secondary to septicemia and meningitis due to Staphylococcus aureus.  Initial antibiotic selection was high-dose Rocephin, Zosyn, and and vancomycin.  Antibiotics simplified to vancomycin on 11/20/2019.  Secondary to MSSA bacteremia.  This is secondary to soft tissue abscess communicating with small epidural abscess. 2. Abscess: Affecting lumbar region and involving soft tissue and communicating with epidural abscess.  Secondary to MSSA.  Status post lumbar drain placement 11/21/2019.  Day #2 nafcillin. .   3. Meningitis: Secondary to MSSA.  On Nafcillin.  On Decadron to improve permeability of antibiotics. 4. Septicemia: Secondary to MSSA secondary to soft tissue abscess communicating with small epidural abscess.  Day 2 Nafcillin.  Previously on vancomycin. 5. Acute respiratory failure: Patient intubated 11/18/2019 for impending respiratory arrest.  Undergoing lung protection strategies targeting a peak pressure of 35 or less and a plateau pressure of 30 or less.  On driving pressure 12.  Hope to extubate tomorrow. 6. Atrial fibrillation with rapid ventricular response: New onset. Felt secondary to septic shock.  Converted to sinus dysrhythmia. Transition to beta blocker therapy.  No anticoagulation. 7. Delirium: Secondary to septic shock with meningitis.     8. Lactic acidosis: Secondary to septic shock with poor perfusion.  S/P volume resuscitation and pressors. Remains elevated.  Assessing MRI back and spine. 9. Type 2 diabetes mellitus: Gap closed.  Transitioned from insulin drip to SQ Lantus. 10. Electrolyte abnormalities: Replaced phosphorus, potassium and magnesium. 11. Back pain: MRI back positive for soft tissue abscess and small epidural abscess.    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   13. Urinary

## 2019-11-27 NOTE — PROGRESS NOTES
11/22/19. Resolved. 17. Pneumonia: Was present upon admission.  Positive for Klebsiella pneumonia and MSSA.  Treated with nafcillin and Cipro.  Resolved. 18. Septic shock: Secondary to septicemia and meningitis due to Staphylococcus aureus.  Initial antibiotic selection was high-dose Rocephin, Zosyn, and and vancomycin.  Antibiotics simplified to vancomycin on 11/20/2019 and subsequently nafcillin.  Secondary to MSSA bacteremia.  This is secondary to soft tissue abscess communicating with small epidural abscess.  Resolved. .     CC: Lumbar abscess  HPI: Patient is a 28-year-old white female active smoker. Babs Hahn has a remote history of appendectomy and cholecystectomy. Babs Hahn has a history of type 2 diabetes mellitus, hypertension, hyperlipidemia, depression, and a history of brain lipoma diagnosed 11/4/2019. Patient was hospitalized 11/4/2019 through 11/7/2019 for evaluation of possible normal pressure hydrocephalus.  She had symptoms consistent with normal pressure hydrocephalus including progressive gait disturbance, incontinence, and cognitive decline associated with blurred vision and headaches.  She was admitted and had a lumbar drain placed.  Final assessment of efficacy of lumbar drain was documented 11/7/2019 by Dr. Rosalee Schwarz that note he determined there was no significant benefit from the lumbar drain trial.  Findings did not support the diagnosis of normal pressure hydrocephalus.  Patient was determined not to be a good candidate for  shunt and lumbar drain was removed.  Patient was discharged home on 11/7/2019.   Patient was received in transfer from [facility] facility to [facility] on 11/18/2019.  Symptoms prior to hospitalization included a 5-day history of back pain associated with difficulty ambulating, difficulty urinating and diarrhea.  Additionally she had a 2-day history of confusion with hallucinations.  Immediately upon arrival, nursing staff had concern regarding patient's

## 2019-11-28 ENCOUNTER — APPOINTMENT (OUTPATIENT)
Dept: GENERAL RADIOLOGY | Age: 64
DRG: 856 | End: 2019-11-28
Attending: INTERNAL MEDICINE
Payer: MEDICARE

## 2019-11-28 LAB
ABO: NORMAL
ANION GAP SERPL CALCULATED.3IONS-SCNC: 13 MEQ/L (ref 8–16)
ANTIBODY SCREEN: NORMAL
BASOPHILIA: ABNORMAL
BASOPHILS # BLD: 0.1 %
BASOPHILS ABSOLUTE: 0 THOU/MM3 (ref 0–0.1)
BUN BLDV-MCNC: 8 MG/DL (ref 7–22)
CALCIUM SERPL-MCNC: 7.3 MG/DL (ref 8.5–10.5)
CHLORIDE BLD-SCNC: 96 MEQ/L (ref 98–111)
CO2: 22 MEQ/L (ref 23–33)
CREAT SERPL-MCNC: 0.9 MG/DL (ref 0.4–1.2)
CREATININE URINE: 53.3 MG/DL
DIFFERENTIAL TYPE: ABNORMAL
EOSINOPHIL # BLD: 1.6 %
EOSINOPHILS ABSOLUTE: 0.3 THOU/MM3 (ref 0–0.4)
ERYTHROCYTE [DISTWIDTH] IN BLOOD BY AUTOMATED COUNT: 16.9 % (ref 11.5–14.5)
ERYTHROCYTE [DISTWIDTH] IN BLOOD BY AUTOMATED COUNT: 51 FL (ref 35–45)
GFR SERPL CREATININE-BSD FRML MDRD: 63 ML/MIN/1.73M2
GLUCOSE BLD-MCNC: 102 MG/DL (ref 70–108)
GLUCOSE BLD-MCNC: 112 MG/DL (ref 70–108)
GLUCOSE BLD-MCNC: 129 MG/DL (ref 70–108)
GLUCOSE BLD-MCNC: 137 MG/DL (ref 70–108)
GLUCOSE BLD-MCNC: 191 MG/DL (ref 70–108)
HCT VFR BLD CALC: 22.1 % (ref 37–47)
HCT VFR BLD CALC: 30.6 % (ref 37–47)
HEMOGLOBIN: 6.9 GM/DL (ref 12–16)
HEMOGLOBIN: 9.7 GM/DL (ref 12–16)
IMMATURE GRANS (ABS): 0.36 THOU/MM3 (ref 0–0.07)
IMMATURE GRANULOCYTES: 2.2 %
LYMPHOCYTES # BLD: 17.2 %
LYMPHOCYTES ABSOLUTE: 2.8 THOU/MM3 (ref 1–4.8)
MAGNESIUM: 1.4 MG/DL (ref 1.6–2.4)
MAGNESIUM: 1.8 MG/DL (ref 1.6–2.4)
MCH RBC QN AUTO: 26.8 PG (ref 26–33)
MCHC RBC AUTO-ENTMCNC: 31.2 GM/DL (ref 32.2–35.5)
MCV RBC AUTO: 86 FL (ref 81–99)
MONOCYTES # BLD: 6.1 %
MONOCYTES ABSOLUTE: 1 THOU/MM3 (ref 0.4–1.3)
NUCLEATED RED BLOOD CELLS: 0 /100 WBC
OSMOLALITY URINE: 358 MOSMOL/KG (ref 250–750)
PATHOLOGIST REVIEW: ABNORMAL
PLATELET # BLD: 286 THOU/MM3 (ref 130–400)
PLATELET ESTIMATE: ADEQUATE
PMV BLD AUTO: 10.1 FL (ref 9.4–12.4)
POTASSIUM SERPL-SCNC: 3.2 MEQ/L (ref 3.5–5.2)
POTASSIUM SERPL-SCNC: 3.7 MEQ/L (ref 3.5–5.2)
RBC # BLD: 2.57 MILL/MM3 (ref 4.2–5.4)
RH FACTOR: NORMAL
SCAN OF BLOOD SMEAR: NORMAL
SEG NEUTROPHILS: 72.8 %
SEGMENTED NEUTROPHILS ABSOLUTE COUNT: 11.7 THOU/MM3 (ref 1.8–7.7)
SODIUM BLD-SCNC: 125 MEQ/L (ref 135–145)
SODIUM BLD-SCNC: 127 MEQ/L (ref 135–145)
SODIUM BLD-SCNC: 130 MEQ/L (ref 135–145)
SODIUM BLD-SCNC: 130 MEQ/L (ref 135–145)
SODIUM BLD-SCNC: 131 MEQ/L (ref 135–145)
SODIUM URINE: 60 MEQ/L
WBC # BLD: 16.1 THOU/MM3 (ref 4.8–10.8)

## 2019-11-28 PROCEDURE — 2580000003 HC RX 258: Performed by: PHYSICIAN ASSISTANT

## 2019-11-28 PROCEDURE — 2000000000 HC ICU R&B

## 2019-11-28 PROCEDURE — 84300 ASSAY OF URINE SODIUM: CPT

## 2019-11-28 PROCEDURE — 86901 BLOOD TYPING SEROLOGIC RH(D): CPT

## 2019-11-28 PROCEDURE — 2580000003 HC RX 258: Performed by: NURSE PRACTITIONER

## 2019-11-28 PROCEDURE — 6370000000 HC RX 637 (ALT 250 FOR IP): Performed by: INTERNAL MEDICINE

## 2019-11-28 PROCEDURE — 2580000003 HC RX 258: Performed by: INTERNAL MEDICINE

## 2019-11-28 PROCEDURE — 2500000003 HC RX 250 WO HCPCS: Performed by: PHYSICIAN ASSISTANT

## 2019-11-28 PROCEDURE — 6370000000 HC RX 637 (ALT 250 FOR IP): Performed by: PHYSICIAN ASSISTANT

## 2019-11-28 PROCEDURE — 80048 BASIC METABOLIC PNL TOTAL CA: CPT

## 2019-11-28 PROCEDURE — 86900 BLOOD TYPING SEROLOGIC ABO: CPT

## 2019-11-28 PROCEDURE — 82570 ASSAY OF URINE CREATININE: CPT

## 2019-11-28 PROCEDURE — 84132 ASSAY OF SERUM POTASSIUM: CPT

## 2019-11-28 PROCEDURE — 6360000002 HC RX W HCPCS: Performed by: INTERNAL MEDICINE

## 2019-11-28 PROCEDURE — 87040 BLOOD CULTURE FOR BACTERIA: CPT

## 2019-11-28 PROCEDURE — 6360000002 HC RX W HCPCS: Performed by: PHYSICIAN ASSISTANT

## 2019-11-28 PROCEDURE — 85025 COMPLETE CBC W/AUTO DIFF WBC: CPT

## 2019-11-28 PROCEDURE — 36592 COLLECT BLOOD FROM PICC: CPT

## 2019-11-28 PROCEDURE — 71045 X-RAY EXAM CHEST 1 VIEW: CPT

## 2019-11-28 PROCEDURE — P9016 RBC LEUKOCYTES REDUCED: HCPCS

## 2019-11-28 PROCEDURE — 83935 ASSAY OF URINE OSMOLALITY: CPT

## 2019-11-28 PROCEDURE — 85018 HEMOGLOBIN: CPT

## 2019-11-28 PROCEDURE — 2709999900 HC NON-CHARGEABLE SUPPLY

## 2019-11-28 PROCEDURE — 86850 RBC ANTIBODY SCREEN: CPT

## 2019-11-28 PROCEDURE — 36415 COLL VENOUS BLD VENIPUNCTURE: CPT

## 2019-11-28 PROCEDURE — 83735 ASSAY OF MAGNESIUM: CPT

## 2019-11-28 PROCEDURE — 99999 PR OFFICE/OUTPT VISIT,PROCEDURE ONLY: CPT | Performed by: NEUROLOGICAL SURGERY

## 2019-11-28 PROCEDURE — 85014 HEMATOCRIT: CPT

## 2019-11-28 PROCEDURE — 99291 CRITICAL CARE FIRST HOUR: CPT | Performed by: INTERNAL MEDICINE

## 2019-11-28 PROCEDURE — 36430 TRANSFUSION BLD/BLD COMPNT: CPT

## 2019-11-28 PROCEDURE — 86923 COMPATIBILITY TEST ELECTRIC: CPT

## 2019-11-28 PROCEDURE — 82948 REAGENT STRIP/BLOOD GLUCOSE: CPT

## 2019-11-28 PROCEDURE — 94760 N-INVAS EAR/PLS OXIMETRY 1: CPT

## 2019-11-28 PROCEDURE — 84295 ASSAY OF SERUM SODIUM: CPT

## 2019-11-28 RX ORDER — MAGNESIUM SULFATE IN WATER 40 MG/ML
2 INJECTION, SOLUTION INTRAVENOUS ONCE
Status: COMPLETED | OUTPATIENT
Start: 2019-11-28 | End: 2019-11-28

## 2019-11-28 RX ORDER — 0.9 % SODIUM CHLORIDE 0.9 %
250 INTRAVENOUS SOLUTION INTRAVENOUS ONCE
Status: COMPLETED | OUTPATIENT
Start: 2019-11-28 | End: 2019-11-28

## 2019-11-28 RX ORDER — 3% SODIUM CHLORIDE 3 G/100ML
20 INJECTION, SOLUTION INTRAVENOUS CONTINUOUS
Status: DISCONTINUED | OUTPATIENT
Start: 2019-11-28 | End: 2019-11-29

## 2019-11-28 RX ORDER — POTASSIUM CHLORIDE 29.8 MG/ML
20 INJECTION INTRAVENOUS
Status: COMPLETED | OUTPATIENT
Start: 2019-11-28 | End: 2019-11-28

## 2019-11-28 RX ORDER — POTASSIUM CHLORIDE 20 MEQ/1
40 TABLET, EXTENDED RELEASE ORAL 2 TIMES DAILY WITH MEALS
Status: DISCONTINUED | OUTPATIENT
Start: 2019-11-28 | End: 2019-12-10 | Stop reason: HOSPADM

## 2019-11-28 RX ADMIN — MAGNESIUM GLUCONATE 500 MG ORAL TABLET 400 MG: 500 TABLET ORAL at 09:29

## 2019-11-28 RX ADMIN — METOPROLOL TARTRATE 25 MG: 25 TABLET ORAL at 21:11

## 2019-11-28 RX ADMIN — METOPROLOL TARTRATE 25 MG: 25 TABLET ORAL at 09:29

## 2019-11-28 RX ADMIN — MICONAZOLE NITRATE: 20 POWDER TOPICAL at 08:00

## 2019-11-28 RX ADMIN — FAMOTIDINE 20 MG: 20 TABLET ORAL at 21:10

## 2019-11-28 RX ADMIN — Medication 10 ML: at 21:10

## 2019-11-28 RX ADMIN — POTASSIUM CHLORIDE 20 MEQ: 400 INJECTION, SOLUTION INTRAVENOUS at 06:14

## 2019-11-28 RX ADMIN — VANCOMYCIN HYDROCHLORIDE 1250 MG: 5 INJECTION, POWDER, LYOPHILIZED, FOR SOLUTION INTRAVENOUS at 21:24

## 2019-11-28 RX ADMIN — ACETAMINOPHEN 650 MG: 325 TABLET ORAL at 23:24

## 2019-11-28 RX ADMIN — SODIUM CHLORIDE 20 ML/HR: 3 INJECTION, SOLUTION INTRAVENOUS at 17:15

## 2019-11-28 RX ADMIN — SODIUM CHLORIDE 25 ML/HR: 3 INJECTION, SOLUTION INTRAVENOUS at 03:07

## 2019-11-28 RX ADMIN — GANCICLOVIR SODIUM 485 MG: 500 INJECTION, POWDER, LYOPHILIZED, FOR SOLUTION INTRAVENOUS at 21:24

## 2019-11-28 RX ADMIN — ACETAMINOPHEN 650 MG: 325 TABLET ORAL at 18:49

## 2019-11-28 RX ADMIN — POTASSIUM CHLORIDE 20 MEQ: 400 INJECTION, SOLUTION INTRAVENOUS at 07:10

## 2019-11-28 RX ADMIN — Medication: at 12:00

## 2019-11-28 RX ADMIN — POTASSIUM & SODIUM PHOSPHATES POWDER PACK 280-160-250 MG 250 MG: 280-160-250 PACK at 16:54

## 2019-11-28 RX ADMIN — Medication 10 ML: at 08:00

## 2019-11-28 RX ADMIN — ACETAMINOPHEN 650 MG: 325 TABLET ORAL at 04:20

## 2019-11-28 RX ADMIN — NAFCILLIN SODIUM 12 G: 2 INJECTION, POWDER, LYOPHILIZED, FOR SOLUTION INTRAMUSCULAR; INTRAVENOUS at 12:45

## 2019-11-28 RX ADMIN — FAMOTIDINE 20 MG: 20 TABLET ORAL at 09:29

## 2019-11-28 RX ADMIN — MICONAZOLE NITRATE: 20 POWDER TOPICAL at 21:10

## 2019-11-28 RX ADMIN — POTASSIUM CHLORIDE 40 MEQ: 20 TABLET, EXTENDED RELEASE ORAL at 09:29

## 2019-11-28 RX ADMIN — ACETAMINOPHEN 650 MG: 325 TABLET ORAL at 12:42

## 2019-11-28 RX ADMIN — POTASSIUM CHLORIDE 20 MEQ: 29.8 INJECTION, SOLUTION INTRAVENOUS at 01:05

## 2019-11-28 RX ADMIN — POTASSIUM & SODIUM PHOSPHATES POWDER PACK 280-160-250 MG 250 MG: 280-160-250 PACK at 09:29

## 2019-11-28 RX ADMIN — Medication: at 21:10

## 2019-11-28 RX ADMIN — MAGNESIUM SULFATE HEPTAHYDRATE 2 G: 40 INJECTION, SOLUTION INTRAVENOUS at 06:13

## 2019-11-28 RX ADMIN — POTASSIUM & SODIUM PHOSPHATES POWDER PACK 280-160-250 MG 250 MG: 280-160-250 PACK at 21:11

## 2019-11-28 RX ADMIN — POTASSIUM CHLORIDE 40 MEQ: 20 TABLET, EXTENDED RELEASE ORAL at 18:50

## 2019-11-28 RX ADMIN — Medication: at 08:00

## 2019-11-28 RX ADMIN — POTASSIUM CHLORIDE 20 MEQ: 400 INJECTION, SOLUTION INTRAVENOUS at 09:31

## 2019-11-28 RX ADMIN — GANCICLOVIR SODIUM 485 MG: 500 INJECTION, POWDER, LYOPHILIZED, FOR SOLUTION INTRAVENOUS at 09:38

## 2019-11-28 RX ADMIN — SODIUM CHLORIDE 250 ML: 9 INJECTION, SOLUTION INTRAVENOUS at 06:13

## 2019-11-28 ASSESSMENT — PAIN DESCRIPTION - PAIN TYPE: TYPE: ACUTE PAIN

## 2019-11-28 ASSESSMENT — PAIN SCALES - GENERAL
PAINLEVEL_OUTOF10: 0
PAINLEVEL_OUTOF10: 0
PAINLEVEL_OUTOF10: 4
PAINLEVEL_OUTOF10: 0

## 2019-11-28 ASSESSMENT — PAIN DESCRIPTION - ONSET: ONSET: GRADUAL

## 2019-11-28 ASSESSMENT — PAIN DESCRIPTION - FREQUENCY: FREQUENCY: INTERMITTENT

## 2019-11-28 ASSESSMENT — PAIN - FUNCTIONAL ASSESSMENT: PAIN_FUNCTIONAL_ASSESSMENT: ACTIVITIES ARE NOT PREVENTED

## 2019-11-28 ASSESSMENT — PAIN DESCRIPTION - PROGRESSION: CLINICAL_PROGRESSION: GRADUALLY WORSENING

## 2019-11-28 ASSESSMENT — PAIN DESCRIPTION - DESCRIPTORS: DESCRIPTORS: ACHING

## 2019-11-28 ASSESSMENT — PAIN DESCRIPTION - ORIENTATION: ORIENTATION: LOWER

## 2019-11-28 ASSESSMENT — PAIN DESCRIPTION - LOCATION: LOCATION: BACK

## 2019-11-28 NOTE — PROGRESS NOTES
Assessment and Plan:        1. Abscess: Soft tissue and communicating with epidural abscess.  Secondary to MSSA.  Status post lumbar drain placement 11/21/2019 which was ineffectual.  Day #9 nafcillin.    Surgical drainage completed 11/25/2019.  2. Meningitis: Secondary to MSSA.  On Nafcillin. Ventriculostomy tube placed 11/25/2019. Continue with Nafcillin. PCR positive for HHV-6. Ganciclovir was added. Unclear if this is active disease. Repeat PCR and culture ordered for Friday. 3. Septicemia: Secondary to above.  Day 8 Nafcillin.  Previously on vancomycin.      4. Anemia: no clear source of bleed. Received 1 unit of PRBCs. Monitor Hb.  5. Atrial fibrillation with rapid ventricular response: New onset. Felt secondary to septic shock.  Converted to sinus. Transitioned to beta blocker therapy.  No anticoagulation. 6. Hyponatremia: Send urine Osmolarity, urine Na and Cr. Will decide about restarting 3% saline vs normal saline after the results. Na increased within acceptable range from yesterday. No more than 8 within 24 hours. Continue to monitor Na level. 7. Delirium: Secondary to septic shock with meningitis. Mentation improved post ventriculostomy tube.     8. Type 2 diabetes mellitus:  SQ Lantus. 9. Electrolyte abnormalities: Replaced   10. Back pain: MRI back positive for soft tissue abscess and small epidural abscess. 11. 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  12. Urinary tract infection:  Related to Septicemia.  Resolved. 13. Respiratory alkalosis: Secondary to septic shock.  Resolved. 14. Lactic acidosis: Secondary to septic shock with poor perfusion.  S/P volume resuscitation and pressors.  Resolved. 15. Acute respiratory failure: Patient intubated 11/18/2019 for impending respiratory arrest. Patient extubated 11/22/19. Resolved. 16. Pneumonia: Was present upon admission.  Positive for Klebsiella pneumonia and MSSA.  Treated with nafcillin and Cipro.  Resolved.   17. Septic shock: Secondary to septicemia and meningitis due to MSSA.  Initial antibiotic selection was high-dose Rocephin, Zosyn, and and vancomycin.  Antibiotics simplified to vancomycin on 11/20/2019 and subsequently nafcillin.  Secondary to MSSA bacteremia.  This is secondary to soft tissue abscess communicating with small epidural abscess.  Resolved. Discussed with Dr. Jonelle Rios is a 51-year-old white female active smoker. Shantel Matthews has a remote history of appendectomy and cholecystectomy. Shantel Matthews has a history of type 2 diabetes mellitus, hypertension, hyperlipidemia, depression, and a history of brain lipoma diagnosed 11/4/2019. Patient was hospitalized 11/4/2019 through 11/7/2019 for evaluation of possible normal pressure hydrocephalus.  She had symptoms consistent with normal pressure hydrocephalus including progressive gait disturbance, incontinence, and cognitive decline associated with blurred vision and headaches.  She was admitted and had a lumbar drain placed.  Final assessment of efficacy of lumbar drain was documented 11/7/2019 by Dr. Dorina Boucher that note he determined there was no significant benefit from the lumbar drain trial.  Findings did not support the diagnosis of normal pressure hydrocephalus.  Patient was determined not to be a good candidate for  shunt and lumbar drain was removed.  Patient was discharged home on 11/7/2019.   Patient was received in transfer from [facility] facility to [facility] on 11/18/2019.  Symptoms prior to hospitalization included a 5-day history of back pain associated with difficulty ambulating, difficulty urinating and diarrhea.  Additionally she had a 2-day history of confusion with hallucinations.  Immediately upon arrival, nursing staff had concern regarding patient's respiratory rate at 44 and heart rate at 160.  She was transferred immediately to the intensive care unit.  Patient was in overt septic shock and was intubated 11/18/2019.  She was Vital Signs:   T: 100.8: P: 83: RR: 20: B/P: 107/52: FiO2: RA: O2 Sat: 99: I/O: 1990/2312  CAM-ICU:  GCS 14.  General:  Ill appearing white female. HEENT:  normocephalic. Right ventriculostomy tube present.  No scleral icterus. PERR  Neck: Stiff neck.  No Thyromegaly. Lungs: clear to auscultation.  No retractions. Cardiac: RRR.  No JVD.     Abdomen: soft.  Nontender. Extremities:  No clubbing, cyanosis, or edema x 4.    Vasculature: capillary refill < 3 seconds. Palpable dorsalis pedis pulses. Skin: Pale but warm. Psych:  Awake.  Oriented to person, and circumstance, but not time or place. Lymph:  No supraclavicular adenopathy. Neurologic:  No seizures.  Bilateral lower extremity weakness. Patient barely able to lift legs off the bed.     Data: (All radiographs, tracings, PFTs, and imaging are personally viewed and interpreted unless otherwise noted). · 2/2 blood cultures positive for MSSA. · Cerebrospinal fluid culture positive for MSSA.  Protein greater than 600. Glucose 9. 15,626 white blood cells. · MRI lumbar and thoracic spine shows lumbar soft tissue abscess with small epidural abscess. · Sputum culture growing MSSA, in addition to Klebsiella pneumonia. · Telemetry shows sinus rhythm.  Premature atrial complexes. · Abscess 11.25/19 growing Staph. 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 · CT head shows right frontal ventriculostomy tube. Mild hydrocephalus. 1.5 cm lipoma of brain. · Sodium 127, potassium 2.9, chloride 92, bicarb 21, BUN 8, creatinine 0.8, glucose 91. Magnesium 1.5. White blood cell count 17.5, hemoglobin 7.5, platelets 243.     CC: 35 mins  . Caro Belcher

## 2019-11-28 NOTE — PROGRESS NOTES
Neurosurgery Progress Note    Patient:  Cruzito Vail      Unit/Bed:4D-17/017-A    YOB: 1955    MRN: [id number]     Acct: [de-identified]     Admit date: 11/18/2019    No chief complaint on file. Patient Seen, Chart, Physician notes, Labs, Radiology studies reviewed. Subjective: She remains status postoperative day #3 from evacuation and debridement of lumbar 2 lumbar 3 intramuscular spinal abscess along with evacuation of epidural abscess the same level including concurrent partial laminectomy of lumbar 2 lumbar 3 and EVD placement.  Continues alert and interactive on exam today. Past, Family, Social History unchanged from admission. Diet:  DIET CARB CONTROL; Dietary Nutrition Supplements: Diabetic Oral Supplement    Medications:  Scheduled Meds:   potassium chloride  40 mEq Oral BID WC    famotidine  20 mg Oral BID    magnesium oxide  400 mg Oral Daily    ganciclovir (CYTOVENE) IVPB  5 mg/kg Intravenous 2 times per day    miconazole   Topical BID    sodium chloride flush  10 mL Intravenous 2 times per day    docusate sodium  100 mg Oral BID    nafcillin 12 g continuous infusion  12 g Intravenous Q24H    calcium replacement protocol   Other RX Placeholder    insulin lispro  0-12 Units Subcutaneous TID WC    insulin lispro  0-6 Units Subcutaneous Nightly    metoprolol tartrate  25 mg Oral BID    potassium & sodium phosphates  1 packet Oral 4x Daily    potassium (CARDIAC) replacement protocol   Other RX Placeholder     Continuous Infusions:   [Held by provider] sodium chloride Stopped (11/28/19 0505)    dextrose       PRN Meds:zinc oxide, sodium chloride flush, ondansetron, fentanNYL **OR** fentanNYL, acetaminophen, dextrose, magnesium hydroxide, potassium chloride, magnesium sulfate, acetaminophen, glucose, dextrose, glucagon (rDNA), dextrose    Objective: She is resting comfortably with the head of the bed elevated with EVD height requirement for CSF collection. Tolerating this without specific complaint. ICPs are averaging between 3 and 6 indicating low pressure hydrocephalus. Lumbar Hemovac drains are intact and functioning with trace amount collected per shift and will be removed at bedside this morning. Dressings are otherwise dry and intact. Vitals: BP (!) 140/69   Pulse 87   Temp 99.9 °F (37.7 °C) (Bladder)   Resp 20   Ht 5' 6\" (1.676 m)   Wt 215 lb 9.8 oz (97.8 kg)   SpO2 98%   BMI 34.80 kg/m²   Physical Exam:  Alert and attentive. Language appropriate, with no aphasia. Pupils equal.  Facial strength symmetric. Physical Exam   Acute changes recorded in the patient's chart overnight no significant changes to physical exam.  She remains animated with purposeful movement demonstrated for all extremities she is alert and does obey simple commands. ROS:  Review of Systems   A comprehensive review of systems is unobtainable due to patient's status. She does however, deny headache on exam      24 hour intake/output:    Intake/Output Summary (Last 24 hours) at 11/28/2019 1113  Last data filed at 11/28/2019 1100  Gross per 24 hour   Intake 3707 ml   Output 2493 ml   Net 1214 ml     Last 3 weights: Wt Readings from Last 3 Encounters:   11/28/19 215 lb 9.8 oz (97.8 kg)   11/07/19 201 lb 4.8 oz (91.3 kg)   09/18/19 220 lb 6.4 oz (100 kg)         CBC:   Recent Labs     11/26/19  0705 11/27/19  0324 11/28/19  0424 11/28/19  1000   WBC 19.1* 17.5* 16.1*  --    HGB 8.0* 7.5* 6.9* 9.7*    281 286  --      BMP:    Recent Labs     11/26/19  0705 11/27/19  0324  11/27/19  1941 11/28/19  0000 11/28/19  0424 11/28/19  1000   * 127*   < > 126* 130* 131* 130*   K 3.7 2.9*  --  3.0*  --  3.2*  --    CL 92* 92*  --   --   --  96*  --    CO2 24 21*  --   --   --  22*  --    BUN 9 8  --   --   --  8  --    CREATININE 0.9 0.8  --   --   --  0.9  --    GLUCOSE 92 91  --   --   --  102  --     < > = values in this interval not displayed.      Calcium:  Recent Labs     11/28/19  0424   CALCIUM 7.3*     Magnesium:  Recent Labs     11/28/19  0424   MG 1.4*     Glucose:  Recent Labs     11/27/19  1237 11/27/19  1835 11/27/19  2041   POCGLU 117* 123* 205*     HgbA1C: No results for input(s): LABA1C in the last 72 hours. Lipids: No results for input(s): CHOL, TRIG, HDL, LDLCALC in the last 72 hours. Invalid input(s): LDL    Radiology reports as per the Radiologist  Radiology: Ct Head Wo Contrast    Result Date: 11/27/2019  PROCEDURE: CT HEAD WO CONTRAST CLINICAL INFORMATION: follow up surgery. COMPARISON: Noncontrast brain CT dated 11/26/2019 TECHNIQUE: Noncontrast 5 mm axial images were obtained through the brain. Sagittal and coronal reconstructions were obtained and reviewed. All CT scans at this facility use dose modulation, iterative reconstruction, and/or weight-based dosing when appropriate to reduce radiation dose to as low as reasonably achievable. FINDINGS: Brain: There is no acute ischemic infarct, hemorrhage, midline shift, or mass effect. There is no change in the 1.5 x 1.1 cm fat density lesion in the interpeduncular cistern to the left of midline. No change in the mild to moderate periventricular hypodensities which may represent small vessel chronic ischemic changes although cannot exclude transependymal flow of CSF. Ventricles/basal cisterns: No change in the position of the right frontal ventriculostomy, tip at the level of the right foramen of [place]. There is stable mild ventriculomegaly. Skull base/calvarium/osseous structures: Right frontal shan hole, otherwise unremarkable. Soft tissues: Unremarkable Intraorbital contents: Unremarkable Sinuses: Unremarkable; the imaged sinuses are clear without evidence of mucosal thickening or fluid levels. Mastoids: Unremarkable; the mastoid air cells are clear. No acute ischemic infarct, hemorrhage, or mass effect. Stable position of the right frontal ventriculostomy, tip at the level of the foramen of [place]. along the left side of the L3 and L4 spinous processes which likely correspond to smaller abscesses. 1. There has been worsening of enhancement of the cauda equina nerve roots which is suggestive of worsening arachnoiditis. 2. There is redemonstration of an epidural abscess at the L3 level measuring 0.4 x 0.3 x 0.8 cm. 3. Additional abscesses are again present within the posterior paraspinal musculature and specifically along the left side of the L3 and L4 spinous processes. These abscesses likely correspond to the previously mentioned epidural abscess. 4. Marrow signal changes and specifically marrow replacement within the L2 and L3 spinous processes are suspicious for osteomyelitis. The findings were conveyed to the patient's nurse, Waylon Puckett over the phone at [phone] hours on 11/25/2019. **This report has been created using voice recognition software. It may contain minor errors which are inherent in voice recognition technology. ** Final report electronically signed by Dr. Kirstin Johnson on 11/25/2019 12:13 PM    Mri Lumbar Spine W Wo Contrast    Result Date: 11/20/2019  PROCEDURE: MRI LUMBAR SPINE W WO CONTRAST CLINICAL INFORMATION: increased low back pain, recent lumbar drain, now sepsis, unable to walk at home. COMPARISON: No prior study. TECHNIQUE: Sagittal and axial T1 and T2-weighted images were obtained to the lumbar spine. Postcontrast axial and sagittal T1-weighted images were also obtained. 20 mL ProHance was injected in the existing IV site. FINDINGS: Redemonstration of a chronic anterior wedge shape configuration of the T12 vertebral body with approximately 20% anterior height loss. There is otherwise anatomic vertebral body height and alignment. Marrow signal is within normal limits. The conus terminates in a normal fashion at the L1 level.  There is a irregularly-shaped focal fluid collection in the subcutaneous fat superficial to the paraspinal musculature at the L3 level which measures 6.8 x 2.6 x 4.7 cm in greatest mL, AP and CC dimensions,  respectively. This has rim enhancement and some thin enhancing septations. In addition, this collection communicates with a deeper complex collection in the left paraspinal musculature at the L3 and L4 levels with a multilobulated cystic complex measuring approximately 5.5 x 3.2 x 2.4 cm in greatest CC, AP and lateral dimensions. This has multiple septations with rim enhancement. In addition, there appears to be a subtle communication with this complex collection in the paraspinal musculature with the epidural space at the L3 level. There is a 1.3 x 0.6 x 0.5 cm rim-enhancing collection in the posterior epidural space at this level. There is mild thickening of the cauda equina nerve roots at the L3 level with subtle enhancement. At T12-L1 there is a small right paracentral protrusion which mildly indents thecal sac and may contact the ventral aspect of the conus secondary to the curvature of the spine. There is no significant neural foraminal stenosis. At L1-L2 there is no significant spinal canal or neuroforaminal stenosis. At L2-3 there is a shallow disc bulge which minimally indents thecal sac and contributes to mild bilateral neural foraminal stenosis in association with mild facet hypertrophy and ligamentum flavum thickening. There is also contribution of mild stenosis from the small posterior epidural enhancing collection. At L3-4 there is minimal disc bulge without significant spinal canal stenosis and mild bilateral neural foraminal stenosis in association with facet hypertrophy and ligamentum flavum thickening. At L4-5 there is a shallow disc bulge without significant spinal canal stenosis or neural foraminal stenosis. There is persistent thickening of the cauda equina this level. At L5-S1 there is a disc bulge with superimposed central protrusion which mildly indents thecal sac and may contact traversing S1 nerve roots bilaterally.  There is moderate bilateral neural foraminal stenosis in association with facet hypertrophy and ligamentum flavum thickening. 1. Prominent complex fluid collection in the paraspinal musculature on the left at the L3-L4 level which appears to communicate anteriorly with a tiny epidural abscess at the L3 level and posteriorly with a large subcutaneous component abscess. 2. Arachnoiditis. Findings were discussed with Ariana Haji RN via telephone at the time of interpretation. **This report has been created using voice recognition software. It may contain minor errors which are inherent in voice recognition technology. ** Final report electronically signed by Dr. Don Bravo MD on 11/20/2019 3:18 PM    Xr Lumbar Spine 1 Vw    Result Date: 11/25/2019  PROCEDURE: XR LUMBAR SPINE 1 VW CLINICAL INFORMATION: Lumbar laminectomy TECHNIQUE: 2 intraoperative spot radiographs of the lumbar spine COMPARISON: None FINDINGS: There is a retractor posterior to the L3-4 vertebra. There is a localizer posterior to L3. Disc space narrowing is present at the L5-S1 level. Radiopaque surgical material seen in the posterior soft tissues. Surgical localization as above. Final report electronically signed by Dr. Helen Lopez on 11/25/2019 3:33 PM    Xr Chest Portable    Result Date: 11/28/2019  PROCEDURE: XR CHEST PORTABLE CLINICAL INFORMATION: hypoxia. COMPARISON: Chest x-ray dated 11/23/2019 TECHNIQUE: AP Portable chest xray FINDINGS: Lines/tubes/devices: Left subclavian central venous catheter tip remains in the region of the mid SVC. Lungs/pleura: There are mild interstitial infiltrates which may represent interstitial pulmonary edema or an atypical pneumonia. There is no consolidation. There is a slightly larger than 1 cm mid left lung calcified granuloma. No pleural effusion. No pneumothorax. Heart: There is mild cardiomegaly. Mediastinum/deepthi: No obvious mass or adenopathy. Skeleton: No significant bone or joint abnormality.      Mild lung base cannot be excluded. There is no obvious congestive failure. Postsurgical clips in the right upper abdomen are stable compared to prior study. There is no acute change of the skeleton. 1. Visualized endotracheal and nasogastric tubes discussed above. 2. Persistent slightly decreased lung volumes with coarse markings. 3. Minimal right effusion not excluded. **This report has been created using voice recognition software. It may contain minor errors which are inherent in voice recognition technology. ** Final report electronically signed by Dr. Andrea Damon on 11/19/2019 12:27 AM    Ir Lumbar Puncture For Diagnosis    Result Date: 11/19/2019  LUMBAR PUNCTURE WITH FLUOROSCOPIC GUIDANCE: CLINICAL INFORMATION:  Lumbar puncture for possible meningitis PERFORMED BY: Aida Yuan M.D. APPROACH: L3-L4 interlaminar approach NEEDLE: 20-gauge spinal needle FLUID: 6 mL yellow turbid fluid FLUOROSCOPY TIME: 1 minute 5 seconds FLUOROSCOPIC IMAGES: 3 Dose (mGy):63 PROCEDURE:  Signed informed consent was obtained prior to performing this procedure. Following local anesthesia and utilizing aseptic technique, a spinal needle was successfully inserted into the lumbar thecal sac at the level listed above. The amount of spinal fluid listed above was obtained in four separate vials, and sent to the laboratory for diagnostic testing. Status post successful lumbar puncture. **This report has been created using voice recognition software. It may contain minor errors which are inherent in voice recognition technology. ** Final report electronically signed by Dr. Kacey Avery on 11/19/2019 1:23 PM    Ir Lumbar Puncture For Diagnosis    Result Date: 11/4/2019  LUMBAR SPINAL CANAL/CSF DRAINAGE CATHETER INSERTION: PERFORMED BY:  Albert Bautista M.D. CLINICAL INFORMATION: Normal pressure hydrocephalus. APPROACH:  Translaminar, L3, right side.  NEEDLE: 14-gauge Touhy needle CATHETER: 2.5 [place] Integra CSF drainage catheter CATHETER TIP: T8 FLUID: 3 mLClear spinal fluid. SEDATION: Versed 2 milligram, fentanyl 100 micrograms, IV. Patient was sedated for 30 minutes minutes during this procedure and monitored with EKG and pulse ox monitoring devices by registered nurse. FLUOROSCOPY TIME: 1 minute 54 seconds FLUOROSCOPIC IMAGES: 2 ESTIMATED BLOOD LOSS: Minimal PROCEDURE:  Signed informed consent was obtained prior to performing this procedure. The patient was sedated, as indicated above. .. The lumbar spine was evaluated fluoroscopically initially to determine an appropriate puncture site. .. The skin was marked, prepped, and draped in a sterile fashion, utilizing [address]. The skin was infiltrated with lidocaine 2 percent at the puncture site and a small skin nick was made with a #11 scalpel blade. The 14-gauge Touhy needle was then passed into the lumbar thecal sac, utilizing the approach listed above. Appropriate needle position was documented fluoroscopically. A few fluoroscopic spot films were obtained. Only a small amount of CSF could be aspirated through the needle. The drainage catheter was then passed coaxially through the Touhy needle and very easily passed cephalad to the level listed above. This was performed with fluoroscopic guidance. Fluoroscopic images were obtained for documentation. A small amount of fluid was seen to drain through the catheter. The catheter was stabilized to the skin with tape and a sterile OpSite dressing. The CSF drainage bag was sent to the floor with the patient. . The patient was then discharged from our department. Status post successful lumbar spine/CSF drainage catheter insertion. **This report has been created using voice recognition software. It may contain minor errors which are inherent in voice recognition technology. ** Final report electronically signed by Dr. Olman Almanza on 11/4/2019 11:38 AM    Fluoro For Surgical Procedures    Result Date: 11/25/2019  Radiology exam is complete. No Radiologist dictation. Please follow up with ordering provider. Mri Brain W Wo Contrast    Result Date: 11/25/2019  PROCEDURE: MRI BRAIN W WO CONTRAST CLINICAL INFORMATION: confusion, with hx of MSSA infection of lumbar area. COMPARISON: None available. Correlation is made to CT of the head dated November 24, 2019. TECHNIQUE: Multiplanar and multiple spin echo T1 and T2-weighted images were obtained through the brain before and after the administration of 20 mL ProHance intravenous contrast. Note is made that fast scanning was performed secondary to patient motion. The repeated images are also degraded by motion. FINDINGS: The images are significantly degraded by motion artifacts which limits detailed evaluation. There is prominence of the sulcal spaces and ventricular system secondary to generalized, age-related parenchymal volume loss. Abnormal hyperintense T2 signal with restricted diffusion is noted along the posterior aspect of the left thalamus. Patchy areas of hyperintense T2 signal are also noted throughout the periventricular and deep cerebral white matter as well as the elsie. These areas do not demonstrate restricted diffusion. A small focus of restricted diffusion is also noted within the periventricular white matter along the posterior body of the right lateral ventricle and possibly a punctate focus at the medial aspect of the right cerebellar hemisphere (image 30 of series 5). Layering debris demonstrating restricted diffusion is present within the trigones of the bilateral lateral ventricles. There is also enhancement of the ventricular margin at these areas. The ventricles appear enlarged and out of proportion to the degree of parenchymal volume loss. There is a lesion along the left side of the interpeduncular fossa T1 and T2 signal. It measures approximately 1.2 x 0.9 x 1.5 cm. It was also noted to be low attenuating on the prior CT.  No significant mass effect is identified. The basal cisterns and visualized vascular flow voids are grossly patent. The pituitary, brain stem and cervical medullary junction are grossly within normal limits. The visualized orbits are unremarkable. There is partial fluid opacification of the mastoid air cells on the left which may correspond to a mastoid effusion or mastoiditis in the proper clinical setting. The paranasal sinuses are within normal limits. 1. There are areas of restricted diffusion and hyperintense T2 signal which are most notable at the posterior aspect of the left thalamus as well as within the periventricular white matter along the posterior body of the right lateral ventricle and possibly within the medial right cerebellar hemisphere. These areas are suspicious for acute ischemia. Changes secondary to septic emboli or cerebritis are considered less likely given the lack of enhancement however, this is not completely excluded. 2. Dependently layering material with corresponding restricted diffusion is present within the trigones of the bilateral lateral ventricles. Enhancement of the ventricular margins are also noted in these regions and the findings are suggestive of purulent material and focal ventriculitis. The ventricles are also enlarged and out of proportion to the degree of parenchymal volume loss which is suggestive of associated hydrocephalus. 3. Mild to moderate patchy foci of hyperintense T2 signal within the periventricular and deep cerebral white matter as well as the elsie likely corresponds to sequela of chronic microvascular ischemic angiopathy. The findings were conveyed to the patient's nurse, Kandy Bro over the phone at [phone] hours on 11/25/2019. **This report has been created using voice recognition software. It may contain minor errors which are inherent in voice recognition technology. ** Final report electronically signed by Dr. Jolanta Cherry on 11/25/2019 12:14 PM    Ct concurrent placement of EVD.  Intracranial pressures measured every 2 hours are averaging between 3 and 6 indicating low pressure hydrocephalus.   CSF to be sampled for cultures on Friday.  Lumbar drains are intact and functioning with less than 20 cc per shift and will be removed at bedside this morning. CBC reflects indication for transfusion which is scheduled. ICU is aware of low serum sodium levels treating accordingly. Neurosurgery to follow-up    Electronically signed by Yaakov English PA-C on 11/28/2019 at 11:13 AM     Attending Note:     Patient seen and examined in the ICU  in conjunction with neurosurgery PA Yaakov English PA-C).  Discussed with patient, her nurse and ICU team as well.  All data and imaging reviewed by myself. I agree with examination assessment and plan as documented above.   - Post op day 3: Patient underwent EVD placement and an excisional draining of lumbar spine abscesses.   -There is no acute events over the night.  -Patient is awake and alert today. -Moving every things and following commands by 4.  - CBC: 88752,  has low Na, Hemoglobin 6.9.  -Patient CSF study showed signs of possible viral infection.  -yesterday  brain CT showed improvement in patient hydrocephalus.  -Please continue the medical management per ICU team.  - ICP: between 3-6 mmhg  -Follow-up the ID recommendation.  -Please lower the  EVD at 10 mmHg   - Transduce the EVD every 2 hours and please document  the ICP.    - New brain Relluisa Ren MD

## 2019-11-28 NOTE — PLAN OF CARE
Problem: Falls - Risk of:  Goal: Will remain free from falls  Description  Will remain free from falls  Outcome: Met This Shift     Problem: Nutrition  Goal: Optimal nutrition therapy  Outcome: Met This Shift  Note:   Pt. With good appetite; tolerating well. Problem: Discharge Planning:  Goal: Discharged to appropriate level of care  Description  Discharged to appropriate level of care  Outcome: Ongoing  Note:   Discharge planning continues, pt. Plans for home with . Problem: Bowel Function - Altered:  Goal: Bowel elimination is within specified parameters  Description  Bowel elimination is within specified parameters  Outcome: Ongoing  Note:   Rectal tube for loose, watery stools - monitoring and documenting output     Problem: Urinary Elimination:  Goal: Signs and symptoms of infection will decrease  Description  Signs and symptoms of infection will decrease  Outcome: Ongoing  Note:   Bernstein catheter continues - pt. With rectal tube for loose, watery stools which leaks - diligent care to keep clean. Problem: Urinary Elimination:  Goal: Complications related to the disease process, condition or treatment will be avoided or minimized  Description  Complications related to the disease process, condition or treatment will be avoided or minimized  Outcome: Ongoing  Note:   Pt.  With slight confusion and some forgetfulness - bed alarm on and close monitoring continues     Problem: Infection - Central Venous Catheter-Associated Bloodstream Infection:  Goal: Will show no infection signs and symptoms  Description  Will show no infection signs and symptoms  Outcome: Ongoing  Note:   No signs of infection - antimicrobial patch, alcohol caps

## 2019-11-29 LAB
ADENOVIRUS F 40 41 PCR: NOT DETECTED
ANION GAP SERPL CALCULATED.3IONS-SCNC: 12 MEQ/L (ref 8–16)
ASTROVIRUS PCR: NOT DETECTED
BASOPHILS # BLD: 0.3 %
BASOPHILS ABSOLUTE: 0 THOU/MM3 (ref 0–0.1)
BLOOD CULTURE, ROUTINE: NORMAL
BLOOD CULTURE, ROUTINE: NORMAL
BUN BLDV-MCNC: 10 MG/DL (ref 7–22)
CALCIUM SERPL-MCNC: 7.6 MG/DL (ref 8.5–10.5)
CAMPYLOBACTER PCR: NOT DETECTED
CHARACTER, CSF: CLEAR
CHLORIDE BLD-SCNC: 94 MEQ/L (ref 98–111)
CLOSTRIDIUM DIFFICILE, PCR: NOT DETECTED
CO2: 21 MEQ/L (ref 23–33)
COLOR CSF: COLORLESS
CREAT SERPL-MCNC: 1.2 MG/DL (ref 0.4–1.2)
CRYPTOCOCCUS NEOFORMANS/GATTI CSF FILM ARR.: NOT DETECTED
CRYPTOSPORIDIUM PCR: NOT DETECTED
CYCLOSPORA CAYETANENSIS PCR: NOT DETECTED
CYTOMEGALOVIRUS (CMV) CSF FILM ARRAY: NOT DETECTED
E COLI 0157 PCR: NORMAL
E COLI ENTEROAGGREGATIVE PCR: NOT DETECTED
E COLI ENTEROPATHOGENIC PCR: NOT DETECTED
E COLI ENTEROTOXIGENIC PCR: NOT DETECTED
E COLI SHIGA LIKE TOXIN PCR: NOT DETECTED
E COLI SHIGELLA/ENTEROINVASIVE PCR: NOT DETECTED
E HISTOLYTICA GI FILM ARRAY: NOT DETECTED
ENTEROVIRUS DETECTION PCR: NOT DETECTED
EOSINOPHIL # BLD: 2.1 %
EOSINOPHILS ABSOLUTE: 0.3 THOU/MM3 (ref 0–0.4)
ERYTHROCYTE [DISTWIDTH] IN BLOOD BY AUTOMATED COUNT: 17.1 % (ref 11.5–14.5)
ERYTHROCYTE [DISTWIDTH] IN BLOOD BY AUTOMATED COUNT: 52.3 FL (ref 35–45)
ESCHERICHIA COLI K1 CSF FILM ARRAY: NOT DETECTED
GFR SERPL CREATININE-BSD FRML MDRD: 45 ML/MIN/1.73M2
GIARDIA LAMBLIA PCR: NOT DETECTED
GLUCOSE BLD-MCNC: 124 MG/DL (ref 70–108)
GLUCOSE BLD-MCNC: 128 MG/DL (ref 70–108)
GLUCOSE BLD-MCNC: 134 MG/DL (ref 70–108)
GLUCOSE BLD-MCNC: 156 MG/DL (ref 70–108)
GLUCOSE BLD-MCNC: 209 MG/DL (ref 70–108)
GLUCOSE, CSF: 41 MG/DL (ref 40–80)
HAEMOPHILUS INFLUENZA CSF FILM ARRAY: NOT DETECTED
HCT VFR BLD CALC: 27 % (ref 37–47)
HEMOGLOBIN: 8.3 GM/DL (ref 12–16)
HHV-6 (HERPESVIRUS 6) CSF FILM ARRAY: DETECTED
HSV-1 CSF FILM ARRAY: NOT DETECTED
HSV-2 CSF FILM ARRAY: NOT DETECTED
IMMATURE GRANS (ABS): 0.32 THOU/MM3 (ref 0–0.07)
IMMATURE GRANULOCYTES: 2.1 %
INTERPRETATION: ABNORMAL
LISTERIA MONOCYTOGENES CSF FILM ARRAY: NOT DETECTED
LYMPHOCYTES # BLD: 17.4 %
LYMPHOCYTES ABSOLUTE: 2.7 THOU/MM3 (ref 1–4.8)
MCH RBC QN AUTO: 26.9 PG (ref 26–33)
MCHC RBC AUTO-ENTMCNC: 30.7 GM/DL (ref 32.2–35.5)
MCV RBC AUTO: 87.7 FL (ref 81–99)
MONOCYTES # BLD: 5.1 %
MONOCYTES ABSOLUTE: 0.8 THOU/MM3 (ref 0.4–1.3)
NEISSERIA MENIGITIDIS CSF FILM ARRAY: NOT DETECTED
NOROVIRUS GI GII PCR: NOT DETECTED
NUCLEATED RED BLOOD CELLS: 0 /100 WBC
PARECHOVIRUS CSF FILM ARRAY: NOT DETECTED
PATHOLOGIST REVIEW: ABNORMAL
PLATELET # BLD: 310 THOU/MM3 (ref 130–400)
PLESIOMONAS SHIGELLOIDES PCR: NOT DETECTED
PMV BLD AUTO: 9.7 FL (ref 9.4–12.4)
POTASSIUM SERPL-SCNC: 3.5 MEQ/L (ref 3.5–5.2)
PROTEIN CSF: 283 MG/DL (ref 12–60)
RBC # BLD: 3.08 MILL/MM3 (ref 4.2–5.4)
RBC CSF: 45 /CUMM
ROTAVIRUS A PCR: NOT DETECTED
SALMONELLA PCR: NOT DETECTED
SAPOVIRUS PCR: NOT DETECTED
SEG NEUTROPHILS: 73 %
SEGMENTED NEUTROPHILS ABSOLUTE COUNT: 11.3 THOU/MM3 (ref 1.8–7.7)
SODIUM BLD-SCNC: 126 MEQ/L (ref 135–145)
SODIUM BLD-SCNC: 127 MEQ/L (ref 135–145)
SODIUM BLD-SCNC: 131 MEQ/L (ref 135–145)
SODIUM BLD-SCNC: 131 MEQ/L (ref 135–145)
STREPTOCOCCUS AGALACTIAE CSF FILM ARRAY: NOT DETECTED
STREPTOCOCCUS PNEUMONIAE CSF FILM ARRAY: NOT DETECTED
TOTAL NUCLEATED CELLS CSF: 280 /CUMM (ref 0–5)
TUBE VOLUME RECEIVED CSF: 5 ML
VARICELLA-ZOSTER, PCR: NOT DETECTED
VIBRIO CHOLERAE PCR: NOT DETECTED
VIBRIO PCR: NOT DETECTED
WBC # BLD: 15.5 THOU/MM3 (ref 4.8–10.8)
YERSINIA ENTEROCOLITICA PCR: NOT DETECTED

## 2019-11-29 PROCEDURE — 6360000002 HC RX W HCPCS: Performed by: ANESTHESIOLOGY

## 2019-11-29 PROCEDURE — 6360000002 HC RX W HCPCS: Performed by: INTERNAL MEDICINE

## 2019-11-29 PROCEDURE — 87205 SMEAR GRAM STAIN: CPT

## 2019-11-29 PROCEDURE — 82948 REAGENT STRIP/BLOOD GLUCOSE: CPT

## 2019-11-29 PROCEDURE — 6370000000 HC RX 637 (ALT 250 FOR IP): Performed by: PHYSICIAN ASSISTANT

## 2019-11-29 PROCEDURE — 84295 ASSAY OF SERUM SODIUM: CPT

## 2019-11-29 PROCEDURE — 2580000003 HC RX 258: Performed by: PHYSICIAN ASSISTANT

## 2019-11-29 PROCEDURE — 2709999900 HC NON-CHARGEABLE SUPPLY

## 2019-11-29 PROCEDURE — 2580000003 HC RX 258: Performed by: INTERNAL MEDICINE

## 2019-11-29 PROCEDURE — 87798 DETECT AGENT NOS DNA AMP: CPT

## 2019-11-29 PROCEDURE — 36415 COLL VENOUS BLD VENIPUNCTURE: CPT

## 2019-11-29 PROCEDURE — 6370000000 HC RX 637 (ALT 250 FOR IP): Performed by: ANESTHESIOLOGY

## 2019-11-29 PROCEDURE — 82945 GLUCOSE OTHER FLUID: CPT

## 2019-11-29 PROCEDURE — 87075 CULTR BACTERIA EXCEPT BLOOD: CPT

## 2019-11-29 PROCEDURE — 84157 ASSAY OF PROTEIN OTHER: CPT

## 2019-11-29 PROCEDURE — 99233 SBSQ HOSP IP/OBS HIGH 50: CPT | Performed by: ANESTHESIOLOGY

## 2019-11-29 PROCEDURE — 2500000003 HC RX 250 WO HCPCS: Performed by: PHYSICIAN ASSISTANT

## 2019-11-29 PROCEDURE — 89051 BODY FLUID CELL COUNT: CPT

## 2019-11-29 PROCEDURE — 6360000002 HC RX W HCPCS: Performed by: NURSE PRACTITIONER

## 2019-11-29 PROCEDURE — 99232 SBSQ HOSP IP/OBS MODERATE 35: CPT | Performed by: NEUROLOGICAL SURGERY

## 2019-11-29 PROCEDURE — 94761 N-INVAS EAR/PLS OXIMETRY MLT: CPT

## 2019-11-29 PROCEDURE — 6370000000 HC RX 637 (ALT 250 FOR IP): Performed by: INTERNAL MEDICINE

## 2019-11-29 PROCEDURE — 85025 COMPLETE CBC W/AUTO DIFF WBC: CPT

## 2019-11-29 PROCEDURE — 87507 IADNA-DNA/RNA PROBE TQ 12-25: CPT

## 2019-11-29 PROCEDURE — 6370000000 HC RX 637 (ALT 250 FOR IP): Performed by: NURSE PRACTITIONER

## 2019-11-29 PROCEDURE — 80048 BASIC METABOLIC PNL TOTAL CA: CPT

## 2019-11-29 PROCEDURE — 2000000000 HC ICU R&B

## 2019-11-29 RX ORDER — HYDRALAZINE HYDROCHLORIDE 20 MG/ML
10 INJECTION INTRAMUSCULAR; INTRAVENOUS EVERY 6 HOURS PRN
Status: DISCONTINUED | OUTPATIENT
Start: 2019-11-29 | End: 2019-12-10 | Stop reason: HOSPADM

## 2019-11-29 RX ORDER — HYDROCODONE BITARTRATE AND ACETAMINOPHEN 5; 325 MG/1; MG/1
2 TABLET ORAL EVERY 4 HOURS PRN
Status: DISCONTINUED | OUTPATIENT
Start: 2019-11-29 | End: 2019-12-10 | Stop reason: HOSPADM

## 2019-11-29 RX ORDER — SODIUM CHLORIDE 1000 MG
2 TABLET, SOLUBLE MISCELLANEOUS
Status: DISCONTINUED | OUTPATIENT
Start: 2019-11-29 | End: 2019-12-10 | Stop reason: HOSPADM

## 2019-11-29 RX ORDER — FUROSEMIDE 10 MG/ML
20 INJECTION INTRAMUSCULAR; INTRAVENOUS ONCE
Status: COMPLETED | OUTPATIENT
Start: 2019-11-29 | End: 2019-11-29

## 2019-11-29 RX ORDER — HYDROCODONE BITARTRATE AND ACETAMINOPHEN 5; 325 MG/1; MG/1
1 TABLET ORAL EVERY 4 HOURS PRN
Status: DISCONTINUED | OUTPATIENT
Start: 2019-11-29 | End: 2019-12-10 | Stop reason: HOSPADM

## 2019-11-29 RX ADMIN — MICONAZOLE NITRATE: 20 POWDER TOPICAL at 09:20

## 2019-11-29 RX ADMIN — POTASSIUM & SODIUM PHOSPHATES POWDER PACK 280-160-250 MG 250 MG: 280-160-250 PACK at 09:18

## 2019-11-29 RX ADMIN — METOPROLOL TARTRATE 25 MG: 25 TABLET ORAL at 19:43

## 2019-11-29 RX ADMIN — SODIUM CHLORIDE TAB 1 GM 2 G: 1 TAB at 17:25

## 2019-11-29 RX ADMIN — Medication 10 ML: at 19:42

## 2019-11-29 RX ADMIN — ACETAMINOPHEN 650 MG: 325 TABLET ORAL at 09:17

## 2019-11-29 RX ADMIN — FUROSEMIDE 20 MG: 40 INJECTION, SOLUTION INTRAMUSCULAR; INTRAVENOUS at 19:06

## 2019-11-29 RX ADMIN — GANCICLOVIR SODIUM 485 MG: 500 INJECTION, POWDER, LYOPHILIZED, FOR SOLUTION INTRAVENOUS at 09:20

## 2019-11-29 RX ADMIN — POTASSIUM & SODIUM PHOSPHATES POWDER PACK 280-160-250 MG 250 MG: 280-160-250 PACK at 19:42

## 2019-11-29 RX ADMIN — VANCOMYCIN HYDROCHLORIDE 1250 MG: 5 INJECTION, POWDER, LYOPHILIZED, FOR SOLUTION INTRAVENOUS at 09:20

## 2019-11-29 RX ADMIN — FENTANYL CITRATE 50 MCG: 50 INJECTION, SOLUTION INTRAMUSCULAR; INTRAVENOUS at 21:11

## 2019-11-29 RX ADMIN — FAMOTIDINE 20 MG: 20 TABLET ORAL at 09:17

## 2019-11-29 RX ADMIN — Medication: at 19:42

## 2019-11-29 RX ADMIN — Medication: at 11:29

## 2019-11-29 RX ADMIN — HYDROCODONE BITARTRATE AND ACETAMINOPHEN 2 TABLET: 5; 325 TABLET ORAL at 22:30

## 2019-11-29 RX ADMIN — POTASSIUM & SODIUM PHOSPHATES POWDER PACK 280-160-250 MG 250 MG: 280-160-250 PACK at 17:25

## 2019-11-29 RX ADMIN — POTASSIUM CHLORIDE 40 MEQ: 20 TABLET, EXTENDED RELEASE ORAL at 09:17

## 2019-11-29 RX ADMIN — HYDRALAZINE HYDROCHLORIDE 10 MG: 20 INJECTION, SOLUTION INTRAMUSCULAR; INTRAVENOUS at 22:30

## 2019-11-29 RX ADMIN — FENTANYL CITRATE 50 MCG: 50 INJECTION, SOLUTION INTRAMUSCULAR; INTRAVENOUS at 23:57

## 2019-11-29 RX ADMIN — POTASSIUM CHLORIDE 40 MEQ: 20 TABLET, EXTENDED RELEASE ORAL at 17:27

## 2019-11-29 RX ADMIN — MICONAZOLE NITRATE: 20 POWDER TOPICAL at 19:42

## 2019-11-29 RX ADMIN — INSULIN LISPRO 1 UNITS: 100 INJECTION, SOLUTION INTRAVENOUS; SUBCUTANEOUS at 19:58

## 2019-11-29 RX ADMIN — MAGNESIUM GLUCONATE 500 MG ORAL TABLET 400 MG: 500 TABLET ORAL at 09:17

## 2019-11-29 RX ADMIN — NAFCILLIN SODIUM 12 G: 2 INJECTION, POWDER, LYOPHILIZED, FOR SOLUTION INTRAMUSCULAR; INTRAVENOUS at 11:29

## 2019-11-29 RX ADMIN — GANCICLOVIR SODIUM 245 MG: 500 INJECTION, POWDER, LYOPHILIZED, FOR SOLUTION INTRAVENOUS at 20:34

## 2019-11-29 RX ADMIN — METOPROLOL TARTRATE 25 MG: 25 TABLET ORAL at 09:17

## 2019-11-29 RX ADMIN — INSULIN LISPRO 4 UNITS: 100 INJECTION, SOLUTION INTRAVENOUS; SUBCUTANEOUS at 12:54

## 2019-11-29 RX ADMIN — ACETAMINOPHEN 650 MG: 325 TABLET ORAL at 17:27

## 2019-11-29 RX ADMIN — FAMOTIDINE 20 MG: 20 TABLET ORAL at 19:42

## 2019-11-29 RX ADMIN — POTASSIUM & SODIUM PHOSPHATES POWDER PACK 280-160-250 MG 250 MG: 280-160-250 PACK at 12:56

## 2019-11-29 ASSESSMENT — PAIN SCALES - GENERAL
PAINLEVEL_OUTOF10: 10
PAINLEVEL_OUTOF10: 10
PAINLEVEL_OUTOF10: 1
PAINLEVEL_OUTOF10: 10
PAINLEVEL_OUTOF10: 0
PAINLEVEL_OUTOF10: 5
PAINLEVEL_OUTOF10: 1
PAINLEVEL_OUTOF10: 0
PAINLEVEL_OUTOF10: 0

## 2019-11-29 ASSESSMENT — PAIN DESCRIPTION - PROGRESSION
CLINICAL_PROGRESSION: GRADUALLY WORSENING
CLINICAL_PROGRESSION: GRADUALLY IMPROVING
CLINICAL_PROGRESSION: NOT CHANGED

## 2019-11-29 ASSESSMENT — PAIN DESCRIPTION - LOCATION
LOCATION: HEAD
LOCATION: BACK
LOCATION: BACK

## 2019-11-29 ASSESSMENT — PAIN DESCRIPTION - ORIENTATION
ORIENTATION: LOWER
ORIENTATION: LOWER

## 2019-11-29 ASSESSMENT — PAIN - FUNCTIONAL ASSESSMENT
PAIN_FUNCTIONAL_ASSESSMENT: ACTIVITIES ARE NOT PREVENTED

## 2019-11-29 ASSESSMENT — PAIN DESCRIPTION - ONSET
ONSET: ON-GOING

## 2019-11-29 ASSESSMENT — PAIN DESCRIPTION - PAIN TYPE
TYPE: SURGICAL PAIN;ACUTE PAIN
TYPE: ACUTE PAIN;SURGICAL PAIN
TYPE: ACUTE PAIN
TYPE: SURGICAL PAIN;ACUTE PAIN
TYPE: ACUTE PAIN

## 2019-11-29 ASSESSMENT — PAIN DESCRIPTION - FREQUENCY
FREQUENCY: CONTINUOUS

## 2019-11-29 ASSESSMENT — PAIN DESCRIPTION - DESCRIPTORS
DESCRIPTORS: ACHING
DESCRIPTORS: ACHING;HEADACHE
DESCRIPTORS: ACHING;HEADACHE
DESCRIPTORS: ACHING
DESCRIPTORS: ACHING;HEADACHE

## 2019-11-29 NOTE — PROGRESS NOTES
Nutrition Assessment    Type and Reason for Visit: Reassess(po check, wounds)    Nutrition Recommendations: Continue diet and ONS as ordered. Recommend culturelle for probiotic benefits and MVI. Nutrition Assessment:  Pt. nutritionally compromised AEB s/p abscess drainage 11/21 in IR, & S/P OR 11/25 & increased needs for wound healing.   At risk for further nutrition compromise r/t admitted with AMS, hyperglycemia, UTI, LP done and Dx with meningitis, sepsis , s/p recent lumbar drain removal, brain lipoma and underlying medical conditions including intubated this admit, now extubated,  back pain, DM, GERD, HTN & tobacco use. Meds include ATB, electrolyte replacements   Na+ 127  Glucose 124   Recommendations above. Malnutrition Assessment:  · Malnutrition Status: At risk for malnutrition    Nutrition Risk Level: Moderate    Nutrient Needs:  · Estimated Daily Total Kcal: ~1788 kcals ( 20 kcals/kg on admit weight of 89.4 kg)  · Estimated Daily Protein (g): ~ grams ( 1.5-1.7 grams protein/kg on IBW of 59 kg)  Monitor renal status  · Estimated Daily Total Fluid (ml/day): per MD    Nutrition Diagnosis:   · Problem: Increased nutrient needs  · Etiology: related to Insufficient energy/nutrient consumption, Increased demand for energy/nutrients     Signs and symptoms:  as evidenced by Presence of wounds    Objective Information:  · Nutrition-Focused Physical Findings: pt. seen this am; can't recall what she had for breakfast but appeared to have been [place] toast - ate ~50%; flexiseal 500ml; was agitated earlier per RN; states she does sip on the glucerna at times  · Wound Type: (on admit to include to include left lower back incision, back bruises , left foot bruising, left ankle abrasions.   OR 11/25 for spinal abscess, partial laminectomy & EVD placement)  · Current Nutrition Therapies:  · Oral Diet Orders: (DB CHO control)   · Oral Diet intake: 51-75%  · Oral Nutrition Supplement (ONS) Orders: Diabetic Oral Supplement(Glucerna tid)  · ONS intake: 51-75%  · Anthropometric Measures:  · Ht: 5' 6\" (167.6 cm)   · Current Body Wt: 216 lb (98 kg)(11/29 with +1 edema)  · Admission Body Wt: 197 lb 1.5 oz (89.4 kg)(11/18 edema not documented )  · Usual Body Wt: 201 lb 4.5 oz (91.3 kg)(11/4/19 bedscale per EMR)  · Ideal Body Wt: 130 lb (59 kg),   · BMI Classification: BMI 35.0 - 39.9 Obese Class II(35)    Nutrition Interventions:   Continue current diet, Continue current ONS  Continued Inpatient Monitoring, Education Initiated, Coordination of Care    Nutrition Evaluation:   · Evaluation: Progressing toward goals   · Goals: 75% or more of healthy intake during LOS    · Monitoring: Nutrition Progression, Meal Intake, Supplement Intake, Diet Tolerance, Skin Integrity, Wound Healing, Mental Status/Confusion, Weight, Pertinent Labs, Chewing/Swallowing, Monitor Bowel Function      Electronically signed by Narda Denis RD, LD on 11/29/19 at 1:28 PM    Contact Number: [phone]

## 2019-11-29 NOTE — FLOWSHEET NOTE
[address] [place] [id number]  PHYSICAL THERAPY MISSED TREATMENT NOTE  ACUTE CARE  [facility] ICU 4D              Missed Treatment  Per OT, pt still a hold. Will try back per POC.

## 2019-11-29 NOTE — PROGRESS NOTES
Renal Adjustment Follow-up    Recent Labs     11/28/19  0424 11/29/19  0524   BUN 8 10       Recent Labs     11/28/19  0424 11/29/19  0524   CREATININE 0.9 1.2       Estimated Creatinine Clearance: 56 mL/min (based on SCr of 1.2 mg/dL). Plan: Will change ganciclovir dose to 2.5mg/kg/dose every 12 hours.
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
## 2019-11-29 NOTE — PROGRESS NOTES
Neurosurgery Progress Note    Patient:  Eyal Quick      Unit/Bed:4D-17/017-A    YOB: 1955    MRN: [id number]     Acct: [de-identified]     Admit date: 11/18/2019    No chief complaint on file. Patient Seen, Chart, Physician notes, Labs, Radiology studies reviewed. Subjective: Mrs. 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  Continues alert and more interactive on exam today.       Past, Family, Social History unchanged from admission. Diet:  DIET CARB CONTROL;   Dietary Nutrition Supplements: Diabetic Oral Supplement    Medications:  Scheduled Meds:   ganciclovir (CYTOVENE) IVPB  2.5 mg/kg Intravenous 2 times per day    potassium chloride  40 mEq Oral BID WC    vancomycin (VANCOCIN) intermittent dosing (placeholder)   Other RX Placeholder    vancomycin  1,250 mg Intravenous Q12H    famotidine  20 mg Oral BID    magnesium oxide  400 mg Oral Daily    miconazole   Topical BID    sodium chloride flush  10 mL Intravenous 2 times per day    docusate sodium  100 mg Oral BID    nafcillin 12 g continuous infusion  12 g Intravenous Q24H    calcium replacement protocol   Other RX Placeholder    insulin lispro  0-12 Units Subcutaneous TID WC    insulin lispro  0-6 Units Subcutaneous Nightly    metoprolol tartrate  25 mg Oral BID    potassium & sodium phosphates  1 packet Oral 4x Daily    potassium (CARDIAC) replacement protocol   Other RX Placeholder     Continuous Infusions:   dextrose       PRN Meds:zinc oxide, sodium chloride flush, ondansetron, fentanNYL **OR** fentanNYL, acetaminophen, dextrose, magnesium hydroxide, potassium chloride, magnesium sulfate, acetaminophen, glucose, dextrose, glucagon (rDNA), dextrose    Objective:  She is resting comfortably with the head of the bed elevated with EVD height requirement for CSF collection. Tolerating this without specific complaint. ICPs are averaging between 1 and 5 indicating low pressure hydrocephalus following an adjustment for CSF evacuation to 10 mmHg. Lumbar Hemovac drains were discontinued and removed yesterday and the sites are dry on inspection today. Dressings are otherwise dry and intact. Vitals: BP (!) 113/93   Pulse 94   Temp 99.7 °F (37.6 °C) (Bladder)   Resp 18   Ht 5' 6\" (1.676 m)   Wt 216 lb 7.9 oz (98.2 kg)   SpO2 98%   BMI 34.94 kg/m²   Physical Exam:  Alert and attentive. Language appropriate, with no aphasia. Pupils equal.  Facial strength symmetric. Physical Exam   She is more alert and attentive today on exam and has returned to her baseline for cognition. She demonstrates purposeful movement in all extremities and obeys commands. ROS:  Review of Systems   Patient denies headache, nausea or vomiting, will disturbances. A more comprehensive review of systems is unobtainable due to patient's status. 24 hour intake/output:    Intake/Output Summary (Last 24 hours) at 11/29/2019 1614  Last data filed at 11/29/2019 1311  Gross per 24 hour   Intake 3461.89 ml   Output 3777 ml   Net -315.11 ml     Last 3 weights:   Wt Readings from Last 3 Encounters:   11/29/19 216 lb 7.9 oz (98.2 kg)   11/07/19 201 lb 4.8 oz (91.3 kg)   09/18/19 220 lb 6.4 oz (100 kg)         CBC:   Recent Labs     11/27/19  0324 11/28/19  0424 11/28/19  1000 11/29/19  0524   WBC 17.5* 16.1*  --  15.5*   HGB 7.5* 6.9* 9.7* 8.3*    286  --  310     BMP:    Recent Labs     11/27/19  0324  11/28/19  0424  11/28/19  1540  11/29/19  0524 11/29/19  0916 11/29/19  1248   *   < > 131*   < > 127*   < > 127* 131* 131*   K 2.9*   < > 3.2*  --  3.7  --  3.5  --   --    CL 92*  --  96*  --   --   --  94*  --   --    CO2 21*  --  22*  --   --   --  21*  --   --    BUN 8  --  8  --   --   --  10  --   -- CREATININE 0.8  --  0.9  --   --   --  1.2  --   --    GLUCOSE 91  --  102  --   --   --  128*  --   --     < > = values in this interval not displayed. Calcium:  Recent Labs     11/29/19  0524   CALCIUM 7.6*     Magnesium:  Recent Labs     11/28/19  1540   MG 1.8     Glucose:  Recent Labs     11/28/19  2323 11/29/19  0915 11/29/19  1253   POCGLU 191* 124* 209*     HgbA1C: No results for input(s): LABA1C in the last 72 hours. Lipids: No results for input(s): CHOL, TRIG, HDL, LDLCALC in the last 72 hours. Invalid input(s): LDL    Radiology reports as per the Radiologist  Radiology: Ct Head Wo Contrast    Result Date: 11/27/2019  PROCEDURE: CT HEAD WO CONTRAST CLINICAL INFORMATION: follow up surgery. COMPARISON: Noncontrast brain CT dated 11/26/2019 TECHNIQUE: Noncontrast 5 mm axial images were obtained through the brain. Sagittal and coronal reconstructions were obtained and reviewed. All CT scans at this facility use dose modulation, iterative reconstruction, and/or weight-based dosing when appropriate to reduce radiation dose to as low as reasonably achievable. FINDINGS: Brain: There is no acute ischemic infarct, hemorrhage, midline shift, or mass effect. There is no change in the 1.5 x 1.1 cm fat density lesion in the interpeduncular cistern to the left of midline. No change in the mild to moderate periventricular hypodensities which may represent small vessel chronic ischemic changes although cannot exclude transependymal flow of CSF. Ventricles/basal cisterns: No change in the position of the right frontal ventriculostomy, tip at the level of the right foramen of Porter. There is stable mild ventriculomegaly. Skull base/calvarium/osseous structures: Right frontal shan hole, otherwise unremarkable. Soft tissues: Unremarkable Intraorbital contents: Unremarkable Sinuses: Unremarkable; the imaged sinuses are clear without evidence of mucosal thickening or fluid levels.  Mastoids: Unremarkable; the mastoid air cells are clear. No acute ischemic infarct, hemorrhage, or mass effect. Stable position of the right frontal ventriculostomy, tip at the level of the foramen of [place]. Stable mild ventriculomegaly. Stable appearance of the brain compared to the previous study as detailed above. **This report has been created using voice recognition software. It may contain minor errors which are inherent in voice recognition technology. ** Final report electronically signed by Dr. Luis Felipe Travis on 11/27/2019 4:54 AM    Ct Head Wo Contrast    Result Date: 11/26/2019  PROCEDURE: CT HEAD WO CONTRAST CLINICAL INFORMATION: hydrocephalus. COMPARISON: Brain MRI dated 11/25/2019 and the brain CT dated 11/24/2019 TECHNIQUE: Noncontrast 5 mm axial images were obtained through the brain. Sagittal and coronal reconstructions were obtained and reviewed. All CT scans at this facility use dose modulation, iterative reconstruction, and/or weight-based dosing when appropriate to reduce radiation dose to as low as reasonably achievable. FINDINGS: Brain: There is no acute ischemic infarct, hemorrhage, midline shift, mass, or mass effect. There is no change in the 1.5 x 1.1 cm fat density lesion in the interpeduncular cistern to the left of midline. This may represent a lipoma or possibly a dermoid  cyst. There are stable mild to moderate deep white matter hypodensities, nonspecific in nature but probably representing small vessel chronic ischemic changes. Ventricles/basal cisterns: There has been interval placement of a right frontal  shunt catheter coursing through the right frontal horn, tip in the region of the right foramen of Gabi Cooks. There is stable mild ventriculomegaly. Skull base/calvarium/osseous structures: There is a new right frontal shan hole for placement of a right frontal ventriculostomy.  Soft tissues: Unremarkable Intraorbital contents: Unremarkable Sinuses: Unremarkable; the imaged sinuses are clear without evidence of mucosal thickening or fluid levels. Mastoids: Unremarkable; the mastoid air cells are clear. Interval placement of a right frontal ventriculostomy, tip in the region of the right foramen of [place]. Stable mild hydrocephalus. Stable 1.5 x 1.1 cm fat density lesion in the interpeduncular cistern to the left of midline. No acute ischemic infarct, hemorrhage, or mass effect. **This report has been created using voice recognition software. It may contain minor errors which are inherent in voice recognition technology. ** Final report electronically signed by Dr. Merly Severino on 11/26/2019 5:53 AM    Ct Head Wo Contrast    Result Date: 11/24/2019  PROCEDURE: CT HEAD WO CONTRAST CLINICAL INFORMATION: Mental status change TECHNIQUE: CT scan of the head was performed from the vertex to the skull base without use of intravenous contrast. Axial images as well as coronal and sagittal reconstructions were obtained. All CT scans at this facility use dose modulation, iterative reconstruction, and/or weight-based dosing when appropriate to reduce radiation dose to as low as reasonably achievable. COMPARISON: None. FINDINGS: There is no mass effect, midline shift or acute hemorrhage. Ventricles and sulci are prominent. There is diffuse periventricular white matter hypoattenuation. A 1.2 cm hypoattenuating structure in the left midbrain has noncontrast mean Hounsfield units of -71 (image 18). Areas of adjacent hypoattenuation may be secondary to prior infarcts. Visualized orbits, paranasal sinuses and mastoid air cells are unremarkable. 1. No mass effect or acute hemorrhage. 2. Chronic periventricular small vessel ischemic changes and cerebral atrophy. 3. Hypoattenuating structure in the midbrain of indeterminate etiology. Brain MRI is recommended for further evaluation. **This report has been created using voice recognition software. It may contain minor errors which are inherent in voice recognition technology. ** Final report electronically signed by Dr. Ema Mckeon on 11/24/2019 1:39 PM    Ct Abscess Drainage W Cath Placement S&i    Result Date: 11/21/2019  CT-GUIDED ABSCESS DRAINAGE PERFORMED BY: JOSETTE Odom: Posterior lower lumbar paraspinal abscess APPROACH: Posterior CATHETER: 8 Turkish multipurpose drainage catheter. FLUID OBTAINED: 15 mL purulent tan fluid SEDATION: None. Patient was ventilated on propofol drip. The patient was sedated for 30 minutes during this procedure and monitored with EKG and pulse ox monitoring devices by a registered nurse. PROCEDURE: Signed informed consent was obtained prior to performing this procedure. The patient was placed on the CT scanner and sedated, as indicated above. CT images were initially obtained to determine appropriate puncture site. The skin was marked, prepped, and draped in a sterile fashion. Following local anesthesia and utilizing aseptic technique, a needle was successfully passed into the abscess pocket. A small amount of pus was aspirated to confirm appropriate needle position. A guidewire was then passed through the needle followed by insertion of progressively larger dilators up to an 8 [place] size. An 8 [place] multi-side-hole drainage catheter was then passed over the wire and was coiled within the abscess pocket. The retaining suture was then locked in the standard fashion. Catheter was then hooked to a Domingo-Close drainage bag and the catheter was flushed several times with sterile saline. The catheter was stabilized to the skin with a Percu-Stay device. A sample of the pus was sent to laboratory for culture and sensitivity testing. Status post successful abscess drainage procedure. . **This report has been created using voice recognition software. It may contain minor errors which are inherent in voice recognition technology. ** Final report electronically signed by Dr. Julianna Donohue on 11/21/2019 1:13 PM    Mri Thoracic Spine W Alisha Aden a normal fashion at the L1 level. There is a irregularly-shaped focal fluid collection in the subcutaneous fat superficial to the paraspinal musculature at the L3 level which measures 6.8 x 2.6 x 4.7 cm in greatest mL, AP and CC dimensions,  respectively. This has rim enhancement and some thin enhancing septations. In addition, this collection communicates with a deeper complex collection in the left paraspinal musculature at the L3 and L4 levels with a multilobulated cystic complex measuring approximately 5.5 x 3.2 x 2.4 cm in greatest CC, AP and lateral dimensions. This has multiple septations with rim enhancement. In addition, there appears to be a subtle communication with this complex collection in the paraspinal musculature with the epidural space at the L3 level. There is a 1.3 x 0.6 x 0.5 cm rim-enhancing collection in the posterior epidural space at this level. There is mild thickening of the cauda equina nerve roots at the L3 level with subtle enhancement. At T12-L1 there is a small right paracentral protrusion which mildly indents thecal sac and may contact the ventral aspect of the conus secondary to the curvature of the spine. There is no significant neural foraminal stenosis. At L1-L2 there is no significant spinal canal or neuroforaminal stenosis. At L2-3 there is a shallow disc bulge which minimally indents thecal sac and contributes to mild bilateral neural foraminal stenosis in association with mild facet hypertrophy and ligamentum flavum thickening. There is also contribution of mild stenosis from the small posterior epidural enhancing collection. At L3-4 there is minimal disc bulge without significant spinal canal stenosis and mild bilateral neural foraminal stenosis in association with facet hypertrophy and ligamentum flavum thickening. At L4-5 there is a shallow disc bulge without significant spinal canal stenosis or neural foraminal stenosis.  There is persistent thickening of the cauda equina granuloma. No pleural effusion. No pneumothorax. Heart: There is mild cardiomegaly. Mediastinum/deepthi: No obvious mass or adenopathy. Skeleton: No significant bone or joint abnormality. Mild interstitial pulmonary edema versus atypical pneumonia. **This report has been created using voice recognition software. It may contain minor errors which are inherent in voice recognition technology. ** Final report electronically signed by Dr. Heather Roldan on 11/28/2019 6:11 AM    Xr Chest Portable    Result Date: 11/23/2019  PROCEDURE: XR CHEST PORTABLE CLINICAL INFORMATION: resp failure. COMPARISON: November 19, 2019 TECHNIQUE: AP upright view of the chest. FINDINGS: There has been interval removal of the endotracheal and nasogastric tubes. The lung volumes are slightly diminished. Coarse markings are stable with no focal infiltrate noted. The left mid thoracic nodular density or granuloma is again noted. Central venous structures are stable. The central venous catheter remains in the superior vena cava. Interval endotracheal and nasogastric tube removal with persistent diminished lung volumes. No acute process compared to prior study. **This report has been created using voice recognition software. It may contain minor errors which are inherent in voice recognition technology. ** Final report electronically signed by Dr. Charley Acuña on 11/23/2019 3:11 AM    Xr Chest Portable    Result Date: 11/19/2019  PROCEDURE: XR CHEST PORTABLE CLINICAL INFORMATION: CVC placement. COMPARISON: November 19, 2019 TECHNIQUE: AP upright view of the chest. FINDINGS: The heart and mediastinum are stable. There is redemonstration of an endotracheal tube and nasogastric tubes in stable locations. Lung volumes are unchanged with mildly coarse markings present. There is redemonstration of a calcified granuloma in the left mid thorax. Central venous catheter from the left subclavian is present within the superior vena cava.  There is no visualized pneumothorax. 1. Status post left subclavian central line placement. Tip location the superior vena cava. There is no visualized pneumothorax. 2. Stable appearance of coarse lung markings and slightly decreased lung volumes. **This report has been created using voice recognition software. It may contain minor errors which are inherent in voice recognition technology. ** Final report electronically signed by Dr. Kala Ramirez on 11/19/2019 7:14 AM    Xr Chest Portable    Result Date: 11/19/2019  PROCEDURE: XR CHEST PORTABLE CLINICAL INFORMATION: ETT/OGT placement. COMPARISON: No prior study. TECHNIQUE: AP upright view of the chest. FINDINGS: The lung volumes are shallow. There is an endotracheal tube 2.5 cm above the mesfin. A nasogastric tube is in the body of the stomach. There are coarse lung markings likely age-related. There is a small calcified density in the left perihilar region. A granuloma is suspected. Minimal retrocardiac atelectasis or infiltrate cannot be excluded. There is no venous congestion present. A small left basilar effusion or atelectasis blunts the costophrenic angle. The skeleton is negative for active pathology. 1. Visualized endotracheal tube above the mesfin. 2. Nasogastric tube within the stomach. 3. Diminished lung volumes with crowding of markings. Left retrocardiac atelectasis or infiltrate is not excluded. A small left effusion is considered. **This report has been created using voice recognition software. It may contain minor errors which are inherent in voice recognition technology. ** Final report electronically signed by Dr. Kala Ramirez on 11/19/2019 12:29 AM    Xr Chest Portable    Result Date: 11/19/2019  PROCEDURE: XR CHEST PORTABLE CLINICAL INFORMATION: et tube placement.  COMPARISON: November 18, 2019 TECHNIQUE: AP upright view of the chest. FINDINGS: The heart and mediastinum remain upper normal. There is an endotracheal tube visualized 3 cm above the Zoë Draper M.D. CLINICAL INFORMATION: Normal pressure hydrocephalus. APPROACH:  Translaminar, L3, right side. NEEDLE: 14-gauge Touhy needle CATHETER: 2.5 [place] Integra CSF drainage catheter CATHETER TIP: T8 FLUID: 3 mLClear spinal fluid. SEDATION: Versed 2 milligram, fentanyl 100 micrograms, IV. Patient was sedated for 30 minutes minutes during this procedure and monitored with EKG and pulse ox monitoring devices by registered nurse. FLUOROSCOPY TIME: 1 minute 54 seconds FLUOROSCOPIC IMAGES: 2 ESTIMATED BLOOD LOSS: Minimal PROCEDURE:  Signed informed consent was obtained prior to performing this procedure. The patient was sedated, as indicated above. .. The lumbar spine was evaluated fluoroscopically initially to determine an appropriate puncture site. .. The skin was marked, prepped, and draped in a sterile fashion, utilizing [address]. The skin was infiltrated with lidocaine 2 percent at the puncture site and a small skin nick was made with a #11 scalpel blade. The 14-gauge Touhy needle was then passed into the lumbar thecal sac, utilizing the approach listed above. Appropriate needle position was documented fluoroscopically. A few fluoroscopic spot films were obtained. Only a small amount of CSF could be aspirated through the needle. The drainage catheter was then passed coaxially through the Touhy needle and very easily passed cephalad to the level listed above. This was performed with fluoroscopic guidance. Fluoroscopic images were obtained for documentation. A small amount of fluid was seen to drain through the catheter. The catheter was stabilized to the skin with tape and a sterile OpSite dressing. The CSF drainage bag was sent to the floor with the patient. . The patient was then discharged from our department. Status post successful lumbar spine/CSF drainage catheter insertion. **This report has been created using voice recognition software.   It may contain minor errors which are inherent in voice recognition technology. ** Final report electronically signed by Dr. Janny Anderson on 11/4/2019 11:38 AM    Fluoro For Surgical Procedures    Result Date: 11/25/2019  Radiology exam is complete. No Radiologist dictation. Please follow up with ordering provider. Mri Brain W Wo Contrast    Result Date: 11/25/2019  PROCEDURE: MRI BRAIN W WO CONTRAST CLINICAL INFORMATION: confusion, with hx of MSSA infection of lumbar area. COMPARISON: None available. Correlation is made to CT of the head dated November 24, 2019. TECHNIQUE: Multiplanar and multiple spin echo T1 and T2-weighted images were obtained through the brain before and after the administration of 20 mL ProHance intravenous contrast. Note is made that fast scanning was performed secondary to patient motion. The repeated images are also degraded by motion. FINDINGS: The images are significantly degraded by motion artifacts which limits detailed evaluation. There is prominence of the sulcal spaces and ventricular system secondary to generalized, age-related parenchymal volume loss. Abnormal hyperintense T2 signal with restricted diffusion is noted along the posterior aspect of the left thalamus. Patchy areas of hyperintense T2 signal are also noted throughout the periventricular and deep cerebral white matter as well as the elsie. These areas do not demonstrate restricted diffusion. A small focus of restricted diffusion is also noted within the periventricular white matter along the posterior body of the right lateral ventricle and possibly a punctate focus at the medial aspect of the right cerebellar hemisphere (image 30 of series 5). Layering debris demonstrating restricted diffusion is present within the trigones of the bilateral lateral ventricles. There is also enhancement of the ventricular margin at these areas. The ventricles appear enlarged and out of proportion to the degree of parenchymal volume loss.  There is a lesion along

## 2019-11-29 NOTE — PLAN OF CARE
Problem: Falls - Risk of:  Goal: Will remain free from falls  Description  Will remain free from falls  11/28/2019 2217 by Miller Cat RN  Outcome: Ongoing  Note:   Patient at risk for falls due to decreased mobility and confusion. Fall assessment completed. Patient verbalizes understanding of call light for needs this shift. Fall band in place on patient's arm. Fall signs posted. Bed alarm in place and functioning properly. Problem: Nutrition  Goal: Optimal nutrition therapy  11/28/2019 2217 by Miller Cat RN  Outcome: Ongoing  Note:   Patient able to eat full amount of meals. Problem: Discharge Planning:  Goal: Discharged to appropriate level of care  Description  Discharged to appropriate level of care  11/28/2019 2217 by Miller Cat RN  Outcome: Ongoing  Note:   Patient continues need for ICU bed at this time. Problem: Bowel Function - Altered:  Goal: Bowel elimination is within specified parameters  Description  Bowel elimination is within specified parameters  11/28/2019 2217 by Miller Cat RN  Outcome: Ongoing  Note:   Patient has rectal tube for diarrhea at this time. Will continue to monitor. Problem: Urinary Elimination:  Goal: Signs and symptoms of infection will decrease  Description  Signs and symptoms of infection will decrease  11/28/2019 2217 by Miller Cat RN  Outcome: Ongoing  Note:   Patient febrile this shift, urine remains clear. Will continue to monitor. Problem: Urinary Elimination:  Goal: Complications related to the disease process, condition or treatment will be avoided or minimized  Description  Complications related to the disease process, condition or treatment will be avoided or minimized  11/28/2019 2217 by Miller Cat RN  Outcome: Ongoing  Note:   Patient continues with slight confusion and some forgetfulness. Bed alarm on and close monitoring continues.       Problem: Infection - Central Venous Catheter-Associated Bloodstream Infection:  Goal: Will show no infection signs and symptoms  Description  Will show no infection signs and symptoms  11/28/2019 2217 by Lauren Woods RN  Outcome: Ongoing  Note:   Aseptic technique utilized. Will continue to monitor. Care plan reviewed with patient and family. Patient and family verbalize understanding of the plan of care and contribute to goal setting.

## 2019-11-29 NOTE — PLAN OF CARE
Problem: Falls - Risk of:  Goal: Will remain free from falls  Description  Will remain free from falls  11/29/2019 1020 by Faviola Diallo RN  Outcome: Ongoing  Note:   No falls this shift. Bed in lowest position and locked. Call light within reach. Bed alarm activated. Fall precautions in place. Problem: Risk for Impaired Skin Integrity  Goal: Tissue integrity - skin and mucous membranes  Description  Structural intactness and normal physiological function of skin and  mucous membranes. 11/29/2019 1020 by Faviola Diallo RN  Outcome: Ongoing  Note:   No new skin breakdown noted this shift. Turn 2 hours and PRN. See wound/skin assessment for wounds/skin integrity for detailed information. Problem: Nutrition  Goal: Optimal nutrition therapy  11/29/2019 1020 by Faviola Diallo RN  Outcome: Ongoing  Note:   Tolerating diet. Problem: Discharge Planning:  Goal: Discharged to appropriate level of care  Description  Discharged to appropriate level of care  11/29/2019 1020 by Faviola Diallo RN  Outcome: Ongoing  Note:   Monitoring for discharge needs. Still has EVD and ICP monitoring. Problem: Bowel Function - Altered:  Goal: Bowel elimination is within specified parameters  Description  Bowel elimination is within specified parameters  11/29/2019 1020 by Faviola Diallo RN  Outcome: Ongoing  Note:   Liquid stools continue with flexiseal in place. Problem: Urinary Elimination:  Goal: Signs and symptoms of infection will decrease  Description  Signs and symptoms of infection will decrease  11/29/2019 1020 by Faviola Diallo RN  Outcome: Ongoing  Note:   No signs of new infection from cade. Cade care q shift.       Problem: Urinary Elimination:  Goal: Complications related to the disease process, condition or treatment will be avoided or minimized  Description  Complications related to the disease process, condition or treatment will be avoided or minimized  11/29/2019 1020 by Faviola Diallo RN  Outcome: Ongoing  Note:   No complications noted. Problem: Infection - Central Venous Catheter-Associated Bloodstream Infection:  Goal: Will show no infection signs and symptoms  Description  Will show no infection signs and symptoms  11/29/2019 1020 by Dunia James RN  Outcome: Ongoing  Note:   No signs of infection to central line. Care plan reviewed with patient. Patient verbalized understanding of the plan of care and contributed to goal setting.

## 2019-11-29 NOTE — PROGRESS NOTES
Assessment and Plan:        1. Abscess: Soft tissue and communicating with epidural abscess.  Secondary to MSSA.  Status post lumbar drain placement 11/21/2019. Alis [place] for nafcillin to be continued outpt.  Surgical drainage completed 11/25/2019.  2. 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  On Nafcillin/ganciclovir. Ventriculostomy tube placed 11/25/2019. Repeat HSV PCR remains + on 11/29. 3. Anemia: no clear source of bleed and +8.3L/20# so likely dilutional component. Received 1 unit of PRBCs. Monitor Hb and give lasix 20mg IV x 1.   4. Parox afib w RVR: New onset. [place] secondary to septic shock.  Converted to sinus. Transitioned to beta blocker therapy.  No anticoagulation. 5. Hyponatremia: s/p 3% saline. Na 131 today. Transition to oral sodium and give dose of lasix  6. Delirium: Secondary to septic shock with meningitis. Mentation improving but verbally aggressive  7. Type 2 diabetes mellitus:  basal bolus. 8. 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  9. MSSA/Klebsiella Pneumonia POA: Resolved. Repeat CX sent today due to high secretion burden. 10. Diarrhea- stool cx pending     CC: Lumbar abscess  HPI: Patient is a 51-year-old white female active smoker. Tommi Friday has a remote history of appendectomy and cholecystectomy. Tommi Friday has a history of type 2 diabetes mellitus, hypertension, hyperlipidemia, depression, and a history of brain lipoma diagnosed 11/4/2019.   Patient was hospitalized 11/4/2019 through 11/7/2019 for evaluation of possible normal pressure hydrocephalus.  She had symptoms consistent with normal pressure hydrocephalus including progressive gait disturbance, incontinence, and cognitive decline associated with blurred vision and headaches.  She was admitted and had a lumbar drain placed.  Final assessment of efficacy of lumbar drain was documented 11/7/2019 by Dr. Noe Chapman that note he determined there was no significant benefit from the lumbar drain trial.  Findings did not support the diagnosis of normal pressure hydrocephalus.  Patient was determined not to be a good candidate for  shunt and lumbar drain was removed.  Patient was discharged home on 2019. Patient was received in transfer from [facility] facility to [facility] on 2019.  Symptoms prior to hospitalization included a 5-day history of back pain associated with difficulty ambulating, difficulty urinating and diarrhea.  Additionally she had a 2-day history of confusion with hallucinations.  Immediately upon arrival, nursing staff had concern regarding patient's respiratory rate at 44 and heart rate at 160.  She was transferred immediately to the intensive care unit.  Patient was in overt septic shock and was intubated 2019.  She was started on Zosyn and vancomycin.  Cultures were sent. Hans Chiu underwent volume resuscitation and was started on pressors.  Rocephin was subsequently added given concerns for possible meningitis. Lumbar puncture was performed and subsequently grew Staphylococcus species. Antibiotics simplified to vancomycin for meningitis.  MRI lumbar spine demonstrated soft tissue abscess communicating with small epidural abscess.  Lumbar soft tissue drain placed 19.  Culture positive for MSSA.  Antibiotics converted to Nafcillin 19. Sputum culture started to grow pansensitive Klebsiella pneumonia.  Patient was treated with Cipro. Patient was extubated 2019. Patient underwent incision and drainage of lumbar soft tissue abscess on 2019. Ventriculostomy tube was placed 2019.   Patient developed hyponatremia. Hypertonic saline was initiated 2019 and  .   Spinal fluid positive HSV x 2     ROS:   Gen: Feeling a lot better, but having trouble remembering things  CV: no angina no palpitations  Pulm: no dyspnea, no cough  Abd: no pain, +flatus  Neuro: denies headache or visual changes, denies neck stiffness, doesn't remember hospitalization up

## 2019-11-30 LAB
AEROBIC CULTURE: ABNORMAL
AEROBIC CULTURE: ABNORMAL
AEROBIC CULTURE: NORMAL
ANAEROBIC CULTURE: ABNORMAL
ANAEROBIC CULTURE: ABNORMAL
ANAEROBIC CULTURE: NORMAL
ANION GAP SERPL CALCULATED.3IONS-SCNC: 17 MEQ/L (ref 8–16)
BASOPHILS # BLD: 0.3 %
BASOPHILS ABSOLUTE: 0 THOU/MM3 (ref 0–0.1)
BUN BLDV-MCNC: 12 MG/DL (ref 7–22)
CALCIUM SERPL-MCNC: 7.6 MG/DL (ref 8.5–10.5)
CHLORIDE BLD-SCNC: 97 MEQ/L (ref 98–111)
CO2: 17 MEQ/L (ref 23–33)
CREAT SERPL-MCNC: 1.1 MG/DL (ref 0.4–1.2)
EOSINOPHIL # BLD: 2.4 %
EOSINOPHILS ABSOLUTE: 0.3 THOU/MM3 (ref 0–0.4)
ERYTHROCYTE [DISTWIDTH] IN BLOOD BY AUTOMATED COUNT: 17.2 % (ref 11.5–14.5)
ERYTHROCYTE [DISTWIDTH] IN BLOOD BY AUTOMATED COUNT: 54.4 FL (ref 35–45)
GFR SERPL CREATININE-BSD FRML MDRD: 50 ML/MIN/1.73M2
GLUCOSE BLD-MCNC: 120 MG/DL (ref 70–108)
GLUCOSE BLD-MCNC: 138 MG/DL (ref 70–108)
GLUCOSE BLD-MCNC: 154 MG/DL (ref 70–108)
GLUCOSE BLD-MCNC: 203 MG/DL (ref 70–108)
GLUCOSE BLD-MCNC: 234 MG/DL (ref 70–108)
GRAM STAIN RESULT: ABNORMAL
GRAM STAIN RESULT: ABNORMAL
GRAM STAIN RESULT: NORMAL
HCT VFR BLD CALC: 25.4 % (ref 37–47)
HEMOGLOBIN: 7.9 GM/DL (ref 12–16)
IMMATURE GRANS (ABS): 0.19 THOU/MM3 (ref 0–0.07)
IMMATURE GRANULOCYTES: 1.4 %
INDIA INK: NORMAL
LYMPHOCYTES # BLD: 22.2 %
LYMPHOCYTES ABSOLUTE: 2.9 THOU/MM3 (ref 1–4.8)
MCH RBC QN AUTO: 27.7 PG (ref 26–33)
MCHC RBC AUTO-ENTMCNC: 31.1 GM/DL (ref 32.2–35.5)
MCV RBC AUTO: 89.1 FL (ref 81–99)
MONOCYTES # BLD: 5.1 %
MONOCYTES ABSOLUTE: 0.7 THOU/MM3 (ref 0.4–1.3)
NUCLEATED RED BLOOD CELLS: 0 /100 WBC
ORGANISM: ABNORMAL
ORGANISM: ABNORMAL
PLATELET # BLD: 304 THOU/MM3 (ref 130–400)
PMV BLD AUTO: 9.9 FL (ref 9.4–12.4)
POTASSIUM SERPL-SCNC: 3.6 MEQ/L (ref 3.5–5.2)
RBC # BLD: 2.85 MILL/MM3 (ref 4.2–5.4)
SEG NEUTROPHILS: 68.6 %
SEGMENTED NEUTROPHILS ABSOLUTE COUNT: 9.1 THOU/MM3 (ref 1.8–7.7)
SODIUM BLD-SCNC: 131 MEQ/L (ref 135–145)
SODIUM BLD-SCNC: 131 MEQ/L (ref 135–145)
WBC # BLD: 13.2 THOU/MM3 (ref 4.8–10.8)

## 2019-11-30 PROCEDURE — 80048 BASIC METABOLIC PNL TOTAL CA: CPT

## 2019-11-30 PROCEDURE — 99233 SBSQ HOSP IP/OBS HIGH 50: CPT | Performed by: ANESTHESIOLOGY

## 2019-11-30 PROCEDURE — 6370000000 HC RX 637 (ALT 250 FOR IP): Performed by: ANESTHESIOLOGY

## 2019-11-30 PROCEDURE — 2580000003 HC RX 258: Performed by: INTERNAL MEDICINE

## 2019-11-30 PROCEDURE — 6370000000 HC RX 637 (ALT 250 FOR IP): Performed by: PHYSICIAN ASSISTANT

## 2019-11-30 PROCEDURE — 84295 ASSAY OF SERUM SODIUM: CPT

## 2019-11-30 PROCEDURE — 82948 REAGENT STRIP/BLOOD GLUCOSE: CPT

## 2019-11-30 PROCEDURE — 99232 SBSQ HOSP IP/OBS MODERATE 35: CPT | Performed by: NEUROLOGICAL SURGERY

## 2019-11-30 PROCEDURE — 2709999900 HC NON-CHARGEABLE SUPPLY

## 2019-11-30 PROCEDURE — 94760 N-INVAS EAR/PLS OXIMETRY 1: CPT

## 2019-11-30 PROCEDURE — 6360000002 HC RX W HCPCS: Performed by: INTERNAL MEDICINE

## 2019-11-30 PROCEDURE — 6360000002 HC RX W HCPCS: Performed by: ANESTHESIOLOGY

## 2019-11-30 PROCEDURE — 2000000000 HC ICU R&B

## 2019-11-30 PROCEDURE — 6370000000 HC RX 637 (ALT 250 FOR IP): Performed by: INTERNAL MEDICINE

## 2019-11-30 PROCEDURE — 2580000003 HC RX 258: Performed by: PHYSICIAN ASSISTANT

## 2019-11-30 PROCEDURE — 85025 COMPLETE CBC W/AUTO DIFF WBC: CPT

## 2019-11-30 PROCEDURE — 2500000003 HC RX 250 WO HCPCS: Performed by: PHYSICIAN ASSISTANT

## 2019-11-30 PROCEDURE — 6370000000 HC RX 637 (ALT 250 FOR IP): Performed by: NURSE PRACTITIONER

## 2019-11-30 PROCEDURE — 36415 COLL VENOUS BLD VENIPUNCTURE: CPT

## 2019-11-30 RX ORDER — CALCIUM POLYCARBOPHIL 625 MG 625 MG/1
625 TABLET ORAL DAILY
Status: DISCONTINUED | OUTPATIENT
Start: 2019-11-30 | End: 2019-12-10 | Stop reason: HOSPADM

## 2019-11-30 RX ORDER — FUROSEMIDE 10 MG/ML
20 INJECTION INTRAMUSCULAR; INTRAVENOUS DAILY
Status: COMPLETED | OUTPATIENT
Start: 2019-11-30 | End: 2019-12-02

## 2019-11-30 RX ADMIN — FAMOTIDINE 20 MG: 20 TABLET ORAL at 20:09

## 2019-11-30 RX ADMIN — SODIUM CHLORIDE TAB 1 GM 2 G: 1 TAB at 12:14

## 2019-11-30 RX ADMIN — GANCICLOVIR SODIUM 245 MG: 500 INJECTION, POWDER, LYOPHILIZED, FOR SOLUTION INTRAVENOUS at 20:04

## 2019-11-30 RX ADMIN — MAGNESIUM GLUCONATE 500 MG ORAL TABLET 400 MG: 500 TABLET ORAL at 08:54

## 2019-11-30 RX ADMIN — CALCIUM POLYCARBOPHIL 625 MG: 625 TABLET, FILM COATED ORAL at 15:32

## 2019-11-30 RX ADMIN — HYDROCODONE BITARTRATE AND ACETAMINOPHEN 2 TABLET: 5; 325 TABLET ORAL at 03:53

## 2019-11-30 RX ADMIN — FUROSEMIDE 20 MG: 40 INJECTION, SOLUTION INTRAMUSCULAR; INTRAVENOUS at 15:32

## 2019-11-30 RX ADMIN — NAFCILLIN SODIUM 12 G: 2 INJECTION, POWDER, LYOPHILIZED, FOR SOLUTION INTRAMUSCULAR; INTRAVENOUS at 10:42

## 2019-11-30 RX ADMIN — MICONAZOLE NITRATE: 20 POWDER TOPICAL at 20:04

## 2019-11-30 RX ADMIN — SODIUM CHLORIDE TAB 1 GM 2 G: 1 TAB at 18:19

## 2019-11-30 RX ADMIN — INSULIN LISPRO 2 UNITS: 100 INJECTION, SOLUTION INTRAVENOUS; SUBCUTANEOUS at 20:07

## 2019-11-30 RX ADMIN — SODIUM CHLORIDE TAB 1 GM 2 G: 1 TAB at 08:54

## 2019-11-30 RX ADMIN — METOPROLOL TARTRATE 25 MG: 25 TABLET ORAL at 08:53

## 2019-11-30 RX ADMIN — MICONAZOLE NITRATE: 20 POWDER TOPICAL at 09:03

## 2019-11-30 RX ADMIN — METOPROLOL TARTRATE 25 MG: 25 TABLET ORAL at 20:04

## 2019-11-30 RX ADMIN — HYDROCODONE BITARTRATE AND ACETAMINOPHEN 2 TABLET: 5; 325 TABLET ORAL at 18:24

## 2019-11-30 RX ADMIN — POTASSIUM & SODIUM PHOSPHATES POWDER PACK 280-160-250 MG 250 MG: 280-160-250 PACK at 12:14

## 2019-11-30 RX ADMIN — POTASSIUM CHLORIDE 40 MEQ: 20 TABLET, EXTENDED RELEASE ORAL at 18:19

## 2019-11-30 RX ADMIN — HYDROCODONE BITARTRATE AND ACETAMINOPHEN 2 TABLET: 5; 325 TABLET ORAL at 23:07

## 2019-11-30 RX ADMIN — INSULIN LISPRO 2 UNITS: 100 INJECTION, SOLUTION INTRAVENOUS; SUBCUTANEOUS at 18:22

## 2019-11-30 RX ADMIN — Medication: at 09:03

## 2019-11-30 RX ADMIN — Medication 10 ML: at 09:00

## 2019-11-30 RX ADMIN — POTASSIUM & SODIUM PHOSPHATES POWDER PACK 280-160-250 MG 250 MG: 280-160-250 PACK at 20:04

## 2019-11-30 RX ADMIN — HYDROCODONE BITARTRATE AND ACETAMINOPHEN 2 TABLET: 5; 325 TABLET ORAL at 08:52

## 2019-11-30 RX ADMIN — POTASSIUM CHLORIDE 40 MEQ: 20 TABLET, EXTENDED RELEASE ORAL at 08:52

## 2019-11-30 RX ADMIN — Medication 10 ML: at 20:04

## 2019-11-30 RX ADMIN — FAMOTIDINE 20 MG: 20 TABLET ORAL at 08:52

## 2019-11-30 RX ADMIN — INSULIN LISPRO 4 UNITS: 100 INJECTION, SOLUTION INTRAVENOUS; SUBCUTANEOUS at 12:14

## 2019-11-30 RX ADMIN — POTASSIUM & SODIUM PHOSPHATES POWDER PACK 280-160-250 MG 250 MG: 280-160-250 PACK at 18:19

## 2019-11-30 RX ADMIN — GANCICLOVIR SODIUM 245 MG: 500 INJECTION, POWDER, LYOPHILIZED, FOR SOLUTION INTRAVENOUS at 08:52

## 2019-11-30 RX ADMIN — Medication: at 20:04

## 2019-11-30 RX ADMIN — POTASSIUM & SODIUM PHOSPHATES POWDER PACK 280-160-250 MG 250 MG: 280-160-250 PACK at 08:53

## 2019-11-30 ASSESSMENT — PAIN DESCRIPTION - PAIN TYPE
TYPE_2: ACUTE PAIN
TYPE: ACUTE PAIN
TYPE: ACUTE PAIN
TYPE: ACUTE PAIN;SURGICAL PAIN
TYPE: ACUTE PAIN

## 2019-11-30 ASSESSMENT — PAIN DESCRIPTION - DESCRIPTORS
DESCRIPTORS: ACHING
DESCRIPTORS_2: ACHING
DESCRIPTORS: ACHING
DESCRIPTORS: ACHING
DESCRIPTORS: ACHING;HEADACHE

## 2019-11-30 ASSESSMENT — PAIN SCALES - GENERAL
PAINLEVEL_OUTOF10: 10
PAINLEVEL_OUTOF10: 10
PAINLEVEL_OUTOF10: 0
PAINLEVEL_OUTOF10: 9
PAINLEVEL_OUTOF10: 0
PAINLEVEL_OUTOF10: 5
PAINLEVEL_OUTOF10: 9
PAINLEVEL_OUTOF10: 6
PAINLEVEL_OUTOF10: 10

## 2019-11-30 ASSESSMENT — PAIN DESCRIPTION - LOCATION
LOCATION: HEAD
LOCATION_2: HEAD
LOCATION: BACK
LOCATION: HEAD
LOCATION: HEAD

## 2019-11-30 ASSESSMENT — PAIN DESCRIPTION - INTENSITY: RATING_2: 6

## 2019-11-30 ASSESSMENT — PAIN DESCRIPTION - ORIENTATION
ORIENTATION: LOWER
ORIENTATION_2: ANTERIOR

## 2019-11-30 ASSESSMENT — PAIN DESCRIPTION - ONSET
ONSET: ON-GOING
ONSET_2: ON-GOING

## 2019-11-30 ASSESSMENT — PAIN DESCRIPTION - FREQUENCY
FREQUENCY: CONTINUOUS

## 2019-11-30 ASSESSMENT — PAIN - FUNCTIONAL ASSESSMENT
PAIN_FUNCTIONAL_ASSESSMENT: ACTIVITIES ARE NOT PREVENTED
PAIN_FUNCTIONAL_ASSESSMENT: PREVENTS OR INTERFERES SOME ACTIVE ACTIVITIES AND ADLS
PAIN_FUNCTIONAL_ASSESSMENT: ACTIVITIES ARE NOT PREVENTED

## 2019-11-30 ASSESSMENT — PAIN DESCRIPTION - PROGRESSION
CLINICAL_PROGRESSION: NOT CHANGED
CLINICAL_PROGRESSION: NOT CHANGED
CLINICAL_PROGRESSION_2: NOT CHANGED
CLINICAL_PROGRESSION: NOT CHANGED

## 2019-11-30 ASSESSMENT — PAIN DESCRIPTION - DURATION: DURATION_2: CONTINUOUS

## 2019-11-30 NOTE — PLAN OF CARE
Problem: Falls - Risk of:  Goal: Will remain free from falls  Description  Will remain free from falls  11/29/2019 2203 by Charla Figueroa RN  Outcome: Ongoing  Note:   Patient at risk for falls due to decreased mobility and confusion. Fall assessment completed. Patient verbalizes understanding of call light for needs this shift. Fall band in place on patient's arm. Fall signs posted. Bed alarm in place and functioning properly. Problem: Risk for Impaired Skin Integrity  Goal: Tissue integrity - skin and mucous membranes  Description  Structural intactness and normal physiological function of skin and  mucous membranes. 11/29/2019 2203 by Charla Figueroa RN  Outcome: Ongoing  Note:   Patient at risk for decreased skin integrity due to decreased mobility. Patient turned and repositioned every 2 hours and PRN t/o this shift. No new skin breakdown noted this shift. Problem: Nutrition  Goal: Optimal nutrition therapy  11/29/2019 2203 by Charla Figueroa RN  Outcome: Ongoing  Note:   Patient able to eat full amount of meals. Problem: Discharge Planning:  Goal: Discharged to appropriate level of care  Description  Discharged to appropriate level of care  11/29/2019 2203 by Charla Figueroa RN  Outcome: Ongoing  Note:   Patient continues need for ICU bed at this time. Problem: Bowel Function - Altered:  Goal: Bowel elimination is within specified parameters  Description  Bowel elimination is within specified parameters  11/29/2019 2203 by Charla Figueroa RN  Outcome: Ongoing  Note:   Patient has rectal tube in place for diarrhea which continues. Will monitor.

## 2019-11-30 NOTE — PROGRESS NOTES
Neurosurgery Progress Note    Patient:  Abhay Berger      Unit/Bed:4D-17/017-A    YOB: 1955    MRN: [id number]     Acct: [de-identified]     Admit date: 11/18/2019    No chief complaint on file. Patient Seen, Chart, Physician notes, Labs, Radiology studies reviewed. Subjective: Mrs. Hodge Figures remains status postoperative day #5 from evacuation and debridement of lumbar 2 lumbar 3 intramuscular spinal abscess along with evacuation of epidural abscess the same level including concurrent partial laminectomy of lumbar 2 lumbar 3 and EVD placement.  Continues alert and even more interactive on exam today. Past, Family, Social History unchanged from admission. Diet:  DIET CARB CONTROL;   Dietary Nutrition Supplements: Diabetic Oral Supplement    Medications:  Scheduled Meds:   ganciclovir (CYTOVENE) IVPB  2.5 mg/kg Intravenous 2 times per day    sodium chloride  2 g Oral TID WC    potassium chloride  40 mEq Oral BID WC    famotidine  20 mg Oral BID    magnesium oxide  400 mg Oral Daily    miconazole   Topical BID    sodium chloride flush  10 mL Intravenous 2 times per day    docusate sodium  100 mg Oral BID    nafcillin 12 g continuous infusion  12 g Intravenous Q24H    calcium replacement protocol   Other RX Placeholder    insulin lispro  0-12 Units Subcutaneous TID WC    insulin lispro  0-6 Units Subcutaneous Nightly    metoprolol tartrate  25 mg Oral BID    potassium & sodium phosphates  1 packet Oral 4x Daily    potassium (CARDIAC) replacement protocol   Other RX Placeholder     Continuous Infusions:   dextrose       PRN Meds:HYDROcodone 5 mg - acetaminophen **OR** HYDROcodone 5 mg - acetaminophen, hydrALAZINE, zinc oxide, sodium chloride flush, ondansetron, fentanNYL **OR** fentanNYL, acetaminophen, dextrose, magnesium hydroxide, potassium chloride, magnesium sulfate, acetaminophen, glucose, dextrose, glucagon (rDNA), dextrose    Objective:  She is resting comfortably with 203*     HgbA1C: No results for input(s): LABA1C in the last 72 hours. Lipids: No results for input(s): CHOL, TRIG, HDL, LDLCALC in the last 72 hours. Invalid input(s): LDL    Radiology reports as per the Radiologist  Radiology: Ct Head Wo Contrast    Result Date: 11/27/2019  PROCEDURE: CT HEAD WO CONTRAST CLINICAL INFORMATION: follow up surgery. COMPARISON: Noncontrast brain CT dated 11/26/2019 TECHNIQUE: Noncontrast 5 mm axial images were obtained through the brain. Sagittal and coronal reconstructions were obtained and reviewed. All CT scans at this facility use dose modulation, iterative reconstruction, and/or weight-based dosing when appropriate to reduce radiation dose to as low as reasonably achievable. FINDINGS: Brain: There is no acute ischemic infarct, hemorrhage, midline shift, or mass effect. There is no change in the 1.5 x 1.1 cm fat density lesion in the interpeduncular cistern to the left of midline. No change in the mild to moderate periventricular hypodensities which may represent small vessel chronic ischemic changes although cannot exclude transependymal flow of CSF. Ventricles/basal cisterns: No change in the position of the right frontal ventriculostomy, tip at the level of the right foramen of [place]. There is stable mild ventriculomegaly. Skull base/calvarium/osseous structures: Right frontal shan hole, otherwise unremarkable. Soft tissues: Unremarkable Intraorbital contents: Unremarkable Sinuses: Unremarkable; the imaged sinuses are clear without evidence of mucosal thickening or fluid levels. Mastoids: Unremarkable; the mastoid air cells are clear. No acute ischemic infarct, hemorrhage, or mass effect. Stable position of the right frontal ventriculostomy, tip at the level of the foramen of [place]. Stable mild ventriculomegaly. Stable appearance of the brain compared to the previous study as detailed above. **This report has been created using voice recognition software.  It may mass effect. **This report has been created using voice recognition software. It may contain minor errors which are inherent in voice recognition technology. ** Final report electronically signed by Dr. Merly Severino on 11/26/2019 5:53 AM    Ct Head Wo Contrast    Result Date: 11/24/2019  PROCEDURE: CT HEAD WO CONTRAST CLINICAL INFORMATION: Mental status change TECHNIQUE: CT scan of the head was performed from the vertex to the skull base without use of intravenous contrast. Axial images as well as coronal and sagittal reconstructions were obtained. All CT scans at this facility use dose modulation, iterative reconstruction, and/or weight-based dosing when appropriate to reduce radiation dose to as low as reasonably achievable. COMPARISON: None. FINDINGS: There is no mass effect, midline shift or acute hemorrhage. Ventricles and sulci are prominent. There is diffuse periventricular white matter hypoattenuation. A 1.2 cm hypoattenuating structure in the left midbrain has noncontrast mean Hounsfield units of -71 (image 18). Areas of adjacent hypoattenuation may be secondary to prior infarcts. Visualized orbits, paranasal sinuses and mastoid air cells are unremarkable. 1. No mass effect or acute hemorrhage. 2. Chronic periventricular small vessel ischemic changes and cerebral atrophy. 3. Hypoattenuating structure in the midbrain of indeterminate etiology. Brain MRI is recommended for further evaluation. **This report has been created using voice recognition software. It may contain minor errors which are inherent in voice recognition technology. ** Final report electronically signed by Dr. Teri Ramirez on 11/24/2019 1:39 PM    Ct Abscess Drainage W Cath Placement S&i    Result Date: 11/21/2019  CT-GUIDED ABSCESS DRAINAGE PERFORMED BY: JOSETTE Ortiz: Posterior lower lumbar paraspinal abscess APPROACH: Posterior CATHETER: 8 [place] multipurpose drainage catheter.  FLUID OBTAINED: 15 mL purulent and sagittal T1-weighted images were obtained. 20 mL ProHance was injected in the existing IV site. FINDINGS:  There is a minimal anterior wedge shape configuration of the T12 vertebral body with approximately 10% anterior height loss. There is a tiny Schmorl's node at superior endplate of T7. There is otherwise anatomic vertebral body height and alignment. Marrow signal is within normal limits. The thoracic spinal cord is normal in caliber without focal area of abnormal T2 signal identified. No epidural fluid collection is identified. No abnormal enhancement is identified within the cord. At T9-10 there is a left paracentral extrusion which mildly indents thecal sac and contacts the ventral aspect of spinal cord secondary to the curvature of the spine without abnormal signal in the cord. There is no significant neural foraminal stenosis. There is no significant spinal canal or neuroforaminal stenosis at any other thoracic level. 1. No evidence of acute abnormality of the thoracic spine. 2. Old minimal compression deformity of T12 with approximately 10% anterior height loss. 3. Left paracentral extrusion at T9-10 causing mild spinal canal stenosis and contacting the ventral aspect of the cord without abnormal signal in the cord. **This report has been created using voice recognition software. It may contain minor errors which are inherent in voice recognition technology. ** Final report electronically signed by Dr. Rhiannon Jones MD on 11/20/2019 3:04 PM    Mri Lumbar Spine W Wo Contrast    Result Date: 11/25/2019  PROCEDURE: MRI LUMBAR SPINE W WO CONTRAST CLINICAL INFORMATION: Infection. COMPARISON: MRI of the lumbar spine dated November 20, 2019. TECHNIQUE: Sagittal and axial T1 and T2-weighted images were obtained to the lumbar spine. Postcontrast axial and sagittal T1-weighted images were also obtained.  Postcontrast images were obtained after administration of 20 mL ProHance intravenous contrast. complex collection in the left paraspinal musculature at the L3 and L4 levels with a multilobulated cystic complex measuring approximately 5.5 x 3.2 x 2.4 cm in greatest CC, AP and lateral dimensions. This has multiple septations with rim enhancement. In addition, there appears to be a subtle communication with this complex collection in the paraspinal musculature with the epidural space at the L3 level. There is a 1.3 x 0.6 x 0.5 cm rim-enhancing collection in the posterior epidural space at this level. There is mild thickening of the cauda equina nerve roots at the L3 level with subtle enhancement. At T12-L1 there is a small right paracentral protrusion which mildly indents thecal sac and may contact the ventral aspect of the conus secondary to the curvature of the spine. There is no significant neural foraminal stenosis. At L1-L2 there is no significant spinal canal or neuroforaminal stenosis. At L2-3 there is a shallow disc bulge which minimally indents thecal sac and contributes to mild bilateral neural foraminal stenosis in association with mild facet hypertrophy and ligamentum flavum thickening. There is also contribution of mild stenosis from the small posterior epidural enhancing collection. At L3-4 there is minimal disc bulge without significant spinal canal stenosis and mild bilateral neural foraminal stenosis in association with facet hypertrophy and ligamentum flavum thickening. At L4-5 there is a shallow disc bulge without significant spinal canal stenosis or neural foraminal stenosis. There is persistent thickening of the cauda equina this level. At L5-S1 there is a disc bulge with superimposed central protrusion which mildly indents thecal sac and may contact traversing S1 nerve roots bilaterally. There is moderate bilateral neural foraminal stenosis in association with facet hypertrophy and ligamentum flavum thickening.       1. Prominent complex fluid collection in the paraspinal musculature on the left at the L3-L4 level which appears to communicate anteriorly with a tiny epidural abscess at the L3 level and posteriorly with a large subcutaneous component abscess. 2. Arachnoiditis. Findings were discussed with Kaylan Tompkins RN via telephone at the time of interpretation. **This report has been created using voice recognition software. It may contain minor errors which are inherent in voice recognition technology. ** Final report electronically signed by Dr. Felisa Weber MD on 11/20/2019 3:18 PM    Xr Lumbar Spine 1 Vw    Result Date: 11/25/2019  PROCEDURE: XR LUMBAR SPINE 1 VW CLINICAL INFORMATION: Lumbar laminectomy TECHNIQUE: 2 intraoperative spot radiographs of the lumbar spine COMPARISON: None FINDINGS: There is a retractor posterior to the L3-4 vertebra. There is a localizer posterior to L3. Disc space narrowing is present at the L5-S1 level. Radiopaque surgical material seen in the posterior soft tissues. Surgical localization as above. Final report electronically signed by Dr. Brooke Lugo on 11/25/2019 3:33 PM    Xr Chest Portable    Result Date: 11/28/2019  PROCEDURE: XR CHEST PORTABLE CLINICAL INFORMATION: hypoxia. COMPARISON: Chest x-ray dated 11/23/2019 TECHNIQUE: AP Portable chest xray FINDINGS: Lines/tubes/devices: Left subclavian central venous catheter tip remains in the region of the mid SVC. Lungs/pleura: There are mild interstitial infiltrates which may represent interstitial pulmonary edema or an atypical pneumonia. There is no consolidation. There is a slightly larger than 1 cm mid left lung calcified granuloma. No pleural effusion. No pneumothorax. Heart: There is mild cardiomegaly. Mediastinum/deepthi: No obvious mass or adenopathy. Skeleton: No significant bone or joint abnormality. Mild interstitial pulmonary edema versus atypical pneumonia. **This report has been created using voice recognition software.  It may contain minor errors which are inherent in voice technology. ** Final report electronically signed by Dr. Rashaad Emerson on 11/19/2019 7:14 AM    Xr Chest Portable    Result Date: 11/19/2019  PROCEDURE: XR CHEST PORTABLE CLINICAL INFORMATION: ETT/OGT placement. COMPARISON: No prior study. TECHNIQUE: AP upright view of the chest. FINDINGS: The lung volumes are shallow. There is an endotracheal tube 2.5 cm above the mesfin. A nasogastric tube is in the body of the stomach. There are coarse lung markings likely age-related. There is a small calcified density in the left perihilar region. A granuloma is suspected. Minimal retrocardiac atelectasis or infiltrate cannot be excluded. There is no venous congestion present. A small left basilar effusion or atelectasis blunts the costophrenic angle. The skeleton is negative for active pathology. 1. Visualized endotracheal tube above the mesfin. 2. Nasogastric tube within the stomach. 3. Diminished lung volumes with crowding of markings. Left retrocardiac atelectasis or infiltrate is not excluded. A small left effusion is considered. **This report has been created using voice recognition software. It may contain minor errors which are inherent in voice recognition technology. ** Final report electronically signed by Dr. Rashaad Emerson on 11/19/2019 12:29 AM    Xr Chest Portable    Result Date: 11/19/2019  PROCEDURE: XR CHEST PORTABLE CLINICAL INFORMATION: et tube placement. COMPARISON: November 18, 2019 TECHNIQUE: AP upright view of the chest. FINDINGS: The heart and mediastinum remain upper normal. There is an endotracheal tube visualized 3 cm above the mesfin. The nasogastric tube is in the mid body of the stomach. Lung volumes remain mildly shallow with redemonstration of calcified granuloma in the left mid thorax. Trace effusion at the right lung base cannot be excluded. There is no obvious congestive failure. Postsurgical clips in the right upper abdomen are stable compared to prior study.  There is no acute change of monitored with EKG and pulse ox monitoring devices by registered nurse. FLUOROSCOPY TIME: 1 minute 54 seconds FLUOROSCOPIC IMAGES: 2 ESTIMATED BLOOD LOSS: Minimal PROCEDURE:  Signed informed consent was obtained prior to performing this procedure. The patient was sedated, as indicated above. .. The lumbar spine was evaluated fluoroscopically initially to determine an appropriate puncture site. .. The skin was marked, prepped, and draped in a sterile fashion, utilizing [address]. The skin was infiltrated with lidocaine 2 percent at the puncture site and a small skin nick was made with a #11 scalpel blade. The 14-gauge Touhy needle was then passed into the lumbar thecal sac, utilizing the approach listed above. Appropriate needle position was documented fluoroscopically. A few fluoroscopic spot films were obtained. Only a small amount of CSF could be aspirated through the needle. The drainage catheter was then passed coaxially through the Touhy needle and very easily passed cephalad to the level listed above. This was performed with fluoroscopic guidance. Fluoroscopic images were obtained for documentation. A small amount of fluid was seen to drain through the catheter. The catheter was stabilized to the skin with tape and a sterile OpSite dressing. The CSF drainage bag was sent to the floor with the patient. . The patient was then discharged from our department. Status post successful lumbar spine/CSF drainage catheter insertion. **This report has been created using voice recognition software. It may contain minor errors which are inherent in voice recognition technology. ** Final report electronically signed by Dr. Husam Menendez on 11/4/2019 11:38 AM    Fluoro For Surgical Procedures    Result Date: 11/25/2019  Radiology exam is complete. No Radiologist dictation. Please follow up with ordering provider.      Mri Brain W Wo Contrast    Result Date: 11/25/2019  PROCEDURE: MRI BRAIN W WO CONTRAST CLINICAL INFORMATION: confusion, with hx of MSSA infection of lumbar area. COMPARISON: None available. Correlation is made to CT of the head dated November 24, 2019. TECHNIQUE: Multiplanar and multiple spin echo T1 and T2-weighted images were obtained through the brain before and after the administration of 20 mL ProHance intravenous contrast. Note is made that fast scanning was performed secondary to patient motion. The repeated images are also degraded by motion. FINDINGS: The images are significantly degraded by motion artifacts which limits detailed evaluation. There is prominence of the sulcal spaces and ventricular system secondary to generalized, age-related parenchymal volume loss. Abnormal hyperintense T2 signal with restricted diffusion is noted along the posterior aspect of the left thalamus. Patchy areas of hyperintense T2 signal are also noted throughout the periventricular and deep cerebral white matter as well as the elsie. These areas do not demonstrate restricted diffusion. A small focus of restricted diffusion is also noted within the periventricular white matter along the posterior body of the right lateral ventricle and possibly a punctate focus at the medial aspect of the right cerebellar hemisphere (image 30 of series 5). Layering debris demonstrating restricted diffusion is present within the trigones of the bilateral lateral ventricles. There is also enhancement of the ventricular margin at these areas. The ventricles appear enlarged and out of proportion to the degree of parenchymal volume loss. There is a lesion along the left side of the interpeduncular fossa T1 and T2 signal. It measures approximately 1.2 x 0.9 x 1.5 cm. It was also noted to be low attenuating on the prior CT. No significant mass effect is identified. The basal cisterns and visualized vascular flow voids are grossly patent.  The pituitary, brain stem and cervical medullary junction are grossly within normal APPROACH: Posterior CATHETER: 8 [place] multipurpose drainage catheter. FLUID OBTAINED: 15 mL purulent tan fluid SEDATION: None. Patient was ventilated on propofol drip. The patient was sedated for 30 minutes during this procedure and monitored with EKG and pulse ox monitoring devices by a registered nurse. PROCEDURE: Signed informed consent was obtained prior to performing this procedure. The patient was placed on the CT scanner and sedated, as indicated above. CT images were initially obtained to determine appropriate puncture site. The skin was marked, prepped, and draped in a sterile fashion. Following local anesthesia and utilizing aseptic technique, a needle was successfully passed into the abscess pocket. A small amount of pus was aspirated to confirm appropriate needle position. A guidewire was then passed through the needle followed by insertion of progressively larger dilators up to an 8 [place] size. An 8 [place] multi-side-hole drainage catheter was then passed over the wire and was coiled within the abscess pocket. The retaining suture was then locked in the standard fashion. Catheter was then hooked to a Domingo-Close drainage bag and the catheter was flushed several times with sterile saline. The catheter was stabilized to the skin with a Percu-Stay device. A sample of the pus was sent to laboratory for culture and sensitivity testing. Status post successful abscess drainage procedure. . **This report has been created using voice recognition software. It may contain minor errors which are inherent in voice recognition technology. ** Final report electronically signed by Dr. Arianna Alvarez on 11/21/2019 1:13 PM      Electronically signed by Syd Nelson PA-C on 11/30/2019 at 12:13 PM       Attending Note:     Patient seen and examined in the ICU  in conjunction with neurosurgery PA Syd Nelson PA-C).   Discussed with patient, her nurse and ICU team as well.  All data and imaging reviewed by

## 2019-11-30 NOTE — PROGRESS NOTES
2019 and  . Spinal fluid positive HSV x 2     ROS:   Gen: Fatigue, bottom hurts  CV: no angina no palpitations  Pulm: no dyspnea, no cough  Abd: no pain, +flatus  Neuro: head and eyes feel heavy. No visual changes, denies neck stiffness,      Medications:     dextrose        furosemide  20 mg Intravenous Daily    polycarbophil  625 mg Oral Daily    ganciclovir (CYTOVENE) IVPB  2.5 mg/kg Intravenous 2 times per day    sodium chloride  2 g Oral TID WC    potassium chloride  40 mEq Oral BID WC    famotidine  20 mg Oral BID    magnesium oxide  400 mg Oral Daily    miconazole   Topical BID    sodium chloride flush  10 mL Intravenous 2 times per day    docusate sodium  100 mg Oral BID    nafcillin 12 g continuous infusion  12 g Intravenous Q24H    calcium replacement protocol   Other RX Placeholder    insulin lispro  0-12 Units Subcutaneous TID WC    insulin lispro  0-6 Units Subcutaneous Nightly    metoprolol tartrate  25 mg Oral BID    potassium & sodium phosphates  1 packet Oral 4x Daily    potassium (CARDIAC) replacement protocol   Other RX Placeholder     Vital Signs:   T: 100.8: P: 83: RR: 20: B/P: 107/52: FiO2: RA: O2 Sat: 99: I/O:     General:  Interactive obese white female. NAD, poor dentition  HEENT:  normocephalic. Right ventriculostomy tube present.  No scleral icterus. PERR  Neck: Full range of motion. Lungs: Speaks in complete sentences wo stopping. No audible wheezing  Cardiac: RRR.    Abdomen: soft.  Nontender obese.   Extremities:  trace edema  Psych:  Awake.  Oriented to person, circumstance, place but still doesn't know month & year  Neurologic:  Bilateral lower extremity weakness.      .Patrick Frnaco

## 2019-11-30 NOTE — PLAN OF CARE
Problem: Falls - Risk of:  Goal: Will remain free from falls  Description  Will remain free from falls  11/30/2019 1103 by Vishnu Olson RN  Outcome: Ongoing  Note:   Pt. On bedrest with EVD, no falls       Problem: Risk for Impaired Skin Integrity  Goal: Tissue integrity - skin and mucous membranes  Description  Structural intactness and normal physiological function of skin and  mucous membranes. 11/30/2019 1103 by Vishnu Olson RN  Outcome: Ongoing  Note:   Pt. Remains on bedrest & at risk for impaired skin integrity; rectal tube with some leaking - frequent turns/repositioning and care to prevent new breakdown - none noted this shift. Problem: Nutrition  Goal: Optimal nutrition therapy  11/30/2019 1103 by Vishnu Olson RN  Outcome: Ongoing  Note:   Pt. With good appetite and able to eat all meals     Problem: Discharge Planning:  Goal: Discharged to appropriate level of care  Description  Discharged to appropriate level of care  11/30/2019 1103 by Vishnu Olson RN  Outcome: Ongoing  Note:   Pt. Conitnues with need for ICU bed, discharge plans ongoing. Pt. Plans for home with spouse. Problem: Bowel Function - Altered:  Goal: Bowel elimination is within specified parameters  Description  Bowel elimination is within specified parameters  11/30/2019 1103 by Vishnu Olson RN  Outcome: Not Met This Shift  Note:   Pt.  Continues with watery diarrhea - rectal tube continues     Problem: Urinary Elimination:  Goal: Signs and symptoms of infection will decrease  Description  Signs and symptoms of infection will decrease  11/30/2019 1103 by Vishnu Olson RN  Outcome: Ongoing  Note:   Continued need for cade catheter - urine is clear, yellow - will continue to monitor     Problem: Urinary Elimination:  Goal: Complications related to the disease process, condition or treatment will be avoided or minimized  Description  Complications related to the disease process, condition or treatment will be avoided or minimized  11/30/2019 1103 by Mell Kearns RN  Outcome: Ongoing  Note:   Pt. Continues with some confusion and memory loss - close monitoring continues, bed alarm on, side rails up, re-inforce and remind of safety precautions frequently     Problem: Infection - Central Venous Catheter-Associated Bloodstream Infection:  Goal: Will show no infection signs and symptoms  Description  Will show no infection signs and symptoms  11/30/2019 1103 by Mell Kearns RN  Outcome: Ongoing  Note:   Pt.  Admitted with infection - on ATB's, CVC site is clean, dry & intact - aseptic technique used

## 2019-12-01 LAB
ANION GAP SERPL CALCULATED.3IONS-SCNC: 15 MEQ/L (ref 8–16)
BASOPHILS # BLD: 0.3 %
BASOPHILS ABSOLUTE: 0 THOU/MM3 (ref 0–0.1)
BUN BLDV-MCNC: 11 MG/DL (ref 7–22)
CALCIUM SERPL-MCNC: 8 MG/DL (ref 8.5–10.5)
CHLORIDE BLD-SCNC: 98 MEQ/L (ref 98–111)
CO2: 23 MEQ/L (ref 23–33)
CREAT SERPL-MCNC: 1 MG/DL (ref 0.4–1.2)
EOSINOPHIL # BLD: 2.1 %
EOSINOPHILS ABSOLUTE: 0.3 THOU/MM3 (ref 0–0.4)
ERYTHROCYTE [DISTWIDTH] IN BLOOD BY AUTOMATED COUNT: 18.1 % (ref 11.5–14.5)
ERYTHROCYTE [DISTWIDTH] IN BLOOD BY AUTOMATED COUNT: 56.2 FL (ref 35–45)
GFR SERPL CREATININE-BSD FRML MDRD: 56 ML/MIN/1.73M2
GLUCOSE BLD-MCNC: 119 MG/DL (ref 70–108)
GLUCOSE BLD-MCNC: 123 MG/DL (ref 70–108)
GLUCOSE BLD-MCNC: 138 MG/DL (ref 70–108)
GLUCOSE BLD-MCNC: 156 MG/DL (ref 70–108)
HCT VFR BLD CALC: 26.8 % (ref 37–47)
HEMOGLOBIN: 8.4 GM/DL (ref 12–16)
IMMATURE GRANS (ABS): 0.13 THOU/MM3 (ref 0–0.07)
IMMATURE GRANULOCYTES: 1 %
LYMPHOCYTES # BLD: 22.9 %
LYMPHOCYTES ABSOLUTE: 2.9 THOU/MM3 (ref 1–4.8)
MAGNESIUM: 1.2 MG/DL (ref 1.6–2.4)
MCH RBC QN AUTO: 27.5 PG (ref 26–33)
MCHC RBC AUTO-ENTMCNC: 31.3 GM/DL (ref 32.2–35.5)
MCV RBC AUTO: 87.9 FL (ref 81–99)
MONOCYTES # BLD: 4.9 %
MONOCYTES ABSOLUTE: 0.6 THOU/MM3 (ref 0.4–1.3)
NUCLEATED RED BLOOD CELLS: 0 /100 WBC
PHOSPHORUS: 4.1 MG/DL (ref 2.4–4.7)
PLATELET # BLD: 346 THOU/MM3 (ref 130–400)
PMV BLD AUTO: 9.8 FL (ref 9.4–12.4)
POTASSIUM SERPL-SCNC: 3.8 MEQ/L (ref 3.5–5.2)
RBC # BLD: 3.05 MILL/MM3 (ref 4.2–5.4)
SEG NEUTROPHILS: 68.8 %
SEGMENTED NEUTROPHILS ABSOLUTE COUNT: 8.7 THOU/MM3 (ref 1.8–7.7)
SODIUM BLD-SCNC: 127 MEQ/L (ref 135–145)
SODIUM BLD-SCNC: 130 MEQ/L (ref 135–145)
SODIUM BLD-SCNC: 136 MEQ/L (ref 135–145)
WBC # BLD: 12.6 THOU/MM3 (ref 4.8–10.8)

## 2019-12-01 PROCEDURE — 84100 ASSAY OF PHOSPHORUS: CPT

## 2019-12-01 PROCEDURE — 6370000000 HC RX 637 (ALT 250 FOR IP): Performed by: INTERNAL MEDICINE

## 2019-12-01 PROCEDURE — 2709999900 HC NON-CHARGEABLE SUPPLY

## 2019-12-01 PROCEDURE — 6360000002 HC RX W HCPCS: Performed by: ANESTHESIOLOGY

## 2019-12-01 PROCEDURE — 83735 ASSAY OF MAGNESIUM: CPT

## 2019-12-01 PROCEDURE — 2580000003 HC RX 258: Performed by: PHYSICIAN ASSISTANT

## 2019-12-01 PROCEDURE — 6370000000 HC RX 637 (ALT 250 FOR IP): Performed by: PHYSICIAN ASSISTANT

## 2019-12-01 PROCEDURE — 2500000003 HC RX 250 WO HCPCS: Performed by: PHYSICIAN ASSISTANT

## 2019-12-01 PROCEDURE — 36415 COLL VENOUS BLD VENIPUNCTURE: CPT

## 2019-12-01 PROCEDURE — 85025 COMPLETE CBC W/AUTO DIFF WBC: CPT

## 2019-12-01 PROCEDURE — 80048 BASIC METABOLIC PNL TOTAL CA: CPT

## 2019-12-01 PROCEDURE — 6360000002 HC RX W HCPCS: Performed by: INTERNAL MEDICINE

## 2019-12-01 PROCEDURE — 99024 POSTOP FOLLOW-UP VISIT: CPT | Performed by: NEUROLOGICAL SURGERY

## 2019-12-01 PROCEDURE — 6370000000 HC RX 637 (ALT 250 FOR IP): Performed by: NURSE PRACTITIONER

## 2019-12-01 PROCEDURE — 97530 THERAPEUTIC ACTIVITIES: CPT

## 2019-12-01 PROCEDURE — 2000000000 HC ICU R&B

## 2019-12-01 PROCEDURE — 82948 REAGENT STRIP/BLOOD GLUCOSE: CPT

## 2019-12-01 PROCEDURE — 97535 SELF CARE MNGMENT TRAINING: CPT

## 2019-12-01 PROCEDURE — 6370000000 HC RX 637 (ALT 250 FOR IP): Performed by: ANESTHESIOLOGY

## 2019-12-01 PROCEDURE — 84295 ASSAY OF SERUM SODIUM: CPT

## 2019-12-01 PROCEDURE — 99233 SBSQ HOSP IP/OBS HIGH 50: CPT | Performed by: ANESTHESIOLOGY

## 2019-12-01 PROCEDURE — 6360000002 HC RX W HCPCS: Performed by: PHYSICIAN ASSISTANT

## 2019-12-01 PROCEDURE — 6370000000 HC RX 637 (ALT 250 FOR IP): Performed by: FAMILY MEDICINE

## 2019-12-01 PROCEDURE — 2580000003 HC RX 258: Performed by: INTERNAL MEDICINE

## 2019-12-01 RX ORDER — LANOLIN ALCOHOL/MO/W.PET/CERES
3 CREAM (GRAM) TOPICAL NIGHTLY PRN
Status: DISCONTINUED | OUTPATIENT
Start: 2019-12-01 | End: 2019-12-10 | Stop reason: HOSPADM

## 2019-12-01 RX ORDER — MAGNESIUM SULFATE IN WATER 40 MG/ML
4 INJECTION, SOLUTION INTRAVENOUS ONCE
Status: COMPLETED | OUTPATIENT
Start: 2019-12-01 | End: 2019-12-01

## 2019-12-01 RX ADMIN — Medication 10 ML: at 20:33

## 2019-12-01 RX ADMIN — MAGNESIUM GLUCONATE 500 MG ORAL TABLET 400 MG: 500 TABLET ORAL at 10:24

## 2019-12-01 RX ADMIN — HYDROCODONE BITARTRATE AND ACETAMINOPHEN 2 TABLET: 5; 325 TABLET ORAL at 20:45

## 2019-12-01 RX ADMIN — ACETAMINOPHEN 650 MG: 325 TABLET ORAL at 10:36

## 2019-12-01 RX ADMIN — POTASSIUM CHLORIDE 40 MEQ: 20 TABLET, EXTENDED RELEASE ORAL at 10:36

## 2019-12-01 RX ADMIN — POTASSIUM CHLORIDE 40 MEQ: 20 TABLET, EXTENDED RELEASE ORAL at 18:06

## 2019-12-01 RX ADMIN — HYDROCODONE BITARTRATE AND ACETAMINOPHEN 2 TABLET: 5; 325 TABLET ORAL at 04:17

## 2019-12-01 RX ADMIN — POTASSIUM & SODIUM PHOSPHATES POWDER PACK 280-160-250 MG 250 MG: 280-160-250 PACK at 20:33

## 2019-12-01 RX ADMIN — FUROSEMIDE 20 MG: 40 INJECTION, SOLUTION INTRAMUSCULAR; INTRAVENOUS at 10:36

## 2019-12-01 RX ADMIN — INSULIN LISPRO 1 UNITS: 100 INJECTION, SOLUTION INTRAVENOUS; SUBCUTANEOUS at 21:12

## 2019-12-01 RX ADMIN — GANCICLOVIR SODIUM 245 MG: 500 INJECTION, POWDER, LYOPHILIZED, FOR SOLUTION INTRAVENOUS at 11:03

## 2019-12-01 RX ADMIN — NAFCILLIN SODIUM 12 G: 2 INJECTION, POWDER, LYOPHILIZED, FOR SOLUTION INTRAMUSCULAR; INTRAVENOUS at 10:39

## 2019-12-01 RX ADMIN — CALCIUM POLYCARBOPHIL 625 MG: 625 TABLET, FILM COATED ORAL at 10:24

## 2019-12-01 RX ADMIN — Medication: at 10:37

## 2019-12-01 RX ADMIN — POTASSIUM & SODIUM PHOSPHATES POWDER PACK 280-160-250 MG 250 MG: 280-160-250 PACK at 16:39

## 2019-12-01 RX ADMIN — METOPROLOL TARTRATE 25 MG: 25 TABLET ORAL at 20:32

## 2019-12-01 RX ADMIN — Medication: at 20:33

## 2019-12-01 RX ADMIN — HYDROCODONE BITARTRATE AND ACETAMINOPHEN 2 TABLET: 5; 325 TABLET ORAL at 16:39

## 2019-12-01 RX ADMIN — MICONAZOLE NITRATE: 20 POWDER TOPICAL at 10:37

## 2019-12-01 RX ADMIN — FAMOTIDINE 20 MG: 20 TABLET ORAL at 10:36

## 2019-12-01 RX ADMIN — Medication 10 ML: at 10:25

## 2019-12-01 RX ADMIN — FAMOTIDINE 20 MG: 20 TABLET ORAL at 20:33

## 2019-12-01 RX ADMIN — POTASSIUM & SODIUM PHOSPHATES POWDER PACK 280-160-250 MG 250 MG: 280-160-250 PACK at 10:24

## 2019-12-01 RX ADMIN — SODIUM CHLORIDE TAB 1 GM 2 G: 1 TAB at 13:01

## 2019-12-01 RX ADMIN — MICONAZOLE NITRATE: 20 POWDER TOPICAL at 20:33

## 2019-12-01 RX ADMIN — POTASSIUM & SODIUM PHOSPHATES POWDER PACK 280-160-250 MG 250 MG: 280-160-250 PACK at 13:01

## 2019-12-01 RX ADMIN — MAGNESIUM SULFATE HEPTAHYDRATE 4 G: 40 INJECTION, SOLUTION INTRAVENOUS at 06:49

## 2019-12-01 RX ADMIN — Medication 3 MG: at 20:33

## 2019-12-01 RX ADMIN — GANCICLOVIR SODIUM 245 MG: 500 INJECTION, POWDER, LYOPHILIZED, FOR SOLUTION INTRAVENOUS at 21:00

## 2019-12-01 RX ADMIN — SODIUM CHLORIDE TAB 1 GM 2 G: 1 TAB at 16:39

## 2019-12-01 RX ADMIN — SODIUM CHLORIDE TAB 1 GM 2 G: 1 TAB at 10:24

## 2019-12-01 RX ADMIN — METOPROLOL TARTRATE 25 MG: 25 TABLET ORAL at 10:24

## 2019-12-01 ASSESSMENT — ENCOUNTER SYMPTOMS
SHORTNESS OF BREATH: 0
APNEA: 0
CHEST TIGHTNESS: 0
ABDOMINAL PAIN: 0
BACK PAIN: 1
ABDOMINAL DISTENTION: 0

## 2019-12-01 ASSESSMENT — PAIN DESCRIPTION - DESCRIPTORS
DESCRIPTORS: ACHING

## 2019-12-01 ASSESSMENT — PAIN DESCRIPTION - PAIN TYPE
TYPE: ACUTE PAIN

## 2019-12-01 ASSESSMENT — PAIN - FUNCTIONAL ASSESSMENT
PAIN_FUNCTIONAL_ASSESSMENT: ACTIVITIES ARE NOT PREVENTED
PAIN_FUNCTIONAL_ASSESSMENT: ACTIVITIES ARE NOT PREVENTED

## 2019-12-01 ASSESSMENT — PAIN SCALES - GENERAL
PAINLEVEL_OUTOF10: 8
PAINLEVEL_OUTOF10: 0
PAINLEVEL_OUTOF10: 7
PAINLEVEL_OUTOF10: 9
PAINLEVEL_OUTOF10: 6
PAINLEVEL_OUTOF10: 4
PAINLEVEL_OUTOF10: 0
PAINLEVEL_OUTOF10: 8

## 2019-12-01 ASSESSMENT — PAIN DESCRIPTION - PROGRESSION
CLINICAL_PROGRESSION: NOT CHANGED
CLINICAL_PROGRESSION: NOT CHANGED

## 2019-12-01 ASSESSMENT — PAIN DESCRIPTION - LOCATION
LOCATION: HEAD

## 2019-12-01 ASSESSMENT — PAIN DESCRIPTION - ONSET
ONSET: ON-GOING
ONSET: ON-GOING

## 2019-12-01 ASSESSMENT — PAIN DESCRIPTION - FREQUENCY
FREQUENCY: CONTINUOUS
FREQUENCY: CONTINUOUS

## 2019-12-01 ASSESSMENT — PAIN DESCRIPTION - ORIENTATION: ORIENTATION: LOWER

## 2019-12-01 NOTE — PROGRESS NOTES
acetaminophen, glucose, dextrose, glucagon (rDNA), dextrose    Objective: She is resting comfortably with the head of the bed elevated with EVD height requirement for CSF collection.  Tolerating this without specific complaint.  ICPs continue to average  between 4 and 9 following an adjustment for CSF evacuation to 10 mmHg with an output of 4 cc noted at the 9 AM check.   Dressings are otherwise dry and intact. A CT scan of the head is recommended for Monday for review. Vitals: BP (!) 134/92   Pulse 89   Temp 98.6 °F (37 °C) (Bladder)   Resp 25   Ht 5' 6\" (1.676 m)   Wt 212 lb 4.9 oz (96.3 kg)   SpO2 95%   BMI 34.27 kg/m²   Physical Exam:  Alert and attentive. Language appropriate, with no aphasia. Pupils equal.  Facial strength symmetric. She is more animated and interactive on exam today demonstrating purposeful movement in all 4 extremities and obeys commands. Is able to have a conversation with staff regarding her care with excellent judgment and recall. Is a significant improvement of her prior exams. Otherwise stable. ROS:  Review of Systems   Constitutional: Negative for activity change. Respiratory: Negative for apnea, chest tightness and shortness of breath. Cardiovascular: Negative for chest pain and leg swelling. Gastrointestinal: Negative for abdominal distention and abdominal pain. Musculoskeletal: Positive for back pain. Neurological: Negative for dizziness, weakness, numbness and headaches. Psychiatric/Behavioral: Negative for agitation, behavioral problems, confusion and decreased concentration. 24 hour intake/output:    Intake/Output Summary (Last 24 hours) at 12/1/2019 1142  Last data filed at 12/1/2019 0700  Gross per 24 hour   Intake 2286.11 ml   Output 4201 ml   Net -1914.89 ml     Last 3 weights:   Wt Readings from Last 3 Encounters:   12/01/19 212 lb 4.9 oz (96.3 kg)   11/07/19 201 lb 4.8 oz (91.3 kg)   09/18/19 220 lb 6.4 oz (100 kg)         CBC: ventriculomegaly. Skull base/calvarium/osseous structures: Right frontal shan hole, otherwise unremarkable. Soft tissues: Unremarkable Intraorbital contents: Unremarkable Sinuses: Unremarkable; the imaged sinuses are clear without evidence of mucosal thickening or fluid levels. Mastoids: Unremarkable; the mastoid air cells are clear. No acute ischemic infarct, hemorrhage, or mass effect. Stable position of the right frontal ventriculostomy, tip at the level of the foramen of [place]. Stable mild ventriculomegaly. Stable appearance of the brain compared to the previous study as detailed above. **This report has been created using voice recognition software. It may contain minor errors which are inherent in voice recognition technology. ** Final report electronically signed by Dr. Ferdinand Arevalo on 11/27/2019 4:54 AM    Ct Head Wo Contrast    Result Date: 11/26/2019  PROCEDURE: CT HEAD WO CONTRAST CLINICAL INFORMATION: hydrocephalus. COMPARISON: Brain MRI dated 11/25/2019 and the brain CT dated 11/24/2019 TECHNIQUE: Noncontrast 5 mm axial images were obtained through the brain. Sagittal and coronal reconstructions were obtained and reviewed. All CT scans at this facility use dose modulation, iterative reconstruction, and/or weight-based dosing when appropriate to reduce radiation dose to as low as reasonably achievable. FINDINGS: Brain: There is no acute ischemic infarct, hemorrhage, midline shift, mass, or mass effect. There is no change in the 1.5 x 1.1 cm fat density lesion in the interpeduncular cistern to the left of midline. This may represent a lipoma or possibly a dermoid  cyst. There are stable mild to moderate deep white matter hypodensities, nonspecific in nature but probably representing small vessel chronic ischemic changes. Ventricles/basal cisterns:  There has been interval placement of a right frontal  shunt catheter coursing through the right frontal horn, tip in the region of the right foramen of ischemic changes and cerebral atrophy. 3. Hypoattenuating structure in the midbrain of indeterminate etiology. Brain MRI is recommended for further evaluation. **This report has been created using voice recognition software. It may contain minor errors which are inherent in voice recognition technology. ** Final report electronically signed by Dr. Tri Pablo on 11/24/2019 1:39 PM    Ct Abscess Drainage W Cath Placement S&i    Result Date: 11/21/2019  CT-GUIDED ABSCESS DRAINAGE PERFORMED BY: JOSETTE Szymanski Bryant: Posterior lower lumbar paraspinal abscess APPROACH: Posterior CATHETER: 8 Malaysian multipurpose drainage catheter. FLUID OBTAINED: 15 mL purulent tan fluid SEDATION: None. Patient was ventilated on propofol drip. The patient was sedated for 30 minutes during this procedure and monitored with EKG and pulse ox monitoring devices by a registered nurse. PROCEDURE: Signed informed consent was obtained prior to performing this procedure. The patient was placed on the CT scanner and sedated, as indicated above. CT images were initially obtained to determine appropriate puncture site. The skin was marked, prepped, and draped in a sterile fashion. Following local anesthesia and utilizing aseptic technique, a needle was successfully passed into the abscess pocket. A small amount of pus was aspirated to confirm appropriate needle position. A guidewire was then passed through the needle followed by insertion of progressively larger dilators up to an 8 [place] size. An 8 [place] multi-side-hole drainage catheter was then passed over the wire and was coiled within the abscess pocket. The retaining suture was then locked in the standard fashion. Catheter was then hooked to a Domingo-Close drainage bag and the catheter was flushed several times with sterile saline. The catheter was stabilized to the skin with a Percu-Stay device. A sample of the pus was sent to laboratory for culture and sensitivity testing. has been created using voice recognition software. It may contain minor errors which are inherent in voice recognition technology. ** Final report electronically signed by Dr. Torsten Sparks MD on 11/20/2019 3:04 PM    Mri Lumbar Spine W Wo Contrast    Result Date: 11/25/2019  PROCEDURE: MRI LUMBAR SPINE W WO CONTRAST CLINICAL INFORMATION: Infection. COMPARISON: MRI of the lumbar spine dated November 20, 2019. TECHNIQUE: Sagittal and axial T1 and T2-weighted images were obtained to the lumbar spine. Postcontrast axial and sagittal T1-weighted images were also obtained. Postcontrast images were obtained after administration of 20 mL ProHance intravenous contrast. FINDINGS: The lumbar spine is imaged from the inferior aspect of T10 to the lower sacrum. The conus medullaris terminates at the L1 level. There has been interval worsening of diffuse enhancement involving the cauda equina nerve roots. There is redemonstration of a peripherally enhancing fluid collection within the epidural space at the L3 level which measures approximately 0.4 x 0.3 x 0.8 cm. This is again noted to likely communicate with a larger paraspinal fluid collection along the left paraspinal soft tissues. It appears similar to the prior exam. There is preservation of the expected lumbar lordosis. The vertebral body heights and alignment are maintained. There is marrow replacement and hyperintense T2 signal involving the spinous processes of L2 and L3 which is suspicious for osteomyelitis. Type II Modic changes are again noted at the endplates of K5-Q7. There is redemonstration of multilevel degenerative changes which are most notable at L5-S1 with disc space narrowing and a disc osteophyte complex with a posterior midline disc protrusion. This causes mild spinal canal narrowing and mild narrowing of the right subarticular zone with moderate narrowing of the left subarticular zone.  Mild to moderate neural foraminal narrowing is also present bilaterally at this level secondary to degenerative facet arthropathy and ligamentum flavum thickening. No suspicious finding is identified within the visualized retroperitoneal soft tissues. There is redemonstration of a large, peripherally enhancing fluid collection within the subcutaneous soft tissues overlying the left posterior paraspinal musculature and measuring 3.1 x 6.8 x 5.2 cm. A pigtail catheter is present within the collection. Additional, smaller fluid collections are noted along the left side of the L3 and L4 spinous processes which likely correspond to smaller abscesses. 1. There has been worsening of enhancement of the cauda equina nerve roots which is suggestive of worsening arachnoiditis. 2. There is redemonstration of an epidural abscess at the L3 level measuring 0.4 x 0.3 x 0.8 cm. 3. Additional abscesses are again present within the posterior paraspinal musculature and specifically along the left side of the L3 and L4 spinous processes. These abscesses likely correspond to the previously mentioned epidural abscess. 4. Marrow signal changes and specifically marrow replacement within the L2 and L3 spinous processes are suspicious for osteomyelitis. The findings were conveyed to the patient's nurse, Higinio Bautista over the phone at [phone] kachn on 11/25/2019. **This report has been created using voice recognition software. It may contain minor errors which are inherent in voice recognition technology. ** Final report electronically signed by Dr. Asad Chavez on 11/25/2019 12:13 PM    Mri Lumbar Spine W Wo Contrast    Result Date: 11/20/2019  PROCEDURE: MRI LUMBAR SPINE W WO CONTRAST CLINICAL INFORMATION: increased low back pain, recent lumbar drain, now sepsis, unable to walk at home. COMPARISON: No prior study. TECHNIQUE: Sagittal and axial T1 and T2-weighted images were obtained to the lumbar spine. Postcontrast axial and sagittal T1-weighted images were also obtained.  20 mL ProHance was Lines/tubes/devices: Left subclavian central venous catheter tip remains in the region of the mid SVC. Lungs/pleura: There are mild interstitial infiltrates which may represent interstitial pulmonary edema or an atypical pneumonia. There is no consolidation. There is a slightly larger than 1 cm mid left lung calcified granuloma. No pleural effusion. No pneumothorax. Heart: There is mild cardiomegaly. Mediastinum/deepthi: No obvious mass or adenopathy. Skeleton: No significant bone or joint abnormality. Mild interstitial pulmonary edema versus atypical pneumonia. **This report has been created using voice recognition software. It may contain minor errors which are inherent in voice recognition technology. ** Final report electronically signed by Dr. Kasia Pablo on 11/28/2019 6:11 AM    Xr Chest Portable    Result Date: 11/23/2019  PROCEDURE: XR CHEST PORTABLE CLINICAL INFORMATION: resp failure. COMPARISON: November 19, 2019 TECHNIQUE: AP upright view of the chest. FINDINGS: There has been interval removal of the endotracheal and nasogastric tubes. The lung volumes are slightly diminished. Coarse markings are stable with no focal infiltrate noted. The left mid thoracic nodular density or granuloma is again noted. Central venous structures are stable. The central venous catheter remains in the superior vena cava. Interval endotracheal and nasogastric tube removal with persistent diminished lung volumes. No acute process compared to prior study. **This report has been created using voice recognition software. It may contain minor errors which are inherent in voice recognition technology. ** Final report electronically signed by Dr. Jada Alfaro on 11/23/2019 3:11 AM    Xr Chest Portable    Result Date: 11/19/2019  PROCEDURE: XR CHEST PORTABLE CLINICAL INFORMATION: CVC placement. COMPARISON: November 19, 2019 TECHNIQUE: AP upright view of the chest. FINDINGS: The heart and mediastinum are stable.  There is redemonstration of an endotracheal tube and nasogastric tubes in stable locations. Lung volumes are unchanged with mildly coarse markings present. There is redemonstration of a calcified granuloma in the left mid thorax. Central venous catheter from the left subclavian is present within the superior vena cava. There is no visualized pneumothorax. 1. Status post left subclavian central line placement. Tip location the superior vena cava. There is no visualized pneumothorax. 2. Stable appearance of coarse lung markings and slightly decreased lung volumes. **This report has been created using voice recognition software. It may contain minor errors which are inherent in voice recognition technology. ** Final report electronically signed by Dr. Landon Quinteros on 11/19/2019 7:14 AM    Xr Chest Portable    Result Date: 11/19/2019  PROCEDURE: XR CHEST PORTABLE CLINICAL INFORMATION: ETT/OGT placement. COMPARISON: No prior study. TECHNIQUE: AP upright view of the chest. FINDINGS: The lung volumes are shallow. There is an endotracheal tube 2.5 cm above the mesfni. A nasogastric tube is in the body of the stomach. There are coarse lung markings likely age-related. There is a small calcified density in the left perihilar region. A granuloma is suspected. Minimal retrocardiac atelectasis or infiltrate cannot be excluded. There is no venous congestion present. A small left basilar effusion or atelectasis blunts the costophrenic angle. The skeleton is negative for active pathology. 1. Visualized endotracheal tube above the mesfin. 2. Nasogastric tube within the stomach. 3. Diminished lung volumes with crowding of markings. Left retrocardiac atelectasis or infiltrate is not excluded. A small left effusion is considered. **This report has been created using voice recognition software. It may contain minor errors which are inherent in voice recognition technology. ** Final report electronically signed by Dr. Landon Quinteros on using voice recognition software. It may contain minor errors which are inherent in voice recognition technology. ** Final report electronically signed by Dr. Christopher Dutton on 11/19/2019 1:23 PM    Ir Lumbar Puncture For Diagnosis    Result Date: 11/4/2019  LUMBAR SPINAL CANAL/CSF DRAINAGE CATHETER INSERTION: PERFORMED BY:  Janet Yu M.D. CLINICAL INFORMATION: Normal pressure hydrocephalus. APPROACH:  Translaminar, L3, right side. NEEDLE: 14-gauge Touhy needle CATHETER: 2.5 [place] Integra CSF drainage catheter CATHETER TIP: T8 FLUID: 3 mLClear spinal fluid. SEDATION: Versed 2 milligram, fentanyl 100 micrograms, IV. Patient was sedated for 30 minutes minutes during this procedure and monitored with EKG and pulse ox monitoring devices by registered nurse. FLUOROSCOPY TIME: 1 minute 54 seconds FLUOROSCOPIC IMAGES: 2 ESTIMATED BLOOD LOSS: Minimal PROCEDURE:  Signed informed consent was obtained prior to performing this procedure. The patient was sedated, as indicated above. .. The lumbar spine was evaluated fluoroscopically initially to determine an appropriate puncture site. .. The skin was marked, prepped, and draped in a sterile fashion, utilizing [address]. The skin was infiltrated with lidocaine 2 percent at the puncture site and a small skin nick was made with a #11 scalpel blade. The 14-gauge Touhy needle was then passed into the lumbar thecal sac, utilizing the approach listed above. Appropriate needle position was documented fluoroscopically. A few fluoroscopic spot films were obtained. Only a small amount of CSF could be aspirated through the needle. The drainage catheter was then passed coaxially through the Touhy needle and very easily passed cephalad to the level listed above. This was performed with fluoroscopic guidance. Fluoroscopic images were obtained for documentation. A small amount of fluid was seen to drain through the catheter.  The catheter was stabilized to the skin skin with a Percu-Stay device. A sample of the pus was sent to laboratory for culture and sensitivity testing. Status post successful abscess drainage procedure. . **This report has been created using voice recognition software. It may contain minor errors which are inherent in voice recognition technology. ** Final report electronically signed by Dr. Dot Becker on 11/21/2019 1:13 PM    A/P: S/P she remains POD #6 following surgical I&D of lumbar infection and placement of EVD drain. Intracranial pressures have been ranging from 4-9 with an output averaging approximately 4 cc. Neurosurgery is recommending that the drain be clamped today with 2-hour transduce recorded to patient chart recommended to continue along with a new CT scan of the head to be performed without contrast for review in the morning. I have recommended that nursing cover the lumbar operative site dressings with Tegaderm to prevent outside contaminants. Neurosurgery to follow    Electronically signed by Nafisa Mckeon PA-C on 12/1/2019 at 11:42 AM     Attending Note:     Patient seen and examined in the ICU  in conjunction with neurosurgery PA Nafisa Mckeon PA-C). Discussed with patient, her nurse and ICU team as well. All data and imaging reviewed by myself. I agree with examination assessment and plan as documented above. - Post op day 6: Patient underwent EVD placement and an excisional draining of lumbar spine abscesses.   -There is no acute events over the night.  -Patient is more awake and alert today. - Feeling better and good. -Moving every things and following commands by 4 with good strength through out. .  -Last  brain CT showed improvement in patient hydrocephalus.  -Please continue the medical management per ICU team.  - ICP: between 3-6 mmhg  -Follow-up the ID recommendation.  -Please clamp the EVD from today. .  - New CSF tomorrow. - Transduce the EVD every 2 hours and please document  the ICP.    - New brain CT tomorrow.          Ann Marie Cook MD

## 2019-12-01 NOTE — PROGRESS NOTES
[address] ICU 4D  Occupational Therapy  Daily Note  Time:    Time In: 1026  Time Out: 1106  Timed Code Treatment Minutes: 40 Minutes  Minutes: 40          Date: 2019  Patient Name: Barby Tony,   Gender: female      Room: 4D-17/017-A  MRN: [id number]  : 1955  (59 y.o.)  Referring Practitioner: Coral Yates PA-C  Diagnosis: Hyperglycemia  Additional Pertinent Hx: Pt was hospitalized 2019 through 2019 for evaluation of possible normal pressure hydrocephalus.  She had symptoms consistent with normal pressure hydrocephalus including progressive gait disturbance, incontinence, and cognitive decline associated with blurred vision and headaches.  She was admitted and had a lumbar drain placed.  Final assessment of efficacy of lumbar drain was documented 2019 by Dr. Deyanira Tirado that note he determined there was no significant benefit from the lumbar drain trial.  Findings did not support the diagnosis of normal pressure hydrocephalus.  Patient was determined not to be a good candidate for  shunt and lumbar drain was removed.  Patient was discharged home on 2019. Patient was received in transfer from [facility] to [facility] on 2019. Symptoms prior to hospitalization included a 5-day history of back pain associated with difficulty ambulating, difficulty urinating and diarrhea. Additionally she had a 2-day history of confusion with hallucinations. Immediately upon arrival, nursing staff had concern regarding patient's respiratory rate at 44 and heart rate at 160. She was transferred immediately to the intensive care unit. Patient was in overt septic shock and was intubated 2019. She was started on Zosyn and vancomycin. Cultures were sent. She underwent volume resuscitation and was started on pressors. Rocephin was subsequently added given concerns for possible meningitis.   s/p EVACUATION AND DEBRIDEMENT L2-L3 INTRAMUSCULAR SPINAL ABSCESS, EVACUATION EPIDURAL ABSCESS L2-L3, PARTIAL LAMINECTOMY L2-L3, EVD PLACEMENT on 11/25/19    Restrictions/Precautions:  Restrictions/Precautions: Fall Risk  Position Activity Restriction  Other position/activity restrictions: Confusion, EVD drain-needs to be clamped prior to mobility & re-leveled by nursing after session, flexiseal    SUBJECTIVE: Julita RN approving session stating she would at least like pt to get to EOB. Pt agreeable to try this date. PAIN: did not state /10: back     COGNITION: Decreased Insight, Decreased Problem Solving and Decreased Safety Awareness    ADL:   Feeding: Stand By Assistance. sitting EOB   Lower Extremity Dressing: Maximum Assistance. donning socks. BALANCE:  Sitting Balance:  Stand By Assistance  Pt initially requiring mod A until proper positioning achieved. Pt sat EOB for lengthy period of time approx 25 min. As pt fatigued she demo a lateral lean to left side tending to lean forearm on bed rail during. Pt stating she was dizzy towards end of session returning her to supine    BED MOBILITY:  Supine to Sit: Moderate Assistance cues for tech  Sit to Supine: Moderate Assistance      TRANSFERS:  Not assessed this date    FUNCTIONAL MOBILITY:  Not addressed dthis date    ADDITIONAL ACTIVITIES:       ASSESSMENT:     Activity Tolerance:  Patient tolerance of  treatment: fair. Increased fatigue during       Discharge Recommendations: Continue to assess pending progress  Equipment Recommendations:  Other: Monitor pending progress  Plan: Times per week: 5x  Current Treatment Recommendations: Balance Training, Self-Care / ADL, Functional Mobility Training, Endurance Training, Cognitive Reorientation, Safety Education & Training, ROM, Strengthening    Patient Education  Patient Education: Importance of Increasing Activity    Goals  Short term goals  Time Frame for Short term goals: 2 weeks  Short term goal 1: Pt will tolerate sitting EOB 4 min with mod A x 2 for eventual EOB ADLs  Short term goal 2: Pt will tolerate BUE AAROM/AROM for increased AROM BUE for ease of grooming & self feeding  Short term goal 3: Pt will tolerate further assessment of functional t/fs by OTR when appropriate  Long term goals  Time Frame for Long term goals :  No LTG set d/t short ELOS    Following session, patient left in safe position with all fall risk precautions in place.

## 2019-12-01 NOTE — PROGRESS NOTES
ICU progress Note    Reason for ICU admission: Meningitis/lumbar abscess    Interval events: no events overnight. Mentation improving. EVD clamped this AM.  Sat on side on bed w PT. Assessment and Plan:        1. Spinal Abscess: Soft tissue and communicating with epidural abscess.  Secondary to MSSA.  Status post lumbar drain placement 11/21/2019. Tana Divers for nafcillin to be continued outpt.  Surgical drainage completed 11/25/2019.  2. 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  On Nafcillin/ganciclovir. Ventriculostomy tube placed 11/25/2019. Repeat HSV PCR remains + on 11/29. CSF cx 11/29 NGTD. 3. Anemia: no clear source of bleed and significantly fluid positive (I&O and wt). Received 1 unit of PRBCs 11/28. Lasix 20mg IV x 4 days total ordered. 4. Parox afib w RVR: New onset. Theresa secondary to septic shock.  Converted to sinus. Transitioned to beta blocker therapy.  No anticoagulation. 5. Hyponatremia: s/p 3% saline. Repeating sodium as labs show dropped 9pts in 12hrs. Receiving oral sodium and diuresing. 2L fluid restriction. 6. Delirium: Secondary to septic shock with meningitis. 7. Type 2 diabetes mellitus:  basal bolus. 8. 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  9. MSSA/Klebsiella Pneumonia POA: Resolved. 10. Diarrhea- Much improved. stool cx pan negative. Fiber added to diet and supplement. If remains an issue, may need to DC oral sodium but diarrhea predates the oral sodium. HPI: Patient is a 49-year-old white female active smoker. Ajith Zuniga has a remote history of appendectomy and cholecystectomy. Ajith Zuniga has a history of type 2 diabetes mellitus, hypertension, hyperlipidemia, depression, and a history of brain lipoma diagnosed 11/4/2019.   Patient was hospitalized 11/4/2019 through 11/7/2019 for evaluation of possible normal pressure hydrocephalus.  She had symptoms consistent with normal pressure hydrocephalus including progressive gait disturbance, incontinence, and cognitive  . Spinal fluid positive HSV x 2     ROS:   Gen: Fatigue, bottom hurts less  CV: no angina no palpitations  Pulm: no dyspnea, no cough  Abd: no pain, +flatus +diarrhea (rectal tube)  Neuro: head and eyes feel heavy. No visual changes, denies neck stiffness,      Medications:     dextrose        furosemide  20 mg Intravenous Daily    polycarbophil  625 mg Oral Daily    ganciclovir (CYTOVENE) IVPB  2.5 mg/kg Intravenous 2 times per day    sodium chloride  2 g Oral TID WC    potassium chloride  40 mEq Oral BID WC    famotidine  20 mg Oral BID    magnesium oxide  400 mg Oral Daily    miconazole   Topical BID    sodium chloride flush  10 mL Intravenous 2 times per day    docusate sodium  100 mg Oral BID    nafcillin 12 g continuous infusion  12 g Intravenous Q24H    calcium replacement protocol   Other RX Placeholder    insulin lispro  0-12 Units Subcutaneous TID WC    insulin lispro  0-6 Units Subcutaneous Nightly    metoprolol tartrate  25 mg Oral BID    potassium & sodium phosphates  1 packet Oral 4x Daily    potassium (CARDIAC) replacement protocol   Other RX Placeholder     Vital Signs:   T: 100.8: P: 83: RR: 20: B/P: 107/52: FiO2: RA: O2 Sat: 99: I/O:     General:  Interactive obese white female. NAD, poor dentition  HEENT:  normocephalic. Right ventriculostomy tube present.  No scleral icterus. PERR  Neck: Full range of motion. Lungs: Speaks in complete sentences wo stopping. No audible wheezing  Cardiac: RRR.    Abdomen: soft.  Nontender obese.   Extremities:  trace edema  Psych:  Awake.  Oriented to person, circumstance, place, month & year  Neurologic:  Bilateral lower extremity weakness.      .Lowanda Bunk

## 2019-12-02 ENCOUNTER — APPOINTMENT (OUTPATIENT)
Dept: CT IMAGING | Age: 64
DRG: 856 | End: 2019-12-02
Attending: INTERNAL MEDICINE
Payer: MEDICARE

## 2019-12-02 LAB
CRYPTOCOCCUS NEOFORMANS/GATTI CSF FILM ARR.: NOT DETECTED
CYTOMEGALOVIRUS (CMV) CSF FILM ARRAY: NOT DETECTED
ENTEROVIRUS DETECTION PCR: NOT DETECTED
ESCHERICHIA COLI K1 CSF FILM ARRAY: NOT DETECTED
GLUCOSE BLD-MCNC: 127 MG/DL (ref 70–108)
GLUCOSE BLD-MCNC: 156 MG/DL (ref 70–108)
GLUCOSE BLD-MCNC: 166 MG/DL (ref 70–108)
GLUCOSE BLD-MCNC: 176 MG/DL (ref 70–108)
HAEMOPHILUS INFLUENZA CSF FILM ARRAY: NOT DETECTED
HHV-6 (HERPESVIRUS 6) CSF FILM ARRAY: NOT DETECTED
HSV-1 CSF FILM ARRAY: NOT DETECTED
HSV-2 CSF FILM ARRAY: NOT DETECTED
LISTERIA MONOCYTOGENES CSF FILM ARRAY: NOT DETECTED
MAGNESIUM: 1.6 MG/DL (ref 1.6–2.4)
NEISSERIA MENIGITIDIS CSF FILM ARRAY: NOT DETECTED
PARECHOVIRUS CSF FILM ARRAY: NOT DETECTED
SODIUM BLD-SCNC: 129 MEQ/L (ref 135–145)
SODIUM BLD-SCNC: 131 MEQ/L (ref 135–145)
SODIUM BLD-SCNC: 134 MEQ/L (ref 135–145)
STREPTOCOCCUS AGALACTIAE CSF FILM ARRAY: NOT DETECTED
STREPTOCOCCUS PNEUMONIAE CSF FILM ARRAY: NOT DETECTED
VARICELLA-ZOSTER, PCR: NOT DETECTED

## 2019-12-02 PROCEDURE — 6370000000 HC RX 637 (ALT 250 FOR IP): Performed by: PHYSICIAN ASSISTANT

## 2019-12-02 PROCEDURE — 94761 N-INVAS EAR/PLS OXIMETRY MLT: CPT

## 2019-12-02 PROCEDURE — 99233 SBSQ HOSP IP/OBS HIGH 50: CPT | Performed by: INTERNAL MEDICINE

## 2019-12-02 PROCEDURE — 87075 CULTR BACTERIA EXCEPT BLOOD: CPT

## 2019-12-02 PROCEDURE — 6370000000 HC RX 637 (ALT 250 FOR IP): Performed by: INTERNAL MEDICINE

## 2019-12-02 PROCEDURE — 6370000000 HC RX 637 (ALT 250 FOR IP): Performed by: ANESTHESIOLOGY

## 2019-12-02 PROCEDURE — 2580000003 HC RX 258: Performed by: PHYSICIAN ASSISTANT

## 2019-12-02 PROCEDURE — 2000000000 HC ICU R&B

## 2019-12-02 PROCEDURE — 6370000000 HC RX 637 (ALT 250 FOR IP): Performed by: NURSE PRACTITIONER

## 2019-12-02 PROCEDURE — 6360000002 HC RX W HCPCS: Performed by: INTERNAL MEDICINE

## 2019-12-02 PROCEDURE — 6360000002 HC RX W HCPCS: Performed by: ANESTHESIOLOGY

## 2019-12-02 PROCEDURE — 87205 SMEAR GRAM STAIN: CPT

## 2019-12-02 PROCEDURE — 70450 CT HEAD/BRAIN W/O DYE: CPT

## 2019-12-02 PROCEDURE — 6370000000 HC RX 637 (ALT 250 FOR IP): Performed by: FAMILY MEDICINE

## 2019-12-02 PROCEDURE — 97530 THERAPEUTIC ACTIVITIES: CPT

## 2019-12-02 PROCEDURE — 36415 COLL VENOUS BLD VENIPUNCTURE: CPT

## 2019-12-02 PROCEDURE — 2500000003 HC RX 250 WO HCPCS: Performed by: PHYSICIAN ASSISTANT

## 2019-12-02 PROCEDURE — 84295 ASSAY OF SERUM SODIUM: CPT

## 2019-12-02 PROCEDURE — 99024 POSTOP FOLLOW-UP VISIT: CPT | Performed by: NEUROLOGICAL SURGERY

## 2019-12-02 PROCEDURE — 97110 THERAPEUTIC EXERCISES: CPT

## 2019-12-02 PROCEDURE — 2580000003 HC RX 258: Performed by: INTERNAL MEDICINE

## 2019-12-02 PROCEDURE — 2709999900 HC NON-CHARGEABLE SUPPLY

## 2019-12-02 PROCEDURE — 83735 ASSAY OF MAGNESIUM: CPT

## 2019-12-02 PROCEDURE — 87798 DETECT AGENT NOS DNA AMP: CPT

## 2019-12-02 PROCEDURE — 82948 REAGENT STRIP/BLOOD GLUCOSE: CPT

## 2019-12-02 RX ADMIN — METOPROLOL TARTRATE 25 MG: 25 TABLET ORAL at 08:50

## 2019-12-02 RX ADMIN — POTASSIUM CHLORIDE 40 MEQ: 20 TABLET, EXTENDED RELEASE ORAL at 08:50

## 2019-12-02 RX ADMIN — FAMOTIDINE 20 MG: 20 TABLET ORAL at 21:34

## 2019-12-02 RX ADMIN — INSULIN LISPRO 1 UNITS: 100 INJECTION, SOLUTION INTRAVENOUS; SUBCUTANEOUS at 21:31

## 2019-12-02 RX ADMIN — POTASSIUM & SODIUM PHOSPHATES POWDER PACK 280-160-250 MG 250 MG: 280-160-250 PACK at 13:12

## 2019-12-02 RX ADMIN — SODIUM CHLORIDE TAB 1 GM 2 G: 1 TAB at 18:36

## 2019-12-02 RX ADMIN — FUROSEMIDE 20 MG: 40 INJECTION, SOLUTION INTRAMUSCULAR; INTRAVENOUS at 08:51

## 2019-12-02 RX ADMIN — MICONAZOLE NITRATE: 20 POWDER TOPICAL at 21:32

## 2019-12-02 RX ADMIN — Medication: at 21:32

## 2019-12-02 RX ADMIN — NAFCILLIN SODIUM 12 G: 2 INJECTION, POWDER, LYOPHILIZED, FOR SOLUTION INTRAMUSCULAR; INTRAVENOUS at 13:09

## 2019-12-02 RX ADMIN — Medication 3 MG: at 21:31

## 2019-12-02 RX ADMIN — METOPROLOL TARTRATE 25 MG: 25 TABLET ORAL at 21:32

## 2019-12-02 RX ADMIN — POTASSIUM & SODIUM PHOSPHATES POWDER PACK 280-160-250 MG 250 MG: 280-160-250 PACK at 18:37

## 2019-12-02 RX ADMIN — SODIUM CHLORIDE TAB 1 GM 2 G: 1 TAB at 08:50

## 2019-12-02 RX ADMIN — SODIUM CHLORIDE TAB 1 GM 2 G: 1 TAB at 13:11

## 2019-12-02 RX ADMIN — INSULIN LISPRO 2 UNITS: 100 INJECTION, SOLUTION INTRAVENOUS; SUBCUTANEOUS at 13:22

## 2019-12-02 RX ADMIN — POTASSIUM CHLORIDE 40 MEQ: 20 TABLET, EXTENDED RELEASE ORAL at 18:36

## 2019-12-02 RX ADMIN — FAMOTIDINE 20 MG: 20 TABLET ORAL at 08:50

## 2019-12-02 RX ADMIN — POTASSIUM & SODIUM PHOSPHATES POWDER PACK 280-160-250 MG 250 MG: 280-160-250 PACK at 21:31

## 2019-12-02 RX ADMIN — GANCICLOVIR SODIUM 245 MG: 500 INJECTION, POWDER, LYOPHILIZED, FOR SOLUTION INTRAVENOUS at 10:21

## 2019-12-02 RX ADMIN — INSULIN LISPRO 2 UNITS: 100 INJECTION, SOLUTION INTRAVENOUS; SUBCUTANEOUS at 09:05

## 2019-12-02 RX ADMIN — Medication 10 ML: at 08:53

## 2019-12-02 RX ADMIN — GANCICLOVIR SODIUM 245 MG: 500 INJECTION, POWDER, LYOPHILIZED, FOR SOLUTION INTRAVENOUS at 21:32

## 2019-12-02 RX ADMIN — HYDROCODONE BITARTRATE AND ACETAMINOPHEN 2 TABLET: 5; 325 TABLET ORAL at 21:31

## 2019-12-02 RX ADMIN — Medication 10 ML: at 21:30

## 2019-12-02 RX ADMIN — MICONAZOLE NITRATE: 20 POWDER TOPICAL at 09:13

## 2019-12-02 RX ADMIN — POTASSIUM & SODIUM PHOSPHATES POWDER PACK 280-160-250 MG 250 MG: 280-160-250 PACK at 08:50

## 2019-12-02 RX ADMIN — MAGNESIUM GLUCONATE 500 MG ORAL TABLET 400 MG: 500 TABLET ORAL at 08:49

## 2019-12-02 RX ADMIN — CALCIUM POLYCARBOPHIL 625 MG: 625 TABLET, FILM COATED ORAL at 08:50

## 2019-12-02 ASSESSMENT — PAIN DESCRIPTION - LOCATION: LOCATION: HEAD

## 2019-12-02 ASSESSMENT — PAIN - FUNCTIONAL ASSESSMENT: PAIN_FUNCTIONAL_ASSESSMENT: ACTIVITIES ARE NOT PREVENTED

## 2019-12-02 ASSESSMENT — PAIN SCALES - GENERAL
PAINLEVEL_OUTOF10: 10
PAINLEVEL_OUTOF10: 0
PAINLEVEL_OUTOF10: 10
PAINLEVEL_OUTOF10: 0

## 2019-12-02 ASSESSMENT — PAIN DESCRIPTION - PAIN TYPE: TYPE: ACUTE PAIN

## 2019-12-02 ASSESSMENT — PAIN DESCRIPTION - FREQUENCY: FREQUENCY: CONTINUOUS

## 2019-12-02 ASSESSMENT — PAIN DESCRIPTION - DESCRIPTORS: DESCRIPTORS: ACHING

## 2019-12-02 ASSESSMENT — PAIN DESCRIPTION - ONSET: ONSET: ON-GOING

## 2019-12-02 ASSESSMENT — PAIN DESCRIPTION - PROGRESSION: CLINICAL_PROGRESSION: NOT CHANGED

## 2019-12-02 NOTE — PROGRESS NOTES
[facility] PHYSICAL THERAPY  DAILY NOTE  [facility] 4D - [id number]-17/[id number]  Time In: 8878  Time Out: 1220  Timed Code Treatment Minutes: 25 Minutes  Minutes: 25          Date: 2019  Patient Name: Reshma Eden,  Gender:  female        MRN: [id number]  : 1955  (59 y.o.)     Referring Practitioner: Amos Wu MD  Diagnosis: Hyperglycemia  Additional Pertinent Hx: Patient is a 60-year-old white female active smoker. She has a remote history of appendectomy and cholecystectomy. She has a history of type 2 diabetes mellitus, hypertension, hyperlipidemia, depression, and a history of brain lipoma diagnosed 2019. Patient was hospitalized 2019 through 2019 for evaluation of possible normal pressure hydrocephalus. She had symptoms consistent with normal pressure hydrocephalus including progressive gait disturbance, incontinence, and cognitive decline associated with blurred vision and headaches. She was admitted and had a lumbar drain placed. Final assessment of efficacy of lumbar drain was documented 2019 by Dr. Theresia Aschoff. In that note he determined there was no significant benefit from the lumbar drain trial.  Findings did not support the diagnosis of normal pressure hydrocephalus. Patient was determined not to be a good candidate for  shunt and lumbar drain was removed. Patient was discharged home on 2019. Patient was received in transfer from [facility] facility to [facility] on 2019. Symptoms prior to hospitalization included a 5-day history of back pain associated with difficulty ambulating, difficulty urinating and diarrhea. Additionally she had a 2-day history of confusion with hallucinations. Immediately upon arrival, nursing staff had concern regarding patient's respiratory rate at 44 and heart rate at 160. She was transferred immediately to the intensive care unit.   Patient was in overt septic shock and was intubated

## 2019-12-02 NOTE — PROGRESS NOTES
Present to pt room for consult of rachel anal DTPI vs MASD. Pt laying in bed working with OT. Family at bedside. Pt has slight confusion with flexiseal and states that people keep \"sticking fingers up her butt\". Assured the pt that she has a tube in her rectum and informed pt that nurse will assess skin around this area. Pt noted to have purple discoloration/ chronic staining to rachel anal area with MASD. Cleansed wound with normal saline and gauze. Pat dry with clean gauze. Applied pinxav to rachel anal denuded skin. Staff to apply each shift and as needed. Pt continues to work with OT. Wound photo and assessment below. Rachel anal chronic staining and MASD. Purple discoloration and denuded skin.

## 2019-12-02 NOTE — PROGRESS NOTES
dextrose    Objective:  She is resting comfortably with the head of the bed elevated with EVD height requirement for CSF collection.  Tolerating this without specific complaint.  ICPs continue to average  between 1 and 5 following clamping of the drain.   Dressings are otherwise dry and intact.      Vitals: BP (!) 144/94   Pulse 94   Temp 98.9 °F (37.2 °C) (Oral)   Resp 20   Ht 5' 6\" (1.676 m)   Wt 213 lb 10 oz (96.9 kg)   SpO2 98%   BMI 34.48 kg/m²   Physical Exam:  Alert and attentive. Language appropriate, with no aphasia. Pupils equal.  Facial strength symmetric. Physical Exam   She continues animated and interactive on exam today demonstrating purposeful movement in all 4 extremities and obeys commands. Is able to have a conversation with staff regarding her care with excellent judgment and recall. Is a significant improvement of her prior exams. Otherwise stable. ROS:  Review of Systems   Constitutional: Negative for activity change. Respiratory: Negative for apnea, chest tightness and shortness of breath. Cardiovascular: Negative for chest pain and leg swelling. Gastrointestinal: Negative for abdominal distention and abdominal pain. Musculoskeletal: Positive for back pain. Neurological: Negative for dizziness, weakness, numbness and headaches. Psychiatric/Behavioral: Negative for agitation, behavioral problems, confusion and decreased concentration.          24 hour intake/output:    Intake/Output Summary (Last 24 hours) at 12/2/2019 1553  Last data filed at 12/2/2019 1345  Gross per 24 hour   Intake 3390 ml   Output 3775 ml   Net -385 ml     Last 3 weights:   Wt Readings from Last 3 Encounters:   12/02/19 213 lb 10 oz (96.9 kg)   11/07/19 201 lb 4.8 oz (91.3 kg)   09/18/19 220 lb 6.4 oz (100 kg)         CBC:   Recent Labs     11/30/19  0340 12/01/19  0350   WBC 13.2* 12.6*   HGB 7.9* 8.4*    346     BMP:    Recent Labs     11/30/19  0340  12/01/19  0350  12/01/19  2111 12/02/19  0335 12/02/19  1255   *   < > 136   < > 130* 129* 134*   K 3.6  --  3.8  --   --   --   --    CL 97*  --  98  --   --   --   --    CO2 17*  --  23  --   --   --   --    BUN 12  --  11  --   --   --   --    CREATININE 1.1  --  1.0  --   --   --   --    GLUCOSE 138*  --  123*  --   --   --   --     < > = values in this interval not displayed. Calcium:  Recent Labs     12/01/19  0350   CALCIUM 8.0*     Magnesium:  Recent Labs     12/02/19  0335   MG 1.6     Glucose:  Recent Labs     12/01/19  2112 12/02/19  0845 12/02/19  1234   POCGLU 156* 166* 156*     HgbA1C: No results for input(s): LABA1C in the last 72 hours. Lipids: No results for input(s): CHOL, TRIG, HDL, LDLCALC in the last 72 hours. Invalid input(s): LDL    Radiology reports as per the Radiologist  Radiology: Ct Head Wo Contrast    Result Date: 12/2/2019  PROCEDURE: CT HEAD WO CONTRAST CLINICAL INFORMATION: NPH vs LPH. COMPARISON: Noncontrast brain CT dated 11/27/2019. TECHNIQUE: Noncontrast 5 mm axial images were obtained through the brain. Sagittal and coronal reconstructions were obtained and reviewed. All CT scans at this facility use dose modulation, iterative reconstruction, and/or weight-based dosing when appropriate to reduce radiation dose to as low as reasonably achievable. FINDINGS: Brain: There is no acute ischemic infarct, hemorrhage, midline shift, mass, or mass effect. There is no change in the mild to moderate periventricular hypodensities, again most likely representing small vessel chronic ischemic changes. Given the lack of interval change since the ventriculostomy placement, this is less likely felt to represent transependymal flow of CSF. No change in the 1.5 x 1.1 cm fat density lesion in the interpeduncular cistern to the left of midline. Ventricles/basal cisterns: The right frontal ventriculostomy tip has been slightly retracted, tip in the right frontal horn, no longer at the level of the foramen of [place]. base/calvarium/osseous structures: Right frontal shan hole, otherwise unremarkable. Soft tissues: Unremarkable Intraorbital contents: Unremarkable Sinuses: Unremarkable; the imaged sinuses are clear without evidence of mucosal thickening or fluid levels. Mastoids: Unremarkable; the mastoid air cells are clear. No acute ischemic infarct, hemorrhage, or mass effect. Stable position of the right frontal ventriculostomy, tip at the level of the foramen of [place]. Stable mild ventriculomegaly. Stable appearance of the brain compared to the previous study as detailed above. **This report has been created using voice recognition software. It may contain minor errors which are inherent in voice recognition technology. ** Final report electronically signed by Dr. Concepción Smith on 11/27/2019 4:54 AM    Ct Head Wo Contrast    Result Date: 11/26/2019  PROCEDURE: CT HEAD WO CONTRAST CLINICAL INFORMATION: hydrocephalus. COMPARISON: Brain MRI dated 11/25/2019 and the brain CT dated 11/24/2019 TECHNIQUE: Noncontrast 5 mm axial images were obtained through the brain. Sagittal and coronal reconstructions were obtained and reviewed. All CT scans at this facility use dose modulation, iterative reconstruction, and/or weight-based dosing when appropriate to reduce radiation dose to as low as reasonably achievable. FINDINGS: Brain: There is no acute ischemic infarct, hemorrhage, midline shift, mass, or mass effect. There is no change in the 1.5 x 1.1 cm fat density lesion in the interpeduncular cistern to the left of midline. This may represent a lipoma or possibly a dermoid  cyst. There are stable mild to moderate deep white matter hypodensities, nonspecific in nature but probably representing small vessel chronic ischemic changes. Ventricles/basal cisterns: There has been interval placement of a right frontal  shunt catheter coursing through the right frontal horn, tip in the region of the right foramen of [place].  There is stable at this level secondary to degenerative facet arthropathy and ligamentum flavum thickening. No suspicious finding is identified within the visualized retroperitoneal soft tissues. There is redemonstration of a large, peripherally enhancing fluid collection within the subcutaneous soft tissues overlying the left posterior paraspinal musculature and measuring 3.1 x 6.8 x 5.2 cm. A pigtail catheter is present within the collection. Additional, smaller fluid collections are noted along the left side of the L3 and L4 spinous processes which likely correspond to smaller abscesses. 1. There has been worsening of enhancement of the cauda equina nerve roots which is suggestive of worsening arachnoiditis. 2. There is redemonstration of an epidural abscess at the L3 level measuring 0.4 x 0.3 x 0.8 cm. 3. Additional abscesses are again present within the posterior paraspinal musculature and specifically along the left side of the L3 and L4 spinous processes. These abscesses likely correspond to the previously mentioned epidural abscess. 4. Marrow signal changes and specifically marrow replacement within the L2 and L3 spinous processes are suspicious for osteomyelitis. The findings were conveyed to the patient's nurse, Deisy Julian over the phone at [phone] dlhdv on 11/25/2019. **This report has been created using voice recognition software. It may contain minor errors which are inherent in voice recognition technology. ** Final report electronically signed by Dr. Johanna Abraham on 11/25/2019 12:13 PM    Mri Lumbar Spine W Wo Contrast    Result Date: 11/20/2019  PROCEDURE: MRI LUMBAR SPINE W WO CONTRAST CLINICAL INFORMATION: increased low back pain, recent lumbar drain, now sepsis, unable to walk at home. COMPARISON: No prior study. TECHNIQUE: Sagittal and axial T1 and T2-weighted images were obtained to the lumbar spine. Postcontrast axial and sagittal T1-weighted images were also obtained.  20 mL ProHance was injected in spinal canal stenosis and mild bilateral neural foraminal stenosis in association with facet hypertrophy and ligamentum flavum thickening. At L4-5 there is a shallow disc bulge without significant spinal canal stenosis or neural foraminal stenosis. There is persistent thickening of the cauda equina this level. At L5-S1 there is a disc bulge with superimposed central protrusion which mildly indents thecal sac and may contact traversing S1 nerve roots bilaterally. There is moderate bilateral neural foraminal stenosis in association with facet hypertrophy and ligamentum flavum thickening. 1. Prominent complex fluid collection in the paraspinal musculature on the left at the L3-L4 level which appears to communicate anteriorly with a tiny epidural abscess at the L3 level and posteriorly with a large subcutaneous component abscess. 2. Arachnoiditis. Findings were discussed with Clayton Reis RN via telephone at the time of interpretation. **This report has been created using voice recognition software. It may contain minor errors which are inherent in voice recognition technology. ** Final report electronically signed by Dr. Karen Lundberg MD on 11/20/2019 3:18 PM    Xr Lumbar Spine 1 Vw    Result Date: 11/25/2019  PROCEDURE: XR LUMBAR SPINE 1 VW CLINICAL INFORMATION: Lumbar laminectomy TECHNIQUE: 2 intraoperative spot radiographs of the lumbar spine COMPARISON: None FINDINGS: There is a retractor posterior to the L3-4 vertebra. There is a localizer posterior to L3. Disc space narrowing is present at the L5-S1 level. Radiopaque surgical material seen in the posterior soft tissues. Surgical localization as above. Final report electronically signed by Dr. Gloria Card on 11/25/2019 3:33 PM    Xr Chest Portable    Result Date: 11/28/2019  PROCEDURE: XR CHEST PORTABLE CLINICAL INFORMATION: hypoxia.  COMPARISON: Chest x-ray dated 11/23/2019 TECHNIQUE: AP Portable chest xray FINDINGS: Lines/tubes/devices: Left subclavian central venous catheter tip remains in the region of the mid SVC. Lungs/pleura: There are mild interstitial infiltrates which may represent interstitial pulmonary edema or an atypical pneumonia. There is no consolidation. There is a slightly larger than 1 cm mid left lung calcified granuloma. No pleural effusion. No pneumothorax. Heart: There is mild cardiomegaly. Mediastinum/deepthi: No obvious mass or adenopathy. Skeleton: No significant bone or joint abnormality. Mild interstitial pulmonary edema versus atypical pneumonia. **This report has been created using voice recognition software. It may contain minor errors which are inherent in voice recognition technology. ** Final report electronically signed by Dr. Kylah Mercado on 11/28/2019 6:11 AM    Xr Chest Portable    Result Date: 11/23/2019  PROCEDURE: XR CHEST PORTABLE CLINICAL INFORMATION: resp failure. COMPARISON: November 19, 2019 TECHNIQUE: AP upright view of the chest. FINDINGS: There has been interval removal of the endotracheal and nasogastric tubes. The lung volumes are slightly diminished. Coarse markings are stable with no focal infiltrate noted. The left mid thoracic nodular density or granuloma is again noted. Central venous structures are stable. The central venous catheter remains in the superior vena cava. Interval endotracheal and nasogastric tube removal with persistent diminished lung volumes. No acute process compared to prior study. **This report has been created using voice recognition software. It may contain minor errors which are inherent in voice recognition technology. ** Final report electronically signed by Dr. Tess Lozano on 11/23/2019 3:11 AM    Xr Chest Portable    Result Date: 11/19/2019  PROCEDURE: XR CHEST PORTABLE CLINICAL INFORMATION: CVC placement. COMPARISON: November 19, 2019 TECHNIQUE: AP upright view of the chest. FINDINGS: The heart and mediastinum are stable.  There is redemonstration of an AM    Xr Chest Portable    Result Date: 11/19/2019  PROCEDURE: XR CHEST PORTABLE CLINICAL INFORMATION: et tube placement. COMPARISON: November 18, 2019 TECHNIQUE: AP upright view of the chest. FINDINGS: The heart and mediastinum remain upper normal. There is an endotracheal tube visualized 3 cm above the mesfin. The nasogastric tube is in the mid body of the stomach. Lung volumes remain mildly shallow with redemonstration of calcified granuloma in the left mid thorax. Trace effusion at the right lung base cannot be excluded. There is no obvious congestive failure. Postsurgical clips in the right upper abdomen are stable compared to prior study. There is no acute change of the skeleton. 1. Visualized endotracheal and nasogastric tubes discussed above. 2. Persistent slightly decreased lung volumes with coarse markings. 3. Minimal right effusion not excluded. **This report has been created using voice recognition software. It may contain minor errors which are inherent in voice recognition technology. ** Final report electronically signed by Dr. Mell Denson on 11/19/2019 12:27 AM    Ir Lumbar Puncture For Diagnosis    Result Date: 11/19/2019  LUMBAR PUNCTURE WITH FLUOROSCOPIC GUIDANCE: CLINICAL INFORMATION:  Lumbar puncture for possible meningitis PERFORMED BY: Ariel Brewster M.D. APPROACH: L3-L4 interlaminar approach NEEDLE: 20-gauge spinal needle FLUID: 6 mL yellow turbid fluid FLUOROSCOPY TIME: 1 minute 5 seconds FLUOROSCOPIC IMAGES: 3 Dose (mGy):63 PROCEDURE:  Signed informed consent was obtained prior to performing this procedure. Following local anesthesia and utilizing aseptic technique, a spinal needle was successfully inserted into the lumbar thecal sac at the level listed above. The amount of spinal fluid listed above was obtained in four separate vials, and sent to the laboratory for diagnostic testing. Status post successful lumbar puncture.  **This report has been created using voice Percu-Stay device. A sample of the pus was sent to laboratory for culture and sensitivity testing. Status post successful abscess drainage procedure. . **This report has been created using voice recognition software. It may contain minor errors which are inherent in voice recognition technology. ** Final report electronically signed by Dr. Jossy Mcclain on 11/21/2019 1:13 PM    A/P: S/P she remains POD #7 following surgical I&D of lumbar infection and placement of EVD drain. Intracranial pressures have been ranging from 1-5 with the drain clamped. Neurosurgery is recommending that the drain remain clamped for the next two days with continued 2-hour transduce recorded to patient chart. I have recommended that nursing cover the lumbar operative site dressings with Tegaderm to prevent outside contaminants. Neurosurgery to follow    Electronically signed by Irma Zarate PA-C on 12/2/2019 at 3:53 PM     ttending Note:     Patient seen and examined in the ICU  in conjunction with neurosurgery PA Irma Zarate PA-C). Discussed with patient, her nurse and ICU team as well. All data and imaging reviewed by myself. I agree with examination assessment and plan as documented above. - Post op day 7: Patient underwent EVD placement and an excisional draining of lumbar spine abscesses.   -There is no acute events over the night.  -Patient is awake, alert and oriented. - Feeling good. -Moving every things and following commands by 4 with good strength through out. .  -Today brain CT showed: Stable mild ventriculomegaly.  -Please continue the medical management per ICU team.  - ICP: between 3-6 mmhg  -Follow-up the ID recommendation.  -Please keep EVD clamped  - New CSF sample today. - Transduce the EVD every 2 hours and please document  the ICP. - New brain CT after 2 days.          Bharat Wooten MD

## 2019-12-02 NOTE — PROGRESS NOTES
[facility] ICU 4D  Occupational Therapy  Daily Note  Time In:   Time Out: 1104  Timed Code Treatment Minutes: 24 Minutes  Minutes: 24        Date: 2019  Patient Name: Cyndy De La Rosa,   Gender: female      Room: 4D-17/017-A  MRN: [id number]  : 1955  (59 y.o.)  Referring Practitioner: Sheri Chua PA-C  Diagnosis: Hyperglycemia  Additional Pertinent Hx: Pt was hospitalized 2019 through 2019 for evaluation of possible normal pressure hydrocephalus.  She had symptoms consistent with normal pressure hydrocephalus including progressive gait disturbance, incontinence, and cognitive decline associated with blurred vision and headaches.  She was admitted and had a lumbar drain placed.  Final assessment of efficacy of lumbar drain was documented 2019 by Dr. Bere Oviedo that note he determined there was no significant benefit from the lumbar drain trial.  Findings did not support the diagnosis of normal pressure hydrocephalus.  Patient was determined not to be a good candidate for  shunt and lumbar drain was removed.  Patient was discharged home on 2019. Patient was received in transfer from [facility] to [facility] on 2019. Symptoms prior to hospitalization included a 5-day history of back pain associated with difficulty ambulating, difficulty urinating and diarrhea. Additionally she had a 2-day history of confusion with hallucinations. Immediately upon arrival, nursing staff had concern regarding patient's respiratory rate at 44 and heart rate at 160. She was transferred immediately to the intensive care unit. Patient was in overt septic shock and was intubated 2019. She was started on Zosyn and vancomycin. Cultures were sent. She underwent volume resuscitation and was started on pressors. Rocephin was subsequently added given concerns for possible meningitis.   s/p EVACUATION AND DEBRIDEMENT L2-L3 INTRAMUSCULAR SPINAL ABSCESS, EVACUATION EPIDURAL ABSCESS L2-L3, PARTIAL LAMINECTOMY L2-L3, EVD PLACEMENT on 11/25/19    Restrictions/Precautions:  Restrictions/Precautions: Fall Risk  Position Activity Restriction  Other position/activity restrictions: Confusion, EVD drain-needs to be clamped prior to mobility & re-leveled by nursing after session, flexiseal    SUBJECTIVE: Pt lying supine in bed with 2 family members at time of OT arrival. Required max verbal encouragement from family and therapists to participate in tx session, eventually refusing to sit EOB. PAIN: Did not rate but verbalized pain upon movement in bed. COGNITION: Decreased Recall, Decreased Insight, Impaired Memory, Decreased Problem Solving, Decreased Safety Awareness and Difficulty Following Commands    ADL:   No ADL's completed this session. Alisia Portillo BALANCE:  Unable to assess d/t refusal to sit EOB    BED MOBILITY:  Rolling to Left: Maximum Assistance. Pt able to pull self with R arm, but required assistance for LE's and torso, as well as max verbal encouragement to complete  Supine to Sit: Pt required Max A to bring LE's off EOB. Pt began to attempt to bring torso off of bed x2 trials with Max A, but verbalized pain and fatigue both times. Eventually refusing to attempt again and requesting to end session despite max encouragement to continue. TRANSFERS:  Unable to assess    FUNCTIONAL MOBILITY:  Not completed this date    ASSESSMENT:     Activity Tolerance:  Patient tolerance of  treatment: poor. Discharge Recommendations: Continue to assess pending progress  Equipment Recommendations:  Other: Monitor pending progress  Plan: Times per week: 5x  Current Treatment Recommendations: Balance Training, Self-Care / ADL, Functional Mobility Training, Endurance Training, Cognitive Reorientation, Safety Education & Training, ROM, Strengthening    Patient Education  Patient Education: Importance of Increasing Activity    Goals  Short term goals  Time Frame for Short term goals: 2 weeks  Short term goal 1: Pt will tolerate sitting EOB 4 min with mod A x 2 for eventual EOB ADLs  Short term goal 2: Pt will tolerate BUE AAROM/AROM for increased AROM BUE for ease of grooming & self feeding  Short term goal 3: Pt will tolerate further assessment of functional t/fs by OTR when appropriate  Long term goals  Time Frame for Long term goals :  No LTG set d/t short ELOS    Following session, patient left in safe position with all fall risk precautions in place.

## 2019-12-02 NOTE — PROGRESS NOTES
Assessment and Plan:        1. Abscess: Affecting lumbar region and involving soft tissue and communicating with epidural abscess.  Secondary to MSSA.  Status post lumbar drain placement 11/21/2019 which was ineffectual.  Day #13 nafcillin.    Surgical drainage completed 11/25/2019.  2. Meningitis: Secondary to MSSA.  On Nafcillin.  S/P Decadron to improve permeability of antibiotics.   Ventriculostomy tube placed 11/25/2019.  Continue with Nafcillin.  Now PCR positive for HHV-6.  Added Ganciclovir.  Unclear if this is active disease.  Would rather treat than have concern of possible untreated disease. Repeat PCR and culture ordered for Friday was positive for HHV-6. Repeat again today. 3. Septicemia: Secondary to MSSA secondary to soft tissue abscess communicating with small epidural abscess.  Day 13 Nafcillin.  Previously on vancomycin.    Now on continuous nafcillin drip. 4. Atrial fibrillation with rapid ventricular response: New onset. Felt secondary to septic shock.  Converted to sinus dysrhythmia. Transitioned to beta blocker therapy.  No anticoagulation. 5. Delirium: Secondary to septic shock with meningitis. Mentation improved post ventriculostomy tube. Continues to improve.     6. Type 2 diabetes mellitus:  SQ Lantus. 7. Electrolyte abnormalities: Replaced phosphorus, potassium and magnesium. Replacing sodium over 5 hours. 8. 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  9. Urinary tract infection:  Related to Septicemia.  Resolved. 10. Respiratory alkalosis: Secondary to septic shock.  Resolved. 11. Hypotension: Volume resuscitated.  S/P Pressors.  Screen for adrenal insufficiency is negative.  Resolved. 12. Hyperglycemia: Resolved. 15. Ketoacidosis: Resolved. 14. Lactic acidosis: Secondary to septic shock with poor perfusion.  S/P volume resuscitation and pressors.  Resolved. 15. Acute respiratory failure: Patient intubated 11/18/2019 for impending respiratory arrest. Patient extubated 11/22/19. Resolved. 16. Pneumonia: Was present upon admission.  Positive for Klebsiella pneumonia and MSSA.  Treated with nafcillin and Cipro.  Resolved. 17. Septic shock: Secondary to septicemia and meningitis due to Staphylococcus aureus.  Initial antibiotic selection was high-dose Rocephin, Zosyn, and and vancomycin.  Antibiotics simplified to vancomycin on 11/20/2019 and subsequently nafcillin.  Secondary to MSSA bacteremia.  This is secondary to soft tissue abscess communicating with small epidural abscess.  Resolved. 18. Back pain: MRI back positive for soft tissue abscess and small epidural abscess. Resolved. .     CC: Lumbar abscess  HPI: Patient is a 60-year-old white female active smoker. Winter Parnell has a remote history of appendectomy and cholecystectomy. Winter Parnell has a history of type 2 diabetes mellitus, hypertension, hyperlipidemia, depression, and a history of brain lipoma diagnosed 11/4/2019. Patient was hospitalized 11/4/2019 through 11/7/2019 for evaluation of possible normal pressure hydrocephalus.  She had symptoms consistent with normal pressure hydrocephalus including progressive gait disturbance, incontinence, and cognitive decline associated with blurred vision and headaches.  She was admitted and had a lumbar drain placed.  Final assessment of efficacy of lumbar drain was documented 11/7/2019 by Dr. Pete Bunn that note he determined there was no significant benefit from the lumbar drain trial.  Findings did not support the diagnosis of normal pressure hydrocephalus.  Patient was determined not to be a good candidate for  shunt and lumbar drain was removed.  Patient was discharged home on 11/7/2019.   Patient was received in transfer from [facility] facility to [facility] on 11/18/2019.  Symptoms prior to hospitalization included a 5-day history of back pain associated with difficulty ambulating, difficulty urinating and diarrhea.  Additionally she had a 2-day history of confusion with Placeholder    insulin lispro  0-12 Units Subcutaneous TID     insulin lispro  0-6 Units Subcutaneous Nightly    metoprolol tartrate  25 mg Oral BID    potassium & sodium phosphates  1 packet Oral 4x Daily    potassium (CARDIAC) replacement protocol   Other RX Placeholder     Vital Signs:   T: 98.9: P: 93: RR: 20: B/P: 113/92: FiO2: 21: O2 Sat: 98: I/O: 1531/1575  CAM-ICU:  GCS 15. General:  Ill appearing white female. HEENT:  normocephalic.  Right ventriculostomy tube present.  No scleral icterus. PERR  Neck: Stiff neck.  No Thyromegaly. Lungs: clear to auscultation.  No retractions. Cardiac: RRR.  No JVD.     Abdomen: soft.  Nontender. Extremities:  No clubbing, cyanosis, or edema x 4.    Vasculature: capillary refill < 3 seconds. Palpable dorsalis pedis pulses. Skin: Pale but warm. Psych:  Awake.  Oriented to person, and place. Confused as to circumstance. Not oriented to time. Lymph:  No supraclavicular adenopathy. Neurologic:  No seizures.  Bilateral lower extremity weakness has significantly improved. Patient is able to lift and hold both lower extremities off the bed for more than 5 seconds. There is slight weakness on the right compared with the left lower extremity.      Data: (All radiographs, tracings, PFTs, and imaging are personally viewed and interpreted unless otherwise noted). · 2/2 blood cultures positive for MSSA. · Cerebrospinal fluid culture positive for MSSA.  Protein greater than 600. Glucose 9. 15,626 white blood cells. · MRI lumbar and thoracic spine shows lumbar soft tissue abscess with small epidural abscess. · Sputum culture growing MSSA, in addition to Klebsiella pneumonia. · Telemetry shows sinus rhythm.  Premature atrial complexes. · Abscess 11.25/19 growing Staph. · CSF collected 11/25/19 and 11/29/19 positive for HHV-6 via PCR. · CT head shows right frontal ventriculostomy tube.  Mild hydrocephalus.  1.5 cm lipoma of brain.   · CT scan head report 12/2/2019:

## 2019-12-02 NOTE — CARE COORDINATION
12/2/19, 3:12 PM    DISCHARGE ON GOING EVALUATION    [facility] day: 14  Location: -/017-A Reason for admit: Hyperglycemia [R73.9]   Procedure:   11/19 Intubated  11/19 CVC Left subclavian  11/19 LP  11/20 MRI Thoracic spine: No evidence of acute abnormality of the thoracic spine; Old minimal compression deformity of T12 with approximately 10% anterior height loss; Left paracentral extrusion at T9-10 causing mild spinal canal stenosis and contacting the ventral aspect of the cord without abnormal signal in the cord  11/20 MRI Lumbar spine: Prominent complex fluid collection in the paraspinal musculature on the left at the L3-L4 level which appears to communicate anteriorly with a tiny epidural abscess at the L3 level and posteriorly with a large subcutaneous component abscess; Arachnoiditis  11/21 Posterior lumbar paraspinal abcess drain placed in IR  11/22 Extubated  11/25 EVACUATION AND DEBRIDEMENT L2-L3 INTRAMUSCULAR SPINAL ABSCESS, EVACUATION EPIDURAL ABSCESS L2-L3, PARTIAL LAMINECTOMY L2-L3, EVD PLACEMENT  11/26 CT Head: Stable  11/27 CT Head: Stable  12/2 CT Head: Slight retraction of the  shunt catheter, tip in the right frontal horn, no longer at the level of the foramen of Monro; Stable mild ventriculomegaly; No acute ischemic infarct, hemorrhage, or mass effect  Treatment Plan of Care: Tmax 100.2. NSR. On room air. Oriented to person and place. Follows commands. EVD clamped - plan to possibly remove today. Primary RN reports plan for possible  shunt; Neurosurgery to speak with ID about when it can be done. Flexiseal. Bernstein.  CSF + Herpes. Lumbar abscess and Blood cultures +MSSA. Respiratory culture +MSSA & Klebsiella pneumoniae. 3% saline off again  Intensivist, ID, and Neurosurgery following. PT/OT. Pepcid, IV ganciclovir, prn norco, SSI ACHS, lopressor, desenex, IV nafcillin continuous, phos-nak, K+, sodium chloride tabs, pinxav, Electrolyte replacement protocols.  Daily IV

## 2019-12-02 NOTE — PROGRESS NOTES
Progress note: Infectious diseases    Patient - Maritza Amezcua,  Age - 59 y.o.    - 1955      Room Number - 4D-17/017-A   MRN -  [id number]   Acct # - [de-identified]  Date of Admission -  2019 11:22 PM    SUBJECTIVE:   She feels good. She was eating her dinner  Denies headache  OBJECTIVE   VITALS    height is 5' 6\" (1.676 m) and weight is 213 lb 10 oz (96.9 kg). Her oral temperature is 98.5 °F (36.9 °C). Her blood pressure is 128/77 and her pulse is 90. Her respiration is 20 and oxygen saturation is 98%.        Wt Readings from Last 3 Encounters:   19 213 lb 10 oz (96.9 kg)   19 201 lb 4.8 oz (91.3 kg)   19 220 lb 6.4 oz (100 kg)       I/O (24 Hours)    Intake/Output Summary (Last 24 hours) at 2019 1826  Last data filed at 2019 1345  Gross per 24 hour   Intake 2740 ml   Output 3775 ml   Net -1035 ml       General Appearance asleep Ventriculostomy drain in place  HEENT - normocephalic, atraumatic, pink conjunctiva,  anicteric sclera  Neck - Supple, no mass  Lungs -  Bilateral   air entry, no rhonchi, no wheeze  Cardiovascular - Heart sounds are normal.     Abdomen - soft, not distended, nontender,   Neurologic -awake and oriented  Skin - No bruising or bleeding  Extremities - dressed lumbar wound    MEDICATIONS:      polycarbophil  625 mg Oral Daily    ganciclovir (CYTOVENE) IVPB  2.5 mg/kg Intravenous 2 times per day    sodium chloride  2 g Oral TID WC    potassium chloride  40 mEq Oral BID WC    famotidine  20 mg Oral BID    magnesium oxide  400 mg Oral Daily    miconazole   Topical BID    sodium chloride flush  10 mL Intravenous 2 times per day    docusate sodium  100 mg Oral BID    nafcillin 12 g continuous infusion  12 g Intravenous Q24H    calcium replacement protocol   Other RX Placeholder    insulin lispro  0-12 Units Subcutaneous TID WC    insulin lispro  0-6 Units Subcutaneous Nightly    metoprolol tartrate  25 mg Oral BID    potassium & sodium phosphates  1 packet Oral 4x Daily    potassium (CARDIAC) replacement protocol   Other RX Placeholder      dextrose       melatonin, HYDROcodone 5 mg - acetaminophen **OR** HYDROcodone 5 mg - acetaminophen, hydrALAZINE, zinc oxide, sodium chloride flush, ondansetron, acetaminophen, dextrose, magnesium hydroxide, potassium chloride, magnesium sulfate, acetaminophen, glucose, dextrose, glucagon (rDNA), dextrose      LABS:     CBC:   Recent Labs     11/30/19  0340 12/01/19  0350   WBC 13.2* 12.6*   HGB 7.9* 8.4*    346     BMP:    Recent Labs     11/30/19  0340 12/01/19  0350  12/01/19  2111 12/02/19  0335 12/02/19  1255   *   < > 136   < > 130* 129* 134*   K 3.6  --  3.8  --   --   --   --    CL 97*  --  98  --   --   --   --    CO2 17*  --  23  --   --   --   --    BUN 12  --  11  --   --   --   --    CREATININE 1.1  --  1.0  --   --   --   --    GLUCOSE 138*  --  123*  --   --   --   --     < > = values in this interval not displayed. Calcium:  Recent Labs     12/01/19 0350   CALCIUM 8.0*     Ionized Calcium:No results for input(s): IONCA in the last 72 hours. Magnesium:  Recent Labs     12/02/19  0335   MG 1.6     Phosphorus:  Recent Labs     12/01/19  0350   PHOS 4.1     BNP:No results for input(s): BNP in the last 72 hours. Glucose:  Recent Labs     12/02/19  0845 12/02/19  1234 12/02/19  1734   POCGLU 166* 156* 127*      CULTURES:   UA:   No results for input(s): SPECGRAV, PHUR, COLORU, CLARITYU, MUCUS, PROTEINU, BLOODU, RBCUA, WBCUA, BACTERIA, NITRU, GLUCOSEU, BILIRUBINUR, UROBILINOGEN, KETUA, LABCAST, LABCASTTY, AMORPHOS in the last 72 hours.     Invalid input(s): CRYSTALS  Micro:   Lab Results   Component Value Date    BC No growth-preliminary  11/28/2019         IMAGING:         Problem list of patient:     Patient Active Problem List   Diagnosis Code    Brain lipoma D17.79    NPH (normal pressure hydrocephalus) ([facility] G91.2    Hyperglycemia R73.9    Sepsis ([facility] A41.9    Acute respiratory failure with hypoxia ([facility]) J96.01    Atrial fibrillation with RVR ([facility]) I48.91    Encephalopathy G93.40    Diabetic ketoacidosis associated with type 2 diabetes mellitus ([facility]) E11.10         ASSESSMENT/PLAN   Epidural and lumbar abscess due to MSSA infection: she had I and D.   Currently on IV nafcillin  Confusion with Ventriculites: Repeat CSF +ve for HSV-6: started on ganciclovir  Patient appears to be more awake and oriented  The ventriculostomy is being clamped to evaluate for any mental state change prior to removal  Cathy Muñoz MD, [address] 12/2/2019 6:26 PM

## 2019-12-03 LAB
GLUCOSE BLD-MCNC: 122 MG/DL (ref 70–108)
GLUCOSE BLD-MCNC: 139 MG/DL (ref 70–108)
GLUCOSE BLD-MCNC: 145 MG/DL (ref 70–108)
GLUCOSE BLD-MCNC: 159 MG/DL (ref 70–108)
MAGNESIUM: 1.5 MG/DL (ref 1.6–2.4)
PLATELET # BLD: 299 THOU/MM3 (ref 130–400)

## 2019-12-03 PROCEDURE — 2709999900 HC NON-CHARGEABLE SUPPLY

## 2019-12-03 PROCEDURE — 6370000000 HC RX 637 (ALT 250 FOR IP): Performed by: INTERNAL MEDICINE

## 2019-12-03 PROCEDURE — 2500000003 HC RX 250 WO HCPCS: Performed by: PHYSICIAN ASSISTANT

## 2019-12-03 PROCEDURE — 99233 SBSQ HOSP IP/OBS HIGH 50: CPT | Performed by: INTERNAL MEDICINE

## 2019-12-03 PROCEDURE — 2580000003 HC RX 258: Performed by: INTERNAL MEDICINE

## 2019-12-03 PROCEDURE — 6360000002 HC RX W HCPCS: Performed by: PHYSICIAN ASSISTANT

## 2019-12-03 PROCEDURE — 2580000003 HC RX 258: Performed by: PHYSICIAN ASSISTANT

## 2019-12-03 PROCEDURE — 97530 THERAPEUTIC ACTIVITIES: CPT

## 2019-12-03 PROCEDURE — 85049 AUTOMATED PLATELET COUNT: CPT

## 2019-12-03 PROCEDURE — 2000000000 HC ICU R&B

## 2019-12-03 PROCEDURE — 36415 COLL VENOUS BLD VENIPUNCTURE: CPT

## 2019-12-03 PROCEDURE — 6370000000 HC RX 637 (ALT 250 FOR IP): Performed by: PHYSICIAN ASSISTANT

## 2019-12-03 PROCEDURE — 6370000000 HC RX 637 (ALT 250 FOR IP): Performed by: ANESTHESIOLOGY

## 2019-12-03 PROCEDURE — 6360000002 HC RX W HCPCS: Performed by: INTERNAL MEDICINE

## 2019-12-03 PROCEDURE — 83735 ASSAY OF MAGNESIUM: CPT

## 2019-12-03 PROCEDURE — 94760 N-INVAS EAR/PLS OXIMETRY 1: CPT

## 2019-12-03 PROCEDURE — 82948 REAGENT STRIP/BLOOD GLUCOSE: CPT

## 2019-12-03 PROCEDURE — 6370000000 HC RX 637 (ALT 250 FOR IP): Performed by: NURSE PRACTITIONER

## 2019-12-03 PROCEDURE — 6370000000 HC RX 637 (ALT 250 FOR IP): Performed by: FAMILY MEDICINE

## 2019-12-03 RX ORDER — MAGNESIUM SULFATE IN WATER 40 MG/ML
2 INJECTION, SOLUTION INTRAVENOUS ONCE
Status: COMPLETED | OUTPATIENT
Start: 2019-12-03 | End: 2019-12-03

## 2019-12-03 RX ORDER — DOCUSATE SODIUM 100 MG/1
100 CAPSULE, LIQUID FILLED ORAL NIGHTLY
Status: DISCONTINUED | OUTPATIENT
Start: 2019-12-04 | End: 2019-12-04

## 2019-12-03 RX ADMIN — SODIUM CHLORIDE TAB 1 GM 2 G: 1 TAB at 08:51

## 2019-12-03 RX ADMIN — Medication 3 MG: at 19:41

## 2019-12-03 RX ADMIN — MAGNESIUM GLUCONATE 500 MG ORAL TABLET 400 MG: 500 TABLET ORAL at 08:51

## 2019-12-03 RX ADMIN — SODIUM CHLORIDE TAB 1 GM 2 G: 1 TAB at 12:47

## 2019-12-03 RX ADMIN — MAGNESIUM GLUCONATE 500 MG ORAL TABLET 400 MG: 500 TABLET ORAL at 19:41

## 2019-12-03 RX ADMIN — POTASSIUM & SODIUM PHOSPHATES POWDER PACK 280-160-250 MG 250 MG: 280-160-250 PACK at 12:47

## 2019-12-03 RX ADMIN — Medication 10 ML: at 09:13

## 2019-12-03 RX ADMIN — POTASSIUM CHLORIDE 40 MEQ: 20 TABLET, EXTENDED RELEASE ORAL at 08:51

## 2019-12-03 RX ADMIN — POTASSIUM & SODIUM PHOSPHATES POWDER PACK 280-160-250 MG 250 MG: 280-160-250 PACK at 18:38

## 2019-12-03 RX ADMIN — METOPROLOL TARTRATE 25 MG: 25 TABLET ORAL at 19:41

## 2019-12-03 RX ADMIN — HYDROCODONE BITARTRATE AND ACETAMINOPHEN 2 TABLET: 5; 325 TABLET ORAL at 02:43

## 2019-12-03 RX ADMIN — FAMOTIDINE 20 MG: 20 TABLET ORAL at 08:51

## 2019-12-03 RX ADMIN — POTASSIUM & SODIUM PHOSPHATES POWDER PACK 280-160-250 MG 250 MG: 280-160-250 PACK at 08:51

## 2019-12-03 RX ADMIN — MICONAZOLE NITRATE: 20 POWDER TOPICAL at 19:42

## 2019-12-03 RX ADMIN — POTASSIUM & SODIUM PHOSPHATES POWDER PACK 280-160-250 MG 250 MG: 280-160-250 PACK at 19:41

## 2019-12-03 RX ADMIN — CALCIUM POLYCARBOPHIL 625 MG: 625 TABLET, FILM COATED ORAL at 08:51

## 2019-12-03 RX ADMIN — MICONAZOLE NITRATE: 20 POWDER TOPICAL at 08:00

## 2019-12-03 RX ADMIN — INSULIN LISPRO 2 UNITS: 100 INJECTION, SOLUTION INTRAVENOUS; SUBCUTANEOUS at 18:33

## 2019-12-03 RX ADMIN — FAMOTIDINE 20 MG: 20 TABLET ORAL at 19:41

## 2019-12-03 RX ADMIN — NAFCILLIN SODIUM 12 G: 2 INJECTION, POWDER, LYOPHILIZED, FOR SOLUTION INTRAMUSCULAR; INTRAVENOUS at 13:11

## 2019-12-03 RX ADMIN — GANCICLOVIR SODIUM 245 MG: 500 INJECTION, POWDER, LYOPHILIZED, FOR SOLUTION INTRAVENOUS at 19:43

## 2019-12-03 RX ADMIN — Medication 10 ML: at 19:50

## 2019-12-03 RX ADMIN — MAGNESIUM SULFATE HEPTAHYDRATE 2 G: 40 INJECTION, SOLUTION INTRAVENOUS at 09:10

## 2019-12-03 RX ADMIN — POTASSIUM CHLORIDE 40 MEQ: 20 TABLET, EXTENDED RELEASE ORAL at 18:42

## 2019-12-03 RX ADMIN — METOPROLOL TARTRATE 25 MG: 25 TABLET ORAL at 08:51

## 2019-12-03 RX ADMIN — INSULIN LISPRO 2 UNITS: 100 INJECTION, SOLUTION INTRAVENOUS; SUBCUTANEOUS at 08:53

## 2019-12-03 RX ADMIN — SODIUM CHLORIDE TAB 1 GM 2 G: 1 TAB at 18:37

## 2019-12-03 RX ADMIN — GANCICLOVIR SODIUM 245 MG: 500 INJECTION, POWDER, LYOPHILIZED, FOR SOLUTION INTRAVENOUS at 09:17

## 2019-12-03 ASSESSMENT — PAIN SCALES - GENERAL
PAINLEVEL_OUTOF10: 0
PAINLEVEL_OUTOF10: 10
PAINLEVEL_OUTOF10: 0
PAINLEVEL_OUTOF10: 0

## 2019-12-03 ASSESSMENT — PAIN DESCRIPTION - LOCATION: LOCATION: HEAD

## 2019-12-03 ASSESSMENT — PAIN DESCRIPTION - PROGRESSION: CLINICAL_PROGRESSION: NOT CHANGED

## 2019-12-03 ASSESSMENT — PAIN DESCRIPTION - PAIN TYPE: TYPE: ACUTE PAIN

## 2019-12-03 ASSESSMENT — PAIN - FUNCTIONAL ASSESSMENT: PAIN_FUNCTIONAL_ASSESSMENT: ACTIVITIES ARE NOT PREVENTED

## 2019-12-03 ASSESSMENT — PAIN DESCRIPTION - FREQUENCY: FREQUENCY: CONTINUOUS

## 2019-12-03 ASSESSMENT — PAIN DESCRIPTION - DESCRIPTORS: DESCRIPTORS: ACHING

## 2019-12-03 ASSESSMENT — PAIN DESCRIPTION - ONSET: ONSET: ON-GOING

## 2019-12-03 NOTE — PROGRESS NOTES
Assessment and Plan:        1. Abscess: Affecting lumbar region and involving soft tissue and communicating with epidural abscess.  Secondary to MSSA.  Status post lumbar drain placement 11/21/2019 which was ineffectual.  Day #14 nafcillin.    Surgical drainage completed 11/25/2019.  2. Meningitis: Secondary to MSSA.  On Nafcillin.  S/P Decadron to improve permeability of antibiotics.   Ventriculostomy tube placed 11/25/2019.  Continue with Nafcillin.  Now PCR positive for HHV-6. Tomás Garcia if this is active disease.  Would rather treat than have concern of possible untreated disease.  Repeat PCR and culture ordered for Friday was positive for HHV-6. Repeat 12/2/19 was negative. Ganciclovir completed 12/3/19. 3. Septicemia: Secondary to MSSA secondary to soft tissue abscess communicating with small epidural abscess.  Day 14 Nafcillin.  Previously on vancomycin.    Now on continuous nafcillin drip. 4. Atrial fibrillation with rapid ventricular response: New onset. Felt secondary to septic shock.  Converted to sinus dysrhythmia. Transitioned to beta blocker therapy.  No anticoagulation. 5. Delirium: Secondary to septic shock with meningitis. Mentation improved post ventriculostomy tube. Continues to improve.     6. Type 2 diabetes mellitus:  SQ Lantus. 7. Electrolyte abnormalities: Replaced phosphorus, potassium and magnesium. .  8. 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  9. Urinary tract infection:  Related to Septicemia.  Resolved. 10. Respiratory alkalosis: Secondary to septic shock.  Resolved. 11. Hypotension: Volume resuscitated.  S/P Pressors.  Screen for adrenal insufficiency is negative.  Resolved. 12. Hyperglycemia: Resolved. 15. Ketoacidosis: Resolved. 14. Lactic acidosis: Secondary to septic shock with poor perfusion.  S/P volume resuscitation and pressors.  Resolved.   15. Acute respiratory failure: Patient intubated 11/18/2019 for impending respiratory arrest. Patient extubated with hallucinations.  Immediately upon arrival, nursing staff had concern regarding patient's respiratory rate at 44 and heart rate at 160.  She was transferred immediately to the intensive care unit.  Patient was in overt septic shock and was intubated 11/18/2019.  She was started on Zosyn and vancomycin.  Cultures were sent. Nicole Byrd underwent volume resuscitation and was started on pressors.  Rocephin was subsequently added given concerns for possible meningitis. Lumbar puncture was performed and subsequently grew Staphylococcus species. Antibiotics simplified to vancomycin for meningitis.  MRI lumbar spine demonstrated soft tissue abscess communicating with small epidural abscess.  Lumbar soft tissue drain placed 11/21/19.  Culture positive for MSSA.  Antibiotics converted to Nafcillin 11/20/19. Sputum culture started to grow pansensitive Klebsiella pneumonia.  Patient was treated with Cipro. Patient was extubated 11/22/2019. Patient underwent incision and drainage of lumbar soft tissue abscess on 11/25/2019. Ventriculostomy tube was placed 11/25/2019.   For further details, please review the assessment and plan. ROS: She has irritable but without complaints. No fever. No headache. No nausea. No cough. PMH:  Per HPI  SHX:  Active tobacco abuse at 1/2 PPD.   FHX: Positive for CAD  Allergies: NKDA  Medications:     dextrose        magnesium oxide  400 mg Oral BID    [START ON 12/4/2019] docusate sodium  100 mg Oral Nightly    polycarbophil  625 mg Oral Daily    ganciclovir (CYTOVENE) IVPB  2.5 mg/kg Intravenous 2 times per day    sodium chloride  2 g Oral TID WC    potassium chloride  40 mEq Oral BID WC    famotidine  20 mg Oral BID    miconazole   Topical BID    sodium chloride flush  10 mL Intravenous 2 times per day    nafcillin 12 g continuous infusion  12 g Intravenous Q24H    calcium replacement protocol   Other RX Placeholder    insulin lispro  0-12 Units Subcutaneous TID WC    insulin lispro  0-6 Units Subcutaneous Nightly    metoprolol tartrate  25 mg Oral BID    potassium & sodium phosphates  1 packet Oral 4x Daily    potassium (CARDIAC) replacement protocol   Other RX Placeholder     Vital Signs:   T: 98.3: P: 91: RR: 21: B/P: 100/59: FiO2: RA: O2 Sat: 98: I/O: 3048/3600  CAM-ICU: Negative. General:  Ill appearing white female. HEENT:  normocephalic.  Right ventriculostomy tube present.  No scleral icterus. PERR  Neck: Stiff neck.  No Thyromegaly. Lungs: clear to auscultation.  No retractions. Cardiac: RRR.  No JVD.     Abdomen: soft.  Nontender. Extremities:  No clubbing, cyanosis, or edema x 4.    Vasculature: capillary refill < 3 seconds. Palpable dorsalis pedis pulses. Skin: Pale but warm. Psych:  Awake.  Oriented to person, and place. Confused as to circumstance permit only. Not oriented to time. Lymph:  No supraclavicular adenopathy. Neurologic:  No seizures.  There is slight weakness on the right lower extremity compared with the left lower extremity.      Data: (All radiographs, tracings, PFTs, and imaging are personally viewed and interpreted unless otherwise noted). · 2/2 blood cultures positive for MSSA. · Cerebrospinal fluid culture positive for MSSA.  Protein greater than 600. Glucose 9. 15,626 white blood cells. · MRI lumbar and thoracic spine shows lumbar soft tissue abscess with small epidural abscess. · Sputum culture growing MSSA, in addition to Klebsiella pneumonia. · Telemetry shows sinus rhythm.  Premature atrial complexes. · Abscess 11.25/19 growing Staph. · CSF collected 11/25/19 and 11/29/19 positive for HHV-6 via PCR. · CSF collected 12/2/19 for PCR is negative with resolution of HHV-6. · CT head shows right frontal ventriculostomy tube.  Mild hydrocephalus.  1.5 cm lipoma of brain. · CT scan head report 12/2/2019: Stable mild ventriculomegaly. · Glucose 122.     Case discussed with Dr. Rob Garcia. Electronically signed by Lidia Chahal M.D.

## 2019-12-03 NOTE — CARE COORDINATION
12/3/19, 3:18 PM    DISCHARGE ON GOING [address] [place] [address] day: 15  Location: [facility] Reason for admit: Hyperglycemia [R73.9]   Procedure:   11/19 Intubated  11/19 CVC Left subclavian  11/19 LP  11/20 MRI Thoracic spine: No evidence of acute abnormality of the thoracic spine; Old minimal compression deformity of T12 with approximately 10% anterior height loss; Left paracentral extrusion at T9-10 causing mild spinal canal stenosis and contacting the ventral aspect of the cord without abnormal signal in the cord  11/20 MRI Lumbar spine: Prominent complex fluid collection in the paraspinal musculature on the left at the L3-L4 level which appears to communicate anteriorly with a tiny epidural abscess at the L3 level and posteriorly with a large subcutaneous component abscess; Arachnoiditis  11/21 Posterior lumbar paraspinal abcess drain placed in IR  11/22 Extubated  11/25 EVACUATION AND DEBRIDEMENT L2-L3 INTRAMUSCULAR SPINAL ABSCESS, EVACUATION EPIDURAL ABSCESS L2-L3, PARTIAL LAMINECTOMY L2-L3, EVD PLACEMENT  11/26 CT Head: Stable  11/27 CT Head: Stable  12/2 CT Head: Slight retraction of the  shunt catheter, tip in the right frontal horn, no longer at the level of the foramen of Monro; Stable mild ventriculomegaly; No acute ischemic infarct, hemorrhage, or mass effect  Treatment Plan of Care: POD #8. Afebrile. NSR. On room air. Oriented to person and place. Follows commands.  EVD clamped, ICP 1-5; if CT tomorrow is good, plan to pull EVD. CSF + Herpes. Lumbar abscess and Blood cultures +MSSA. Respiratory culture +MSSA & Klebsiella pneumoniae. 3% saline off again  Intensivist, ID, and Neurosurgery following. PT/OT. Pepcid, IV ganciclovir, prn norco, SSI ACHS, lopressor, desenex, IV nafcillin continuous, phos-nak, K+, sodium chloride tabs, pinxav, Electrolyte replacement protocols. Mg 1.5.    PCP: ROMULO Vargas  Readmission Risk Score: 20%  Patient Goals/Plan/Treatment Preferences:

## 2019-12-03 NOTE — PROGRESS NOTES
metoprolol tartrate  25 mg Oral BID    potassium & sodium phosphates  1 packet Oral 4x Daily    potassium (CARDIAC) replacement protocol   Other RX Placeholder      dextrose       melatonin, HYDROcodone 5 mg - acetaminophen **OR** HYDROcodone 5 mg - acetaminophen, hydrALAZINE, zinc oxide, sodium chloride flush, ondansetron, acetaminophen, dextrose, magnesium hydroxide, potassium chloride, magnesium sulfate, acetaminophen, glucose, dextrose, glucagon (rDNA), dextrose      LABS:     CBC:   Recent Labs     12/01/19  0350 12/03/19  0357   WBC 12.6*  --    HGB 8.4*  --     299     BMP:    Recent Labs     12/01/19  0350  12/02/19  0335 12/02/19  1255 12/02/19  1745      < > 129* 134* 131*   K 3.8  --   --   --   --    CL 98  --   --   --   --    CO2 23  --   --   --   --    BUN 11  --   --   --   --    CREATININE 1.0  --   --   --   --    GLUCOSE 123*  --   --   --   --     < > = values in this interval not displayed. Calcium:  Recent Labs     12/01/19 0350   CALCIUM 8.0*     Ionized Calcium:No results for input(s): IONCA in the last 72 hours. Magnesium:  Recent Labs     12/03/19 0357   MG 1.5*     Phosphorus:  Recent Labs     12/01/19  0350   PHOS 4.1     BNP:No results for input(s): BNP in the last 72 hours. 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    NPH (normal pressure hydrocephalus) ([facility]) G91.2    Hyperglycemia R73.9    Sepsis ([facility]) A41.9    Acute respiratory failure with hypoxia ([facility]) J96.01    Atrial fibrillation with RVR ([facility]) I48.91    Encephalopathy G93.40    Diabetic ketoacidosis associated with type 2 diabetes mellitus ([facility]) E11.10         ASSESSMENT/PLAN   Epidural and lumbar abscess due to MSSA infection: she had I and D. Currently on IV nafcillin  Confusion with Ventriculites: much improved.   HHV-6 on CSF on ganciclovir  Consider removing the ventriculostomy drain  Demond evaluation  Paty Henry MD, FACP 12/3/2019 2:01 PM

## 2019-12-03 NOTE — PROGRESS NOTES
Neurosurgery Progress Note    Patient:  Poncho Clemons      Unit/Bed:4D-17/017-A    YOB: 1955    MRN: [id number]     Acct: [de-identified]     Admit date: 11/18/2019    No chief complaint on file. Patient Seen, Chart, Physician notes, Labs, Radiology studies reviewed. Subjective: Mrs. Palacios Hidden status postoperative day #8 from evacuation and debridement of lumbar 2 lumbar 3 intramuscular spinal abscess along with evacuation of epidural abscess the same level including concurrent partial laminectomy of lumbar 2 lumbar 3 and EVD placement.  Continues alert and interactive on exam today. Past, Family, Social History unchanged from admission.     Diet:  Dietary Nutrition Supplements: Diabetic Oral Supplement  DIET CARB CONTROL; Daily Fluid Restriction: 2000 ml; High Fiber    Medications:  Scheduled Meds:   polycarbophil  625 mg Oral Daily    ganciclovir (CYTOVENE) IVPB  2.5 mg/kg Intravenous 2 times per day    sodium chloride  2 g Oral TID WC    potassium chloride  40 mEq Oral BID WC    famotidine  20 mg Oral BID    magnesium oxide  400 mg Oral Daily    miconazole   Topical BID    sodium chloride flush  10 mL Intravenous 2 times per day    docusate sodium  100 mg Oral BID    nafcillin 12 g continuous infusion  12 g Intravenous Q24H    calcium replacement protocol   Other RX Placeholder    insulin lispro  0-12 Units Subcutaneous TID WC    insulin lispro  0-6 Units Subcutaneous Nightly    metoprolol tartrate  25 mg Oral BID    potassium & sodium phosphates  1 packet Oral 4x Daily    potassium (CARDIAC) replacement protocol   Other RX Placeholder     Continuous Infusions:   dextrose       PRN Meds:melatonin, HYDROcodone 5 mg - acetaminophen **OR** HYDROcodone 5 mg - acetaminophen, hydrALAZINE, zinc oxide, sodium chloride flush, ondansetron, acetaminophen, dextrose, magnesium hydroxide, potassium chloride, magnesium sulfate, acetaminophen, glucose, dextrose, glucagon (rDNA), dextrose    Objective:  She is sitting up in a chair comfortably with EVD drain clamped and is tolerating this without specific complaint.  ICPs continue to average  between 1 and 5 following clamping of the drain.   Dressings are otherwise dry and intact.      Vitals: BP (!) 94/57   Pulse 76   Temp 98.1 °F (36.7 °C) (Oral)   Resp 20   Ht 5' 6\" (1.676 m)   Wt 213 lb 3 oz (96.7 kg)   SpO2 98%   BMI 34.41 kg/m²   Physical Exam:  Alert and attentive. Language appropriate, with no aphasia. Pupils equal.  Facial strength symmetric. Physical Exam  She continues animated and interactive on exam today demonstrating purposeful movement in all 4 extremities and obeys commands.  Is able to have a conversation with staff regarding her care with reasonable judgment and recall.  This represents a significant improvement of her prior exams.  Otherwise stable. ROS:  Review of Systems   Constitutional: Negative for activity change. Respiratory: Negative for apnea, chest tightness and shortness of breath.    Cardiovascular: Negative for chest pain and leg swelling. Gastrointestinal: Negative for abdominal distention and abdominal pain. Musculoskeletal: Positive for back pain. Neurological: Negative for dizziness, weakness, numbness and headaches. Psychiatric/Behavioral: Negative for agitation, behavioral problems, confusion and decreased concentration.       24 hour intake/output:    Intake/Output Summary (Last 24 hours) at 12/3/2019 1318  Last data filed at 12/3/2019 1255  Gross per 24 hour   Intake 2418. 11 ml   Output 4450 ml   Net -2031.89 ml     Last 3 weights:   Wt Readings from Last 3 Encounters:   12/03/19 213 lb 3 oz (96.7 kg)   11/07/19 201 lb 4.8 oz (91.3 kg)   09/18/19 220 lb 6.4 oz (100 kg)         CBC:   Recent Labs     12/01/19  0350 12/03/19  0357   WBC 12.6*  --    HGB 8.4*  --     299     BMP:    Recent Labs     12/01/19  0350  12/02/19  0335 12/02/19  1255 12/02/19  1745      < > 129* 134* 131*   K 3.8  --   --   --   --    CL 98  --   --   --   --    CO2 23  --   --   --   --    BUN 11  --   --   --   --    CREATININE 1.0  --   --   --   --    GLUCOSE 123*  --   --   --   --     < > = values in this interval not displayed. Calcium:  Recent Labs     12/01/19  0350   CALCIUM 8.0*     Magnesium:  Recent Labs     12/03/19  0357   MG 1.5*     Glucose:  Recent Labs     12/02/19  2129 12/03/19  0831 12/03/19  1230   POCGLU 176* 159* 122*     HgbA1C: No results for input(s): LABA1C in the last 72 hours. Lipids: No results for input(s): CHOL, TRIG, HDL, LDLCALC in the last 72 hours. Invalid input(s): LDL    Radiology reports as per the Radiologist  Radiology: Ct Head Wo Contrast    Result Date: 12/2/2019  PROCEDURE: CT HEAD WO CONTRAST CLINICAL INFORMATION: NPH vs LPH. COMPARISON: Noncontrast brain CT dated 11/27/2019. TECHNIQUE: Noncontrast 5 mm axial images were obtained through the brain. Sagittal and coronal reconstructions were obtained and reviewed. All CT scans at this facility use dose modulation, iterative reconstruction, and/or weight-based dosing when appropriate to reduce radiation dose to as low as reasonably achievable. FINDINGS: Brain: There is no acute ischemic infarct, hemorrhage, midline shift, mass, or mass effect. There is no change in the mild to moderate periventricular hypodensities, again most likely representing small vessel chronic ischemic changes. Given the lack of interval change since the ventriculostomy placement, this is less likely felt to represent transependymal flow of CSF. No change in the 1.5 x 1.1 cm fat density lesion in the interpeduncular cistern to the left of midline. Ventricles/basal cisterns: The right frontal ventriculostomy tip has been slightly retracted, tip in the right frontal horn, no longer at the level of the foramen of Monro. There is stable mild ventriculomegaly involving the lateral and third ventricles.  Skull base/calvarium/osseous for further evaluation. **This report has been created using voice recognition software. It may contain minor errors which are inherent in voice recognition technology. ** Final report electronically signed by Dr. Eugene Living on 11/24/2019 1:39 PM    Ct Abscess Drainage W Cath Placement S&i    Result Date: 11/21/2019  CT-GUIDED ABSCESS DRAINAGE PERFORMED BY: JOSETTE Pickens Rebel: Posterior lower lumbar paraspinal abscess APPROACH: Posterior CATHETER: 8 Kinyarwanda multipurpose drainage catheter. FLUID OBTAINED: 15 mL purulent tan fluid SEDATION: None. Patient was ventilated on propofol drip. The patient was sedated for 30 minutes during this procedure and monitored with EKG and pulse ox monitoring devices by a registered nurse. PROCEDURE: Signed informed consent was obtained prior to performing this procedure. The patient was placed on the CT scanner and sedated, as indicated above. CT images were initially obtained to determine appropriate puncture site. The skin was marked, prepped, and draped in a sterile fashion. Following local anesthesia and utilizing aseptic technique, a needle was successfully passed into the abscess pocket. A small amount of pus was aspirated to confirm appropriate needle position. A guidewire was then passed through the needle followed by insertion of progressively larger dilators up to an 8 Western Cornelia size. An 8 Western Cornelia multi-side-hole drainage catheter was then passed over the wire and was coiled within the abscess pocket. The retaining suture was then locked in the standard fashion. Catheter was then hooked to a Domingo-Close drainage bag and the catheter was flushed several times with sterile saline. The catheter was stabilized to the skin with a Percu-Stay device. A sample of the pus was sent to laboratory for culture and sensitivity testing. Status post successful abscess drainage procedure. . **This report has been created using voice recognition software.   It may contain flavum thickening. At L4-5 there is a shallow disc bulge without significant spinal canal stenosis or neural foraminal stenosis. There is persistent thickening of the cauda equina this level. At L5-S1 there is a disc bulge with superimposed central protrusion which mildly indents thecal sac and may contact traversing S1 nerve roots bilaterally. There is moderate bilateral neural foraminal stenosis in association with facet hypertrophy and ligamentum flavum thickening. 1. Prominent complex fluid collection in the paraspinal musculature on the left at the L3-L4 level which appears to communicate anteriorly with a tiny epidural abscess at the L3 level and posteriorly with a large subcutaneous component abscess. 2. Arachnoiditis. Findings were discussed with Kaylan Tompkins RN via telephone at the time of interpretation. **This report has been created using voice recognition software. It may contain minor errors which are inherent in voice recognition technology. ** Final report electronically signed by Dr. Felisa Weber MD on 11/20/2019 3:18 PM    Xr Lumbar Spine 1 Vw    Result Date: 11/25/2019  PROCEDURE: XR LUMBAR SPINE 1 VW CLINICAL INFORMATION: Lumbar laminectomy TECHNIQUE: 2 intraoperative spot radiographs of the lumbar spine COMPARISON: None FINDINGS: There is a retractor posterior to the L3-4 vertebra. There is a localizer posterior to L3. Disc space narrowing is present at the L5-S1 level. Radiopaque surgical material seen in the posterior soft tissues. Surgical localization as above. Final report electronically signed by Dr. Brooke Lugo on 11/25/2019 3:33 PM    Xr Chest Portable    Result Date: 11/28/2019  PROCEDURE: XR CHEST PORTABLE CLINICAL INFORMATION: hypoxia. COMPARISON: Chest x-ray dated 11/23/2019 TECHNIQUE: AP Portable chest xray FINDINGS: Lines/tubes/devices: Left subclavian central venous catheter tip remains in the region of the mid SVC. Lungs/pleura:   There are mild interstitial infiltrates which may represent interstitial pulmonary edema or an atypical pneumonia. There is no consolidation. There is a slightly larger than 1 cm mid left lung calcified granuloma. No pleural effusion. No pneumothorax. Heart: There is mild cardiomegaly. Mediastinum/deepthi: No obvious mass or adenopathy. Skeleton: No significant bone or joint abnormality. Mild interstitial pulmonary edema versus atypical pneumonia. **This report has been created using voice recognition software. It may contain minor errors which are inherent in voice recognition technology. ** Final report electronically signed by Dr. Liang Peck on 11/28/2019 6:11 AM    Xr Chest Portable    Result Date: 11/23/2019  PROCEDURE: XR CHEST PORTABLE CLINICAL INFORMATION: resp failure. COMPARISON: November 19, 2019 TECHNIQUE: AP upright view of the chest. FINDINGS: There has been interval removal of the endotracheal and nasogastric tubes. The lung volumes are slightly diminished. Coarse markings are stable with no focal infiltrate noted. The left mid thoracic nodular density or granuloma is again noted. Central venous structures are stable. The central venous catheter remains in the superior vena cava. Interval endotracheal and nasogastric tube removal with persistent diminished lung volumes. No acute process compared to prior study. **This report has been created using voice recognition software. It may contain minor errors which are inherent in voice recognition technology. ** Final report electronically signed by Dr. Rashid Frias on 11/23/2019 3:11 AM    Xr Chest Portable    Result Date: 11/19/2019  PROCEDURE: XR CHEST PORTABLE CLINICAL INFORMATION: CVC placement. COMPARISON: November 19, 2019 TECHNIQUE: AP upright view of the chest. FINDINGS: The heart and mediastinum are stable. There is redemonstration of an endotracheal tube and nasogastric tubes in stable locations. Lung volumes are unchanged with mildly coarse markings present. electronically signed by Dr. Ruy Daniels on 11/19/2019 1:23 PM    Ir Lumbar Puncture For Diagnosis    Result Date: 11/4/2019  LUMBAR SPINAL CANAL/CSF DRAINAGE CATHETER INSERTION: PERFORMED BY:  Renea Ma M.D. CLINICAL INFORMATION: Normal pressure hydrocephalus. APPROACH:  Translaminar, L3, right side. NEEDLE: 14-gauge Touhy needle CATHETER: 2.5 [place] Integra CSF drainage catheter CATHETER TIP: T8 FLUID: 3 mLClear spinal fluid. SEDATION: Versed 2 milligram, fentanyl 100 micrograms, IV. Patient was sedated for 30 minutes minutes during this procedure and monitored with EKG and pulse ox monitoring devices by registered nurse. FLUOROSCOPY TIME: 1 minute 54 seconds FLUOROSCOPIC IMAGES: 2 ESTIMATED BLOOD LOSS: Minimal PROCEDURE:  Signed informed consent was obtained prior to performing this procedure. The patient was sedated, as indicated above. .. The lumbar spine was evaluated fluoroscopically initially to determine an appropriate puncture site. .. The skin was marked, prepped, and draped in a sterile fashion, utilizing [address]. The skin was infiltrated with lidocaine 2 percent at the puncture site and a small skin nick was made with a #11 scalpel blade. The 14-gauge Touhy needle was then passed into the lumbar thecal sac, utilizing the approach listed above. Appropriate needle position was documented fluoroscopically. A few fluoroscopic spot films were obtained. Only a small amount of CSF could be aspirated through the needle. The drainage catheter was then passed coaxially through the Touhy needle and very easily passed cephalad to the level listed above. This was performed with fluoroscopic guidance. Fluoroscopic images were obtained for documentation. A small amount of fluid was seen to drain through the catheter. The catheter was stabilized to the skin with tape and a sterile OpSite dressing. The CSF drainage bag was sent to the floor with the patient. . The patient was then post successful abscess drainage procedure. . **This report has been created using voice recognition software. It may contain minor errors which are inherent in voice recognition technology. ** Final report electronically signed by Dr. Sajan Valdes on 11/21/2019 1:13 PM    A/P: S/P she remains POD #8 following surgical I&D of lumbar infection and placement of EVD drain.  Intracranial pressures have been ranging from 1-5 with the drain clamped.  Neurosurgery is recommending that the drain remain clamped for the next few days with continued 2-hour transduce recorded to patient chart.  I have recommended that nursing keep the lumbar operative site dressings covered with Tegaderm to prevent outside contaminants.   Repeat of the CT scan of the head to be performed tomorrow for review.  Neurosurgery to follow    Electronically signed by Amrik Escoto PA-C on 12/3/2019 at 1:18 PM

## 2019-12-03 NOTE — PROGRESS NOTES
[address] [place] [address]  PHYSICAL THERAPY MISSED TREATMENT NOTE  ACUTE CARE  [facility] ICU 4D              Missed Treatment  Per RN, Pt. In chair and needs to be cleaned up before therapy sees as pt. Is incontinent of bowel. Will attempt again later as time allows.

## 2019-12-03 NOTE — PROGRESS NOTES
[address] ICU 4D  Occupational Therapy  Daily Note  Time:   Time In: 2696  Time Out: 1016  Timed Code Treatment Minutes: 26 Minutes  Minutes: 26          Date: 12/3/2019  Patient Name: Cinthya Farias,   Gender: female      Room: 4D-/017-A  MRN: [id number]  : 1955  (59 y.o.)  Referring Practitioner: Quoc Sma PA-C  Diagnosis: Hyperglycemia  Additional Pertinent Hx: Pt was hospitalized 2019 through 2019 for evaluation of possible normal pressure hydrocephalus. Oskar Calles had symptoms consistent with normal pressure hydrocephalus including progressive gait disturbance, incontinence, and cognitive decline associated with blurred vision and headaches.  She was admitted and had a lumbar drain placed.  Final assessment of efficacy of lumbar drain was documented 2019 by Dr. Melinda Ly that note he determined there was no significant benefit from the lumbar drain trial.  Findings did not support the diagnosis of normal pressure hydrocephalus.  Patient was determined not to be a good candidate for  shunt and lumbar drain was removed.  Patient was discharged home on 2019. Patient was received in transfer from [facility] to [facility] on 2019. Symptoms prior to hospitalization included a 5-day history of back pain associated with difficulty ambulating, difficulty urinating and diarrhea. Additionally she had a 2-day history of confusion with hallucinations. Immediately upon arrival, nursing staff had concern regarding patient's respiratory rate at 44 and heart rate at 160. She was transferred immediately to the intensive care unit. Patient was in overt septic shock and was intubated 2019. She was started on Zosyn and vancomycin. Cultures were sent. She underwent volume resuscitation and was started on pressors. Rocephin was subsequently added given concerns for possible meningitis.   s/p EVACUATION AND DEBRIDEMENT L2-L3 assist X2 for safety and to manage multiple lines/tubes    FUNCTIONAL MOBILITY:  OTR to further assess as able/appropriate    ASSESSMENT:     Activity Tolerance:  Patient tolerance of  treatment: fair. Additional goals added this date due to progress. Discharge Recommendations: Patient would benefit from continued therapy after discharge, Continue to assess pending progress, 24 hour supervision or assist  Equipment Recommendations: Other: Monitor pending progress  Plan: Times per week: 5x  Current Treatment Recommendations: Balance Training, Self-Care / ADL, Functional Mobility Training, Endurance Training, Cognitive Reorientation, Safety Education & Training, ROM, Strengthening    Patient Education  Patient Education: Plan of Care, Importance of Increasing Activity and transfer training/safety with transfers    Goals  Short term goals  Time Frame for Short term goals: 2 weeks  Short term goal 1: Pt will tolerate sitting EOB 4 min with mod A x 2 for eventual EOB ADLs. Goal revised. Short term goal 2: Pt will tolerate BUE AAROM/AROM for increased AROM BUE for ease of grooming & self feeding. Short term goal 3: Pt will tolerate further assessment of functional t/fs by OTR when appropriate. Goal revised. Long term goals  Time Frame for Long term goals :  No LTG set d/t short ELOS    Revised Short-Term Goals  Short term goals  Time Frame for Short term goals: 2 weeks  Short term goal 1: Pt will tolerate static/dynamic standing X2 minutes with unilateral release and minimal assistance in prep for toileting tasks. Short term goal 2: Pt will tolerate BUE AAROM/AROM for increased AROM BUE for ease of grooming & self feeding  Short term goal 3: Pt will complete sit<>stand transfers with sera stedy and minimal assistance X2 in prep for [facility] transfers. Short term goal 4: Pt will complete BADL tasks, UB with minimal assistance, LB with maximal assistance, to increase independence with self care tasks.    Long term goals  Time Frame for Long term goals :  No LTG set d/t short ELOS      Following session, patient left in safe position with all fall risk precautions in place.

## 2019-12-03 NOTE — FLOWSHEET NOTE
12/03/19 0102   Vitals   Pulse 86   Resp 22   BP   (patient refusing to wear BP cuff)   Patient refusing to wear BP cuff at this time. Patient educated on importance of wearing blood pressure cuff to monitor BP. Patient still refuses to let this nurse apply BP cuff.

## 2019-12-04 ENCOUNTER — APPOINTMENT (OUTPATIENT)
Dept: CT IMAGING | Age: 64
DRG: 856 | End: 2019-12-04
Attending: INTERNAL MEDICINE
Payer: MEDICARE

## 2019-12-04 LAB
ANAEROBIC CULTURE: NORMAL
BLOOD CULTURE, ROUTINE: NORMAL
BLOOD CULTURE, ROUTINE: NORMAL
CSF CULTURE: NORMAL
GLUCOSE BLD-MCNC: 129 MG/DL (ref 70–108)
GLUCOSE BLD-MCNC: 157 MG/DL (ref 70–108)
GLUCOSE BLD-MCNC: 157 MG/DL (ref 70–108)
GRAM STAIN RESULT: NORMAL
MAGNESIUM: 1.6 MG/DL (ref 1.6–2.4)

## 2019-12-04 PROCEDURE — 2709999900 HC NON-CHARGEABLE SUPPLY

## 2019-12-04 PROCEDURE — 97110 THERAPEUTIC EXERCISES: CPT

## 2019-12-04 PROCEDURE — 99233 SBSQ HOSP IP/OBS HIGH 50: CPT | Performed by: INTERNAL MEDICINE

## 2019-12-04 PROCEDURE — 99024 POSTOP FOLLOW-UP VISIT: CPT | Performed by: NEUROLOGICAL SURGERY

## 2019-12-04 PROCEDURE — 6360000002 HC RX W HCPCS: Performed by: PHYSICIAN ASSISTANT

## 2019-12-04 PROCEDURE — 36592 COLLECT BLOOD FROM PICC: CPT

## 2019-12-04 PROCEDURE — 6370000000 HC RX 637 (ALT 250 FOR IP): Performed by: PHYSICIAN ASSISTANT

## 2019-12-04 PROCEDURE — 70450 CT HEAD/BRAIN W/O DYE: CPT

## 2019-12-04 PROCEDURE — 2060000000 HC ICU INTERMEDIATE R&B

## 2019-12-04 PROCEDURE — 94761 N-INVAS EAR/PLS OXIMETRY MLT: CPT

## 2019-12-04 PROCEDURE — 2580000003 HC RX 258: Performed by: PHYSICIAN ASSISTANT

## 2019-12-04 PROCEDURE — 2500000003 HC RX 250 WO HCPCS: Performed by: PHYSICIAN ASSISTANT

## 2019-12-04 PROCEDURE — 36415 COLL VENOUS BLD VENIPUNCTURE: CPT

## 2019-12-04 PROCEDURE — 82948 REAGENT STRIP/BLOOD GLUCOSE: CPT

## 2019-12-04 PROCEDURE — 6370000000 HC RX 637 (ALT 250 FOR IP): Performed by: NURSE PRACTITIONER

## 2019-12-04 PROCEDURE — 6370000000 HC RX 637 (ALT 250 FOR IP): Performed by: INTERNAL MEDICINE

## 2019-12-04 PROCEDURE — 83735 ASSAY OF MAGNESIUM: CPT

## 2019-12-04 PROCEDURE — 6370000000 HC RX 637 (ALT 250 FOR IP): Performed by: ANESTHESIOLOGY

## 2019-12-04 PROCEDURE — 97535 SELF CARE MNGMENT TRAINING: CPT

## 2019-12-04 PROCEDURE — 97530 THERAPEUTIC ACTIVITIES: CPT

## 2019-12-04 PROCEDURE — 6370000000 HC RX 637 (ALT 250 FOR IP): Performed by: FAMILY MEDICINE

## 2019-12-04 RX ORDER — SODIUM CHLORIDE 0.9 % (FLUSH) 0.9 %
10 SYRINGE (ML) INJECTION PRN
Status: DISCONTINUED | OUTPATIENT
Start: 2019-12-04 | End: 2019-12-04 | Stop reason: SDUPTHER

## 2019-12-04 RX ORDER — SODIUM CHLORIDE 0.9 % (FLUSH) 0.9 %
10 SYRINGE (ML) INJECTION EVERY 12 HOURS SCHEDULED
Status: DISCONTINUED | OUTPATIENT
Start: 2019-12-04 | End: 2019-12-04 | Stop reason: SDUPTHER

## 2019-12-04 RX ORDER — LIDOCAINE HYDROCHLORIDE 10 MG/ML
5 INJECTION, SOLUTION EPIDURAL; INFILTRATION; INTRACAUDAL; PERINEURAL ONCE
Status: DISCONTINUED | OUTPATIENT
Start: 2019-12-04 | End: 2019-12-10 | Stop reason: HOSPADM

## 2019-12-04 RX ORDER — MAGNESIUM SULFATE IN WATER 40 MG/ML
2 INJECTION, SOLUTION INTRAVENOUS ONCE
Status: COMPLETED | OUTPATIENT
Start: 2019-12-04 | End: 2019-12-04

## 2019-12-04 RX ADMIN — CALCIUM POLYCARBOPHIL 625 MG: 625 TABLET, FILM COATED ORAL at 11:58

## 2019-12-04 RX ADMIN — Medication 10 ML: at 19:36

## 2019-12-04 RX ADMIN — INSULIN LISPRO 2 UNITS: 100 INJECTION, SOLUTION INTRAVENOUS; SUBCUTANEOUS at 12:55

## 2019-12-04 RX ADMIN — Medication 3 MG: at 19:35

## 2019-12-04 RX ADMIN — FAMOTIDINE 20 MG: 20 TABLET ORAL at 19:45

## 2019-12-04 RX ADMIN — NAFCILLIN SODIUM 12 G: 2 INJECTION, POWDER, LYOPHILIZED, FOR SOLUTION INTRAMUSCULAR; INTRAVENOUS at 11:58

## 2019-12-04 RX ADMIN — HYDROCODONE BITARTRATE AND ACETAMINOPHEN 2 TABLET: 5; 325 TABLET ORAL at 09:05

## 2019-12-04 RX ADMIN — INSULIN LISPRO 1 UNITS: 100 INJECTION, SOLUTION INTRAVENOUS; SUBCUTANEOUS at 19:40

## 2019-12-04 RX ADMIN — METOPROLOL TARTRATE 25 MG: 25 TABLET ORAL at 09:06

## 2019-12-04 RX ADMIN — SODIUM CHLORIDE TAB 1 GM 2 G: 1 TAB at 09:05

## 2019-12-04 RX ADMIN — POTASSIUM & SODIUM PHOSPHATES POWDER PACK 280-160-250 MG 250 MG: 280-160-250 PACK at 11:59

## 2019-12-04 RX ADMIN — MAGNESIUM SULFATE HEPTAHYDRATE 2 G: 40 INJECTION, SOLUTION INTRAVENOUS at 09:16

## 2019-12-04 RX ADMIN — MAGNESIUM GLUCONATE 500 MG ORAL TABLET 400 MG: 500 TABLET ORAL at 09:05

## 2019-12-04 RX ADMIN — Medication 10 ML: at 09:07

## 2019-12-04 RX ADMIN — SODIUM CHLORIDE TAB 1 GM 2 G: 1 TAB at 11:59

## 2019-12-04 RX ADMIN — POTASSIUM CHLORIDE 40 MEQ: 20 TABLET, EXTENDED RELEASE ORAL at 17:14

## 2019-12-04 RX ADMIN — MAGNESIUM GLUCONATE 500 MG ORAL TABLET 400 MG: 500 TABLET ORAL at 19:34

## 2019-12-04 RX ADMIN — METOPROLOL TARTRATE 25 MG: 25 TABLET ORAL at 19:35

## 2019-12-04 RX ADMIN — Medication: at 09:05

## 2019-12-04 RX ADMIN — POTASSIUM CHLORIDE 40 MEQ: 20 TABLET, EXTENDED RELEASE ORAL at 09:04

## 2019-12-04 RX ADMIN — POTASSIUM & SODIUM PHOSPHATES POWDER PACK 280-160-250 MG 250 MG: 280-160-250 PACK at 19:35

## 2019-12-04 RX ADMIN — FAMOTIDINE 20 MG: 20 TABLET ORAL at 09:05

## 2019-12-04 RX ADMIN — SODIUM CHLORIDE TAB 1 GM 2 G: 1 TAB at 17:14

## 2019-12-04 RX ADMIN — POTASSIUM & SODIUM PHOSPHATES POWDER PACK 280-160-250 MG 250 MG: 280-160-250 PACK at 09:06

## 2019-12-04 RX ADMIN — MICONAZOLE NITRATE: 20 POWDER TOPICAL at 09:05

## 2019-12-04 RX ADMIN — ONDANSETRON 4 MG: 2 INJECTION INTRAMUSCULAR; INTRAVENOUS at 22:34

## 2019-12-04 RX ADMIN — MICONAZOLE NITRATE: 20 POWDER TOPICAL at 19:35

## 2019-12-04 RX ADMIN — POTASSIUM & SODIUM PHOSPHATES POWDER PACK 280-160-250 MG 250 MG: 280-160-250 PACK at 17:14

## 2019-12-04 ASSESSMENT — PAIN DESCRIPTION - PAIN TYPE: TYPE: ACUTE PAIN

## 2019-12-04 ASSESSMENT — PAIN DESCRIPTION - LOCATION: LOCATION: HEAD

## 2019-12-04 ASSESSMENT — PAIN DESCRIPTION - ONSET: ONSET: ON-GOING

## 2019-12-04 ASSESSMENT — PAIN SCALES - GENERAL
PAINLEVEL_OUTOF10: 0
PAINLEVEL_OUTOF10: 8
PAINLEVEL_OUTOF10: 0

## 2019-12-04 ASSESSMENT — PAIN - FUNCTIONAL ASSESSMENT: PAIN_FUNCTIONAL_ASSESSMENT: PREVENTS OR INTERFERES SOME ACTIVE ACTIVITIES AND ADLS

## 2019-12-04 ASSESSMENT — PAIN DESCRIPTION - FREQUENCY: FREQUENCY: CONTINUOUS

## 2019-12-04 ASSESSMENT — PAIN DESCRIPTION - DESCRIPTORS: DESCRIPTORS: ACHING

## 2019-12-04 NOTE — PLAN OF CARE
Problem: Falls - Risk of:  Goal: Will remain free from falls  Description  Will remain free from falls  Outcome: Ongoing  Note:   Pt. Continues at risk for falls - appropriate safety measures to ensure no falls - gait belt, x2 contact guard assist when up, bed alarm, hourly rounding and frequent monitoring as well as frequently re-inforce teaching      Problem: Risk for Impaired Skin Integrity  Goal: Tissue integrity - skin and mucous membranes  Description  Structural intactness and normal physiological function of skin and  mucous membranes. Outcome: Ongoing  Note:   Continues at risk for impaired skin integrity - continue frequent care to skin, turn as often as pt will allow and teaching is ongoing     Problem: Nutrition  Goal: Optimal nutrition therapy  Outcome: Ongoing  Note:   Pt. With good appetite. Problem: Discharge Planning:  Goal: Discharged to appropriate level of care  Description  Discharged to appropriate level of care  Outcome: Ongoing  Note:   Discharge plans ongoing. SW reviewing options. Pt. With order for stepdown today. Problem: Bowel Function - Altered:  Goal: Bowel elimination is within specified parameters  Description  Bowel elimination is within specified parameters  Outcome: Ongoing  Note:   Bowel movements are less and firming up - still loose with prescribed medications. Continue to monitor.

## 2019-12-04 NOTE — PROGRESS NOTES
[address] ICU 4D  Occupational Therapy  Daily Note  Time:    Time In: 1340  Time Out: 1409  Timed Code Treatment Minutes: 29 Minutes  Minutes: 29          Date: 2019  Patient Name: Rhoda Andrews,   Gender: female      Room: 4D-17/017-A  MRN: [id number]  : 1955  (59 y.o.)  Referring Practitioner: Sonja Syed PA-C  Diagnosis: Hyperglycemia  Additional Pertinent Hx: Pt was hospitalized 2019 through 2019 for evaluation of possible normal pressure hydrocephalus.  She had symptoms consistent with normal pressure hydrocephalus including progressive gait disturbance, incontinence, and cognitive decline associated with blurred vision and headaches.  She was admitted and had a lumbar drain placed.  Final assessment of efficacy of lumbar drain was documented 2019 by Dr. Angie Long that note he determined there was no significant benefit from the lumbar drain trial.  Findings did not support the diagnosis of normal pressure hydrocephalus.  Patient was determined not to be a good candidate for  shunt and lumbar drain was removed.  Patient was discharged home on 2019. Patient was received in transfer from [facility] to [facility] on 2019. Symptoms prior to hospitalization included a 5-day history of back pain associated with difficulty ambulating, difficulty urinating and diarrhea. Additionally she had a 2-day history of confusion with hallucinations. Immediately upon arrival, nursing staff had concern regarding patient's respiratory rate at 44 and heart rate at 160. She was transferred immediately to the intensive care unit. Patient was in overt septic shock and was intubated 2019. She was started on Zosyn and vancomycin. Cultures were sent. She underwent volume resuscitation and was started on pressors. Rocephin was subsequently added given concerns for possible meningitis.   s/p EVACUATION AND DEBRIDEMENT L2-L3

## 2019-12-04 NOTE — PROGRESS NOTES
11/18/2019. She was started on Zosyn and vancomycin. Cultures were sent. She underwent volume resuscitation and was started on pressors. Rocephin was subsequently added given concerns for possible meningitis. Lumbar puncture was performed and subsequently grew Staphylococcus species. Antibiotics simplified to vancomycin for meningitis. MRI lumbar spine demonstrated soft tissue abscess communicating with small epidural abscess. Lumbar soft tissue drain placed 11/21/19. Culture positive for MSSA. Antibiotics converted to Nafcillin 11/20/19. Rifampin added on 11/22/2019 for synergy. Day #1/3 of rifampin. Sputum culture started to grow gram-negative rods and Cipro was added on 11/22/2019.  s/p EVACUATION AND DEBRIDEMENT L2-L3 INTRAMUSCULAR SPINAL ABSCESS, EVACUATION EPIDURAL ABSCESS L2-L3, PARTIAL LAMINECTOMY L2-L3, EVD PLACEMENT on 11/25/19. Prior Level of Function:  Lives With: Spouse  Type of Home: House  Home Layout: Two level, Bed/Bath upstairs  Home Access: Stairs to enter with rails  Entrance Stairs - Number of Steps: Pt unable to state how many steps to enter  Home Equipment: (none)        ADL Assistance: Independent  Ambulation Assistance: Independent  Transfer Assistance: Independent  Additional Comments: Pt amb without AD, recent lumbar drain trial, no shunt placed; PLOF obtained from PT due to Pt confusion    Restrictions/Precautions:  Restrictions/Precautions: Fall Risk  Position Activity Restriction  Other position/activity restrictions: Confusion, EVD drain-needs to be clamped prior to mobility & re-leveled by nursing after session    SUBJECTIVE: RN approved session, states EVD drain clamped, present and assisting during session. RN states possibly removing EVD drain today. Pt is alert and oriented, cooperative with PT, does have some short term memory issues, repeating questions and statements.     PAIN: 6/10: low back    OBJECTIVE:  Bed Mobility:  Rolling to Left: Minimal Assistance short length of stay. Following session, patient left in safe position with all fall risk precautions in place.

## 2019-12-04 NOTE — FLOWSHEET NOTE
12/04/19 1057   Encounter Summary   Services provided to: Patient   Referral/Consult From: [facility]   Support System Spouse; Family members   Continue Visiting Yes  (12/4/19 )   Complexity of Encounter Moderate   Length of Encounter 15 minutes   Spiritual/Oriental orthodox   Type Spiritual support   Assessment Grieving   Intervention Nurtured hope   Outcome Comfort;Expressed gratitude   During my contact with the 59 yr old pt she was coping with Sepsis and grief of her son and mother. The pt was anxious but approachable. I offered words of comfort and emotional support. Plan: Continued support would be helpful.

## 2019-12-04 NOTE — PROGRESS NOTES
Attempt to place PICC line consent needs signed by . Julita RN will attempt to call again and inform IV team when consent is obtained.

## 2019-12-04 NOTE — PROGRESS NOTES
Neurosurgery Progress Note    Patient:  Maritza Amezcua      Unit/Bed:4D-17/017-A    YOB: 1955    MRN: [id number]     Acct: [de-identified]     Admit date: 11/18/2019    No chief complaint on file. Patient Seen, Chart, Physician notes, Labs, Radiology studies reviewed. Subjective: Mrs. Rosita Yee status postoperative day #9 from evacuation and debridement of lumbar 2 lumbar 3 intramuscular spinal abscess along with evacuation of epidural abscess the same level including concurrent partial laminectomy of lumbar 2 lumbar 3 and EVD placement.  Continues alert and interactive on exam today. Past, Family, Social History unchanged from admission.     Diet:  Dietary Nutrition Supplements: Diabetic Oral Supplement  DIET CARB CONTROL; Daily Fluid Restriction: 2000 ml; High Fiber    Medications:  Scheduled Meds:   lidocaine 1 % injection  5 mL Intradermal Once    magnesium oxide  400 mg Oral BID    polycarbophil  625 mg Oral Daily    sodium chloride  2 g Oral TID WC    potassium chloride  40 mEq Oral BID WC    famotidine  20 mg Oral BID    miconazole   Topical BID    sodium chloride flush  10 mL Intravenous 2 times per day    nafcillin 12 g continuous infusion  12 g Intravenous Q24H    calcium replacement protocol   Other RX Placeholder    insulin lispro  0-12 Units Subcutaneous TID WC    insulin lispro  0-6 Units Subcutaneous Nightly    metoprolol tartrate  25 mg Oral BID    potassium & sodium phosphates  1 packet Oral 4x Daily    potassium (CARDIAC) replacement protocol   Other RX Placeholder     Continuous Infusions:   dextrose       PRN Meds:melatonin, HYDROcodone 5 mg - acetaminophen **OR** HYDROcodone 5 mg - acetaminophen, hydrALAZINE, zinc oxide, sodium chloride flush, ondansetron, acetaminophen, dextrose, magnesium hydroxide, potassium chloride, magnesium sulfate, acetaminophen, glucose, dextrose, glucagon (rDNA), dextrose    Objective: Comfortable following discontinue and bedside removal of the EVD this afternoon, without complication. Vitals: /69   Pulse 88   Temp 97.9 °F (36.6 °C) (Oral)   Resp 24   Ht 5' 6\" (1.676 m)   Wt 214 lb 1.1 oz (97.1 kg)   SpO2 99%   BMI 34.55 kg/m²   Physical Exam:  Alert and attentive. Language appropriate, with no aphasia. Pupils equal.  Facial strength symmetric. Physical Exam   No significant changes entered into the patient chart overnight. ROS:  Review of Systems   Constitutional: Negative for activity change. Respiratory: Negative for apnea, chest tightness and shortness of breath.    Cardiovascular: Negative for chest pain and leg swelling. Gastrointestinal: Negative for abdominal distention and abdominal pain. Musculoskeletal: Positive for back pain. Neurological: Negative for dizziness, weakness, numbness and headaches. Psychiatric/Behavioral: Negative for agitation, behavioral problems, confusion and decreased concentration.       24 hour intake/output:    Intake/Output Summary (Last 24 hours) at 12/4/2019 1439  Last data filed at 12/4/2019 1400  Gross per 24 hour   Intake 2797.9 ml   Output 302 ml   Net 2495.9 ml     Last 3 weights: Wt Readings from Last 3 Encounters:   12/04/19 214 lb 1.1 oz (97.1 kg)   11/07/19 201 lb 4.8 oz (91.3 kg)   09/18/19 220 lb 6.4 oz (100 kg)         CBC:   Recent Labs     12/03/19  0357        BMP:    Recent Labs     12/02/19  0335 12/02/19  1255 12/02/19  1745   * 134* 131*     Calcium:No results for input(s): CALCIUM in the last 72 hours. Magnesium:  Recent Labs     12/04/19  0341   MG 1.6     Glucose:  Recent Labs     12/03/19  1806 12/03/19  2035 12/04/19  1253   POCGLU 145* 139* 157*     HgbA1C: No results for input(s): LABA1C in the last 72 hours. Lipids: No results for input(s): CHOL, TRIG, HDL, LDLCALC in the last 72 hours.     Invalid input(s): LDL    Radiology reports as per the Radiologist  Radiology: Ct Head Wo Contrast    Result Date: 12/4/2019  PROCEDURE: CT HEAD WO CONTRAST CLINICAL INFORMATION: Status of EVD. COMPARISON: December 2, 2019 TECHNIQUE: Noncontrast 5 mm axial images were obtained through the brain. All CT scans at this facility use dose modulation, iterative reconstruction, and/or weight-based dosing when appropriate to reduce radiation dose to as low as reasonably achievable. FINDINGS: There is redemonstration of right frontal parietal approach ventriculostomy position within the body of the right lateral ventricle the posterior margin of the frontal horn. Slight overall increase of ventricular distention is suspected when comparing diameters of the temporal horns and transverse caudate diameter at comparable levels. There is no midline shift. The brain redemonstrates mild periventricular hypodensity changes of the white matter compatible with chronic small vessel disease. The exam is negative for acute-appearing hemorrhage. No acute large vessel infarcts are identified. The fat density lesion within the interpeduncular cistern is again noted and measures 1.6 x 1 cm unchanged from prior exam.     . 1. Stable location of right-sided ventriculostomy within the right lateral ventricle. 2. Slight interval increase of ventricular distention compared to prior studies. 3. Stable cerebral atrophy with chronic small vessel changes. **This report has been created using voice recognition software. It may contain minor errors which are inherent in voice recognition technology. ** Final report electronically signed by Dr. Charley Park on 12/4/2019 5:17 AM    Ct Head Wo Contrast    Result Date: 12/2/2019  PROCEDURE: CT HEAD WO CONTRAST CLINICAL INFORMATION: [facility]. COMPARISON: Noncontrast brain CT dated 11/27/2019. TECHNIQUE: Noncontrast 5 mm axial images were obtained through the brain. Sagittal and coronal reconstructions were obtained and reviewed.  All CT scans at this facility use dose modulation, iterative reconstruction, and/or weight-based dosing when appropriate to reduce radiation dose to as low as reasonably achievable. FINDINGS: Brain: There is no acute ischemic infarct, hemorrhage, midline shift, mass, or mass effect. There is no change in the mild to moderate periventricular hypodensities, again most likely representing small vessel chronic ischemic changes. Given the lack of interval change since the ventriculostomy placement, this is less likely felt to represent transependymal flow of CSF. No change in the 1.5 x 1.1 cm fat density lesion in the interpeduncular cistern to the left of midline. Ventricles/basal cisterns: The right frontal ventriculostomy tip has been slightly retracted, tip in the right frontal horn, no longer at the level of the foramen of Monro. There is stable mild ventriculomegaly involving the lateral and third ventricles. Skull base/calvarium/osseous structures: Right frontal shan hole for the ventriculostomy. Skull base and calvarium are otherwise intact. Soft tissues: Unremarkable Intraorbital contents: Unremarkable Sinuses: Unremarkable; the imaged sinuses are clear without evidence of mucosal thickening or fluid levels. Mastoids: Unremarkable; the mastoid air cells are clear. Slight retraction of the  shunt catheter, tip in the right frontal horn, no longer at the level of the foramen of Monro. Stable mild ventriculomegaly. No acute ischemic infarct, hemorrhage, or mass effect. **This report has been created using voice recognition software. It may contain minor errors which are inherent in voice recognition technology. ** Final report electronically signed by Dr. Luis Felipe Travis on 12/2/2019 5:58 AM    Ct Head Wo Contrast    Result Date: 11/27/2019  PROCEDURE: CT HEAD WO CONTRAST CLINICAL INFORMATION: follow up surgery. COMPARISON: Noncontrast brain CT dated 11/26/2019 TECHNIQUE: Noncontrast 5 mm axial images were obtained through the brain.  Sagittal and coronal reconstructions were obtained and reviewed. All CT scans at this facility use dose modulation, iterative reconstruction, and/or weight-based dosing when appropriate to reduce radiation dose to as low as reasonably achievable. FINDINGS: Brain: There is no acute ischemic infarct, hemorrhage, midline shift, or mass effect. There is no change in the 1.5 x 1.1 cm fat density lesion in the interpeduncular cistern to the left of midline. No change in the mild to moderate periventricular hypodensities which may represent small vessel chronic ischemic changes although cannot exclude transependymal flow of CSF. Ventricles/basal cisterns: No change in the position of the right frontal ventriculostomy, tip at the level of the right foramen of Porter. There is stable mild ventriculomegaly. Skull base/calvarium/osseous structures: Right frontal shan hole, otherwise unremarkable. Soft tissues: Unremarkable Intraorbital contents: Unremarkable Sinuses: Unremarkable; the imaged sinuses are clear without evidence of mucosal thickening or fluid levels. Mastoids: Unremarkable; the mastoid air cells are clear. No acute ischemic infarct, hemorrhage, or mass effect. Stable position of the right frontal ventriculostomy, tip at the level of the foramen of [employer]. Stable mild ventriculomegaly. Stable appearance of the brain compared to the previous study as detailed above. **This report has been created using voice recognition software. It may contain minor errors which are inherent in voice recognition technology. ** Final report electronically signed by Dr. Luis Felipe Travis on 11/27/2019 4:54 AM    Ct Head Wo Contrast    Result Date: 11/26/2019  PROCEDURE: CT HEAD WO CONTRAST CLINICAL INFORMATION: hydrocephalus. COMPARISON: Brain MRI dated 11/25/2019 and the brain CT dated 11/24/2019 TECHNIQUE: Noncontrast 5 mm axial images were obtained through the brain. Sagittal and coronal reconstructions were obtained and reviewed.  All CT scans at this facility use dose modulation, iterative reconstruction, and/or weight-based dosing when appropriate to reduce radiation dose to as low as reasonably achievable. FINDINGS: Brain: There is no acute ischemic infarct, hemorrhage, midline shift, mass, or mass effect. There is no change in the 1.5 x 1.1 cm fat density lesion in the interpeduncular cistern to the left of midline. This may represent a lipoma or possibly a dermoid  cyst. There are stable mild to moderate deep white matter hypodensities, nonspecific in nature but probably representing small vessel chronic ischemic changes. Ventricles/basal cisterns: There has been interval placement of a right frontal  shunt catheter coursing through the right frontal horn, tip in the region of the right foramen of [place]. There is stable mild ventriculomegaly. Skull base/calvarium/osseous structures: There is a new right frontal shan hole for placement of a right frontal ventriculostomy. Soft tissues: Unremarkable Intraorbital contents: Unremarkable Sinuses: Unremarkable; the imaged sinuses are clear without evidence of mucosal thickening or fluid levels. Mastoids: Unremarkable; the mastoid air cells are clear. Interval placement of a right frontal ventriculostomy, tip in the region of the right foramen of Porter. Stable mild hydrocephalus. Stable 1.5 x 1.1 cm fat density lesion in the interpeduncular cistern to the left of midline. No acute ischemic infarct, hemorrhage, or mass effect. **This report has been created using voice recognition software. It may contain minor errors which are inherent in voice recognition technology. ** Final report electronically signed by Dr. Dariel Silver on 11/26/2019 5:53 AM    Ct Head Wo Contrast    Result Date: 11/24/2019  PROCEDURE: CT HEAD WO CONTRAST CLINICAL INFORMATION: Mental status change TECHNIQUE: CT scan of the head was performed from the vertex to the skull base without use of intravenous contrast. Axial images as well as coronal and sagittal anesthesia and utilizing aseptic technique, a needle was successfully passed into the abscess pocket. A small amount of pus was aspirated to confirm appropriate needle position. A guidewire was then passed through the needle followed by insertion of progressively larger dilators up to an 8 [place] size. An 8 [place] multi-side-hole drainage catheter was then passed over the wire and was coiled within the abscess pocket. The retaining suture was then locked in the standard fashion. Catheter was then hooked to a Domingo-Close drainage bag and the catheter was flushed several times with sterile saline. The catheter was stabilized to the skin with a Percu-Stay device. A sample of the pus was sent to laboratory for culture and sensitivity testing. Status post successful abscess drainage procedure. . **This report has been created using voice recognition software. It may contain minor errors which are inherent in voice recognition technology. ** Final report electronically signed by Dr. Howie Bernal on 11/21/2019 1:13 PM    Mri Thoracic Spine W Wo Contrast    Result Date: 11/20/2019  PROCEDURE: MRI THORACIC SPINE W WO CONTRAST CLINICAL INFORMATION: increased low back pain, recent lumbar drain, sepsis, unable to walk at [facility]. COMPARISON: No prior study. TECHNIQUE: Sagittal T1, T2 and STIR sequences were obtained through the thoracic spine. Axial T2-weighted images were obtained through the discs. Postcontrast axial and sagittal T1-weighted images were obtained. 20 mL ProHance was injected in the existing IV site. FINDINGS:  There is a minimal anterior wedge shape configuration of the T12 vertebral body with approximately 10% anterior height loss. There is a tiny Schmorl's node at superior endplate of T7. There is otherwise anatomic vertebral body height and alignment. Marrow signal is within normal limits. The thoracic spinal cord is normal in caliber without focal area of abnormal T2 signal identified.  No epidural fluid collection is identified. No abnormal enhancement is identified within the cord. At T9-10 there is a left paracentral extrusion which mildly indents thecal sac and contacts the ventral aspect of spinal cord secondary to the curvature of the spine without abnormal signal in the cord. There is no significant neural foraminal stenosis. There is no significant spinal canal or neuroforaminal stenosis at any other thoracic level. 1. No evidence of acute abnormality of the thoracic spine. 2. Old minimal compression deformity of T12 with approximately 10% anterior height loss. 3. Left paracentral extrusion at T9-10 causing mild spinal canal stenosis and contacting the ventral aspect of the cord without abnormal signal in the cord. **This report has been created using voice recognition software. It may contain minor errors which are inherent in voice recognition technology. ** Final report electronically signed by Dr. Madeline Cespedes MD on 11/20/2019 3:04 PM    Mri Lumbar Spine W Wo Contrast    Result Date: 11/25/2019  PROCEDURE: MRI LUMBAR SPINE W WO CONTRAST CLINICAL INFORMATION: Infection. COMPARISON: MRI of the lumbar spine dated November 20, 2019. TECHNIQUE: Sagittal and axial T1 and T2-weighted images were obtained to the lumbar spine. Postcontrast axial and sagittal T1-weighted images were also obtained. Postcontrast images were obtained after administration of 20 mL ProHance intravenous contrast. FINDINGS: The lumbar spine is imaged from the inferior aspect of T10 to the lower sacrum. The conus medullaris terminates at the L1 level. There has been interval worsening of diffuse enhancement involving the cauda equina nerve roots. There is redemonstration of a peripherally enhancing fluid collection within the epidural space at the L3 level which measures approximately 0.4 x 0.3 x 0.8 cm. This is again noted to likely communicate with a larger paraspinal fluid collection along the left paraspinal soft tissues. findings were conveyed to the patient's nurse, Irving Lieneymar over the phone at [phone] hours on 11/25/2019. **This report has been created using voice recognition software. It may contain minor errors which are inherent in voice recognition technology. ** Final report electronically signed by Dr. Margo Rasheed on 11/25/2019 12:13 PM    Mri Lumbar Spine W Wo Contrast    Result Date: 11/20/2019  PROCEDURE: MRI LUMBAR SPINE W WO CONTRAST CLINICAL INFORMATION: increased low back pain, recent lumbar drain, now sepsis, unable to walk at home. COMPARISON: No prior study. TECHNIQUE: Sagittal and axial T1 and T2-weighted images were obtained to the lumbar spine. Postcontrast axial and sagittal T1-weighted images were also obtained. 20 mL ProHance was injected in the existing IV site. FINDINGS: Redemonstration of a chronic anterior wedge shape configuration of the T12 vertebral body with approximately 20% anterior height loss. There is otherwise anatomic vertebral body height and alignment. Marrow signal is within normal limits. The conus terminates in a normal fashion at the L1 level. There is a irregularly-shaped focal fluid collection in the subcutaneous fat superficial to the paraspinal musculature at the L3 level which measures 6.8 x 2.6 x 4.7 cm in greatest mL, AP and CC dimensions,  respectively. This has rim enhancement and some thin enhancing septations. In addition, this collection communicates with a deeper complex collection in the left paraspinal musculature at the L3 and L4 levels with a multilobulated cystic complex measuring approximately 5.5 x 3.2 x 2.4 cm in greatest CC, AP and lateral dimensions. This has multiple septations with rim enhancement. In addition, there appears to be a subtle communication with this complex collection in the paraspinal musculature with the epidural space at the L3 level. There is a 1.3 x 0.6 x 0.5 cm rim-enhancing collection in the posterior epidural space at this level.  There is mild thickening of the cauda equina nerve roots at the L3 level with subtle enhancement. At T12-L1 there is a small right paracentral protrusion which mildly indents thecal sac and may contact the ventral aspect of the conus secondary to the curvature of the spine. There is no significant neural foraminal stenosis. At L1-L2 there is no significant spinal canal or neuroforaminal stenosis. At L2-3 there is a shallow disc bulge which minimally indents thecal sac and contributes to mild bilateral neural foraminal stenosis in association with mild facet hypertrophy and ligamentum flavum thickening. There is also contribution of mild stenosis from the small posterior epidural enhancing collection. At L3-4 there is minimal disc bulge without significant spinal canal stenosis and mild bilateral neural foraminal stenosis in association with facet hypertrophy and ligamentum flavum thickening. At L4-5 there is a shallow disc bulge without significant spinal canal stenosis or neural foraminal stenosis. There is persistent thickening of the cauda equina this level. At L5-S1 there is a disc bulge with superimposed central protrusion which mildly indents thecal sac and may contact traversing S1 nerve roots bilaterally. There is moderate bilateral neural foraminal stenosis in association with facet hypertrophy and ligamentum flavum thickening. 1. Prominent complex fluid collection in the paraspinal musculature on the left at the L3-L4 level which appears to communicate anteriorly with a tiny epidural abscess at the L3 level and posteriorly with a large subcutaneous component abscess. 2. Arachnoiditis. Findings were discussed with Olinda Brown RN via telephone at the time of interpretation. **This report has been created using voice recognition software. It may contain minor errors which are inherent in voice recognition technology. ** Final report electronically signed by Dr. Kelly Luis MD on 11/20/2019 3:18 PM    Xr Lumbar Spine 1 Vw    Result Date: 11/25/2019  PROCEDURE: XR LUMBAR SPINE 1 VW CLINICAL INFORMATION: Lumbar laminectomy TECHNIQUE: 2 intraoperative spot radiographs of the lumbar spine COMPARISON: None FINDINGS: There is a retractor posterior to the L3-4 vertebra. There is a localizer posterior to L3. Disc space narrowing is present at the L5-S1 level. Radiopaque surgical material seen in the posterior soft tissues. Surgical localization as above. Final report electronically signed by Dr. Sravan Ramirez on 11/25/2019 3:33 PM    Xr Chest Portable    Result Date: 11/28/2019  PROCEDURE: XR CHEST PORTABLE CLINICAL INFORMATION: hypoxia. COMPARISON: Chest x-ray dated 11/23/2019 TECHNIQUE: AP Portable chest xray FINDINGS: Lines/tubes/devices: Left subclavian central venous catheter tip remains in the region of the mid SVC. Lungs/pleura: There are mild interstitial infiltrates which may represent interstitial pulmonary edema or an atypical pneumonia. There is no consolidation. There is a slightly larger than 1 cm mid left lung calcified granuloma. No pleural effusion. No pneumothorax. Heart: There is mild cardiomegaly. Mediastinum/deepthi: No obvious mass or adenopathy. Skeleton: No significant bone or joint abnormality. Mild interstitial pulmonary edema versus atypical pneumonia. **This report has been created using voice recognition software. It may contain minor errors which are inherent in voice recognition technology. ** Final report electronically signed by Dr. Jesus Molina on 11/28/2019 6:11 AM    Xr Chest Portable    Result Date: 11/23/2019  PROCEDURE: XR CHEST PORTABLE CLINICAL INFORMATION: resp failure. COMPARISON: November 19, 2019 TECHNIQUE: AP upright view of the chest. FINDINGS: There has been interval removal of the endotracheal and nasogastric tubes. The lung volumes are slightly diminished. Coarse markings are stable with no focal infiltrate noted.  The left mid thoracic nodular density or granuloma is again noted. Central venous structures are stable. The central venous catheter remains in the superior vena cava. Interval endotracheal and nasogastric tube removal with persistent diminished lung volumes. No acute process compared to prior study. **This report has been created using voice recognition software. It may contain minor errors which are inherent in voice recognition technology. ** Final report electronically signed by Dr. Amanda Alvarado on 11/23/2019 3:11 AM    Xr Chest Portable    Result Date: 11/19/2019  PROCEDURE: XR CHEST PORTABLE CLINICAL INFORMATION: CVC placement. COMPARISON: November 19, 2019 TECHNIQUE: AP upright view of the chest. FINDINGS: The heart and mediastinum are stable. There is redemonstration of an endotracheal tube and nasogastric tubes in stable locations. Lung volumes are unchanged with mildly coarse markings present. There is redemonstration of a calcified granuloma in the left mid thorax. Central venous catheter from the left subclavian is present within the superior vena cava. There is no visualized pneumothorax. 1. Status post left subclavian central line placement. Tip location the superior vena cava. There is no visualized pneumothorax. 2. Stable appearance of coarse lung markings and slightly decreased lung volumes. **This report has been created using voice recognition software. It may contain minor errors which are inherent in voice recognition technology. ** Final report electronically signed by Dr. Amanda Alvarado on 11/19/2019 7:14 AM    Xr Chest Portable    Result Date: 11/19/2019  PROCEDURE: XR CHEST PORTABLE CLINICAL INFORMATION: ETT/OGT placement. COMPARISON: No prior study. TECHNIQUE: AP upright view of the chest. FINDINGS: The lung volumes are shallow. There is an endotracheal tube 2.5 cm above the mesfin. A nasogastric tube is in the body of the stomach. There are coarse lung markings likely age-related.  There is a small calcified density in noted along the posterior aspect of the left thalamus. Patchy areas of hyperintense T2 signal are also noted throughout the periventricular and deep cerebral white matter as well as the elsie. These areas do not demonstrate restricted diffusion. A small focus of restricted diffusion is also noted within the periventricular white matter along the posterior body of the right lateral ventricle and possibly a punctate focus at the medial aspect of the right cerebellar hemisphere (image 30 of series 5). Layering debris demonstrating restricted diffusion is present within the trigones of the bilateral lateral ventricles. There is also enhancement of the ventricular margin at these areas. The ventricles appear enlarged and out of proportion to the degree of parenchymal volume loss. There is a lesion along the left side of the interpeduncular fossa T1 and T2 signal. It measures approximately 1.2 x 0.9 x 1.5 cm. It was also noted to be low attenuating on the prior CT. No significant mass effect is identified. The basal cisterns and visualized vascular flow voids are grossly patent. The pituitary, brain stem and cervical medullary junction are grossly within normal limits. The visualized orbits are unremarkable. There is partial fluid opacification of the mastoid air cells on the left which may correspond to a mastoid effusion or mastoiditis in the proper clinical setting. The paranasal sinuses are within normal limits. 1. There are areas of restricted diffusion and hyperintense T2 signal which are most notable at the posterior aspect of the left thalamus as well as within the periventricular white matter along the posterior body of the right lateral ventricle and possibly within the medial right cerebellar hemisphere. These areas are suspicious for acute ischemia. Changes secondary to septic emboli or cerebritis are considered less likely given the lack of enhancement however, this is not completely excluded.  2. Dependently layering material with corresponding restricted diffusion is present within the trigones of the bilateral lateral ventricles. Enhancement of the ventricular margins are also noted in these regions and the findings are suggestive of purulent material and focal ventriculitis. The ventricles are also enlarged and out of proportion to the degree of parenchymal volume loss which is suggestive of associated hydrocephalus. 3. Mild to moderate patchy foci of hyperintense T2 signal within the periventricular and deep cerebral white matter as well as the elsie likely corresponds to sequela of chronic microvascular ischemic angiopathy. The findings were conveyed to the patient's nurse, Paco Johnson over the phone at [phone] vwuwm on 11/25/2019. **This report has been created using voice recognition software. It may contain minor errors which are inherent in voice recognition technology. ** Final report electronically signed by Dr. Kimberlee Castillo on 11/25/2019 12:14 PM    Ct Drainage Hematoma/seroma/fluid Collection    Result Date: 11/21/2019  CT-GUIDED ABSCESS DRAINAGE PERFORMED BY: JOSETTE Tsai Postal: Posterior lower lumbar paraspinal abscess APPROACH: Posterior CATHETER: 8 Bulgarian multipurpose drainage catheter. FLUID OBTAINED: 15 mL purulent tan fluid SEDATION: None. Patient was ventilated on propofol drip. The patient was sedated for 30 minutes during this procedure and monitored with EKG and pulse ox monitoring devices by a registered nurse. PROCEDURE: Signed informed consent was obtained prior to performing this procedure. The patient was placed on the CT scanner and sedated, as indicated above. CT images were initially obtained to determine appropriate puncture site. The skin was marked, prepped, and draped in a sterile fashion. Following local anesthesia and utilizing aseptic technique, a needle was successfully passed into the abscess pocket.   A small amount of pus was aspirated to confirm appropriate needle position. A guidewire was then passed through the needle followed by insertion of progressively larger dilators up to an 8 Western Cornelia size. An 8 [place]elia multi-side-hole drainage catheter was then passed over the wire and was coiled within the abscess pocket. The retaining suture was then locked in the standard fashion. Catheter was then hooked to a Domingo-Close drainage bag and the catheter was flushed several times with sterile saline. The catheter was stabilized to the skin with a Percu-Stay device. A sample of the pus was sent to laboratory for culture and sensitivity testing. Status post successful abscess drainage procedure. . **This report has been created using voice recognition software. It may contain minor errors which are inherent in voice recognition technology. ** Final report electronically signed by Dr. Adriano Cohen on 11/21/2019 1:13 PM    A/P: S/P she remains POD #9 following surgical I&D of lumbar infection and placement of EVD drain.  The drain was discontinued and removed, bedside, without complication.   I have recommended that nursing keep the lumbar operative site dressings covered with Tegaderm to prevent outside contaminants. Drain removal is in anticipation of a discharge to care with a FU with NS recommended and removal of lumbar incision sutures for 12 days postoperatively.  Neurosurgery to follow    Electronically signed by Ana Elaine PA-C on 12/4/2019 at 2:39 PM     Attending Note:     Patient seen and examined in the ICU  in conjunction with neurosurgery PA Ana Elaine PA-C). Discussed with patient, her nurse, ICU team and ID team as well. All data and imaging reviewed by me. I agree with examination assessment and plan as documented above. - Post op day 8: Patient underwent EVD placement and an excisional draining of lumbar spine abscesses.   -There is no acute events over the night.  -Patient is awake, alert and oriented. - Feeling good.   -Moving

## 2019-12-04 NOTE — PROGRESS NOTES
Assessment and Plan:        1. Abscess: Affecting lumbar region and involving soft tissue and communicating with epidural abscess.  Secondary to MSSA.  Status post lumbar drain placement 11/21/2019 which was ineffectual.  Day #15 nafcillin.    Surgical drainage completed 11/25/2019.  2. Meningitis: Secondary to MSSA.  On Nafcillin.  S/P Decadron to improve permeability of antibiotics.   Ventriculostomy tube placed 11/25/2019.  PCR was positive for HHV-6. Patient treated with Ganciclovir.  Repeat PCR and culture 12/2/19 were negative. Ganciclovir completed 12/3/19. Repeat CSF analysis 12/4/2019 to verify resolution of meningitis. 3. Septicemia: Secondary to MSSA secondary to soft tissue abscess communicating with small epidural abscess.  UUD 21 Nafcillin.  Previously on vancomycin.    Now on continuous nafcillin drip. 4. Normal pressure hydrocephalus: Neurosurgery would like to place  shunt. Unclear when timing of shunt is optimal from infectious standpoint. Awaiting input from infectious disease. 5. Atrial fibrillation with rapid ventricular response: New onset. Felt secondary to septic shock.  Converted to sinus dysrhythmia. Transitioned to beta blocker therapy.  No anticoagulation. 6. Delirium: Secondary to septic shock with meningitis. Mentation improved.  Continues to improve.     7. Type 2 diabetes mellitus:  SQ Lantus. 8. Electrolyte abnormalities: Replaced phosphorus, potassium and magnesium. .  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  10. Urinary tract infection:  Related to Septicemia.  Resolved. 11. Respiratory alkalosis: Secondary to septic shock.  Resolved. 12. Hypotension: Volume resuscitated.  S/P Pressors.  Screen for adrenal insufficiency is negative.  Resolved. 13. Hyperglycemia: Resolved. 15. Ketoacidosis: Resolved. 15. Lactic acidosis: Secondary to septic shock with poor perfusion.  S/P volume resuscitation and pressors.  Resolved.   16. Acute respiratory failure: Patient intubated 11/18/2019 for impending respiratory arrest. Patient extubated 11/22/19. Resolved. 17. Pneumonia: Was present upon admission.  Positive for Klebsiella pneumonia and MSSA.  Treated with nafcillin and Cipro.  Resolved. 18. Septic shock: Secondary to septicemia and meningitis due to Staphylococcus aureus.  Initial antibiotic selection was high-dose Rocephin, Zosyn, and and vancomycin.  Antibiotics simplified to vancomycin on 11/20/2019 and subsequently nafcillin.  Secondary to MSSA bacteremia.  This is secondary to soft tissue abscess communicating with small epidural abscess.  Resolved. 19. Back pain: MRI back positive for soft tissue abscess and small epidural abscess.  Resolved. .     CC: Lumbar abscess  HPI: Patient is a 57-year-old white female active smoker. Ana Kaplan has a remote history of appendectomy and cholecystectomy. Ana Kaplan has a history of type 2 diabetes mellitus, hypertension, hyperlipidemia, depression, and a history of brain lipoma diagnosed 11/4/2019. Patient was hospitalized 11/4/2019 through 11/7/2019 for evaluation of possible normal pressure hydrocephalus.  She had symptoms consistent with normal pressure hydrocephalus including progressive gait disturbance, incontinence, and cognitive decline associated with blurred vision and headaches.  She was admitted and had a lumbar drain placed.  Final assessment of efficacy of lumbar drain was documented 11/7/2019 by Dr. Raeann Stephen that note he determined there was no significant benefit from the lumbar drain trial.  Findings did not support the diagnosis of normal pressure hydrocephalus.  Patient was determined not to be a good candidate for  shunt and lumbar drain was removed.  Patient was discharged home on 11/7/2019.   Patient was received in transfer from [facility] facility to [facility] on 11/18/2019.  Symptoms prior to hospitalization included a 5-day history of back pain associated with difficulty ambulating, difficulty urinating and diarrhea.  Additionally she had a 2-day history of confusion with hallucinations.  Immediately upon arrival, nursing staff had concern regarding patient's respiratory rate at 44 and heart rate at 160.  She was transferred immediately to the intensive care unit.  Patient was in overt septic shock and was intubated 11/18/2019.  She was started on Zosyn and vancomycin.  Cultures were sent. Loris Bumpers underwent volume resuscitation and was started on pressors.  Rocephin was subsequently added given concerns for possible meningitis. Lumbar puncture was performed and subsequently grew Staphylococcus species. Antibiotics simplified to vancomycin for meningitis.  MRI lumbar spine demonstrated soft tissue abscess communicating with small epidural abscess.  Lumbar soft tissue drain placed 11/21/19.  Culture positive for MSSA.  Antibiotics converted to Nafcillin 11/20/19. Sputum culture started to grow pansensitive Klebsiella pneumonia.  Patient was treated with Cipro. Patient was extubated 11/22/2019. Patient underwent incision and drainage of lumbar soft tissue abscess on 11/25/2019. Ventriculostomy tube was placed 11/25/2019.   For further details, please review the assessment and plan. ROS: No fever. No headache. No visual changes. No nausea. Shelby Perkins PMH:  Per HPI  SHX:  Active tobacco abuse at 1/2 PPD.   FHX: Positive for CAD  Allergies: NKDA  Medications:     dextrose        magnesium sulfate  2 g Intravenous Once    magnesium oxide  400 mg Oral BID    docusate sodium  100 mg Oral Nightly    polycarbophil  625 mg Oral Daily    sodium chloride  2 g Oral TID     potassium chloride  40 mEq Oral BID WC    famotidine  20 mg Oral BID    miconazole   Topical BID    sodium chloride flush  10 mL Intravenous 2 times per day    nafcillin 12 g continuous infusion  12 g Intravenous Q24H    calcium replacement protocol   Other RX Placeholder    insulin lispro  0-12 Units Subcutaneous TID     insulin lispro  0-6

## 2019-12-05 ENCOUNTER — APPOINTMENT (OUTPATIENT)
Dept: GENERAL RADIOLOGY | Age: 64
DRG: 856 | End: 2019-12-05
Attending: INTERNAL MEDICINE
Payer: MEDICARE

## 2019-12-05 LAB
GLUCOSE BLD-MCNC: 136 MG/DL (ref 70–108)
GLUCOSE BLD-MCNC: 166 MG/DL (ref 70–108)
GLUCOSE BLD-MCNC: 98 MG/DL (ref 70–108)
MAGNESIUM: 1.8 MG/DL (ref 1.6–2.4)
PLATELET # BLD: 305 THOU/MM3 (ref 130–400)

## 2019-12-05 PROCEDURE — 2709999900 HC NON-CHARGEABLE SUPPLY

## 2019-12-05 PROCEDURE — 99233 SBSQ HOSP IP/OBS HIGH 50: CPT | Performed by: INTERNAL MEDICINE

## 2019-12-05 PROCEDURE — 6370000000 HC RX 637 (ALT 250 FOR IP): Performed by: FAMILY MEDICINE

## 2019-12-05 PROCEDURE — 2060000000 HC ICU INTERMEDIATE R&B

## 2019-12-05 PROCEDURE — 36415 COLL VENOUS BLD VENIPUNCTURE: CPT

## 2019-12-05 PROCEDURE — 82948 REAGENT STRIP/BLOOD GLUCOSE: CPT

## 2019-12-05 PROCEDURE — 6370000000 HC RX 637 (ALT 250 FOR IP): Performed by: PHYSICIAN ASSISTANT

## 2019-12-05 PROCEDURE — 6370000000 HC RX 637 (ALT 250 FOR IP): Performed by: NURSE PRACTITIONER

## 2019-12-05 PROCEDURE — 36569 INSJ PICC 5 YR+ W/O IMAGING: CPT

## 2019-12-05 PROCEDURE — 2500000003 HC RX 250 WO HCPCS: Performed by: PHYSICIAN ASSISTANT

## 2019-12-05 PROCEDURE — 02HV33Z INSERTION OF INFUSION DEVICE INTO SUPERIOR VENA CAVA, PERCUTANEOUS APPROACH: ICD-10-PCS | Performed by: INTERNAL MEDICINE

## 2019-12-05 PROCEDURE — 85049 AUTOMATED PLATELET COUNT: CPT

## 2019-12-05 PROCEDURE — 6370000000 HC RX 637 (ALT 250 FOR IP): Performed by: INTERNAL MEDICINE

## 2019-12-05 PROCEDURE — 2580000003 HC RX 258: Performed by: PHYSICIAN ASSISTANT

## 2019-12-05 PROCEDURE — 6370000000 HC RX 637 (ALT 250 FOR IP): Performed by: ANESTHESIOLOGY

## 2019-12-05 PROCEDURE — 83735 ASSAY OF MAGNESIUM: CPT

## 2019-12-05 PROCEDURE — 71045 X-RAY EXAM CHEST 1 VIEW: CPT

## 2019-12-05 PROCEDURE — 94760 N-INVAS EAR/PLS OXIMETRY 1: CPT

## 2019-12-05 PROCEDURE — C1751 CATH, INF, PER/CENT/MIDLINE: HCPCS

## 2019-12-05 RX ADMIN — FAMOTIDINE 20 MG: 20 TABLET ORAL at 09:28

## 2019-12-05 RX ADMIN — NAFCILLIN SODIUM 12 G: 2 INJECTION, POWDER, LYOPHILIZED, FOR SOLUTION INTRAMUSCULAR; INTRAVENOUS at 12:19

## 2019-12-05 RX ADMIN — ACETAMINOPHEN 650 MG: 325 TABLET ORAL at 20:26

## 2019-12-05 RX ADMIN — SODIUM CHLORIDE TAB 1 GM 2 G: 1 TAB at 09:27

## 2019-12-05 RX ADMIN — MICONAZOLE NITRATE: 20 POWDER TOPICAL at 09:27

## 2019-12-05 RX ADMIN — METOPROLOL TARTRATE 25 MG: 25 TABLET ORAL at 20:26

## 2019-12-05 RX ADMIN — POTASSIUM & SODIUM PHOSPHATES POWDER PACK 280-160-250 MG 250 MG: 280-160-250 PACK at 12:24

## 2019-12-05 RX ADMIN — Medication 3 MG: at 20:26

## 2019-12-05 RX ADMIN — MAGNESIUM GLUCONATE 500 MG ORAL TABLET 400 MG: 500 TABLET ORAL at 20:26

## 2019-12-05 RX ADMIN — FAMOTIDINE 20 MG: 20 TABLET ORAL at 20:26

## 2019-12-05 RX ADMIN — POTASSIUM CHLORIDE 40 MEQ: 20 TABLET, EXTENDED RELEASE ORAL at 18:48

## 2019-12-05 RX ADMIN — HYDROCODONE BITARTRATE AND ACETAMINOPHEN 2 TABLET: 5; 325 TABLET ORAL at 05:13

## 2019-12-05 RX ADMIN — METOPROLOL TARTRATE 25 MG: 25 TABLET ORAL at 09:28

## 2019-12-05 RX ADMIN — MICONAZOLE NITRATE: 20 POWDER TOPICAL at 22:10

## 2019-12-05 RX ADMIN — MAGNESIUM GLUCONATE 500 MG ORAL TABLET 400 MG: 500 TABLET ORAL at 09:28

## 2019-12-05 RX ADMIN — POTASSIUM & SODIUM PHOSPHATES POWDER PACK 280-160-250 MG 250 MG: 280-160-250 PACK at 18:43

## 2019-12-05 RX ADMIN — Medication 10 ML: at 20:26

## 2019-12-05 RX ADMIN — CALCIUM POLYCARBOPHIL 625 MG: 625 TABLET, FILM COATED ORAL at 09:28

## 2019-12-05 RX ADMIN — POTASSIUM & SODIUM PHOSPHATES POWDER PACK 280-160-250 MG 250 MG: 280-160-250 PACK at 20:26

## 2019-12-05 RX ADMIN — SODIUM CHLORIDE TAB 1 GM 2 G: 1 TAB at 12:24

## 2019-12-05 RX ADMIN — SODIUM CHLORIDE TAB 1 GM 2 G: 1 TAB at 18:43

## 2019-12-05 RX ADMIN — INSULIN LISPRO 1 UNITS: 100 INJECTION, SOLUTION INTRAVENOUS; SUBCUTANEOUS at 20:26

## 2019-12-05 RX ADMIN — POTASSIUM CHLORIDE 40 MEQ: 20 TABLET, EXTENDED RELEASE ORAL at 09:27

## 2019-12-05 RX ADMIN — POTASSIUM & SODIUM PHOSPHATES POWDER PACK 280-160-250 MG 250 MG: 280-160-250 PACK at 09:28

## 2019-12-05 RX ADMIN — HYDROCODONE BITARTRATE AND ACETAMINOPHEN 2 TABLET: 5; 325 TABLET ORAL at 01:03

## 2019-12-05 ASSESSMENT — PAIN DESCRIPTION - ONSET
ONSET: ON-GOING

## 2019-12-05 ASSESSMENT — PAIN DESCRIPTION - ORIENTATION
ORIENTATION: MID;ANTERIOR
ORIENTATION: MID;ANTERIOR
ORIENTATION: RIGHT;LEFT

## 2019-12-05 ASSESSMENT — PAIN SCALES - GENERAL
PAINLEVEL_OUTOF10: 7
PAINLEVEL_OUTOF10: 0
PAINLEVEL_OUTOF10: 2
PAINLEVEL_OUTOF10: 9
PAINLEVEL_OUTOF10: 8
PAINLEVEL_OUTOF10: 3

## 2019-12-05 ASSESSMENT — PAIN DESCRIPTION - PAIN TYPE
TYPE: ACUTE PAIN

## 2019-12-05 ASSESSMENT — PAIN - FUNCTIONAL ASSESSMENT
PAIN_FUNCTIONAL_ASSESSMENT: ACTIVITIES ARE NOT PREVENTED

## 2019-12-05 ASSESSMENT — PAIN SCALES - WONG BAKER: WONGBAKER_NUMERICALRESPONSE: 0

## 2019-12-05 ASSESSMENT — PAIN DESCRIPTION - DESCRIPTORS
DESCRIPTORS: ACHING

## 2019-12-05 ASSESSMENT — PAIN DESCRIPTION - LOCATION
LOCATION: HEAD

## 2019-12-05 ASSESSMENT — PAIN DESCRIPTION - FREQUENCY
FREQUENCY: INTERMITTENT

## 2019-12-05 ASSESSMENT — PAIN DESCRIPTION - PROGRESSION
CLINICAL_PROGRESSION: NOT CHANGED

## 2019-12-05 NOTE — CARE COORDINATION
12/05/19 7:33 AM    Perfect Serve text sent to Dr. All Garcia to see if Nafcillin can go to Q4H at discharge and for length of IV ATB therapy. Dr. All Garcia responded stating 4 more weeks IV nafcillin and plan for IV Nafcillin Q4H at discharge if goes to TCU.

## 2019-12-05 NOTE — PROGRESS NOTES
[address] [facility] THERAPY MISSED TREATMENT NOTE  [facility] ICU STEPDOWN TELEMETRY 4K  [id number]      Date: 2019  Patient Name: Alis Diana        CSN: [id number]   : 1955  (59 y.o.)  Gender: female   Referring Practitioner: Sudhakar Hodge PA-C  Diagnosis: Hyperglycemia         REASON FOR MISSED TREATMENT: Patient Refused despite max encouragement stating \"I'm just to tired. I'm not doing anything now. \" Will check back as able

## 2019-12-05 NOTE — PROGRESS NOTES
Consult received. Chart reviewed. Spoke with patient about TCU. Patient is agreeable to TCU. Precert initiated.

## 2019-12-05 NOTE — PROGRESS NOTES
This RN spoke with primary nurse Darren Vo, RN in regards to PICC. Per radiologist report from Dr. Noah Grijalva PICC is good to use with tip terminating in mid SVC. This RN verbalized to primary nurse to use all new tubing with new insertion of PICC. Primary nurse verbalized understanding. No further questions or concerns voiced at this time.

## 2019-12-05 NOTE — CARE COORDINATION
protocols. PCP: ROMULO Song  Readmission Risk Score: 15%  Patient Goals/Plan/Treatment Preferences: From home w/spouse. Precert for TCU initiated today. Plan for 4 weeks IV ATB. SW on case. Backup plan for [employer] or [facility] [employer].

## 2019-12-05 NOTE — CARE COORDINATION
12/05/19 1:03 PM    Perfect Serve text sent to Dr. Anamaria Voss asking for PICC order. Plan for 4 weeks IV ATB.

## 2019-12-05 NOTE — PLAN OF CARE
Problem: Falls - Risk of:  Goal: Will remain free from falls  Description  Will remain free from falls  12/5/2019 0159 by Anh Bain RN  Outcome: Ongoing  Note:   Free of falls so far this shift. Non skid socks, bed in lowest position, bed alarm on, call light within reach. Problem: Risk for Impaired Skin Integrity  Goal: Tissue integrity - skin and mucous membranes  Description  Structural intactness and normal physiological function of skin and  mucous membranes. 12/5/2019 0159 by Anh Bain RN  Outcome: Ongoing  Note:   No new signs of skin breakdown this shift. Refuses to turn as recommended. Problem: Nutrition  Goal: Optimal nutrition therapy  12/5/2019 0159 by Anh Bain RN  Outcome: Ongoing  Note:   Tolerating diet well. Does not agree with fluid restriction, education provided. Problem: Discharge Planning:  Goal: Discharged to appropriate level of care  Description  Discharged to appropriate level of care  12/5/2019 0159 by Anh Bain RN  Outcome: Ongoing  Note:   Discharge location pending available beds and patient being medically stable. Problem: Bowel Function - Altered:  Goal: Bowel elimination is within specified parameters  Description  Bowel elimination is within specified parameters  12/5/2019 0159 by Anh Bain RN  Outcome: Ongoing  Note:   Loose stool this shift. Care plan reviewed with patient and  \"Pickle\". Patient and  \"Pickle\" verbalize understanding of the plan of care and contribute to goal setting.

## 2019-12-05 NOTE — PROGRESS NOTES
Progress note: Infectious diseases    Patient - Eyal Quick,  Age - 59 y.o.    - 1955      Room Number - 2X-99/163-W   MRN -  [id number]   Acct # - [de-identified]  Date of Admission -  2019 11:22 PM    SUBJECTIVE:   No new complaints. OBJECTIVE   VITALS    height is 5' 6\" (1.676 m) and weight is 208 lb 11.2 oz (94.7 kg). Her oral temperature is 98.2 °F (36.8 °C). Her blood pressure is 118/63 and her pulse is 74. Her respiration is 16 and oxygen saturation is 99%.        Wt Readings from Last 3 Encounters:   19 208 lb 11.2 oz (94.7 kg)   19 201 lb 4.8 oz (91.3 kg)   19 220 lb 6.4 oz (100 kg)       I/O (24 Hours)    Intake/Output Summary (Last 24 hours) at 2019 1602  Last data filed at 2019 1259  Gross per 24 hour   Intake 1347.26 ml   Output 0 ml   Net 1347.26 ml       General Appearance oriented  HEENT - normocephalic, atraumatic, pink conjunctiva,  anicteric sclera  Neck - Supple, no mass  Lungs -  Bilateral   air entry, no rhonchi, no wheeze  Cardiovascular - Heart sounds are normal.     Abdomen - soft, not distended, nontender,   Neurologic -awake and oriented  Skin - No bruising or bleeding  Extremities - dressed lumbar wound    MEDICATIONS:      lidocaine 1 % injection  5 mL Intradermal Once    magnesium oxide  400 mg Oral BID    polycarbophil  625 mg Oral Daily    sodium chloride  2 g Oral TID WC    potassium chloride  40 mEq Oral BID WC    famotidine  20 mg Oral BID    miconazole   Topical BID    sodium chloride flush  10 mL Intravenous 2 times per day    nafcillin 12 g continuous infusion  12 g Intravenous Q24H    calcium replacement protocol   Other RX Placeholder    insulin lispro  0-12 Units Subcutaneous TID WC    insulin lispro  0-6 Units Subcutaneous Nightly    metoprolol tartrate  25 mg Oral BID    potassium & sodium phosphates  1 packet Oral 4x Daily   

## 2019-12-05 NOTE — PROGRESS NOTES
Dr. Emily Barajas to the floor to se patient. Received order to remove stitches on 12-7, (12 days after surgery, which was on 11-25-19). Dr. Emily Barajas is ok with patient being discharged. No other orders received.

## 2019-12-06 LAB
GLUCOSE BLD-MCNC: 107 MG/DL (ref 70–108)
GLUCOSE BLD-MCNC: 114 MG/DL (ref 70–108)
GLUCOSE BLD-MCNC: 141 MG/DL (ref 70–108)
GLUCOSE BLD-MCNC: 159 MG/DL (ref 70–108)

## 2019-12-06 PROCEDURE — 6370000000 HC RX 637 (ALT 250 FOR IP): Performed by: INTERNAL MEDICINE

## 2019-12-06 PROCEDURE — 2060000000 HC ICU INTERMEDIATE R&B

## 2019-12-06 PROCEDURE — 2709999900 HC NON-CHARGEABLE SUPPLY

## 2019-12-06 PROCEDURE — 2580000003 HC RX 258: Performed by: PHYSICIAN ASSISTANT

## 2019-12-06 PROCEDURE — 6370000000 HC RX 637 (ALT 250 FOR IP): Performed by: FAMILY MEDICINE

## 2019-12-06 PROCEDURE — 6370000000 HC RX 637 (ALT 250 FOR IP): Performed by: PHYSICIAN ASSISTANT

## 2019-12-06 PROCEDURE — 2500000003 HC RX 250 WO HCPCS: Performed by: PHYSICIAN ASSISTANT

## 2019-12-06 PROCEDURE — 97530 THERAPEUTIC ACTIVITIES: CPT

## 2019-12-06 PROCEDURE — 97110 THERAPEUTIC EXERCISES: CPT

## 2019-12-06 PROCEDURE — 82948 REAGENT STRIP/BLOOD GLUCOSE: CPT

## 2019-12-06 PROCEDURE — 97535 SELF CARE MNGMENT TRAINING: CPT

## 2019-12-06 PROCEDURE — 6370000000 HC RX 637 (ALT 250 FOR IP): Performed by: ANESTHESIOLOGY

## 2019-12-06 RX ADMIN — CALCIUM POLYCARBOPHIL 625 MG: 625 TABLET, FILM COATED ORAL at 09:16

## 2019-12-06 RX ADMIN — MICONAZOLE NITRATE: 20 POWDER TOPICAL at 09:30

## 2019-12-06 RX ADMIN — SODIUM CHLORIDE TAB 1 GM 2 G: 1 TAB at 09:16

## 2019-12-06 RX ADMIN — MICONAZOLE NITRATE: 20 POWDER TOPICAL at 20:38

## 2019-12-06 RX ADMIN — ACETAMINOPHEN 650 MG: 325 TABLET ORAL at 15:09

## 2019-12-06 RX ADMIN — Medication 10 ML: at 20:39

## 2019-12-06 RX ADMIN — METOPROLOL TARTRATE 25 MG: 25 TABLET ORAL at 09:16

## 2019-12-06 RX ADMIN — FAMOTIDINE 20 MG: 20 TABLET ORAL at 11:12

## 2019-12-06 RX ADMIN — MAGNESIUM GLUCONATE 500 MG ORAL TABLET 400 MG: 500 TABLET ORAL at 20:38

## 2019-12-06 RX ADMIN — Medication 10 ML: at 09:16

## 2019-12-06 RX ADMIN — POTASSIUM CHLORIDE 40 MEQ: 20 TABLET, EXTENDED RELEASE ORAL at 17:25

## 2019-12-06 RX ADMIN — NAFCILLIN SODIUM 12 G: 2 INJECTION, POWDER, LYOPHILIZED, FOR SOLUTION INTRAMUSCULAR; INTRAVENOUS at 11:12

## 2019-12-06 RX ADMIN — Medication: at 20:38

## 2019-12-06 RX ADMIN — SODIUM CHLORIDE TAB 1 GM 2 G: 1 TAB at 12:14

## 2019-12-06 RX ADMIN — ACETAMINOPHEN 650 MG: 325 TABLET ORAL at 20:38

## 2019-12-06 RX ADMIN — POTASSIUM & SODIUM PHOSPHATES POWDER PACK 280-160-250 MG 250 MG: 280-160-250 PACK at 17:23

## 2019-12-06 RX ADMIN — FAMOTIDINE 20 MG: 20 TABLET ORAL at 20:38

## 2019-12-06 RX ADMIN — MAGNESIUM GLUCONATE 500 MG ORAL TABLET 400 MG: 500 TABLET ORAL at 09:16

## 2019-12-06 RX ADMIN — SODIUM CHLORIDE TAB 1 GM 2 G: 1 TAB at 17:23

## 2019-12-06 RX ADMIN — POTASSIUM CHLORIDE 40 MEQ: 20 TABLET, EXTENDED RELEASE ORAL at 09:15

## 2019-12-06 RX ADMIN — POTASSIUM & SODIUM PHOSPHATES POWDER PACK 280-160-250 MG 250 MG: 280-160-250 PACK at 12:14

## 2019-12-06 RX ADMIN — METOPROLOL TARTRATE 25 MG: 25 TABLET ORAL at 20:38

## 2019-12-06 RX ADMIN — ACETAMINOPHEN 650 MG: 325 TABLET ORAL at 09:15

## 2019-12-06 RX ADMIN — INSULIN LISPRO 2 UNITS: 100 INJECTION, SOLUTION INTRAVENOUS; SUBCUTANEOUS at 12:14

## 2019-12-06 RX ADMIN — Medication 3 MG: at 20:38

## 2019-12-06 ASSESSMENT — PAIN DESCRIPTION - ORIENTATION
ORIENTATION: MID

## 2019-12-06 ASSESSMENT — PAIN SCALES - GENERAL
PAINLEVEL_OUTOF10: 0
PAINLEVEL_OUTOF10: 3
PAINLEVEL_OUTOF10: 3
PAINLEVEL_OUTOF10: 2
PAINLEVEL_OUTOF10: 0
PAINLEVEL_OUTOF10: 0
PAINLEVEL_OUTOF10: 3
PAINLEVEL_OUTOF10: 2
PAINLEVEL_OUTOF10: 3

## 2019-12-06 ASSESSMENT — PAIN DESCRIPTION - LOCATION
LOCATION: HEAD

## 2019-12-06 ASSESSMENT — PAIN DESCRIPTION - PAIN TYPE
TYPE: ACUTE PAIN

## 2019-12-06 ASSESSMENT — PAIN DESCRIPTION - ONSET
ONSET: ON-GOING

## 2019-12-06 ASSESSMENT — PAIN DESCRIPTION - PROGRESSION
CLINICAL_PROGRESSION: NOT CHANGED
CLINICAL_PROGRESSION: NOT CHANGED
CLINICAL_PROGRESSION: GRADUALLY WORSENING
CLINICAL_PROGRESSION: NOT CHANGED

## 2019-12-06 ASSESSMENT — PAIN DESCRIPTION - DESCRIPTORS
DESCRIPTORS: HEADACHE

## 2019-12-06 ASSESSMENT — PAIN - FUNCTIONAL ASSESSMENT
PAIN_FUNCTIONAL_ASSESSMENT: ACTIVITIES ARE NOT PREVENTED
PAIN_FUNCTIONAL_ASSESSMENT: ACTIVITIES ARE NOT PREVENTED
PAIN_FUNCTIONAL_ASSESSMENT: PREVENTS OR INTERFERES SOME ACTIVE ACTIVITIES AND ADLS
PAIN_FUNCTIONAL_ASSESSMENT: ACTIVITIES ARE NOT PREVENTED

## 2019-12-06 ASSESSMENT — PAIN DESCRIPTION - FREQUENCY
FREQUENCY: INTERMITTENT

## 2019-12-06 ASSESSMENT — PAIN SCALES - WONG BAKER: WONGBAKER_NUMERICALRESPONSE: 0

## 2019-12-06 NOTE — PLAN OF CARE
Problem: Falls - Risk of:  Goal: Will remain free from falls  Description  Will remain free from falls  Outcome: Ongoing  Note:   Patient alert and oriented x1. Bed alarmed and armed. Bed wheels locked. Bedside table in reach. Patient up with assistance with 1 to 2 staff members when ambulating. Patient verbalizes and demonstrates the use of the call light. Hourly rounding being completed. Extra checks being done to ensure patient is safe and needs are being met. Patient being checked and changed if incontinent. Problem: Risk for Impaired Skin Integrity  Goal: Tissue integrity - skin and mucous membranes  Description  Structural intactness and normal physiological function of skin and  mucous membranes. Outcome: Ongoing  Note:   No signs of skin break down this shift. Skin is red, but medicated cream per MAR being applied. No sign of breakdown or breaks in integrity. Problem: Discharge Planning:  Goal: Discharged to appropriate level of care  Description  Discharged to appropriate level of care  Outcome: Ongoing  Note:   Patient plans to be discharged to [place] when medically stable. Problem: Infection - Central Venous Catheter-Associated Bloodstream Infection:  Goal: Will show no infection signs and symptoms  Description  Will show no infection signs and symptoms  Outcome: Ongoing  Note:   No sign of infection at or around central line site. Dressing is clean, dry, and in tact. Patient is currently free from pain at this time. Pain is rated a 0 on a scale of 0/10. Will continue to assess. Care plan reviewed with patient and family members. Patient and family memebers verbalize understanding of the plan of care and contribute to goal setting.

## 2019-12-06 NOTE — PROGRESS NOTES
potassium (CARDIAC) replacement protocol   Other RX Placeholder      dextrose       melatonin, HYDROcodone 5 mg - acetaminophen **OR** HYDROcodone 5 mg - acetaminophen, hydrALAZINE, zinc oxide, sodium chloride flush, ondansetron, acetaminophen, dextrose, magnesium hydroxide, potassium chloride, magnesium sulfate, acetaminophen, glucose, dextrose, glucagon (rDNA), dextrose         Problem list of patient:     Patient Active Problem List   Diagnosis Code    Brain lipoma D17.79    NPH (normal pressure hydrocephalus) ([facility]) G91.2    Hyperglycemia R73.9    Sepsis ([facility] A41.9    [facility] with [facility] J96.01    Atrial fibrillation with RVR ([facility]) I48.91    Encephalopathy G93.40    Diabetic ketoacidosis associated with type 2 diabetes mellitus ([facility] [address] E11.10         ASSESSMENT/PLAN   Epidural and lumbar abscess due to MSSA infection: she had I and D.   Currently on IV nafcillin  Confusion with Ventriculites: resolved  HHV-6 on CSF:ganciclovir stopped  Continue current treatment  Joe Owens MD, FACP 12/6/2019 5:52 PM

## 2019-12-06 NOTE — PROGRESS NOTES
Planning TCU today if patient is medically cleared and precert is completed.  Patient will go to [facility]

## 2019-12-06 NOTE — PROGRESS NOTES
DEBRIDEMENT L2-L3 INTRAMUSCULAR SPINAL ABSCESS, EVACUATION EPIDURAL ABSCESS L2-L3, PARTIAL LAMINECTOMY L2-L3, EVD PLACEMENT on 11/25/19    Restrictions/Precautions:  Restrictions/Precautions: Fall Risk  Position Activity Restriction  Other position/activity restrictions: Confusion, EVD drain-needs to be clamped prior to mobility & re-leveled by nursing after session    SUBJECTIVE: pt agreeable to OT with lots of encouragment to part. Pt lying in bed when arrived. PAIN: pt had little pain in back area when up with mobility     COGNITION: Slow Processing, Decreased Insight, Impaired Memory, Inattention, Decreased Problem Solving, Decreased Safety Awareness and Difficulty Following Commands    ADL:   Toileting: Dependent. to wipe bottom   Toilet Transfer: Minimal Assistance, X 2 and with verbal cues . to STS . BALANCE:  Sitting Balance:  Contact Guard Assistance  Standing Balance: Minimal Assistance, X 2, with cues for safety, with verbal cues     BED MOBILITY:  Supine to Sit: Minimal Assistance, with head of bed raised, with verbal cues  for safety and proper tech   Sit to Supine: Moderate Assistance, with verbal cues  for LE in bed and follow proper tech    TRANSFERS:  Sit to Stand: Moderate Assistance, X 2, with verbal cues. from EOB and STS   Stand to Sit: Moderate Assistance, X 2, with verbal cues. to STS and EOB     FUNCTIONAL MOBILITY:  Assistive Device: Rolling Walker  Assist Level:  Minimal Assistance, Moderate Assistance, X 2, with verbal cues  and with increased time for completion. Distance: To and from bathroom  Pt had no LOB but very unsteady throughout and not follow safety with RW. Pt declined to sit up in recliner after going to BR wanted to go back to bed. ASSESSMENT:     Activity Tolerance:  Patient tolerance of  treatment: fair.  Very confused during session and poor safety       Discharge Recommendations: Patient would benefit from continued therapy after discharge, Continue to assess pending progress, 24 hour supervision or assist  Equipment Recommendations: Other: Monitor pending progress  Plan: Times per week: 5x  Current Treatment Recommendations: Balance Training, Self-Care / ADL, Functional Mobility Training, Endurance Training, Cognitive Reorientation, Safety Education & Training, ROM, Strengthening    Patient Education  Patient Education: ADL's and Importance of Increasing Activity    Goals  Short term goals  Time Frame for Short term goals: 2 weeks  Short term goal 1: Pt will tolerate static/dynamic standing X2 minutes with unilateral release and minimal assistance in prep for toileting tasks. Short term goal 2: Pt will tolerate BUE AAROM/AROM for increased AROM BUE for ease of grooming & self feeding  Short term goal 3: Pt will complete sit<>stand transfers with sera stedy and minimal assistance X2 in prep for [facility] transfers. Short term goal 4: Pt will complete BADL tasks, UB with minimal assistance, LB with maximal assistance, to increase independence with self care tasks. Long term goals  Time Frame for Long term goals :  No LTG set d/t short ELOS    Following session, patient left in safe position with all fall risk precautions in place.

## 2019-12-06 NOTE — PROGRESS NOTES
[address] [facility] PHYSICAL THERAPY  DAILY NOTE  [facility] ICU STEPDOWN TELEMETRY 4K - 4K-16/016-A    Time In: 0906  Time Out: 1089  Timed Code Treatment Minutes: 35 Minutes  Minutes: 33          Date: 2019  Patient Name: Nelson Howard,  Gender:  female        MRN: [id number]  : 1955  (59 y.o.)     Referring Practitioner: Asim Gomez MD  Diagnosis: Hyperglycemia  Additional Pertinent Hx: Patient is a 59-year-old white female active smoker. She has a remote history of appendectomy and cholecystectomy. She has a history of type 2 diabetes mellitus, hypertension, hyperlipidemia, depression, and a history of brain lipoma diagnosed 2019. Patient was hospitalized 2019 through 2019 for evaluation of possible normal pressure hydrocephalus. She had symptoms consistent with normal pressure hydrocephalus including progressive gait disturbance, incontinence, and cognitive decline associated with blurred vision and headaches. She was admitted and had a lumbar drain placed. Final assessment of efficacy of lumbar drain was documented 2019 by Dr. Evans Engel. In that note he determined there was no significant benefit from the lumbar drain trial.  Findings did not support the diagnosis of normal pressure hydrocephalus. Patient was determined not to be a good candidate for  shunt and lumbar drain was removed. Patient was discharged home on 2019. [facility]. Symptoms prior to hospitalization included a 5-day history of back pain associated with difficulty ambulating, difficulty urinating and diarrhea. Additionally she had a 2-day history of confusion with hallucinations. Immediately upon arrival, nursing staff had concern regarding patient's respiratory rate at 44 and heart rate at 160. She was transferred immediately to the intensive care unit.   Patient was in overt septic shock and was intubated 11/18/2019. She was started on Zosyn and vancomycin. Cultures were sent. She underwent volume resuscitation and was started on pressors. Rocephin was subsequently added given concerns for possible meningitis. Lumbar puncture was performed and subsequently grew Staphylococcus species. Antibiotics simplified to vancomycin for meningitis. MRI lumbar spine demonstrated soft tissue abscess communicating with small epidural abscess. Lumbar soft tissue drain placed 11/21/19. Culture positive for MSSA. Antibiotics converted to Nafcillin 11/20/19. Rifampin added on 11/22/2019 for synergy. Day #1/3 of rifampin. Sputum culture started to grow gram-negative rods and Cipro was added on 11/22/2019.  s/p EVACUATION AND DEBRIDEMENT L2-L3 INTRAMUSCULAR SPINAL ABSCESS, EVACUATION EPIDURAL ABSCESS L2-L3, PARTIAL LAMINECTOMY L2-L3, EVD PLACEMENT on 11/25/19. Prior Level of Function:  Lives With: Spouse  Type of Home: House  Home Layout: Two level, Bed/Bath upstairs  Home Access: Stairs to enter with rails  Entrance Stairs - Number of Steps: Pt unable to state how many steps to enter  Home Equipment: (none)        ADL Assistance: Independent  Ambulation Assistance: Independent  Transfer Assistance: Independent  Additional Comments: Pt amb without AD, recent lumbar drain trial, no shunt placed; PLOF obtained from PT due to Pt confusion    Restrictions/Precautions:  Restrictions/Precautions: Fall Risk  Position Activity Restriction  Other position/activity restrictions: Confusion, EVD drain-needs to be clamped prior to mobility & re-leveled by nursing after session    SUBJECTIVE: RN approved session. Pt in bed upon arrival with RN present. Pt incontinent of urine- per RN was refusing to allow staff to clean her earlier and had been sitting in it- educated pt on importance of staying dry for improved skin integrity.  Extra time with bed mobility spent while RN applying pink xav to pts bottom and inner legs Recommendations: Other: will continue to assess needs pending progress  Discharge Recommendations:  Subacute/Skilled Nursing Facility(TCU)    Plan: Times per week: 5 x GM  Current Treatment Recommendations: Strengthening, Patient/Caregiver Education & Training, Equipment Evaluation, Education, & procurement, Balance Training, Functional Mobility Training, Endurance Training, Safety Education & Training, Positioning, Transfer Training, Gait Training, Stair training, Home Exercise Program    Patient Education  Patient Education: Plan of Care, [employer] Mobility, Transfers, Gait, Verbal Exercise Instruction    Goals:  Patient goals : to get better  Short term goals  Time Frame for Short term goals: by discharge  Short term goal 1: Pt to transfer supine <--> sit SBA to enable pt to get in/out of bed. Short term goal 2: Pt to transfer sit <--> stand SBA for increased functional mobility. Short term goal 3: Pt to ambulate >50 feet with LRAD CGA for household ambulation. Short term goal 4:      Long term goals  Time Frame for Long term goals : NA due to short length of stay. Following session, patient left in safe position with all fall risk precautions in place.

## 2019-12-06 NOTE — PROGRESS NOTES
Assessment and Plan:        1. Abscess: Affecting lumbar region and involving soft tissue and communicating with epidural abscess.  Secondary to MSSA.  Status post lumbar drain placement 11/21/2019 which was ineffectual.  Day #15 nafcillin.    Surgical drainage completed 11/25/2019.  2. Meningitis: Secondary to MSSA.  On Nafcillin.  S/P Decadron to improve permeability of antibiotics.   Ventriculostomy tube placed 11/25/2019.  PCR was positive for HHV-6. Patient treated with Ganciclovir.  Repeat PCR and culture 12/2/19 were negative. Ganciclovir completed 12/3/19. 3. Septicemia: Secondary to MSSA secondary to soft tissue abscess communicating with small epidural abscess.  EDC 64 Nafcillin.  Previously on vancomycin.    Now on nafcillin drip. 4. Normal pressure hydrocephalus: Neurosurgery would like to place  shunt. Unclear when timing of shunt is optimal from infectious standpoint. Awaiting input from infectious disease. Plan is to pursue  shunt outpatient if indicated. She will have outpatient follow-up in approximately 2 weeks with a new CT of her head. 5. Atrial fibrillation with rapid ventricular response: New onset. Felt secondary to septic shock.  Converted to sinus dysrhythmia. Transitioned to beta blocker therapy.  No anticoagulation. 6. Delirium: Secondary to septic shock with meningitis. Mentation improved.  Continues to improve.     7. Type 2 diabetes mellitus:  SQ Lantus. 8. Electrolyte abnormalities: Replaced phosphorus, potassium and magnesium. .  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  10. Urinary tract infection:  Related to Septicemia.  Resolved. 11. Respiratory alkalosis: Secondary to septic shock.  Resolved. 12. Hypotension: Volume resuscitated.  S/P Pressors.  Screen for adrenal insufficiency is negative.  Resolved. 13. Hyperglycemia: Resolved. 15. Ketoacidosis: Resolved. 15. Lactic acidosis: Secondary to septic shock with poor perfusion.  S/P volume resuscitation and pressors. associated with difficulty ambulating, difficulty urinating and diarrhea.  Additionally she had a 2-day history of confusion with hallucinations.  Immediately upon arrival, nursing staff had concern regarding patient's respiratory rate at 44 and heart rate at 160.  She was transferred immediately to the intensive care unit.  Patient was in overt septic shock and was intubated 11/18/2019.  She was started on Zosyn and vancomycin.  Cultures were sent. Aidan Laguna underwent volume resuscitation and was started on pressors.  Rocephin was subsequently added given concerns for possible meningitis. Lumbar puncture was performed and subsequently grew Staphylococcus species. Antibiotics simplified to vancomycin for meningitis.  MRI lumbar spine demonstrated soft tissue abscess communicating with small epidural abscess.  Lumbar soft tissue drain placed 11/21/19.  Culture positive for MSSA.  Antibiotics converted to Nafcillin 11/20/19. Sputum culture started to grow pansensitive Klebsiella pneumonia.  Patient was treated with Cipro. Patient was extubated 11/22/2019. Patient underwent incision and drainage of lumbar soft tissue abscess on 11/25/2019. Ventriculostomy tube was placed 11/25/2019, removed on 12/4    For further details, please review the assessment and plan. ROS: No fever. No headache. No visual changes. No nausea. . Patient feeling well. Awaiting pre-cert  PMH:  Per HPI  SHX:  Active tobacco abuse at 1/2 PPD.   FHX: Positive for CAD  Allergies: NKDA  Medications:     dextrose        lidocaine 1 % injection  5 mL Intradermal Once    magnesium oxide  400 mg Oral BID    polycarbophil  625 mg Oral Daily    sodium chloride  2 g Oral TID WC    potassium chloride  40 mEq Oral BID WC    famotidine  20 mg Oral BID    miconazole   Topical BID    sodium chloride flush  10 mL Intravenous 2 times per day    nafcillin 12 g continuous infusion  12 g Intravenous Q24H    calcium replacement protocol   Other RX

## 2019-12-07 LAB
ANAEROBIC CULTURE: NORMAL
CSF CULTURE: NORMAL
GLUCOSE BLD-MCNC: 114 MG/DL (ref 70–108)
GLUCOSE BLD-MCNC: 128 MG/DL (ref 70–108)
GLUCOSE BLD-MCNC: 154 MG/DL (ref 70–108)
GLUCOSE BLD-MCNC: 172 MG/DL (ref 70–108)
GRAM STAIN RESULT: NORMAL
PLATELET # BLD: 341 THOU/MM3 (ref 130–400)

## 2019-12-07 PROCEDURE — 6370000000 HC RX 637 (ALT 250 FOR IP): Performed by: PHYSICIAN ASSISTANT

## 2019-12-07 PROCEDURE — 6370000000 HC RX 637 (ALT 250 FOR IP): Performed by: ANESTHESIOLOGY

## 2019-12-07 PROCEDURE — 2580000003 HC RX 258: Performed by: PHYSICIAN ASSISTANT

## 2019-12-07 PROCEDURE — 82948 REAGENT STRIP/BLOOD GLUCOSE: CPT

## 2019-12-07 PROCEDURE — 36415 COLL VENOUS BLD VENIPUNCTURE: CPT

## 2019-12-07 PROCEDURE — 2060000000 HC ICU INTERMEDIATE R&B

## 2019-12-07 PROCEDURE — 6370000000 HC RX 637 (ALT 250 FOR IP): Performed by: FAMILY MEDICINE

## 2019-12-07 PROCEDURE — 85049 AUTOMATED PLATELET COUNT: CPT

## 2019-12-07 PROCEDURE — 6370000000 HC RX 637 (ALT 250 FOR IP): Performed by: INTERNAL MEDICINE

## 2019-12-07 PROCEDURE — 2709999900 HC NON-CHARGEABLE SUPPLY

## 2019-12-07 PROCEDURE — 2500000003 HC RX 250 WO HCPCS: Performed by: PHYSICIAN ASSISTANT

## 2019-12-07 RX ADMIN — POTASSIUM & SODIUM PHOSPHATES POWDER PACK 280-160-250 MG 250 MG: 280-160-250 PACK at 17:14

## 2019-12-07 RX ADMIN — POTASSIUM CHLORIDE 40 MEQ: 20 TABLET, EXTENDED RELEASE ORAL at 08:00

## 2019-12-07 RX ADMIN — POTASSIUM & SODIUM PHOSPHATES POWDER PACK 280-160-250 MG 250 MG: 280-160-250 PACK at 12:03

## 2019-12-07 RX ADMIN — SODIUM CHLORIDE TAB 1 GM 2 G: 1 TAB at 17:14

## 2019-12-07 RX ADMIN — FAMOTIDINE 20 MG: 20 TABLET ORAL at 21:29

## 2019-12-07 RX ADMIN — MICONAZOLE NITRATE: 20 POWDER TOPICAL at 12:03

## 2019-12-07 RX ADMIN — FAMOTIDINE 20 MG: 20 TABLET ORAL at 08:00

## 2019-12-07 RX ADMIN — MAGNESIUM GLUCONATE 500 MG ORAL TABLET 400 MG: 500 TABLET ORAL at 08:00

## 2019-12-07 RX ADMIN — ACETAMINOPHEN 650 MG: 325 TABLET ORAL at 21:29

## 2019-12-07 RX ADMIN — POTASSIUM CHLORIDE 40 MEQ: 20 TABLET, EXTENDED RELEASE ORAL at 17:14

## 2019-12-07 RX ADMIN — POTASSIUM & SODIUM PHOSPHATES POWDER PACK 280-160-250 MG 250 MG: 280-160-250 PACK at 08:01

## 2019-12-07 RX ADMIN — Medication 10 ML: at 08:01

## 2019-12-07 RX ADMIN — MAGNESIUM GLUCONATE 500 MG ORAL TABLET 400 MG: 500 TABLET ORAL at 21:32

## 2019-12-07 RX ADMIN — Medication: at 21:31

## 2019-12-07 RX ADMIN — POTASSIUM & SODIUM PHOSPHATES POWDER PACK 280-160-250 MG 250 MG: 280-160-250 PACK at 21:32

## 2019-12-07 RX ADMIN — CALCIUM POLYCARBOPHIL 625 MG: 625 TABLET, FILM COATED ORAL at 08:01

## 2019-12-07 RX ADMIN — Medication 10 ML: at 21:31

## 2019-12-07 RX ADMIN — INSULIN LISPRO 2 UNITS: 100 INJECTION, SOLUTION INTRAVENOUS; SUBCUTANEOUS at 13:02

## 2019-12-07 RX ADMIN — SODIUM CHLORIDE TAB 1 GM 2 G: 1 TAB at 08:01

## 2019-12-07 RX ADMIN — METOPROLOL TARTRATE 25 MG: 25 TABLET ORAL at 21:32

## 2019-12-07 RX ADMIN — Medication 3 MG: at 21:44

## 2019-12-07 RX ADMIN — INSULIN LISPRO 1 UNITS: 100 INJECTION, SOLUTION INTRAVENOUS; SUBCUTANEOUS at 21:29

## 2019-12-07 RX ADMIN — NAFCILLIN SODIUM 12 G: 2 INJECTION, POWDER, LYOPHILIZED, FOR SOLUTION INTRAMUSCULAR; INTRAVENOUS at 12:05

## 2019-12-07 RX ADMIN — SODIUM CHLORIDE TAB 1 GM 2 G: 1 TAB at 12:03

## 2019-12-07 RX ADMIN — METOPROLOL TARTRATE 25 MG: 25 TABLET ORAL at 08:01

## 2019-12-07 RX ADMIN — MICONAZOLE NITRATE: 20 POWDER TOPICAL at 21:29

## 2019-12-07 ASSESSMENT — PAIN SCALES - GENERAL
PAINLEVEL_OUTOF10: 0
PAINLEVEL_OUTOF10: 0
PAINLEVEL_OUTOF10: 2

## 2019-12-07 ASSESSMENT — PAIN DESCRIPTION - PROGRESSION: CLINICAL_PROGRESSION: GRADUALLY IMPROVING

## 2019-12-07 ASSESSMENT — PAIN DESCRIPTION - ONSET: ONSET: ON-GOING

## 2019-12-07 ASSESSMENT — PAIN DESCRIPTION - LOCATION: LOCATION: HEAD

## 2019-12-07 ASSESSMENT — PAIN DESCRIPTION - PAIN TYPE: TYPE: ACUTE PAIN

## 2019-12-07 ASSESSMENT — PAIN SCALES - WONG BAKER: WONGBAKER_NUMERICALRESPONSE: 0

## 2019-12-07 ASSESSMENT — PAIN DESCRIPTION - DESCRIPTORS: DESCRIPTORS: HEADACHE

## 2019-12-07 ASSESSMENT — PAIN DESCRIPTION - FREQUENCY: FREQUENCY: INTERMITTENT

## 2019-12-07 ASSESSMENT — PAIN DESCRIPTION - ORIENTATION: ORIENTATION: MID

## 2019-12-07 NOTE — PLAN OF CARE
Problem: Falls - Risk of:  Goal: Will remain free from falls  Description  Will remain free from falls  Outcome: Met This Shift     Problem: Nutrition  Goal: Optimal nutrition therapy  Outcome: Met This Shift     Problem: Bowel Function - Altered:  Goal: Bowel elimination is within specified parameters  Description  Bowel elimination is within specified parameters  Outcome: Met This Shift     Problem: Risk for Impaired Skin Integrity  Goal: Tissue integrity - skin and mucous membranes  Description  Structural intactness and normal physiological function of skin and  mucous membranes. Outcome: Ongoing  Note:   Patient turns self independently, but reminded to do so every two hours. Patient taught to change position frequently to prevent breakdown. Will continue to monitor. Problem: Discharge Planning:  Goal: Discharged to appropriate level of care  Description  Discharged to appropriate level of care  Outcome: Ongoing  Note:   Patient plans to go to TCU, waiting to hear from insurance. Problem: Infection - Central Venous Catheter-Associated Bloodstream Infection:  Goal: Will show no infection signs and symptoms  Description  Will show no infection signs and symptoms  Outcome: Ongoing  Note:   Patient on continuous antibiotics. Hand hygiene educated and utilized. Patient education regarding incisions and keeping them clean.

## 2019-12-07 NOTE — PLAN OF CARE
symptoms  12/7/2019 4184 by Chago Zhang, RN, BSN  Outcome: Ongoing  Note:   Bloody site. Site changed. Iv team updated  12/6/2019 2004 by Nuris Ly RN  Outcome: Ongoing  Note:   Patient on continuous antibiotics. Hand hygiene educated and utilized. Patient education regarding incisions and keeping them clean. Care plan reviewed with patient. Patient verbalize understanding of the plan of care and contribute to goal setting.

## 2019-12-08 LAB
GLUCOSE BLD-MCNC: 108 MG/DL (ref 70–108)
GLUCOSE BLD-MCNC: 126 MG/DL (ref 70–108)
GLUCOSE BLD-MCNC: 148 MG/DL (ref 70–108)
GLUCOSE BLD-MCNC: 182 MG/DL (ref 70–108)

## 2019-12-08 PROCEDURE — 2580000003 HC RX 258: Performed by: PHYSICIAN ASSISTANT

## 2019-12-08 PROCEDURE — 6370000000 HC RX 637 (ALT 250 FOR IP): Performed by: PHYSICIAN ASSISTANT

## 2019-12-08 PROCEDURE — 6370000000 HC RX 637 (ALT 250 FOR IP): Performed by: FAMILY MEDICINE

## 2019-12-08 PROCEDURE — 2709999900 HC NON-CHARGEABLE SUPPLY

## 2019-12-08 PROCEDURE — 82948 REAGENT STRIP/BLOOD GLUCOSE: CPT

## 2019-12-08 PROCEDURE — 6370000000 HC RX 637 (ALT 250 FOR IP): Performed by: INTERNAL MEDICINE

## 2019-12-08 PROCEDURE — 6370000000 HC RX 637 (ALT 250 FOR IP): Performed by: ANESTHESIOLOGY

## 2019-12-08 PROCEDURE — 94761 N-INVAS EAR/PLS OXIMETRY MLT: CPT

## 2019-12-08 PROCEDURE — 2060000000 HC ICU INTERMEDIATE R&B

## 2019-12-08 PROCEDURE — 2500000003 HC RX 250 WO HCPCS: Performed by: PHYSICIAN ASSISTANT

## 2019-12-08 PROCEDURE — 99231 SBSQ HOSP IP/OBS SF/LOW 25: CPT | Performed by: INTERNAL MEDICINE

## 2019-12-08 RX ADMIN — MICONAZOLE NITRATE: 20 POWDER TOPICAL at 19:59

## 2019-12-08 RX ADMIN — POTASSIUM CHLORIDE 40 MEQ: 20 TABLET, EXTENDED RELEASE ORAL at 09:37

## 2019-12-08 RX ADMIN — POTASSIUM & SODIUM PHOSPHATES POWDER PACK 280-160-250 MG 250 MG: 280-160-250 PACK at 12:17

## 2019-12-08 RX ADMIN — NAFCILLIN SODIUM 12 G: 2 INJECTION, POWDER, LYOPHILIZED, FOR SOLUTION INTRAMUSCULAR; INTRAVENOUS at 11:43

## 2019-12-08 RX ADMIN — METOPROLOL TARTRATE 25 MG: 25 TABLET ORAL at 09:37

## 2019-12-08 RX ADMIN — FAMOTIDINE 20 MG: 20 TABLET ORAL at 09:37

## 2019-12-08 RX ADMIN — Medication 10 ML: at 09:37

## 2019-12-08 RX ADMIN — METOPROLOL TARTRATE 25 MG: 25 TABLET ORAL at 19:57

## 2019-12-08 RX ADMIN — MICONAZOLE NITRATE: 20 POWDER TOPICAL at 09:37

## 2019-12-08 RX ADMIN — POTASSIUM CHLORIDE 40 MEQ: 20 TABLET, EXTENDED RELEASE ORAL at 17:08

## 2019-12-08 RX ADMIN — SODIUM CHLORIDE TAB 1 GM 2 G: 1 TAB at 12:17

## 2019-12-08 RX ADMIN — FAMOTIDINE 20 MG: 20 TABLET ORAL at 20:06

## 2019-12-08 RX ADMIN — INSULIN LISPRO 2 UNITS: 100 INJECTION, SOLUTION INTRAVENOUS; SUBCUTANEOUS at 12:19

## 2019-12-08 RX ADMIN — POTASSIUM & SODIUM PHOSPHATES POWDER PACK 280-160-250 MG 250 MG: 280-160-250 PACK at 19:57

## 2019-12-08 RX ADMIN — SODIUM CHLORIDE TAB 1 GM 2 G: 1 TAB at 17:08

## 2019-12-08 RX ADMIN — SODIUM CHLORIDE TAB 1 GM 2 G: 1 TAB at 09:37

## 2019-12-08 RX ADMIN — MAGNESIUM GLUCONATE 500 MG ORAL TABLET 400 MG: 500 TABLET ORAL at 19:57

## 2019-12-08 RX ADMIN — MAGNESIUM GLUCONATE 500 MG ORAL TABLET 400 MG: 500 TABLET ORAL at 09:37

## 2019-12-08 RX ADMIN — Medication 10 ML: at 19:58

## 2019-12-08 RX ADMIN — POTASSIUM & SODIUM PHOSPHATES POWDER PACK 280-160-250 MG 250 MG: 280-160-250 PACK at 09:37

## 2019-12-08 RX ADMIN — INSULIN LISPRO 1 UNITS: 100 INJECTION, SOLUTION INTRAVENOUS; SUBCUTANEOUS at 19:59

## 2019-12-08 RX ADMIN — POTASSIUM & SODIUM PHOSPHATES POWDER PACK 280-160-250 MG 250 MG: 280-160-250 PACK at 17:09

## 2019-12-08 RX ADMIN — Medication 3 MG: at 19:57

## 2019-12-08 RX ADMIN — CALCIUM POLYCARBOPHIL 625 MG: 625 TABLET, FILM COATED ORAL at 09:37

## 2019-12-08 ASSESSMENT — PAIN SCALES - GENERAL
PAINLEVEL_OUTOF10: 0
PAINLEVEL_OUTOF10: 0

## 2019-12-08 NOTE — PROGRESS NOTES
Progress note: Infectious diseases    Patient - Thom Trivedi,  Age - 59 y.o.    - 1955      Room Number - 9H-46/370-Z   N -  [id number]   Acct # - [de-identified]  Date of Admission -  2019 11:22 PM    SUBJECTIVE:   No new issues  She wants to go home. OBJECTIVE   VITALS    height is 5' 6\" (1.676 m) and weight is 208 lb 14.4 oz (94.8 kg). Her oral temperature is 98.1 °F (36.7 °C). Her blood pressure is 139/79 and her pulse is 85. Her respiration is 18 and oxygen saturation is 100%. Wt Readings from Last 3 Encounters:   19 208 lb 14.4 oz (94.8 kg)   19 201 lb 4.8 oz (91.3 kg)   19 220 lb 6.4 oz (100 kg)       I/O (24 Hours)    Intake/Output Summary (Last 24 hours) at 2019 1238  Last data filed at 2019 0946  Gross per 24 hour   Intake 1901.45 ml   Output 0 ml   Net 1901.45 ml       General Appearance oriented staple on the scalp. HEENT - normocephalic, atraumatic, pale conjunctiva,  anicteric sclera  Neck - Supple, no mass  Lungs -  Bilateral   air entry, no rhonchi, no wheeze. Cardiovascular - Heart sounds are normal.     Abdomen - soft, not distended, nontender  Neurologic -awake and oriented. Skin - No bruising or bleeding  Extremities - trace edema.   Clean lumbar wound, no redness or drainage  MEDICATIONS:      lidocaine 1 % injection  5 mL Intradermal Once    magnesium oxide  400 mg Oral BID    polycarbophil  625 mg Oral Daily    sodium chloride  2 g Oral TID WC    potassium chloride  40 mEq Oral BID WC    famotidine  20 mg Oral BID    miconazole   Topical BID    sodium chloride flush  10 mL Intravenous 2 times per day    nafcillin 12 g continuous infusion  12 g Intravenous Q24H    calcium replacement protocol   Other RX Placeholder    insulin lispro  0-12 Units Subcutaneous TID WC    insulin lispro  0-6 Units Subcutaneous Nightly    metoprolol tartrate  25

## 2019-12-08 NOTE — PLAN OF CARE
Problem: Falls - Risk of:  Goal: Will remain free from falls  Description  Will remain free from falls  12/8/2019 1803 by Bari Faustin RN  Outcome: Met This Shift  12/8/2019 0801 by Danni Kraft RN, BSN  Outcome: Ongoing  Note:   Up with 1 to assist. Lexy Bingham in room due to fall  12/8/2019 0800 by Danni Kraft RN, BSN  Outcome: Ongoing     Problem: Risk for Impaired Skin Integrity  Goal: Tissue integrity - skin and mucous membranes  Description  Structural intactness and normal physiological function of skin and  mucous membranes. Outcome: Met This Shift     Problem: Nutrition  Goal: Optimal nutrition therapy  12/8/2019 1803 by Bari Faustin RN  Outcome: Met This Shift  12/8/2019 0801 by Danni Kraft RN, BSN  Note:   Possible dti vs excoriation presnt      Problem: Bowel Function - Altered:  Goal: Bowel elimination is within specified parameters  Description  Bowel elimination is within specified parameters  12/8/2019 1803 by Bari Faustin RN  Outcome: Met This Shift  12/8/2019 0801 by Danni Kraft RN, BSN  Outcome: Ongoing  Note:   One bm this shift     Problem: Infection - Central Venous Catheter-Associated Bloodstream Infection:  Goal: Will show no infection signs and symptoms  Description  Will show no infection signs and symptoms  Outcome: Met This Shift     Problem: Discharge Planning:  Goal: Discharged to appropriate level of care  Description  Discharged to appropriate level of care  12/8/2019 1803 by Bari Faustin RN  Outcome: Ongoing  12/8/2019 0801 by Danni Kraft RN, BSN  Note:   From home with    Care plan reviewed with patient. Patient does not verbalize understanding of the plan of care and contribute to goal setting.

## 2019-12-09 LAB
ANION GAP SERPL CALCULATED.3IONS-SCNC: 14 MEQ/L (ref 8–16)
BUN BLDV-MCNC: 18 MG/DL (ref 7–22)
CALCIUM SERPL-MCNC: 8.7 MG/DL (ref 8.5–10.5)
CHLORIDE BLD-SCNC: 97 MEQ/L (ref 98–111)
CO2: 22 MEQ/L (ref 23–33)
CREAT SERPL-MCNC: 1 MG/DL (ref 0.4–1.2)
ERYTHROCYTE [DISTWIDTH] IN BLOOD BY AUTOMATED COUNT: 19.7 % (ref 11.5–14.5)
ERYTHROCYTE [DISTWIDTH] IN BLOOD BY AUTOMATED COUNT: 65 FL (ref 35–45)
GFR SERPL CREATININE-BSD FRML MDRD: 56 ML/MIN/1.73M2
GLUCOSE BLD-MCNC: 109 MG/DL (ref 70–108)
GLUCOSE BLD-MCNC: 130 MG/DL (ref 70–108)
GLUCOSE BLD-MCNC: 136 MG/DL (ref 70–108)
GLUCOSE BLD-MCNC: 151 MG/DL (ref 70–108)
GLUCOSE BLD-MCNC: 179 MG/DL (ref 70–108)
HCT VFR BLD CALC: 25.3 % (ref 37–47)
HEMOGLOBIN: 7.5 GM/DL (ref 12–16)
MCH RBC QN AUTO: 27.3 PG (ref 26–33)
MCHC RBC AUTO-ENTMCNC: 29.6 GM/DL (ref 32.2–35.5)
MCV RBC AUTO: 92 FL (ref 81–99)
PLATELET # BLD: 310 THOU/MM3 (ref 130–400)
PLATELET # BLD: 334 THOU/MM3 (ref 130–400)
PMV BLD AUTO: 9.3 FL (ref 9.4–12.4)
POTASSIUM SERPL-SCNC: 4.3 MEQ/L (ref 3.5–5.2)
RBC # BLD: 2.75 MILL/MM3 (ref 4.2–5.4)
SODIUM BLD-SCNC: 133 MEQ/L (ref 135–145)
WBC # BLD: 7.7 THOU/MM3 (ref 4.8–10.8)

## 2019-12-09 PROCEDURE — 6370000000 HC RX 637 (ALT 250 FOR IP): Performed by: PHYSICIAN ASSISTANT

## 2019-12-09 PROCEDURE — 6370000000 HC RX 637 (ALT 250 FOR IP): Performed by: INTERNAL MEDICINE

## 2019-12-09 PROCEDURE — 6370000000 HC RX 637 (ALT 250 FOR IP): Performed by: FAMILY MEDICINE

## 2019-12-09 PROCEDURE — 97530 THERAPEUTIC ACTIVITIES: CPT

## 2019-12-09 PROCEDURE — 80048 BASIC METABOLIC PNL TOTAL CA: CPT

## 2019-12-09 PROCEDURE — 2580000003 HC RX 258: Performed by: PHYSICIAN ASSISTANT

## 2019-12-09 PROCEDURE — 85049 AUTOMATED PLATELET COUNT: CPT

## 2019-12-09 PROCEDURE — 97110 THERAPEUTIC EXERCISES: CPT

## 2019-12-09 PROCEDURE — 99233 SBSQ HOSP IP/OBS HIGH 50: CPT | Performed by: INTERNAL MEDICINE

## 2019-12-09 PROCEDURE — 6370000000 HC RX 637 (ALT 250 FOR IP): Performed by: ANESTHESIOLOGY

## 2019-12-09 PROCEDURE — 2500000003 HC RX 250 WO HCPCS: Performed by: PHYSICIAN ASSISTANT

## 2019-12-09 PROCEDURE — 82948 REAGENT STRIP/BLOOD GLUCOSE: CPT

## 2019-12-09 PROCEDURE — 2060000000 HC ICU INTERMEDIATE R&B

## 2019-12-09 PROCEDURE — 36415 COLL VENOUS BLD VENIPUNCTURE: CPT

## 2019-12-09 PROCEDURE — 85027 COMPLETE CBC AUTOMATED: CPT

## 2019-12-09 PROCEDURE — 97116 GAIT TRAINING THERAPY: CPT

## 2019-12-09 PROCEDURE — 2709999900 HC NON-CHARGEABLE SUPPLY

## 2019-12-09 RX ADMIN — MAGNESIUM GLUCONATE 500 MG ORAL TABLET 400 MG: 500 TABLET ORAL at 20:31

## 2019-12-09 RX ADMIN — Medication 3 MG: at 20:31

## 2019-12-09 RX ADMIN — NAFCILLIN SODIUM 12 G: 2 INJECTION, POWDER, LYOPHILIZED, FOR SOLUTION INTRAMUSCULAR; INTRAVENOUS at 14:15

## 2019-12-09 RX ADMIN — CALCIUM POLYCARBOPHIL 625 MG: 625 TABLET, FILM COATED ORAL at 09:11

## 2019-12-09 RX ADMIN — SODIUM CHLORIDE TAB 1 GM 2 G: 1 TAB at 09:12

## 2019-12-09 RX ADMIN — POTASSIUM CHLORIDE 40 MEQ: 20 TABLET, EXTENDED RELEASE ORAL at 16:45

## 2019-12-09 RX ADMIN — ACETAMINOPHEN 650 MG: 325 TABLET ORAL at 02:58

## 2019-12-09 RX ADMIN — SODIUM CHLORIDE TAB 1 GM 2 G: 1 TAB at 13:54

## 2019-12-09 RX ADMIN — FAMOTIDINE 20 MG: 20 TABLET ORAL at 20:31

## 2019-12-09 RX ADMIN — FAMOTIDINE 20 MG: 20 TABLET ORAL at 09:11

## 2019-12-09 RX ADMIN — POTASSIUM CHLORIDE 40 MEQ: 20 TABLET, EXTENDED RELEASE ORAL at 09:11

## 2019-12-09 RX ADMIN — Medication 10 ML: at 20:31

## 2019-12-09 RX ADMIN — POTASSIUM & SODIUM PHOSPHATES POWDER PACK 280-160-250 MG 250 MG: 280-160-250 PACK at 20:31

## 2019-12-09 RX ADMIN — ACETAMINOPHEN 650 MG: 325 TABLET ORAL at 09:12

## 2019-12-09 RX ADMIN — ACETAMINOPHEN 650 MG: 325 TABLET ORAL at 16:42

## 2019-12-09 RX ADMIN — SODIUM CHLORIDE TAB 1 GM 2 G: 1 TAB at 16:44

## 2019-12-09 RX ADMIN — POTASSIUM & SODIUM PHOSPHATES POWDER PACK 280-160-250 MG 250 MG: 280-160-250 PACK at 09:11

## 2019-12-09 RX ADMIN — MICONAZOLE NITRATE: 20 POWDER TOPICAL at 11:41

## 2019-12-09 RX ADMIN — POTASSIUM & SODIUM PHOSPHATES POWDER PACK 280-160-250 MG 250 MG: 280-160-250 PACK at 16:43

## 2019-12-09 RX ADMIN — INSULIN LISPRO 1 UNITS: 100 INJECTION, SOLUTION INTRAVENOUS; SUBCUTANEOUS at 20:36

## 2019-12-09 RX ADMIN — METOPROLOL TARTRATE 25 MG: 25 TABLET ORAL at 20:31

## 2019-12-09 RX ADMIN — METOPROLOL TARTRATE 25 MG: 25 TABLET ORAL at 09:11

## 2019-12-09 RX ADMIN — POTASSIUM & SODIUM PHOSPHATES POWDER PACK 280-160-250 MG 250 MG: 280-160-250 PACK at 13:54

## 2019-12-09 RX ADMIN — MAGNESIUM GLUCONATE 500 MG ORAL TABLET 400 MG: 500 TABLET ORAL at 09:11

## 2019-12-09 RX ADMIN — MICONAZOLE NITRATE: 20 POWDER TOPICAL at 20:31

## 2019-12-09 ASSESSMENT — PAIN DESCRIPTION - LOCATION
LOCATION: HEAD
LOCATION: HEAD

## 2019-12-09 ASSESSMENT — PAIN DESCRIPTION - ORIENTATION: ORIENTATION: MID

## 2019-12-09 ASSESSMENT — PAIN DESCRIPTION - ONSET: ONSET: GRADUAL

## 2019-12-09 ASSESSMENT — PAIN SCALES - GENERAL
PAINLEVEL_OUTOF10: 0
PAINLEVEL_OUTOF10: 3
PAINLEVEL_OUTOF10: 0
PAINLEVEL_OUTOF10: 0
PAINLEVEL_OUTOF10: 8
PAINLEVEL_OUTOF10: 4

## 2019-12-09 ASSESSMENT — PAIN DESCRIPTION - PAIN TYPE
TYPE: ACUTE PAIN
TYPE: ACUTE PAIN

## 2019-12-09 ASSESSMENT — PAIN DESCRIPTION - PROGRESSION: CLINICAL_PROGRESSION: GRADUALLY WORSENING

## 2019-12-09 ASSESSMENT — PAIN DESCRIPTION - FREQUENCY: FREQUENCY: INTERMITTENT

## 2019-12-09 ASSESSMENT — PAIN DESCRIPTION - DESCRIPTORS
DESCRIPTORS: HEADACHE
DESCRIPTORS: HEADACHE

## 2019-12-09 ASSESSMENT — PAIN - FUNCTIONAL ASSESSMENT: PAIN_FUNCTIONAL_ASSESSMENT: ACTIVITIES ARE NOT PREVENTED

## 2019-12-09 NOTE — PROGRESS NOTES
Resolved. 16. Acute respiratory failure: Patient intubated 11/18/2019 for impending respiratory arrest. Patient extubated 11/22/19. Resolved. 17. Pneumonia: Was present upon admission.  Positive for Klebsiella pneumonia and MSSA.  Treated with nafcillin and Cipro.  Resolved. 18. Septic shock: Secondary to septicemia and meningitis due to Staphylococcus aureus.  Initial antibiotic selection was high-dose Rocephin, Zosyn, and and vancomycin.  Antibiotics simplified to vancomycin on 11/20/2019 and subsequently nafcillin.  Secondary to MSSA bacteremia.  This is secondary to soft tissue abscess communicating with small epidural abscess.  Resolved. 19. Back pain: MRI back positive for soft tissue abscess and small epidural abscess.  Resolved. .     CC: Lumbar abscess  HPI: Patient is a 59-year-old white female active smoker. Sheri Brown has a remote history of appendectomy and cholecystectomy. Sheri Brown has a history of type 2 diabetes mellitus, hypertension, hyperlipidemia, depression, and a history of brain lipoma diagnosed 11/4/2019. Patient was hospitalized 11/4/2019 through 11/7/2019 for evaluation of possible normal pressure hydrocephalus.  She had symptoms consistent with normal pressure hydrocephalus including progressive gait disturbance, incontinence, and cognitive decline associated with blurred vision and headaches.  She was admitted and had a lumbar drain placed.  Final assessment of efficacy of lumbar drain was documented 11/7/2019 by Dr. Dann Victoria that note he determined there was no significant benefit from the lumbar drain trial.  Findings did not support the diagnosis of normal pressure hydrocephalus.  Patient was determined not to be a good candidate for  shunt and lumbar drain was removed.  Patient was discharged home on 11/7/2019.   Patient was received in transfer from [facility] to [facility] on 11/18/2019.  Symptoms prior to hospitalization included a 5-day history of back pain

## 2019-12-09 NOTE — PROGRESS NOTES
[address] [place] [address]  INPATIENT PHYSICAL THERAPY  DAILY NOTE  STR ICU STEPDOWN TELEMETRY 4K - 4K-16/016-A      Time In: 1114  Time Out: 7277  Timed Code Treatment Minutes: 39 Minutes  Minutes: 45          Date: 2019  Patient Name: Alis Diana,  Gender:  female        MRN: [id number]  : 1955  (59 y.o.)     Referring Practitioner: Miquel Powell MD  Diagnosis: Hyperglycemia  Additional Pertinent Hx: Patient is a 80-year-old white female active smoker. She has a remote history of appendectomy and cholecystectomy. She has a history of type 2 diabetes mellitus, hypertension, hyperlipidemia, depression, and a history of brain lipoma diagnosed 2019. Patient was hospitalized 2019 through 2019 for evaluation of possible normal pressure hydrocephalus. She had symptoms consistent with normal pressure hydrocephalus including progressive gait disturbance, incontinence, and cognitive decline associated with blurred vision and headaches. She was admitted and had a lumbar drain placed. Final assessment of efficacy of lumbar drain was documented 2019 by Dr. Marvin Joyce. In that note he determined there was no significant benefit from the lumbar drain trial.  Findings did not support the diagnosis of normal pressure hydrocephalus. Patient was determined not to be a good candidate for  shunt and lumbar drain was removed. Patient was discharged home on 2019. Patient was received in transfer from [facility] facility to [facility] on 2019. Symptoms prior to hospitalization included a 5-day history of back pain associated with difficulty ambulating, difficulty urinating and diarrhea. Additionally she had a 2-day history of confusion with hallucinations. Immediately upon arrival, nursing staff had concern regarding patient's respiratory rate at 44 and heart rate at 160. She was transferred immediately to the intensive care unit.   Patient was in overt septic session     PAIN: 8/10: in back pt asking for pain meds did notify nursing     OBJECTIVE:  Bed Mobility:  Rolling to Right: Stand By Assistance, with extra time as she was distracted and lacked initiation    Supine to Sit: Stand By Assistance, pt did use bed rail took extra time to complete   Sit to Supine: Stand By Assistance, with extra time and cues for technique, once in bed she did require cues to reposition hips and shoulders      Transfers:  Sit to Stand: Minimal Assistance, from various surfaces pt needed cues for hand placement 100% of the time poor carryover  Stand to Sit:Contact Guard Assistance, cues for safety to reach back to surface, on one occ pt sat without warning and wasn't backed upto surface    Ambulation:  Minimal Assistance  Distance: 15x1, 60x1  Surface: Level Tile  Device:Rolling Walker  Gait Deviations:  Pt very distracted while in halls and decreased attention to what she was doing she had several loss of balance needing min assist, pt also had wide base of support with decreased step length and heel strike and toe off, she frequently needed cues and assist to guide walker in straight path and struggled with walker in narrow spaces    Balance:  pt completed toileting task with CGA for balance with wide base she made no effort to manage rachel care and change of attends, pt completed a dynamic balance activity one UE at support reaching above head and to knee ht and right and left ~ 2 in with CGA and wide base, then w/o UE at support with CGA to min assist due to min trunk sway she would frequently reach for walker for support     Exercise:  Patient was guided in 1 set(s) 19 reps of exercise to both lower extremities. Ankle pumps, Heelslides, Short arc quads, Long arc quads, Hip abduction/adduction, Seated hip flexion, Seated hamstring curls and pt needed several cues to stay on task with ex and to keep count and cues for proper technique of ex .   Exercises were completed for increased independence with functional mobility. Functional Outcome Measures: Completed  Balance Score: 6  Gait Score: 7  Tinetti Total Score: 13  Risk Indicators:  Less than/equal to 18 = high risk  19-23 Moderate risk  Greater than/equal to 24 = low risk    AM-PAC Inpatient Mobility without Stair Climbing Raw Score : 15  AM-PAC Inpatient without Stair Climbing T-Scale Score : 43.03    ASSESSMENT:  Assessment: Patient progressing toward established goals. and pt cont to demonstrate generalized weakness and endurance, she was unsteady with gait needing physical hands on assist pt did score 13/28 on her tinetti balance test putting her at a high fall risk, pt would benefit from cont skilled therapy   Activity Tolerance:  Patient tolerance of  treatment: good. Equipment Recommendations: Other: will continue to assess needs pending progress  Discharge Recommendations:    Subacute/Skilled Nursing Facility(TCU)    Plan: Times per week: 5 x GM  Current Treatment Recommendations: Strengthening, Patient/Caregiver Education & Training, Equipment Evaluation, Education, & procurement, Balance Training, Functional Mobility Training, Endurance Training, Safety Education & Training, Positioning, Transfer Training, Gait Training, Stair training, Home Exercise Program    Patient Education  Patient Education: Plan of Care, Transfers, Gait    Goals:  Patient goals : to get better  Short term goals  Time Frame for Short term goals: by discharge  Short term goal 1: Pt to transfer supine <--> sit SBA to enable pt to get in/out of bed. MET  Short term goal 2: Pt to transfer sit <--> stand SBA for increased functional mobility. Short term goal 3: Pt to ambulate >50 feet with LRAD CGA for household ambulation. Short term goal 4:      Long term goals  Time Frame for Long term goals : NA due to short length of stay. Following session, patient left in safe position with all fall risk precautions in place.

## 2019-12-09 NOTE — PROGRESS NOTES
smoker. Aidan Laguna has a remote history of appendectomy and cholecystectomy. Aidan Laguna has a history of type 2 diabetes mellitus, hypertension, hyperlipidemia, depression, and a history of brain lipoma diagnosed 11/4/2019. Patient was hospitalized 11/4/2019 through 11/7/2019 for evaluation of possible normal pressure hydrocephalus.  She had symptoms consistent with normal pressure hydrocephalus including progressive gait disturbance, incontinence, and cognitive decline associated with blurred vision and headaches.  She was admitted and had a lumbar drain placed.  Final assessment of efficacy of lumbar drain was documented 11/7/2019 by Dr. Catia Feng that note he determined there was no significant benefit from the lumbar drain trial.  Findings did not support the diagnosis of normal pressure hydrocephalus.  Patient was determined not to be a good candidate for  shunt and lumbar drain was removed.  Patient was discharged home on 11/7/2019. Patient was received in transfer from [facility] facility to [facility] on 11/18/2019.  Symptoms prior to hospitalization included a 5-day history of back pain associated with difficulty ambulating, difficulty urinating and diarrhea.  Additionally she had a 2-day history of confusion with hallucinations.  Immediately upon arrival, nursing staff had concern regarding patient's respiratory rate at 44 and heart rate at 160.  She was transferred immediately to the intensive care unit.  Patient was in overt septic shock and was intubated 11/18/2019.  She was started on Zosyn and vancomycin.  Cultures were sent. Aidan Laguna underwent volume resuscitation and was started on pressors.  Rocephin was subsequently added given concerns for possible meningitis. Lumbar puncture was performed and subsequently grew Staphylococcus species.  Antibiotics simplified to vancomycin for meningitis.  MRI lumbar spine demonstrated soft tissue abscess communicating with small epidural abscess.  Lumbar soft chronic microvascular ischemic angiopathy. The findings were conveyed to the patient's nurse, Virginie Roque over the phone at [phone] hours on 11/25/2019. **This report has been created using voice recognition software. It may contain minor errors which are inherent in voice recognition technology. **         Final report electronically signed by Dr. Mariajose Saenz on 11/25/2019 12:14 PM      CT Head WO Contrast   Final Result   1. No mass effect or acute hemorrhage. 2. Chronic periventricular small vessel ischemic changes and cerebral atrophy. 3. Hypoattenuating structure in the midbrain of indeterminate etiology. Brain MRI is recommended for further evaluation. **This report has been created using voice recognition software. It may contain minor errors which are inherent in voice recognition technology. **      Final report electronically signed by Dr. Jabier Connell on 11/24/2019 1:39 PM      XR CHEST PORTABLE   Final Result   Interval endotracheal and nasogastric tube removal with persistent diminished lung volumes. No acute process compared to prior study. **This report has been created using voice recognition software. It may contain minor errors which are inherent in voice recognition technology. **      Final report electronically signed by Dr. Kathleen Bang on 11/23/2019 3:11 AM      CT ABSCESS DRAINAGE W CATH PLACEMENT S&I   Final Result   Status post successful abscess drainage procedure. .               **This report has been created using voice recognition software. It may contain minor errors which are inherent in voice recognition technology. **         Final report electronically signed by Dr. Samson Stevenson on 11/21/2019 1:13 PM      CT DRAINAGE HEMATOMA/SEROMA/FLUID COLLECTION   Final Result   Status post successful abscess drainage procedure. .               **This report has been created using voice recognition software.   It may contain minor errors which are inherent in voice recognition technology. **         Final report electronically signed by Dr. Giles Charles on 11/21/2019 1:13 PM      MRI LUMBAR SPINE W WO CONTRAST   Final Result       1. Prominent complex fluid collection in the paraspinal musculature on the left at the L3-L4 level which appears to communicate anteriorly with a tiny epidural abscess at the L3 level and posteriorly with a large subcutaneous component abscess. 2. Arachnoiditis. Findings were discussed with Carlos Enrique Llanos RN via telephone at the time of interpretation. **This report has been created using voice recognition software. It may contain minor errors which are inherent in voice recognition technology. **      Final report electronically signed by Dr. Abrahan Peterson MD on 11/20/2019 3:18 PM      MRI THORACIC SPINE W WO CONTRAST   Final Result       1. No evidence of acute abnormality of the thoracic spine. 2. Old minimal compression deformity of T12 with approximately 10% anterior height loss. 3. Left paracentral extrusion at T9-10 causing mild spinal canal stenosis and contacting the ventral aspect of the cord without abnormal signal in the cord. **This report has been created using voice recognition software. It may contain minor errors which are inherent in voice recognition technology. **      Final report electronically signed by Dr. Abrahan Peterson MD on 11/20/2019 3:04 PM      IR LUMBAR PUNCTURE FOR DIAGNOSIS   Final Result   Status post successful lumbar puncture. **This report has been created using voice recognition software. It may contain minor errors which are inherent in voice recognition technology. **      Final report electronically signed by Dr. Giles Charles on 11/19/2019 1:23 PM      XR CHEST PORTABLE   Final Result   1. Status post left subclavian central line placement. Tip location the superior vena cava. There is no visualized pneumothorax.    2. Stable appearance of coarse lung markings and slightly decreased lung volumes. **This report has been created using voice recognition software. It may contain minor errors which are inherent in voice recognition technology. **      Final report electronically signed by Dr. Marian Elliott on 11/19/2019 7:14 AM      XR CHEST PORTABLE   Final Result   1. Visualized endotracheal and nasogastric tubes discussed above. 2. Persistent slightly decreased lung volumes with coarse markings. 3. Minimal right effusion not excluded. **This report has been created using voice recognition software. It may contain minor errors which are inherent in voice recognition technology. **      Final report electronically signed by Dr. Marian Elliott on 11/19/2019 12:27 AM      XR CHEST PORTABLE   Final Result   1. Visualized endotracheal tube above the mesfin. 2. Nasogastric tube within the stomach. 3. Diminished lung volumes with crowding of markings. Left retrocardiac atelectasis or infiltrate is not excluded. A small left effusion is considered. **This report has been created using voice recognition software. It may contain minor errors which are inherent in voice recognition technology. **      Final report electronically signed by Dr. Marian Elliott on 11/19/2019 12:29 AM          DVT prophylaxis: [] Lovenox                                  [x] SCDs                                 [] SQ Heparin                                 [] Encourage ambulation           [] Already on Anticoagulation     Code Status: Full Code    PT/OT Eval Status: yes    Diet:   Dietary Nutrition Supplements: Diabetic Oral Supplement  DIET CARB CONTROL; Daily Fluid Restriction: 2000 ml; High Fiber  Dietary Nutrition Supplements: Other Oral Supplement (see comment)    Fluids:na    Tele:   [x] yes             [] no      Electronically signed by Cameron Wilson DO on 12/9/2019 at 8:23 AM

## 2019-12-10 ENCOUNTER — HOSPITAL ENCOUNTER (INPATIENT)
Age: 64
LOS: 9 days | Discharge: HOME HEALTH | DRG: 099 | End: 2019-12-19
Attending: PHYSICAL MEDICINE & REHABILITATION | Admitting: FAMILY MEDICINE
Payer: MEDICARE

## 2019-12-10 ENCOUNTER — APPOINTMENT (OUTPATIENT)
Dept: GENERAL RADIOLOGY | Age: 64
DRG: 856 | End: 2019-12-10
Attending: INTERNAL MEDICINE
Payer: MEDICARE

## 2019-12-10 VITALS
SYSTOLIC BLOOD PRESSURE: 148 MMHG | DIASTOLIC BLOOD PRESSURE: 83 MMHG | HEART RATE: 90 BPM | WEIGHT: 204.6 LBS | TEMPERATURE: 98.1 F | HEIGHT: 66 IN | BODY MASS INDEX: 32.88 KG/M2 | OXYGEN SATURATION: 99 % | RESPIRATION RATE: 19 BRPM

## 2019-12-10 DIAGNOSIS — G03.9 MENINGITIS: ICD-10-CM

## 2019-12-10 DIAGNOSIS — G93.40 ENCEPHALOPATHY: ICD-10-CM

## 2019-12-10 DIAGNOSIS — R65.20 SEPSIS WITH ENCEPHALOPATHY WITHOUT SEPTIC SHOCK, DUE TO UNSPECIFIED ORGANISM (HCC): Primary | ICD-10-CM

## 2019-12-10 DIAGNOSIS — A41.9 SEPSIS WITH ENCEPHALOPATHY WITHOUT SEPTIC SHOCK, DUE TO UNSPECIFIED ORGANISM (HCC): Primary | ICD-10-CM

## 2019-12-10 DIAGNOSIS — G93.40 SEPSIS WITH ENCEPHALOPATHY WITHOUT SEPTIC SHOCK, DUE TO UNSPECIFIED ORGANISM (HCC): Primary | ICD-10-CM

## 2019-12-10 LAB
FERRITIN: 64 NG/ML (ref 10–291)
FOLATE: 12.3 NG/ML (ref 4.8–24.2)
GLUCOSE BLD-MCNC: 106 MG/DL (ref 70–108)
GLUCOSE BLD-MCNC: 110 MG/DL (ref 70–108)
GLUCOSE BLD-MCNC: 153 MG/DL (ref 70–108)
GLUCOSE BLD-MCNC: 159 MG/DL (ref 70–108)
HCT VFR BLD CALC: 26.9 % (ref 37–47)
HEMOGLOBIN: 8.1 GM/DL (ref 12–16)
IRON: 38 UG/DL (ref 50–170)
TOTAL IRON BINDING CAPACITY: 269 UG/DL (ref 171–450)
VITAMIN B-12: 561 PG/ML (ref 211–911)

## 2019-12-10 PROCEDURE — 82948 REAGENT STRIP/BLOOD GLUCOSE: CPT

## 2019-12-10 PROCEDURE — 82728 ASSAY OF FERRITIN: CPT

## 2019-12-10 PROCEDURE — 6370000000 HC RX 637 (ALT 250 FOR IP): Performed by: PHYSICIAN ASSISTANT

## 2019-12-10 PROCEDURE — 6370000000 HC RX 637 (ALT 250 FOR IP): Performed by: ANESTHESIOLOGY

## 2019-12-10 PROCEDURE — 97530 THERAPEUTIC ACTIVITIES: CPT

## 2019-12-10 PROCEDURE — 82607 VITAMIN B-12: CPT

## 2019-12-10 PROCEDURE — 97116 GAIT TRAINING THERAPY: CPT

## 2019-12-10 PROCEDURE — 6370000000 HC RX 637 (ALT 250 FOR IP): Performed by: INTERNAL MEDICINE

## 2019-12-10 PROCEDURE — 1290000000 HC SEMI PRIVATE OTHER R&B

## 2019-12-10 PROCEDURE — 82746 ASSAY OF FOLIC ACID SERUM: CPT

## 2019-12-10 PROCEDURE — 85018 HEMOGLOBIN: CPT

## 2019-12-10 PROCEDURE — 83540 ASSAY OF IRON: CPT

## 2019-12-10 PROCEDURE — 36592 COLLECT BLOOD FROM PICC: CPT

## 2019-12-10 PROCEDURE — 97110 THERAPEUTIC EXERCISES: CPT

## 2019-12-10 PROCEDURE — 2580000003 HC RX 258: Performed by: PHYSICIAN ASSISTANT

## 2019-12-10 PROCEDURE — 85014 HEMATOCRIT: CPT

## 2019-12-10 PROCEDURE — 2709999900 HC NON-CHARGEABLE SUPPLY

## 2019-12-10 PROCEDURE — 83550 IRON BINDING TEST: CPT

## 2019-12-10 PROCEDURE — 2500000003 HC RX 250 WO HCPCS: Performed by: PHYSICIAN ASSISTANT

## 2019-12-10 PROCEDURE — 36415 COLL VENOUS BLD VENIPUNCTURE: CPT

## 2019-12-10 PROCEDURE — 71045 X-RAY EXAM CHEST 1 VIEW: CPT

## 2019-12-10 PROCEDURE — 99239 HOSP IP/OBS DSCHRG MGMT >30: CPT | Performed by: INTERNAL MEDICINE

## 2019-12-10 PROCEDURE — 0220000000 HC SKILLED NURSING FACILITY

## 2019-12-10 PROCEDURE — 2580000003 HC RX 258: Performed by: INTERNAL MEDICINE

## 2019-12-10 RX ORDER — ACETAMINOPHEN 325 MG/1
650 TABLET ORAL EVERY 4 HOURS PRN
Status: CANCELLED | OUTPATIENT
Start: 2019-12-10

## 2019-12-10 RX ORDER — LANOLIN ALCOHOL/MO/W.PET/CERES
3 CREAM (GRAM) TOPICAL NIGHTLY PRN
Status: DISCONTINUED | OUTPATIENT
Start: 2019-12-10 | End: 2019-12-19 | Stop reason: HOSPADM

## 2019-12-10 RX ORDER — SODIUM CHLORIDE 0.9 % (FLUSH) 0.9 %
10 SYRINGE (ML) INJECTION PRN
Status: DISCONTINUED | OUTPATIENT
Start: 2019-12-10 | End: 2019-12-12 | Stop reason: SDUPTHER

## 2019-12-10 RX ORDER — FAMOTIDINE 20 MG/1
20 TABLET, FILM COATED ORAL 2 TIMES DAILY
Status: DISCONTINUED | OUTPATIENT
Start: 2019-12-10 | End: 2019-12-19 | Stop reason: HOSPADM

## 2019-12-10 RX ORDER — LANOLIN ALCOHOL/MO/W.PET/CERES
3 CREAM (GRAM) TOPICAL NIGHTLY PRN
Status: CANCELLED | OUTPATIENT
Start: 2019-12-10

## 2019-12-10 RX ORDER — FAMOTIDINE 20 MG/1
20 TABLET, FILM COATED ORAL 2 TIMES DAILY
Status: CANCELLED | OUTPATIENT
Start: 2019-12-10

## 2019-12-10 RX ORDER — ONDANSETRON 2 MG/ML
4 INJECTION INTRAMUSCULAR; INTRAVENOUS EVERY 6 HOURS PRN
Status: DISCONTINUED | OUTPATIENT
Start: 2019-12-10 | End: 2019-12-19 | Stop reason: HOSPADM

## 2019-12-10 RX ORDER — SODIUM CHLORIDE 0.9 % (FLUSH) 0.9 %
10 SYRINGE (ML) INJECTION EVERY 12 HOURS SCHEDULED
Status: CANCELLED | OUTPATIENT
Start: 2019-12-10

## 2019-12-10 RX ORDER — SODIUM CHLORIDE 0.9 % (FLUSH) 0.9 %
10 SYRINGE (ML) INJECTION EVERY 12 HOURS SCHEDULED
Status: DISCONTINUED | OUTPATIENT
Start: 2019-12-10 | End: 2019-12-12 | Stop reason: SDUPTHER

## 2019-12-10 RX ORDER — SODIUM CHLORIDE 1000 MG
2 TABLET, SOLUBLE MISCELLANEOUS
Status: DISCONTINUED | OUTPATIENT
Start: 2019-12-10 | End: 2019-12-19 | Stop reason: HOSPADM

## 2019-12-10 RX ORDER — CALCIUM POLYCARBOPHIL 625 MG 625 MG/1
625 TABLET ORAL DAILY
Status: DISCONTINUED | OUTPATIENT
Start: 2019-12-11 | End: 2019-12-19 | Stop reason: HOSPADM

## 2019-12-10 RX ORDER — POTASSIUM CHLORIDE 20 MEQ/1
40 TABLET, EXTENDED RELEASE ORAL 2 TIMES DAILY WITH MEALS
Status: CANCELLED | OUTPATIENT
Start: 2019-12-10

## 2019-12-10 RX ORDER — POTASSIUM CHLORIDE 20 MEQ/1
40 TABLET, EXTENDED RELEASE ORAL 2 TIMES DAILY WITH MEALS
Status: DISCONTINUED | OUTPATIENT
Start: 2019-12-10 | End: 2019-12-19 | Stop reason: HOSPADM

## 2019-12-10 RX ORDER — ONDANSETRON 2 MG/ML
4 INJECTION INTRAMUSCULAR; INTRAVENOUS EVERY 6 HOURS PRN
Status: CANCELLED | OUTPATIENT
Start: 2019-12-10

## 2019-12-10 RX ORDER — NICOTINE POLACRILEX 4 MG
15 LOZENGE BUCCAL PRN
Status: DISCONTINUED | OUTPATIENT
Start: 2019-12-10 | End: 2019-12-11

## 2019-12-10 RX ORDER — ACETAMINOPHEN 325 MG/1
650 TABLET ORAL EVERY 4 HOURS PRN
Status: DISCONTINUED | OUTPATIENT
Start: 2019-12-10 | End: 2019-12-19 | Stop reason: HOSPADM

## 2019-12-10 RX ORDER — CALCIUM POLYCARBOPHIL 625 MG 625 MG/1
625 TABLET ORAL DAILY
Status: CANCELLED | OUTPATIENT
Start: 2019-12-11

## 2019-12-10 RX ORDER — NICOTINE POLACRILEX 4 MG
15 LOZENGE BUCCAL PRN
Status: CANCELLED | OUTPATIENT
Start: 2019-12-10

## 2019-12-10 RX ORDER — SODIUM CHLORIDE 0.9 % (FLUSH) 0.9 %
10 SYRINGE (ML) INJECTION PRN
Status: CANCELLED | OUTPATIENT
Start: 2019-12-10

## 2019-12-10 RX ORDER — SODIUM CHLORIDE 1000 MG
2 TABLET, SOLUBLE MISCELLANEOUS
Status: CANCELLED | OUTPATIENT
Start: 2019-12-10

## 2019-12-10 RX ADMIN — FAMOTIDINE 20 MG: 20 TABLET ORAL at 08:23

## 2019-12-10 RX ADMIN — SODIUM CHLORIDE, PRESERVATIVE FREE 10 ML: 5 INJECTION INTRAVENOUS at 21:35

## 2019-12-10 RX ADMIN — Medication 10 ML: at 08:23

## 2019-12-10 RX ADMIN — SODIUM CHLORIDE TAB 1 GM 2 G: 1 TAB at 08:23

## 2019-12-10 RX ADMIN — CALCIUM POLYCARBOPHIL 625 MG: 625 TABLET, FILM COATED ORAL at 08:23

## 2019-12-10 RX ADMIN — NAFCILLIN SODIUM 12 G: 2 INJECTION, POWDER, LYOPHILIZED, FOR SOLUTION INTRAMUSCULAR; INTRAVENOUS at 11:52

## 2019-12-10 RX ADMIN — Medication 3 MG: at 21:50

## 2019-12-10 RX ADMIN — METOPROLOL TARTRATE 25 MG: 25 TABLET ORAL at 08:23

## 2019-12-10 RX ADMIN — FAMOTIDINE 20 MG: 20 TABLET, FILM COATED ORAL at 21:54

## 2019-12-10 RX ADMIN — POTASSIUM & SODIUM PHOSPHATES POWDER PACK 280-160-250 MG 250 MG: 280-160-250 PACK at 08:23

## 2019-12-10 RX ADMIN — METOPROLOL TARTRATE 25 MG: 25 TABLET ORAL at 21:34

## 2019-12-10 RX ADMIN — POTASSIUM & SODIUM PHOSPHATES POWDER PACK 280-160-250 MG 250 MG: 280-160-250 PACK at 21:46

## 2019-12-10 RX ADMIN — ACETAMINOPHEN 650 MG: 325 TABLET ORAL at 08:22

## 2019-12-10 RX ADMIN — INSULIN LISPRO 2 UNITS: 100 INJECTION, SOLUTION INTRAVENOUS; SUBCUTANEOUS at 12:43

## 2019-12-10 RX ADMIN — POTASSIUM CHLORIDE 40 MEQ: 20 TABLET, EXTENDED RELEASE ORAL at 08:23

## 2019-12-10 RX ADMIN — POTASSIUM & SODIUM PHOSPHATES POWDER PACK 280-160-250 MG 250 MG: 280-160-250 PACK at 12:48

## 2019-12-10 RX ADMIN — ACETAMINOPHEN 650 MG: 325 TABLET ORAL at 02:46

## 2019-12-10 RX ADMIN — ACETAMINOPHEN 650 MG: 325 TABLET ORAL at 19:01

## 2019-12-10 RX ADMIN — ACETAMINOPHEN 650 MG: 325 TABLET ORAL at 12:48

## 2019-12-10 RX ADMIN — SODIUM CHLORIDE TAB 1 GM 2 G: 1 TAB at 12:48

## 2019-12-10 RX ADMIN — POTASSIUM CHLORIDE 40 MEQ: 20 TABLET, EXTENDED RELEASE ORAL at 19:01

## 2019-12-10 RX ADMIN — MICONAZOLE NITRATE: 20 POWDER TOPICAL at 08:23

## 2019-12-10 RX ADMIN — MAGNESIUM GLUCONATE 500 MG ORAL TABLET 400 MG: 500 TABLET ORAL at 21:34

## 2019-12-10 RX ADMIN — SODIUM CHLORIDE TAB 1 GM 2 G: 1 TAB at 19:01

## 2019-12-10 RX ADMIN — MAGNESIUM GLUCONATE 500 MG ORAL TABLET 400 MG: 500 TABLET ORAL at 08:23

## 2019-12-10 ASSESSMENT — PAIN DESCRIPTION - PROGRESSION
CLINICAL_PROGRESSION: GRADUALLY WORSENING
CLINICAL_PROGRESSION: GRADUALLY IMPROVING
CLINICAL_PROGRESSION: GRADUALLY IMPROVING
CLINICAL_PROGRESSION: NOT CHANGED
CLINICAL_PROGRESSION: RESOLVED
CLINICAL_PROGRESSION: GRADUALLY IMPROVING
CLINICAL_PROGRESSION: GRADUALLY WORSENING
CLINICAL_PROGRESSION: GRADUALLY IMPROVING
CLINICAL_PROGRESSION: NOT CHANGED

## 2019-12-10 ASSESSMENT — PAIN SCALES - GENERAL
PAINLEVEL_OUTOF10: 9
PAINLEVEL_OUTOF10: 3
PAINLEVEL_OUTOF10: 0
PAINLEVEL_OUTOF10: 3
PAINLEVEL_OUTOF10: 0
PAINLEVEL_OUTOF10: 9
PAINLEVEL_OUTOF10: 4
PAINLEVEL_OUTOF10: 2
PAINLEVEL_OUTOF10: 3
PAINLEVEL_OUTOF10: 3

## 2019-12-10 ASSESSMENT — PAIN DESCRIPTION - ONSET
ONSET: ON-GOING
ONSET: GRADUAL
ONSET: GRADUAL
ONSET: UNABLE TO TELL
ONSET: ON-GOING

## 2019-12-10 ASSESSMENT — PAIN SCALES - WONG BAKER: WONGBAKER_NUMERICALRESPONSE: 0

## 2019-12-10 ASSESSMENT — PAIN DESCRIPTION - FREQUENCY
FREQUENCY: INTERMITTENT

## 2019-12-10 ASSESSMENT — PAIN DESCRIPTION - PAIN TYPE
TYPE: ACUTE PAIN

## 2019-12-10 ASSESSMENT — PAIN DESCRIPTION - LOCATION
LOCATION: HEAD

## 2019-12-10 ASSESSMENT — PAIN DESCRIPTION - DESCRIPTORS
DESCRIPTORS: HEADACHE
DESCRIPTORS: ACHING;HEADACHE
DESCRIPTORS: HEADACHE
DESCRIPTORS: HEADACHE

## 2019-12-10 ASSESSMENT — PAIN DESCRIPTION - ORIENTATION
ORIENTATION: MID

## 2019-12-10 NOTE — PROGRESS NOTES
[facility] ICU STEPDOWN TELEMETRY 4K  Occupational Therapy  Daily Note  Time:    Time In: 9968  Time Out: 1138  Timed Code Treatment Minutes: 23 Minutes  Minutes: 23          Date: 12/10/2019  Patient Name: Adwoa Blair,   Gender: female      Room: [facility]16/016-A  MRN: [id number]  : 1955  (59 y.o.)  Referring Practitioner: Litzy Simmons PA-C  Diagnosis: Hyperglycemia  Additional Pertinent Hx: Pt was hospitalized 2019 through 2019 for evaluation of possible normal pressure hydrocephalus.  She had symptoms consistent with normal pressure hydrocephalus including progressive gait disturbance, incontinence, and cognitive decline associated with blurred vision and headaches.  She was admitted and had a lumbar drain placed.  Final assessment of efficacy of lumbar drain was documented 2019 by Dr. Rosalee Schwarz that note he determined there was no significant benefit from the lumbar drain trial.  Findings did not support the diagnosis of normal pressure hydrocephalus.  Patient was determined not to be a good candidate for  shunt and lumbar drain was removed.  Patient was discharged home on 2019. Patient was received in transfer from [facility] to [facility] on 2019. Symptoms prior to hospitalization included a 5-day history of back pain associated with difficulty ambulating, difficulty urinating and diarrhea. Additionally she had a 2-day history of confusion with hallucinations. Immediately upon arrival, nursing staff had concern regarding patient's respiratory rate at 44 and heart rate at 160. She was transferred immediately to the intensive care unit. Patient was in overt septic shock and was intubated 2019. She was started on Zosyn and vancomycin. Cultures were sent. She underwent volume resuscitation and was started on pressors. Rocephin was subsequently added given concerns for possible meningitis.   s/p EVACUATION AND Education & Training, ROM, Strengthening    Patient Education  Patient Education: role of OT, orientation, HEP, POC    Goals  Short term goals  Time Frame for Short term goals: 2 weeks  Short term goal 1: Pt will tolerate static/dynamic standing X2 minutes with unilateral release and minimal assistance in prep for toileting tasks. Short term goal 2: Pt will tolerate BUE AAROM/AROM for increased AROM BUE for ease of grooming & self feeding  Short term goal 3: Pt will complete sit<>stand transfers with sera stedy and minimal assistance X2 in prep for [facility] transfers. Short term goal 4: Pt will complete BADL tasks, UB with minimal assistance, LB with maximal assistance, to increase independence with self care tasks. Long term goals  Time Frame for Long term goals :  No LTG set d/t short ELOS    Following session, patient left in safe position with all fall risk precautions in place.

## 2019-12-10 NOTE — PROGRESS NOTES
Progress note: Infectious diseases    Patient - Mustapha Fermin,  Age - 59 y.o.    - 1955      Room Number - 6O-13/335-P   MRN -  [id number]   Acct # - [de-identified]  Date of Admission -  2019 11:22 PM    SUBJECTIVE:   No new issues. OBJECTIVE   VITALS    height is 5' 6\" (1.676 m) and weight is 204 lb 9.6 oz (92.8 kg). Her oral temperature is 97.6 °F (36.4 °C). Her blood pressure is 156/68 (abnormal) and her pulse is 98. Her respiration is 18 and oxygen saturation is 98%. Wt Readings from Last 3 Encounters:   19 204 lb 9.6 oz (92.8 kg)   19 201 lb 4.8 oz (91.3 kg)   19 220 lb 6.4 oz (100 kg)       I/O (24 Hours)    Intake/Output Summary (Last 24 hours) at 2019 194  Last data filed at 2019 1733  Gross per 24 hour   Intake 2194.51 ml   Output 200 ml   Net 1994.51 ml       General Appearance oriented    HEENT - normocephalic, atraumatic, pale conjunctiva,  anicteric sclera  Neck - Supple, no mass  Lungs -  Bilateral   air entry, no rhonchi, no wheeze. Cardiovascular - Heart sounds are normal.     Abdomen - soft, not distended, nontender  Neurologic -awake and oriented. Skin - No bruising or bleeding  Extremities - trace edema.   Clean lumbar wound,    MEDICATIONS:      lidocaine 1 % injection  5 mL Intradermal Once    magnesium oxide  400 mg Oral BID    polycarbophil  625 mg Oral Daily    sodium chloride  2 g Oral TID     potassium chloride  40 mEq Oral BID WC    famotidine  20 mg Oral BID    miconazole   Topical BID    sodium chloride flush  10 mL Intravenous 2 times per day    nafcillin 12 g continuous infusion  12 g Intravenous Q24H    calcium replacement protocol   Other RX Placeholder    insulin lispro  0-12 Units Subcutaneous TID WC    insulin lispro  0-6 Units Subcutaneous Nightly    metoprolol tartrate  25 mg Oral BID    potassium & sodium phosphates  1 packet Oral 4x Daily    potassium (CARDIAC) replacement protocol   Other RX Placeholder      dextrose       melatonin, HYDROcodone 5 mg - acetaminophen **OR** HYDROcodone 5 mg - acetaminophen, hydrALAZINE, zinc oxide, sodium chloride flush, ondansetron, acetaminophen, dextrose, magnesium hydroxide, potassium chloride, magnesium sulfate, acetaminophen, glucose, dextrose, glucagon (rDNA), dextrose         Problem list of patient:     Patient Active Problem List   Diagnosis Code    Brain lipoma D17.79    NPH (normal pressure hydrocephalus) ([facility]) G91.2    Hyperglycemia R73.9    Sepsis ([facility] A41.9    Acute respiratory failure with hypoxia ([facility] [address] J96.01    Atrial fibrillation with RVR ([facility]) I48.91    Encephalopathy G93.40    Diabetic ketoacidosis associated with type 2 diabetes mellitus ([facility] [address] E11.10         ASSESSMENT/PLAN   Epidural and lumbar abscess due to MSSA infection: she had I and D.   Currently on IV nafcillin  Confusion with Ventriculites: resolved  Continue current treatment     Nga Wolf MD, FACP 12/9/2019 7:44 PM

## 2019-12-10 NOTE — CARE COORDINATION
12/10/19, 10:42 AM  TCU planned today if repeat labs okay, H stable; collaborated with Attending, Tram Leung, TCU Coordinator  Patient goals/plan/ treatment preferences discussed by  and . Patient goals/plan/ treatment preferences reviewed with patient/ family. Patient/ family verbalize understanding of discharge plan and are in agreement with goal/plan/treatment preferences. Understanding was demonstrated using the teach back method. AVS provided by RN at time of discharge, which includes all necessary medical information pertaining to the patients current course of illness, treatment, post-discharge goals of care, and treatment preferences.

## 2019-12-10 NOTE — PROGRESS NOTES
Progress note: Infectious diseases    Patient - Alis Diana,  Age - 59 y.o.    - 1955      Room Number - 1J-76/554-B   MRN -  [id number]   Acct # - [de-identified]  Date of Admission -  2019 11:22 PM    SUBJECTIVE:   Plan for TCU. Intermittent confusion  OBJECTIVE   VITALS    height is 5' 6\" (1.676 m) and weight is 204 lb 9.6 oz (92.8 kg). Her oral temperature is 98.3 °F (36.8 °C). Her blood pressure is 125/60 and her pulse is 84. Her respiration is 17 and oxygen saturation is 98%. Wt Readings from Last 3 Encounters:   19 204 lb 9.6 oz (92.8 kg)   19 201 lb 4.8 oz (91.3 kg)   19 220 lb 6.4 oz (100 kg)       I/O (24 Hours)    Intake/Output Summary (Last 24 hours) at 12/10/2019 1357  Last data filed at 12/10/2019 1253  Gross per 24 hour   Intake 1529.72 ml   Output 900 ml   Net 629.72 ml       General Appearance oriented    HEENT - normocephalic, atraumatic, pale conjunctiva,  anicteric sclera  Neck - Supple, no mass  Lungs -  Bilateral   air entry, no rhonchi, no wheeze. Cardiovascular - Heart sounds are normal.     Abdomen - soft, not distended, nontender  Neurologic -awake and oriented. Skin - No bruising or bleeding  Extremities - trace edema.   Clean lumbar wound,    MEDICATIONS:      lidocaine 1 % injection  5 mL Intradermal Once    magnesium oxide  400 mg Oral BID    polycarbophil  625 mg Oral Daily    sodium chloride  2 g Oral TID     potassium chloride  40 mEq Oral BID WC    famotidine  20 mg Oral BID    miconazole   Topical BID    sodium chloride flush  10 mL Intravenous 2 times per day    nafcillin 12 g continuous infusion  12 g Intravenous Q24H    calcium replacement protocol   Other RX Placeholder    insulin lispro  0-12 Units Subcutaneous TID     insulin lispro  0-6 Units Subcutaneous Nightly    metoprolol tartrate  25 mg Oral BID    potassium & sodium phosphates  1 packet Oral 4x Daily    potassium (CARDIAC) replacement protocol   Other RX Placeholder      dextrose       melatonin, HYDROcodone 5 mg - acetaminophen **OR** HYDROcodone 5 mg - acetaminophen, hydrALAZINE, zinc oxide, sodium chloride flush, ondansetron, acetaminophen, dextrose, magnesium hydroxide, potassium chloride, magnesium sulfate, acetaminophen, glucose, dextrose, glucagon (rDNA), dextrose         Problem list of patient:     Patient Active Problem List   Diagnosis Code    Brain lipoma D17.79    NPH (normal pressure hydrocephalus) ([facility]) G91.2    Hyperglycemia R73.9    Sepsis ([facility] [address] A41.9    Acute respiratory failure with hypoxia ([facility] [address] J96.01    Atrial fibrillation with RVR ([facility]) I48.91    Encephalopathy G93.40    Diabetic ketoacidosis associated with type 2 diabetes mellitus ([facility] [address] E11.10         ASSESSMENT/PLAN   Epidural and lumbar abscess due to MSSA infection: she had I and D. Currently on IV nafcillin  Confusion with Ventriculites: resolved  Continue current treatment: ok with TCU.  Will need total 4 wks of iv antibiotic     Jesus Gleason MD, FACP 12/10/2019 1:57 PM

## 2019-12-10 NOTE — PLAN OF CARE
Problem: Falls - Risk of:  Goal: Will remain free from falls  Description  Will remain free from falls  Outcome: Ongoing  Note:   Patient free from falls this shift call light within reach, telesitter at bedside, and bed alarm on. Problem: Risk for Impaired Skin Integrity  Goal: Tissue integrity - skin and mucous membranes  Description  Structural intactness and normal physiological function of skin and  mucous membranes. Outcome: Ongoing  Note:   Skin and mucous membranes moist and intact this shift. Problem: Nutrition  Goal: Optimal nutrition therapy  Outcome: Ongoing  Note:   Patient nutritional status maintained this shift. Problem: Discharge Planning:  Goal: Discharged to appropriate level of care  Description  Discharged to appropriate level of care  Outcome: Ongoing  Note:   Patient and family aware of change in level of care to transitional care. Problem: Bowel Function - Altered:  Goal: Bowel elimination is within specified parameters  Description  Bowel elimination is within specified parameters  Outcome: Ongoing  Note:   Patient bowel elimination increased this shift with frequent bowel movements this shift. Problem: Infection - Central Venous Catheter-Associated Bloodstream Infection:  Goal: Will show no infection signs and symptoms  Description  Will show no infection signs and symptoms  Outcome: Ongoing  Note:   Patient shows no outward signs or symptoms of infection this shift. Care plan reviewed with patient. Patient unable to verbalize understanding of the plan of care and contribute to goal setting.

## 2019-12-10 NOTE — FLOWSHEET NOTE
12/10/19 1231   Encounter Summary   Services provided to: Patient   Referral/Consult From: Elizabeth   Continue Visiting Yes  (12/10/19)   Complexity of Encounter Low   Length of Encounter 15 minutes   Spiritual/Jewish   Type Spiritual support   Assessment Approachable   Intervention Nurtured hope   Outcome Comfort   S- During my contact with the 59 yr old patient I wanted to assess the spiritual and emotional        needs. The pt was coping with sepsis. O- The patient was in her chair and was receptive to my presences. The pt didnt share any             major concerns at the time. The pt does have support through their family. A- I offered emotional support as well as words of encouragement. P-  Continued support would be helpful in order to meet the future Spiritual needs of the         patient. Note: The pt might be discharged.

## 2019-12-10 NOTE — PROGRESS NOTES
Patient ready for transfer to TCU, transport here. Patient attempted to pull out picc line. IV team called, message left. Stat CXR ordered to verify placement.

## 2019-12-10 NOTE — PROGRESS NOTES
[facility]  INPATIENT PHYSICAL THERAPY  DAILY NOTE  [facility] [place] ICU STEPDOWN TELEMETRY 4K - 4K-[id number]      Time In: 5352  Time Out: 1006  Timed Code Treatment Minutes: 39 Minutes  Minutes: 41          Date: 12/10/2019  Patient Name: Marcelle Alvarado,  Gender:  female        MRN: [id number]  : 1955  (59 y.o.)     Referring Practitioner: Marilyn Delatorre MD  Diagnosis: Hyperglycemia  Additional Pertinent Hx: Patient is a 78-year-old white female active smoker. She has a remote history of appendectomy and cholecystectomy. She has a history of type 2 diabetes mellitus, hypertension, hyperlipidemia, depression, and a history of brain lipoma diagnosed 2019. Patient was hospitalized 2019 through 2019 for evaluation of possible normal pressure hydrocephalus. She had symptoms consistent with normal pressure hydrocephalus including progressive gait disturbance, incontinence, and cognitive decline associated with blurred vision and headaches. She was admitted and had a lumbar drain placed. Final assessment of efficacy of lumbar drain was documented 2019 by Dr. Mali Mcgrath. In that note he determined there was no significant benefit from the lumbar drain trial.  Findings did not support the diagnosis of normal pressure hydrocephalus. Patient was determined not to be a good candidate for  shunt and lumbar drain was removed. Patient was discharged home on 2019. [facility]. Symptoms prior to hospitalization included a 5-day history of back pain associated with difficulty ambulating, difficulty urinating and diarrhea. Additionally she had a 2-day history of confusion with hallucinations. Immediately upon arrival, nursing staff had concern regarding patient's respiratory rate at 44 and heart rate at 160. She was transferred immediately to the intensive care unit.   Patient was in overt septic shock and was intubated 11/18/2019. She was started on Zosyn and vancomycin. Cultures were sent. She underwent volume resuscitation and was started on pressors. Rocephin was subsequently added given concerns for possible meningitis. Lumbar puncture was performed and subsequently grew Staphylococcus species. Antibiotics simplified to vancomycin for meningitis. MRI lumbar spine demonstrated soft tissue abscess communicating with small epidural abscess. Lumbar soft tissue drain placed 11/21/19. Culture positive for MSSA. Antibiotics converted to Nafcillin 11/20/19. Rifampin added on 11/22/2019 for synergy. Day #1/3 of rifampin. Sputum culture started to grow gram-negative rods and Cipro was added on 11/22/2019.  s/p EVACUATION AND DEBRIDEMENT L2-L3 INTRAMUSCULAR SPINAL ABSCESS, EVACUATION EPIDURAL ABSCESS L2-L3, PARTIAL LAMINECTOMY L2-L3, EVD PLACEMENT on 11/25/19.      Prior Level of Function:  Lives With: Spouse  Type of Home: House  Home Layout: Two level, Bed/Bath upstairs  Home Access: Stairs to enter with rails  Entrance Stairs - Number of Steps: Pt unable to state how many steps to enter  Home Equipment: (none)        ADL Assistance: Independent  Ambulation Assistance: Independent  Transfer Assistance: Independent  Additional Comments: Pt amb without AD, recent lumbar drain trial, no shunt placed; PLOF obtained from PT due to Pt confusion    Restrictions/Precautions:  Restrictions/Precautions: Fall Risk  Position Activity Restriction  Other position/activity restrictions: Confusion, EVD drain-needs to be clamped prior to mobility & re-leveled by nursing after session    SUBJECTIVE: pt cont to be slightly confused and needed redirected many times during session, pt very tangible and had a hard time keeping to task she demonstrated decreased safety awareness, I feel pt would benefit from Speech Therapy     PAIN: 0/10: pt denied pain     OBJECTIVE:  Bed Mobility:  Rolling to Right: Stand By Assistance Supine to Sit: Stand By Assistance    Transfers:  Sit to Stand: Minimal Assistance, to CGA cues for safety with hand placement   Stand to Sit:Minimal Assistance, cues for safety with hand placement     Ambulation:  Minimal Assistance  Distance: 75x1  Surface: Level Tile  Device:Rolling Walker  Gait Deviations:  Pt was easily distracted with mod path deviation due to this, pt needed cues to guide walker straight and cues to stay within walker tony when turning around, at times uneven step length and noted wide base of support with decreased heel strike and toe off noted decreased stance at left LE this date     Balance:  static standing with UE at support to complete standing activity for ~ 4 min at a time and CGA for balance, pt completed dynamic balance with one UE at support reaching above head and to the right and left with CGA and wide base of support     Exercise:  Patient was guided in 1 set(s) 10 reps of exercise to both lower extremities. ankle pumps, heelslides, hip abd/add, short arc quads, seated long arc quads, hip flexion . Exercises were completed for increased independence with functional mobility. Functional Outcome Measures: Completed  -PAC Inpatient Mobility without Stair Climbing Raw Score : 15  AM-PAC Inpatient without Stair Climbing T-Scale Score : 43.03    ASSESSMENT:  Assessment: Patient progressing toward established goals. and pt still demonstrated decreased safety awareness needign cues for safety, she was easily distracted throughout session needing cues to stay on task pt would benefit from cont skilled therapy   Activity Tolerance:  Patient tolerance of  treatment: fair. Equipment Recommendations: Other: will continue to assess needs pending progress  Discharge Recommendations:    Subacute/Skilled Nursing Facility(TCU)    Plan: Times per week: 5 x GM  Current Treatment Recommendations: Strengthening, Patient/Caregiver Education & Training, Equipment Evaluation, Education, & procurement, Balance Training, Functional Mobility Training, Endurance Training, Safety Education & Training, Positioning, Transfer Training, Gait Training, Stair training, Home Exercise Program    Patient Education  Patient Education: Plan of Care    Goals:  Patient goals : to get better  Short term goals  Time Frame for Short term goals: by discharge  Short term goal 1: Pt to transfer supine <--> sit SBA to enable pt to get in/out of bed. Short term goal 2: Pt to transfer sit <--> stand SBA for increased functional mobility. Short term goal 3: Pt to ambulate >50 feet with LRAD CGA for household ambulation. Short term goal 4:      Long term goals  Time Frame for Long term goals : NA due to short length of stay. Following session, patient left in safe position with all fall risk precautions in place.

## 2019-12-11 PROBLEM — E11.9 DM (DIABETES MELLITUS), TYPE 2 (HCC): Status: ACTIVE | Noted: 2019-12-11

## 2019-12-11 PROBLEM — E66.09 CLASS 1 OBESITY DUE TO EXCESS CALORIES WITH SERIOUS COMORBIDITY AND BODY MASS INDEX (BMI) OF 32.0 TO 32.9 IN ADULT: Status: ACTIVE | Noted: 2019-12-11

## 2019-12-11 PROBLEM — E66.811 CLASS 1 OBESITY DUE TO EXCESS CALORIES WITH SERIOUS COMORBIDITY AND BODY MASS INDEX (BMI) OF 32.0 TO 32.9 IN ADULT: Status: ACTIVE | Noted: 2019-12-11

## 2019-12-11 LAB
GLUCOSE BLD-MCNC: 145 MG/DL (ref 70–108)
GLUCOSE BLD-MCNC: 94 MG/DL (ref 70–108)

## 2019-12-11 PROCEDURE — 97166 OT EVAL MOD COMPLEX 45 MIN: CPT

## 2019-12-11 PROCEDURE — 97535 SELF CARE MNGMENT TRAINING: CPT

## 2019-12-11 PROCEDURE — 97530 THERAPEUTIC ACTIVITIES: CPT

## 2019-12-11 PROCEDURE — 6370000000 HC RX 637 (ALT 250 FOR IP): Performed by: INTERNAL MEDICINE

## 2019-12-11 PROCEDURE — 82948 REAGENT STRIP/BLOOD GLUCOSE: CPT

## 2019-12-11 PROCEDURE — 1290000000 HC SEMI PRIVATE OTHER R&B

## 2019-12-11 PROCEDURE — 97127 HC SP THER IVNTJ W/FOCUS COG FUNCJ: CPT

## 2019-12-11 PROCEDURE — 6370000000 HC RX 637 (ALT 250 FOR IP): Performed by: FAMILY MEDICINE

## 2019-12-11 PROCEDURE — 92523 SPEECH SOUND LANG COMPREHEN: CPT

## 2019-12-11 PROCEDURE — 2500000003 HC RX 250 WO HCPCS: Performed by: INTERNAL MEDICINE

## 2019-12-11 PROCEDURE — 97162 PT EVAL MOD COMPLEX 30 MIN: CPT

## 2019-12-11 PROCEDURE — 2580000003 HC RX 258: Performed by: INTERNAL MEDICINE

## 2019-12-11 RX ORDER — HALOPERIDOL 5 MG/ML
1 INJECTION INTRAMUSCULAR ONCE
Status: COMPLETED | OUTPATIENT
Start: 2019-12-11 | End: 2019-12-12

## 2019-12-11 RX ORDER — SERTRALINE HYDROCHLORIDE 100 MG/1
100 TABLET, FILM COATED ORAL DAILY
Status: DISCONTINUED | OUTPATIENT
Start: 2019-12-12 | End: 2019-12-12

## 2019-12-11 RX ORDER — DIPHENHYDRAMINE HCL 25 MG
25 TABLET ORAL NIGHTLY
Status: DISCONTINUED | OUTPATIENT
Start: 2019-12-11 | End: 2019-12-12

## 2019-12-11 RX ADMIN — NAFCILLIN SODIUM 12 G: 2 INJECTION, POWDER, LYOPHILIZED, FOR SOLUTION INTRAMUSCULAR; INTRAVENOUS at 11:06

## 2019-12-11 RX ADMIN — POTASSIUM CHLORIDE 40 MEQ: 20 TABLET, EXTENDED RELEASE ORAL at 17:30

## 2019-12-11 RX ADMIN — POTASSIUM & SODIUM PHOSPHATES POWDER PACK 280-160-250 MG 250 MG: 280-160-250 PACK at 12:32

## 2019-12-11 RX ADMIN — POTASSIUM & SODIUM PHOSPHATES POWDER PACK 280-160-250 MG 250 MG: 280-160-250 PACK at 20:04

## 2019-12-11 RX ADMIN — CALCIUM POLYCARBOPHIL 625 MG: 625 TABLET, FILM COATED ORAL at 08:26

## 2019-12-11 RX ADMIN — METOPROLOL TARTRATE 25 MG: 25 TABLET ORAL at 20:04

## 2019-12-11 RX ADMIN — MAGNESIUM GLUCONATE 500 MG ORAL TABLET 400 MG: 500 TABLET ORAL at 08:26

## 2019-12-11 RX ADMIN — FAMOTIDINE 20 MG: 20 TABLET, FILM COATED ORAL at 08:26

## 2019-12-11 RX ADMIN — METOPROLOL TARTRATE 25 MG: 25 TABLET ORAL at 08:26

## 2019-12-11 RX ADMIN — ACETAMINOPHEN 650 MG: 325 TABLET ORAL at 05:03

## 2019-12-11 RX ADMIN — MICONAZOLE NITRATE: 20 POWDER TOPICAL at 08:36

## 2019-12-11 RX ADMIN — Medication 5 UNITS: at 08:32

## 2019-12-11 RX ADMIN — SODIUM CHLORIDE TAB 1 GM 2 G: 1 TAB at 08:26

## 2019-12-11 RX ADMIN — INSULIN LISPRO 1 UNITS: 100 INJECTION, SOLUTION INTRAVENOUS; SUBCUTANEOUS at 12:32

## 2019-12-11 RX ADMIN — MAGNESIUM GLUCONATE 500 MG ORAL TABLET 400 MG: 500 TABLET ORAL at 20:04

## 2019-12-11 RX ADMIN — SODIUM CHLORIDE, PRESERVATIVE FREE 10 ML: 5 INJECTION INTRAVENOUS at 08:27

## 2019-12-11 RX ADMIN — SODIUM CHLORIDE TAB 1 GM 2 G: 1 TAB at 17:30

## 2019-12-11 RX ADMIN — DIPHENHYDRAMINE HCL 25 MG: 25 TABLET ORAL at 20:03

## 2019-12-11 RX ADMIN — POTASSIUM CHLORIDE 40 MEQ: 20 TABLET, EXTENDED RELEASE ORAL at 08:26

## 2019-12-11 RX ADMIN — SODIUM CHLORIDE TAB 1 GM 2 G: 1 TAB at 12:32

## 2019-12-11 RX ADMIN — FAMOTIDINE 20 MG: 20 TABLET, FILM COATED ORAL at 20:03

## 2019-12-11 RX ADMIN — POTASSIUM & SODIUM PHOSPHATES POWDER PACK 280-160-250 MG 250 MG: 280-160-250 PACK at 08:26

## 2019-12-11 RX ADMIN — Medication 3 MG: at 20:04

## 2019-12-11 RX ADMIN — SODIUM CHLORIDE, PRESERVATIVE FREE 10 ML: 5 INJECTION INTRAVENOUS at 20:08

## 2019-12-11 RX ADMIN — POTASSIUM & SODIUM PHOSPHATES POWDER PACK 280-160-250 MG 250 MG: 280-160-250 PACK at 17:30

## 2019-12-11 ASSESSMENT — PAIN DESCRIPTION - DESCRIPTORS: DESCRIPTORS: ACHING

## 2019-12-11 ASSESSMENT — PAIN SCALES - GENERAL: PAINLEVEL_OUTOF10: 8

## 2019-12-11 ASSESSMENT — PAIN - FUNCTIONAL ASSESSMENT: PAIN_FUNCTIONAL_ASSESSMENT: ACTIVITIES ARE NOT PREVENTED

## 2019-12-11 ASSESSMENT — PAIN DESCRIPTION - PROGRESSION: CLINICAL_PROGRESSION: GRADUALLY WORSENING

## 2019-12-11 ASSESSMENT — PAIN DESCRIPTION - ONSET: ONSET: GRADUAL

## 2019-12-11 ASSESSMENT — PAIN DESCRIPTION - LOCATION: LOCATION: BACK

## 2019-12-12 LAB — GLUCOSE BLD-MCNC: 84 MG/DL (ref 70–108)

## 2019-12-12 PROCEDURE — 2500000003 HC RX 250 WO HCPCS: Performed by: INTERNAL MEDICINE

## 2019-12-12 PROCEDURE — 97530 THERAPEUTIC ACTIVITIES: CPT

## 2019-12-12 PROCEDURE — 97535 SELF CARE MNGMENT TRAINING: CPT

## 2019-12-12 PROCEDURE — 6370000000 HC RX 637 (ALT 250 FOR IP): Performed by: INTERNAL MEDICINE

## 2019-12-12 PROCEDURE — 6370000000 HC RX 637 (ALT 250 FOR IP): Performed by: PSYCHIATRY & NEUROLOGY

## 2019-12-12 PROCEDURE — 97110 THERAPEUTIC EXERCISES: CPT

## 2019-12-12 PROCEDURE — 90792 PSYCH DIAG EVAL W/MED SRVCS: CPT | Performed by: PSYCHIATRY & NEUROLOGY

## 2019-12-12 PROCEDURE — 2580000003 HC RX 258: Performed by: INTERNAL MEDICINE

## 2019-12-12 PROCEDURE — 97127 HC SP THER IVNTJ W/FOCUS COG FUNCJ: CPT

## 2019-12-12 PROCEDURE — 6360000002 HC RX W HCPCS: Performed by: FAMILY MEDICINE

## 2019-12-12 PROCEDURE — 97116 GAIT TRAINING THERAPY: CPT

## 2019-12-12 PROCEDURE — 82948 REAGENT STRIP/BLOOD GLUCOSE: CPT

## 2019-12-12 PROCEDURE — 1290000000 HC SEMI PRIVATE OTHER R&B

## 2019-12-12 PROCEDURE — 6370000000 HC RX 637 (ALT 250 FOR IP): Performed by: FAMILY MEDICINE

## 2019-12-12 RX ORDER — HALOPERIDOL 5 MG/ML
0.5 INJECTION INTRAMUSCULAR EVERY 12 HOURS PRN
Status: DISCONTINUED | OUTPATIENT
Start: 2019-12-12 | End: 2019-12-19 | Stop reason: HOSPADM

## 2019-12-12 RX ORDER — QUETIAPINE FUMARATE 25 MG/1
25 TABLET, FILM COATED ORAL 2 TIMES DAILY PRN
Status: DISCONTINUED | OUTPATIENT
Start: 2019-12-12 | End: 2019-12-19 | Stop reason: HOSPADM

## 2019-12-12 RX ORDER — LIDOCAINE HYDROCHLORIDE 10 MG/ML
5 INJECTION, SOLUTION EPIDURAL; INFILTRATION; INTRACAUDAL; PERINEURAL ONCE
Status: DISCONTINUED | OUTPATIENT
Start: 2019-12-12 | End: 2019-12-19 | Stop reason: HOSPADM

## 2019-12-12 RX ORDER — TRAZODONE HYDROCHLORIDE 50 MG/1
50 TABLET ORAL NIGHTLY
Status: DISCONTINUED | OUTPATIENT
Start: 2019-12-12 | End: 2019-12-19 | Stop reason: HOSPADM

## 2019-12-12 RX ORDER — SODIUM CHLORIDE 0.9 % (FLUSH) 0.9 %
10 SYRINGE (ML) INJECTION EVERY 12 HOURS SCHEDULED
Status: DISCONTINUED | OUTPATIENT
Start: 2019-12-12 | End: 2019-12-19 | Stop reason: HOSPADM

## 2019-12-12 RX ORDER — LOPERAMIDE HYDROCHLORIDE 2 MG/1
4 CAPSULE ORAL 4 TIMES DAILY PRN
Status: DISCONTINUED | OUTPATIENT
Start: 2019-12-12 | End: 2019-12-19 | Stop reason: HOSPADM

## 2019-12-12 RX ORDER — SODIUM CHLORIDE 0.9 % (FLUSH) 0.9 %
10 SYRINGE (ML) INJECTION PRN
Status: DISCONTINUED | OUTPATIENT
Start: 2019-12-12 | End: 2019-12-19 | Stop reason: HOSPADM

## 2019-12-12 RX ADMIN — SODIUM CHLORIDE TAB 1 GM 2 G: 1 TAB at 08:58

## 2019-12-12 RX ADMIN — SERTRALINE 100 MG: 100 TABLET, FILM COATED ORAL at 08:53

## 2019-12-12 RX ADMIN — HALOPERIDOL LACTATE 1 MG: 5 INJECTION INTRAMUSCULAR at 01:43

## 2019-12-12 RX ADMIN — Medication 3 MG: at 20:51

## 2019-12-12 RX ADMIN — POTASSIUM CHLORIDE 40 MEQ: 20 TABLET, EXTENDED RELEASE ORAL at 08:53

## 2019-12-12 RX ADMIN — SODIUM CHLORIDE TAB 1 GM 2 G: 1 TAB at 12:03

## 2019-12-12 RX ADMIN — CALCIUM POLYCARBOPHIL 625 MG: 625 TABLET, FILM COATED ORAL at 08:53

## 2019-12-12 RX ADMIN — SODIUM CHLORIDE TAB 1 GM 2 G: 1 TAB at 17:26

## 2019-12-12 RX ADMIN — MAGNESIUM GLUCONATE 500 MG ORAL TABLET 400 MG: 500 TABLET ORAL at 08:54

## 2019-12-12 RX ADMIN — NAFCILLIN SODIUM 12 G: 2 INJECTION, POWDER, LYOPHILIZED, FOR SOLUTION INTRAMUSCULAR; INTRAVENOUS at 11:00

## 2019-12-12 RX ADMIN — POTASSIUM & SODIUM PHOSPHATES POWDER PACK 280-160-250 MG 250 MG: 280-160-250 PACK at 08:54

## 2019-12-12 RX ADMIN — METOPROLOL TARTRATE 25 MG: 25 TABLET ORAL at 20:50

## 2019-12-12 RX ADMIN — LOPERAMIDE HYDROCHLORIDE 4 MG: 2 CAPSULE ORAL at 14:57

## 2019-12-12 RX ADMIN — MICONAZOLE NITRATE: 20 POWDER TOPICAL at 08:52

## 2019-12-12 RX ADMIN — TRAZODONE HYDROCHLORIDE 50 MG: 50 TABLET ORAL at 20:50

## 2019-12-12 RX ADMIN — ACETAMINOPHEN 650 MG: 325 TABLET ORAL at 01:23

## 2019-12-12 RX ADMIN — POTASSIUM & SODIUM PHOSPHATES POWDER PACK 280-160-250 MG 250 MG: 280-160-250 PACK at 20:50

## 2019-12-12 RX ADMIN — POTASSIUM & SODIUM PHOSPHATES POWDER PACK 280-160-250 MG 250 MG: 280-160-250 PACK at 17:26

## 2019-12-12 RX ADMIN — QUETIAPINE FUMARATE 25 MG: 25 TABLET ORAL at 16:40

## 2019-12-12 RX ADMIN — POTASSIUM & SODIUM PHOSPHATES POWDER PACK 280-160-250 MG 250 MG: 280-160-250 PACK at 14:00

## 2019-12-12 RX ADMIN — ACETAMINOPHEN 650 MG: 325 TABLET ORAL at 14:58

## 2019-12-12 RX ADMIN — POTASSIUM CHLORIDE 40 MEQ: 20 TABLET, EXTENDED RELEASE ORAL at 17:26

## 2019-12-12 RX ADMIN — METOPROLOL TARTRATE 25 MG: 25 TABLET ORAL at 08:55

## 2019-12-12 RX ADMIN — FAMOTIDINE 20 MG: 20 TABLET, FILM COATED ORAL at 08:54

## 2019-12-12 RX ADMIN — MAGNESIUM GLUCONATE 500 MG ORAL TABLET 400 MG: 500 TABLET ORAL at 20:50

## 2019-12-12 RX ADMIN — SODIUM CHLORIDE, PRESERVATIVE FREE 10 ML: 5 INJECTION INTRAVENOUS at 08:52

## 2019-12-12 RX ADMIN — FAMOTIDINE 20 MG: 20 TABLET, FILM COATED ORAL at 20:50

## 2019-12-12 ASSESSMENT — PAIN DESCRIPTION - FREQUENCY
FREQUENCY: INTERMITTENT
FREQUENCY: INTERMITTENT

## 2019-12-12 ASSESSMENT — PAIN DESCRIPTION - LOCATION
LOCATION: HEAD
LOCATION: HEAD

## 2019-12-12 ASSESSMENT — PAIN SCALES - GENERAL
PAINLEVEL_OUTOF10: 8
PAINLEVEL_OUTOF10: 8
PAINLEVEL_OUTOF10: 9
PAINLEVEL_OUTOF10: 5

## 2019-12-12 ASSESSMENT — PAIN DESCRIPTION - ORIENTATION: ORIENTATION: ANTERIOR

## 2019-12-12 ASSESSMENT — PAIN DESCRIPTION - DESCRIPTORS
DESCRIPTORS: ACHING;HEADACHE
DESCRIPTORS: ACHING

## 2019-12-12 ASSESSMENT — PAIN DESCRIPTION - ONSET
ONSET: ON-GOING
ONSET: ON-GOING

## 2019-12-12 ASSESSMENT — PAIN DESCRIPTION - PROGRESSION
CLINICAL_PROGRESSION: NOT CHANGED
CLINICAL_PROGRESSION: NOT CHANGED

## 2019-12-12 ASSESSMENT — PAIN DESCRIPTION - PAIN TYPE
TYPE: ACUTE PAIN
TYPE: ACUTE PAIN

## 2019-12-13 ENCOUNTER — APPOINTMENT (OUTPATIENT)
Dept: GENERAL RADIOLOGY | Age: 64
DRG: 099 | End: 2019-12-13
Attending: PHYSICAL MEDICINE & REHABILITATION
Payer: MEDICARE

## 2019-12-13 LAB — GLUCOSE BLD-MCNC: 138 MG/DL (ref 70–108)

## 2019-12-13 PROCEDURE — 1290000000 HC SEMI PRIVATE OTHER R&B

## 2019-12-13 PROCEDURE — 97127 HC SP THER IVNTJ W/FOCUS COG FUNCJ: CPT

## 2019-12-13 PROCEDURE — 2580000003 HC RX 258: Performed by: FAMILY MEDICINE

## 2019-12-13 PROCEDURE — 2500000003 HC RX 250 WO HCPCS: Performed by: INTERNAL MEDICINE

## 2019-12-13 PROCEDURE — 36568 INSJ PICC <5 YR W/O IMAGING: CPT

## 2019-12-13 PROCEDURE — 02HV33Z INSERTION OF INFUSION DEVICE INTO SUPERIOR VENA CAVA, PERCUTANEOUS APPROACH: ICD-10-PCS | Performed by: FAMILY MEDICINE

## 2019-12-13 PROCEDURE — 71045 X-RAY EXAM CHEST 1 VIEW: CPT

## 2019-12-13 PROCEDURE — 82948 REAGENT STRIP/BLOOD GLUCOSE: CPT

## 2019-12-13 PROCEDURE — 6370000000 HC RX 637 (ALT 250 FOR IP): Performed by: PSYCHIATRY & NEUROLOGY

## 2019-12-13 PROCEDURE — 6370000000 HC RX 637 (ALT 250 FOR IP): Performed by: INTERNAL MEDICINE

## 2019-12-13 PROCEDURE — 6370000000 HC RX 637 (ALT 250 FOR IP): Performed by: FAMILY MEDICINE

## 2019-12-13 PROCEDURE — 2709999900 HC NON-CHARGEABLE SUPPLY

## 2019-12-13 PROCEDURE — 2580000003 HC RX 258: Performed by: INTERNAL MEDICINE

## 2019-12-13 PROCEDURE — C1751 CATH, INF, PER/CENT/MIDLINE: HCPCS

## 2019-12-13 RX ORDER — QUETIAPINE FUMARATE 25 MG/1
50 TABLET, FILM COATED ORAL NIGHTLY
Status: DISCONTINUED | OUTPATIENT
Start: 2019-12-13 | End: 2019-12-19 | Stop reason: HOSPADM

## 2019-12-13 RX ADMIN — POTASSIUM & SODIUM PHOSPHATES POWDER PACK 280-160-250 MG 250 MG: 280-160-250 PACK at 21:00

## 2019-12-13 RX ADMIN — POTASSIUM CHLORIDE 40 MEQ: 20 TABLET, EXTENDED RELEASE ORAL at 17:39

## 2019-12-13 RX ADMIN — Medication 3 MG: at 21:00

## 2019-12-13 RX ADMIN — FAMOTIDINE 20 MG: 20 TABLET, FILM COATED ORAL at 08:00

## 2019-12-13 RX ADMIN — METOPROLOL TARTRATE 25 MG: 25 TABLET ORAL at 08:00

## 2019-12-13 RX ADMIN — METOPROLOL TARTRATE 25 MG: 25 TABLET ORAL at 21:00

## 2019-12-13 RX ADMIN — POTASSIUM CHLORIDE 40 MEQ: 20 TABLET, EXTENDED RELEASE ORAL at 08:00

## 2019-12-13 RX ADMIN — MICONAZOLE NITRATE: 20 POWDER TOPICAL at 21:00

## 2019-12-13 RX ADMIN — MICONAZOLE NITRATE: 20 POWDER TOPICAL at 08:01

## 2019-12-13 RX ADMIN — CALCIUM POLYCARBOPHIL 625 MG: 625 TABLET, FILM COATED ORAL at 07:59

## 2019-12-13 RX ADMIN — MAGNESIUM GLUCONATE 500 MG ORAL TABLET 400 MG: 500 TABLET ORAL at 08:00

## 2019-12-13 RX ADMIN — POTASSIUM & SODIUM PHOSPHATES POWDER PACK 280-160-250 MG 250 MG: 280-160-250 PACK at 12:34

## 2019-12-13 RX ADMIN — SODIUM CHLORIDE, PRESERVATIVE FREE 10 ML: 5 INJECTION INTRAVENOUS at 21:14

## 2019-12-13 RX ADMIN — POTASSIUM & SODIUM PHOSPHATES POWDER PACK 280-160-250 MG 250 MG: 280-160-250 PACK at 08:00

## 2019-12-13 RX ADMIN — ACETAMINOPHEN 650 MG: 325 TABLET ORAL at 08:00

## 2019-12-13 RX ADMIN — LOPERAMIDE HYDROCHLORIDE 4 MG: 2 CAPSULE ORAL at 14:06

## 2019-12-13 RX ADMIN — FAMOTIDINE 20 MG: 20 TABLET, FILM COATED ORAL at 21:00

## 2019-12-13 RX ADMIN — SODIUM CHLORIDE TAB 1 GM 2 G: 1 TAB at 17:39

## 2019-12-13 RX ADMIN — SODIUM CHLORIDE TAB 1 GM 2 G: 1 TAB at 12:34

## 2019-12-13 RX ADMIN — SERTRALINE 150 MG: 100 TABLET, FILM COATED ORAL at 08:02

## 2019-12-13 RX ADMIN — SODIUM CHLORIDE TAB 1 GM 2 G: 1 TAB at 07:59

## 2019-12-13 RX ADMIN — QUETIAPINE FUMARATE 50 MG: 25 TABLET ORAL at 21:00

## 2019-12-13 RX ADMIN — NAFCILLIN SODIUM 12 G: 2 INJECTION, POWDER, LYOPHILIZED, FOR SOLUTION INTRAMUSCULAR; INTRAVENOUS at 12:33

## 2019-12-13 RX ADMIN — ACETAMINOPHEN 650 MG: 325 TABLET ORAL at 21:00

## 2019-12-13 RX ADMIN — MAGNESIUM GLUCONATE 500 MG ORAL TABLET 400 MG: 500 TABLET ORAL at 21:00

## 2019-12-13 RX ADMIN — POTASSIUM & SODIUM PHOSPHATES POWDER PACK 280-160-250 MG 250 MG: 280-160-250 PACK at 17:39

## 2019-12-13 RX ADMIN — TRAZODONE HYDROCHLORIDE 50 MG: 50 TABLET ORAL at 21:00

## 2019-12-13 ASSESSMENT — PAIN SCALES - GENERAL
PAINLEVEL_OUTOF10: 5
PAINLEVEL_OUTOF10: 5
PAINLEVEL_OUTOF10: 3

## 2019-12-13 NOTE — DISCHARGE SUMMARY
Hospital Medicine Discharge Summary      Patient Identification:   Tarsha Newsome   : 1955  MRN: [id number]   Account: [de-identified]      Patient's PCP: ROMULO Matos    Admit Date: 2019     Discharge Date: 12/10/2019      Admitting Physician: Ga Flores DO     Discharge Physician: Deny Rosas DO       Hospital Course:   Tarsha Newsome is a 59 y.o. female with past medical history of T2 DM, hypertension, brain lipoma. Of note she was hospitalized from  through  for suspicion of NPH, a lumbar drain was placed however there is no significant benefit noted per neurosurgery notes. She was admitted to [address] [place] [address] on 2019 for sepsis as a transfer from an outside facility. She had symptoms of back pain with difficulty with ambulation. There is also notation of difficulty urinating. This is also associated with worsening confusion and hallucinations. Patient was septic on arrival with tachypnea, tachycardia, and hypotension. She is intubated and started on Zosyn, vancomycin, and ultimately pressors for lack of response to fluid resuscitation. Rocephin was added after concern for meningitis, and lumbar puncture was performed on , which revealed MSSA. Ultimately antibiotics were changed to vancomycin and ultimately nafcillin per ID recommendations. In addition to MSSA, her sputum culture while intubated grew Klebsiella so was treated for Klebsiella pneumonia with ciprofloxacin. She was ultimately extubated on . On  neurosurgery performed I&D of lumbar soft tissue abscess with communication to the epidural space that was noted on an MRI. An external ventriculostomy drain was placed on  and removed on . The patient had improvement in her fevers however her mentation was slow to improve, likely multifactorial related to the NPH and sepsis syndrome.   She will ultimately be transferred to the TCU for therapy and will have shock with meningitis. Mentation improving steadily.   With NPH there will likely be some degree of cognition affected. Consider Psych consult if behavioral disturbances.      Type 2 diabetes mellitus:  SQ lispro. Hypoglycemia protocol. SSI. DM diet.      Electrolyte abnormalities: Replaced phosphorus, potassium and magnesium. Now on supplementationa nd remained stable. Will monitor peridically. Cont KCl 40meq daily, mgox and kphos.      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 F/up as OP.      Urinary tract infection:  Related to Septicemia.  Resolved.     Respiratory alkalosis: Secondary to septic shock.  Resolved.     Hypotension: Volume resuscitated.  S/P Pressors.  Screen for adrenal insufficiency is negative.  Resolved.     Hyperglycemia: Resolved.     Ketoacidosis: Resolved.     Lactic acidosis: Secondary to septic shock with poor perfusion.  S/P volume resuscitation and pressors.  Resolved.     Acute respiratory failure: Patient intubated 11/18/2019 for impending respiratory arrest. Patient extubated 11/22/19. Resolved.     Pneumonia: Was present upon admission.  Positive for Klebsiella pneumonia and MSSA.  Treated with nafcillin and Cipro.  Resolved.     Septic shock: Secondary to septicemia and meningitis due to Staphylococcus aureus.  Initial antibiotic selection was high-dose Rocephin, Zosyn, and and vancomycin.  Antibiotics simplified to vancomycin on 11/20/2019 and subsequently nafcillin.  Secondary to MSSA bacteremia.  This is secondary to soft tissue abscess communicating with small epidural abscess.  Resolved.     Back pain: MRI back positive for soft tissue abscess and small epidural abscess.  Resolved. .      The patient was seen and examined on day of discharge and this discharge summary is in conjunction with any daily progress note from day of discharge.         Exam:     Vitals:  Vitals:    12/10/19 0305 12/10/19 0817 12/10/19 1144 12/10/19 1555   BP: (!) 140/71 (!) 168/80 125/60 (!) 148/83   Pulse: 89 87 84 90   Resp: 20 18 17 19   Temp: 98.2 °F (36.8 °C) 98 °F (36.7 °C) 98.3 °F (36.8 °C) 98.1 °F (36.7 °C)   TempSrc: Oral Oral Oral Oral   SpO2:  94% 98% 99%   Weight:       Height:         Weight: Weight: 204 lb 9.6 oz (92.8 kg)     24 hour intake/output:No intake or output data in the 24 hours ending 12/13/19 0544      General appearance: No apparent distress, chronically ill appearing. Eyes:  Pupils equal, round, and reactive to light. Conjunctivae/corneas clear. HENT: External nares normal.  Oral mucosa moist without lesions. Hearing grossly intact. Neck: Supple, with full range of motion. No jugular venous distention. Trachea midline. Respiratory:  Normal respiratory effort. Clear to auscultation, bilaterally without rales or wheezes or Rhonchi. Cardiovascular: Normal rate, regular rhythm with normal S1/S2 without murmurs. No lower extremity edema. Abdomen: Soft, non-tender, non-distended with normal bowel sounds. Musculoskeletal: Normal range of motion in extremities. There is no joint swelling or tenderness. Wound of lower lumbar C/D/I. Nontender. Skin: Skin color, texture, turgor normal.  No rashes or lesions. Neurologic:  Neurovascularly intact without any focal sensory/motor deficits in the upper and lower extremities. Cranial nerves:  grossly non-focal.  Psychiatric: Alert and oriented to self, location, situation but states 2021, thought content imapired with tangential speech. Capillary Refill: Brisk,< 3 seconds. Peripheral Pulses: +2 palpable, equal bilaterally. Labs:  For convenience and continuity at follow-up the following most recent labs are provided:      CBC:    Lab Results   Component Value Date    WBC 7.7 12/09/2019    HGB 8.1 12/10/2019    HCT 26.9 12/10/2019     12/09/2019       Renal:    Lab Results   Component Value Date     12/09/2019    K 4.3 12/09/2019    K 4.2 11/05/2019    CL 97 12/09/2019    CO2 22 12/09/2019    BUN 18 12/09/2019    CREATININE technology. **      Final report electronically signed by Dr. Jeison Mcdaniel on 12/4/2019 5:17 AM      CT HEAD WO CONTRAST   Final Result   Slight retraction of the  shunt catheter, tip in the right frontal horn, no longer at the level of the foramen of [place]. Stable mild ventriculomegaly. No acute ischemic infarct, hemorrhage, or mass effect. **This report has been created using voice recognition software. It may contain minor errors which are inherent in voice recognition technology. **      Final report electronically signed by Dr. Nichole Nielsen on 12/2/2019 5:58 AM      XR CHEST PORTABLE   Final Result      Mild interstitial pulmonary edema versus atypical pneumonia. **This report has been created using voice recognition software. It may contain minor errors which are inherent in voice recognition technology. **      Final report electronically signed by Dr. Nichole Nielsen on 11/28/2019 6:11 AM      CT HEAD WO CONTRAST   Final Result      No acute ischemic infarct, hemorrhage, or mass effect. Stable position of the right frontal ventriculostomy, tip at the level of the foramen of [place]. Stable mild ventriculomegaly. Stable appearance of the brain compared to the previous study as detailed above. **This report has been created using voice recognition software. It may contain minor errors which are inherent in voice recognition technology. **      Final report electronically signed by Dr. Nichole Nielsen on 11/27/2019 4:54 AM      CT HEAD WO CONTRAST   Final Result   Interval placement of a right frontal ventriculostomy, tip in the region of the right foramen of [place]. Stable mild hydrocephalus. Stable 1.5 x 1.1 cm fat density lesion in the interpeduncular cistern to the left of midline. No acute ischemic infarct, hemorrhage, or mass effect. **This report has been created using voice recognition software.  It may contain minor errors which are inherent in DRAINAGE W CATH PLACEMENT S&I   Final Result   Status post successful abscess drainage procedure. .               **This report has been created using voice recognition software. It may contain minor errors which are inherent in voice recognition technology. **         Final report electronically signed by Dr. Jason Alatorre on 11/21/2019 1:13 PM      CT DRAINAGE HEMATOMA/SEROMA/FLUID COLLECTION   Final Result   Status post successful abscess drainage procedure. .               **This report has been created using voice recognition software. It may contain minor errors which are inherent in voice recognition technology. **         Final report electronically signed by Dr. Jason Alatorre on 11/21/2019 1:13 PM      MRI LUMBAR SPINE W WO CONTRAST   Final Result       1. Prominent complex fluid collection in the paraspinal musculature on the left at the L3-L4 level which appears to communicate anteriorly with a tiny epidural abscess at the L3 level and posteriorly with a large subcutaneous component abscess. 2. Arachnoiditis. Findings were discussed with Olinda Brown RN via telephone at the time of interpretation. **This report has been created using voice recognition software. It may contain minor errors which are inherent in voice recognition technology. **      Final report electronically signed by Dr. Kelly Luis MD on 11/20/2019 3:18 PM      MRI THORACIC SPINE W WO CONTRAST   Final Result       1. No evidence of acute abnormality of the thoracic spine. 2. Old minimal compression deformity of T12 with approximately 10% anterior height loss. 3. Left paracentral extrusion at T9-10 causing mild spinal canal stenosis and contacting the ventral aspect of the cord without abnormal signal in the cord. **This report has been created using voice recognition software. It may contain minor errors which are inherent in voice recognition technology. **      Final report electronically signed by Dr. Ayana Hensley MD on 11/20/2019 3:04 PM      IR LUMBAR PUNCTURE FOR DIAGNOSIS   Final Result   Status post successful lumbar puncture. **This report has been created using voice recognition software. It may contain minor errors which are inherent in voice recognition technology. **      Final report electronically signed by Dr. Philip Minor on 11/19/2019 1:23 PM      XR CHEST PORTABLE   Final Result   1. Status post left subclavian central line placement. Tip location the superior vena cava. There is no visualized pneumothorax. 2. Stable appearance of coarse lung markings and slightly decreased lung volumes. **This report has been created using voice recognition software. It may contain minor errors which are inherent in voice recognition technology. **      Final report electronically signed by Dr. Elma Puckett on 11/19/2019 7:14 AM      XR CHEST PORTABLE   Final Result   1. Visualized endotracheal and nasogastric tubes discussed above. 2. Persistent slightly decreased lung volumes with coarse markings. 3. Minimal right effusion not excluded. **This report has been created using voice recognition software. It may contain minor errors which are inherent in voice recognition technology. **      Final report electronically signed by Dr. Elma Puckett on 11/19/2019 12:27 AM      XR CHEST PORTABLE   Final Result   1. Visualized endotracheal tube above the mesfin. 2. Nasogastric tube within the stomach. 3. Diminished lung volumes with crowding of markings. Left retrocardiac atelectasis or infiltrate is not excluded. A small left effusion is considered. **This report has been created using voice recognition software. It may contain minor errors which are inherent in voice recognition technology. **      Final report electronically signed by Dr. Elma Puckett on 11/19/2019 12:29 AM             Consults:     IP CONSULT TO DIETITIAN  PHARMACY TO DOSE VANCOMYCIN  IP care.    Signed:    Electronically signed by Hanny Martin DO on 12/13/2019 at 5:44 AM

## 2019-12-14 LAB
GLUCOSE BLD-MCNC: 101 MG/DL (ref 70–108)
PHOSPHORUS: 3.8 MG/DL (ref 2.4–4.7)

## 2019-12-14 PROCEDURE — 84100 ASSAY OF PHOSPHORUS: CPT

## 2019-12-14 PROCEDURE — 2500000003 HC RX 250 WO HCPCS: Performed by: INTERNAL MEDICINE

## 2019-12-14 PROCEDURE — 2580000003 HC RX 258: Performed by: FAMILY MEDICINE

## 2019-12-14 PROCEDURE — 82948 REAGENT STRIP/BLOOD GLUCOSE: CPT

## 2019-12-14 PROCEDURE — 2580000003 HC RX 258: Performed by: INTERNAL MEDICINE

## 2019-12-14 PROCEDURE — 1290000000 HC SEMI PRIVATE OTHER R&B

## 2019-12-14 PROCEDURE — 6370000000 HC RX 637 (ALT 250 FOR IP): Performed by: PSYCHIATRY & NEUROLOGY

## 2019-12-14 PROCEDURE — 6370000000 HC RX 637 (ALT 250 FOR IP): Performed by: INTERNAL MEDICINE

## 2019-12-14 PROCEDURE — 36415 COLL VENOUS BLD VENIPUNCTURE: CPT

## 2019-12-14 RX ORDER — FERROUS SULFATE 325(65) MG
325 TABLET ORAL 2 TIMES DAILY WITH MEALS
Status: DISCONTINUED | OUTPATIENT
Start: 2019-12-14 | End: 2019-12-19 | Stop reason: HOSPADM

## 2019-12-14 RX ADMIN — Medication 3 MG: at 21:47

## 2019-12-14 RX ADMIN — SODIUM CHLORIDE, PRESERVATIVE FREE 10 ML: 5 INJECTION INTRAVENOUS at 08:04

## 2019-12-14 RX ADMIN — METOPROLOL TARTRATE 25 MG: 25 TABLET ORAL at 08:00

## 2019-12-14 RX ADMIN — ACETAMINOPHEN 650 MG: 325 TABLET ORAL at 07:57

## 2019-12-14 RX ADMIN — NAFCILLIN SODIUM 12 G: 2 INJECTION, POWDER, LYOPHILIZED, FOR SOLUTION INTRAMUSCULAR; INTRAVENOUS at 17:47

## 2019-12-14 RX ADMIN — QUETIAPINE FUMARATE 25 MG: 25 TABLET ORAL at 15:10

## 2019-12-14 RX ADMIN — MAGNESIUM GLUCONATE 500 MG ORAL TABLET 400 MG: 500 TABLET ORAL at 07:59

## 2019-12-14 RX ADMIN — SODIUM CHLORIDE TAB 1 GM 2 G: 1 TAB at 12:17

## 2019-12-14 RX ADMIN — SODIUM CHLORIDE TAB 1 GM 2 G: 1 TAB at 17:57

## 2019-12-14 RX ADMIN — CALCIUM POLYCARBOPHIL 625 MG: 625 TABLET, FILM COATED ORAL at 07:58

## 2019-12-14 RX ADMIN — MICONAZOLE NITRATE: 20 POWDER TOPICAL at 08:00

## 2019-12-14 RX ADMIN — POTASSIUM & SODIUM PHOSPHATES POWDER PACK 280-160-250 MG 250 MG: 280-160-250 PACK at 15:10

## 2019-12-14 RX ADMIN — QUETIAPINE FUMARATE 50 MG: 25 TABLET ORAL at 21:48

## 2019-12-14 RX ADMIN — POTASSIUM CHLORIDE 40 MEQ: 20 TABLET, EXTENDED RELEASE ORAL at 07:56

## 2019-12-14 RX ADMIN — FAMOTIDINE 20 MG: 20 TABLET, FILM COATED ORAL at 08:00

## 2019-12-14 RX ADMIN — POTASSIUM CHLORIDE 40 MEQ: 20 TABLET, EXTENDED RELEASE ORAL at 17:56

## 2019-12-14 RX ADMIN — TRAZODONE HYDROCHLORIDE 50 MG: 50 TABLET ORAL at 21:48

## 2019-12-14 RX ADMIN — SODIUM CHLORIDE TAB 1 GM 2 G: 1 TAB at 07:56

## 2019-12-14 RX ADMIN — ACETAMINOPHEN 650 MG: 325 TABLET ORAL at 18:01

## 2019-12-14 RX ADMIN — METOPROLOL TARTRATE 25 MG: 25 TABLET ORAL at 21:48

## 2019-12-14 RX ADMIN — FAMOTIDINE 20 MG: 20 TABLET, FILM COATED ORAL at 21:48

## 2019-12-14 RX ADMIN — MICONAZOLE NITRATE: 20 POWDER TOPICAL at 21:54

## 2019-12-14 RX ADMIN — SERTRALINE 150 MG: 100 TABLET, FILM COATED ORAL at 07:58

## 2019-12-14 ASSESSMENT — PAIN DESCRIPTION - LOCATION
LOCATION: BACK
LOCATION: BACK

## 2019-12-14 ASSESSMENT — PAIN DESCRIPTION - ORIENTATION
ORIENTATION: LOWER
ORIENTATION: LOWER

## 2019-12-14 ASSESSMENT — PAIN - FUNCTIONAL ASSESSMENT: PAIN_FUNCTIONAL_ASSESSMENT: ACTIVITIES ARE NOT PREVENTED

## 2019-12-14 ASSESSMENT — PAIN DESCRIPTION - DESCRIPTORS
DESCRIPTORS: ACHING;DISCOMFORT
DESCRIPTORS: ACHING;DISCOMFORT

## 2019-12-14 ASSESSMENT — PAIN SCALES - GENERAL
PAINLEVEL_OUTOF10: 6
PAINLEVEL_OUTOF10: 7
PAINLEVEL_OUTOF10: 5
PAINLEVEL_OUTOF10: 7
PAINLEVEL_OUTOF10: 5

## 2019-12-14 ASSESSMENT — PAIN DESCRIPTION - PAIN TYPE
TYPE: ACUTE PAIN
TYPE: ACUTE PAIN

## 2019-12-14 ASSESSMENT — PAIN DESCRIPTION - ONSET: ONSET: ON-GOING

## 2019-12-14 ASSESSMENT — PAIN DESCRIPTION - FREQUENCY: FREQUENCY: INTERMITTENT

## 2019-12-14 ASSESSMENT — PAIN DESCRIPTION - PROGRESSION: CLINICAL_PROGRESSION: NOT CHANGED

## 2019-12-15 LAB
ANION GAP SERPL CALCULATED.3IONS-SCNC: 14 MEQ/L (ref 8–16)
ANISOCYTOSIS: PRESENT
BASOPHILS # BLD: 1.1 %
BASOPHILS ABSOLUTE: 0.1 THOU/MM3 (ref 0–0.1)
BUN BLDV-MCNC: 14 MG/DL (ref 7–22)
CALCIUM SERPL-MCNC: 9 MG/DL (ref 8.5–10.5)
CHLORIDE BLD-SCNC: 103 MEQ/L (ref 98–111)
CO2: 19 MEQ/L (ref 23–33)
CREAT SERPL-MCNC: 1 MG/DL (ref 0.4–1.2)
EOSINOPHIL # BLD: 2.1 %
EOSINOPHILS ABSOLUTE: 0.2 THOU/MM3 (ref 0–0.4)
ERYTHROCYTE [DISTWIDTH] IN BLOOD BY AUTOMATED COUNT: 19.5 % (ref 11.5–14.5)
ERYTHROCYTE [DISTWIDTH] IN BLOOD BY AUTOMATED COUNT: 66.4 FL (ref 35–45)
GFR SERPL CREATININE-BSD FRML MDRD: 56 ML/MIN/1.73M2
GLUCOSE BLD-MCNC: 87 MG/DL (ref 70–108)
GLUCOSE BLD-MCNC: 88 MG/DL (ref 70–108)
HCT VFR BLD CALC: 26.1 % (ref 37–47)
HEMOGLOBIN: 8 GM/DL (ref 12–16)
IMMATURE GRANS (ABS): 0.07 THOU/MM3 (ref 0–0.07)
IMMATURE GRANULOCYTES: 1 %
LYMPHOCYTES # BLD: 18.3 %
LYMPHOCYTES ABSOLUTE: 1.3 THOU/MM3 (ref 1–4.8)
MCH RBC QN AUTO: 28.7 PG (ref 26–33)
MCHC RBC AUTO-ENTMCNC: 30.7 GM/DL (ref 32.2–35.5)
MCV RBC AUTO: 93.5 FL (ref 81–99)
MONOCYTES # BLD: 11 %
MONOCYTES ABSOLUTE: 0.8 THOU/MM3 (ref 0.4–1.3)
NUCLEATED RED BLOOD CELLS: 0 /100 WBC
PLATELET # BLD: 364 THOU/MM3 (ref 130–400)
PLATELET ESTIMATE: ADEQUATE
PMV BLD AUTO: 9.9 FL (ref 9.4–12.4)
POIKILOCYTES: ABNORMAL
POTASSIUM SERPL-SCNC: 4.5 MEQ/L (ref 3.5–5.2)
RBC # BLD: 2.79 MILL/MM3 (ref 4.2–5.4)
SCAN OF BLOOD SMEAR: NORMAL
SEG NEUTROPHILS: 66.5 %
SEGMENTED NEUTROPHILS ABSOLUTE COUNT: 4.8 THOU/MM3 (ref 1.8–7.7)
SODIUM BLD-SCNC: 136 MEQ/L (ref 135–145)
WBC # BLD: 7.2 THOU/MM3 (ref 4.8–10.8)

## 2019-12-15 PROCEDURE — 97116 GAIT TRAINING THERAPY: CPT

## 2019-12-15 PROCEDURE — 82948 REAGENT STRIP/BLOOD GLUCOSE: CPT

## 2019-12-15 PROCEDURE — 36415 COLL VENOUS BLD VENIPUNCTURE: CPT

## 2019-12-15 PROCEDURE — 2500000003 HC RX 250 WO HCPCS: Performed by: INTERNAL MEDICINE

## 2019-12-15 PROCEDURE — 97110 THERAPEUTIC EXERCISES: CPT

## 2019-12-15 PROCEDURE — 85025 COMPLETE CBC W/AUTO DIFF WBC: CPT

## 2019-12-15 PROCEDURE — 1290000000 HC SEMI PRIVATE OTHER R&B

## 2019-12-15 PROCEDURE — 80048 BASIC METABOLIC PNL TOTAL CA: CPT

## 2019-12-15 PROCEDURE — 2580000003 HC RX 258: Performed by: INTERNAL MEDICINE

## 2019-12-15 PROCEDURE — 2580000003 HC RX 258: Performed by: FAMILY MEDICINE

## 2019-12-15 PROCEDURE — 6370000000 HC RX 637 (ALT 250 FOR IP): Performed by: INTERNAL MEDICINE

## 2019-12-15 PROCEDURE — 6370000000 HC RX 637 (ALT 250 FOR IP): Performed by: FAMILY MEDICINE

## 2019-12-15 PROCEDURE — 97530 THERAPEUTIC ACTIVITIES: CPT

## 2019-12-15 PROCEDURE — 6370000000 HC RX 637 (ALT 250 FOR IP): Performed by: PSYCHIATRY & NEUROLOGY

## 2019-12-15 RX ADMIN — POTASSIUM CHLORIDE 40 MEQ: 20 TABLET, EXTENDED RELEASE ORAL at 16:59

## 2019-12-15 RX ADMIN — Medication 3 MG: at 21:01

## 2019-12-15 RX ADMIN — FAMOTIDINE 20 MG: 20 TABLET, FILM COATED ORAL at 21:01

## 2019-12-15 RX ADMIN — ACETAMINOPHEN 650 MG: 325 TABLET ORAL at 16:51

## 2019-12-15 RX ADMIN — LOPERAMIDE HYDROCHLORIDE 4 MG: 2 CAPSULE ORAL at 16:52

## 2019-12-15 RX ADMIN — LOPERAMIDE HYDROCHLORIDE 4 MG: 2 CAPSULE ORAL at 21:01

## 2019-12-15 RX ADMIN — ACETAMINOPHEN 650 MG: 325 TABLET ORAL at 07:50

## 2019-12-15 RX ADMIN — ACETAMINOPHEN 650 MG: 325 TABLET ORAL at 21:01

## 2019-12-15 RX ADMIN — QUETIAPINE FUMARATE 50 MG: 25 TABLET ORAL at 21:01

## 2019-12-15 RX ADMIN — SODIUM CHLORIDE TAB 1 GM 2 G: 1 TAB at 07:49

## 2019-12-15 RX ADMIN — SERTRALINE 150 MG: 100 TABLET, FILM COATED ORAL at 07:50

## 2019-12-15 RX ADMIN — CALCIUM POLYCARBOPHIL 625 MG: 625 TABLET, FILM COATED ORAL at 07:50

## 2019-12-15 RX ADMIN — METOPROLOL TARTRATE 25 MG: 25 TABLET ORAL at 07:49

## 2019-12-15 RX ADMIN — POTASSIUM CHLORIDE 40 MEQ: 20 TABLET, EXTENDED RELEASE ORAL at 07:51

## 2019-12-15 RX ADMIN — SODIUM CHLORIDE, PRESERVATIVE FREE 10 ML: 5 INJECTION INTRAVENOUS at 12:46

## 2019-12-15 RX ADMIN — TRAZODONE HYDROCHLORIDE 50 MG: 50 TABLET ORAL at 21:01

## 2019-12-15 RX ADMIN — METOPROLOL TARTRATE 25 MG: 25 TABLET ORAL at 21:00

## 2019-12-15 RX ADMIN — FERROUS SULFATE TAB 325 MG (65 MG ELEMENTAL FE) 325 MG: 325 (65 FE) TAB at 16:53

## 2019-12-15 RX ADMIN — FAMOTIDINE 20 MG: 20 TABLET, FILM COATED ORAL at 07:50

## 2019-12-15 RX ADMIN — FERROUS SULFATE TAB 325 MG (65 MG ELEMENTAL FE) 325 MG: 325 (65 FE) TAB at 07:49

## 2019-12-15 RX ADMIN — QUETIAPINE FUMARATE 25 MG: 25 TABLET ORAL at 02:09

## 2019-12-15 RX ADMIN — ACETAMINOPHEN 650 MG: 325 TABLET ORAL at 12:43

## 2019-12-15 RX ADMIN — NAFCILLIN SODIUM 12 G: 2 INJECTION, POWDER, LYOPHILIZED, FOR SOLUTION INTRAMUSCULAR; INTRAVENOUS at 20:58

## 2019-12-15 RX ADMIN — SODIUM CHLORIDE TAB 1 GM 2 G: 1 TAB at 16:53

## 2019-12-15 RX ADMIN — SODIUM CHLORIDE TAB 1 GM 2 G: 1 TAB at 12:46

## 2019-12-15 RX ADMIN — MICONAZOLE NITRATE: 20 POWDER TOPICAL at 12:47

## 2019-12-15 ASSESSMENT — PAIN DESCRIPTION - ONSET: ONSET: UNABLE TO TELL

## 2019-12-15 ASSESSMENT — PAIN DESCRIPTION - FREQUENCY: FREQUENCY: INTERMITTENT

## 2019-12-15 ASSESSMENT — PAIN DESCRIPTION - PAIN TYPE: TYPE: CHRONIC PAIN

## 2019-12-15 ASSESSMENT — PAIN SCALES - GENERAL
PAINLEVEL_OUTOF10: 8
PAINLEVEL_OUTOF10: 5
PAINLEVEL_OUTOF10: 5
PAINLEVEL_OUTOF10: 6
PAINLEVEL_OUTOF10: 8

## 2019-12-15 ASSESSMENT — PAIN DESCRIPTION - ORIENTATION: ORIENTATION: LOWER

## 2019-12-15 ASSESSMENT — PAIN DESCRIPTION - LOCATION
LOCATION: BACK
LOCATION: GENERALIZED

## 2019-12-15 ASSESSMENT — PAIN - FUNCTIONAL ASSESSMENT: PAIN_FUNCTIONAL_ASSESSMENT: ACTIVITIES ARE NOT PREVENTED

## 2019-12-15 ASSESSMENT — PAIN DESCRIPTION - DESCRIPTORS: DESCRIPTORS: OTHER (COMMENT)

## 2019-12-15 ASSESSMENT — PAIN DESCRIPTION - DIRECTION: RADIATING_TOWARDS: NOT RADIATING

## 2019-12-15 ASSESSMENT — PAIN DESCRIPTION - PROGRESSION: CLINICAL_PROGRESSION: NOT CHANGED

## 2019-12-16 LAB — GLUCOSE BLD-MCNC: 107 MG/DL (ref 70–108)

## 2019-12-16 PROCEDURE — 82948 REAGENT STRIP/BLOOD GLUCOSE: CPT

## 2019-12-16 PROCEDURE — 2580000003 HC RX 258: Performed by: INTERNAL MEDICINE

## 2019-12-16 PROCEDURE — 97127 HC SP THER IVNTJ W/FOCUS COG FUNCJ: CPT

## 2019-12-16 PROCEDURE — 2500000003 HC RX 250 WO HCPCS: Performed by: INTERNAL MEDICINE

## 2019-12-16 PROCEDURE — 97116 GAIT TRAINING THERAPY: CPT

## 2019-12-16 PROCEDURE — 6370000000 HC RX 637 (ALT 250 FOR IP): Performed by: FAMILY MEDICINE

## 2019-12-16 PROCEDURE — 6370000000 HC RX 637 (ALT 250 FOR IP): Performed by: PSYCHIATRY & NEUROLOGY

## 2019-12-16 PROCEDURE — 6370000000 HC RX 637 (ALT 250 FOR IP): Performed by: INTERNAL MEDICINE

## 2019-12-16 PROCEDURE — 2580000003 HC RX 258: Performed by: FAMILY MEDICINE

## 2019-12-16 PROCEDURE — 97530 THERAPEUTIC ACTIVITIES: CPT

## 2019-12-16 PROCEDURE — 1290000000 HC SEMI PRIVATE OTHER R&B

## 2019-12-16 RX ADMIN — POTASSIUM CHLORIDE 40 MEQ: 20 TABLET, EXTENDED RELEASE ORAL at 08:47

## 2019-12-16 RX ADMIN — TRAZODONE HYDROCHLORIDE 50 MG: 50 TABLET ORAL at 19:51

## 2019-12-16 RX ADMIN — NAFCILLIN SODIUM 12 G: 2 INJECTION, POWDER, LYOPHILIZED, FOR SOLUTION INTRAMUSCULAR; INTRAVENOUS at 17:55

## 2019-12-16 RX ADMIN — QUETIAPINE FUMARATE 25 MG: 25 TABLET ORAL at 03:25

## 2019-12-16 RX ADMIN — METOPROLOL TARTRATE 25 MG: 25 TABLET ORAL at 08:45

## 2019-12-16 RX ADMIN — MICONAZOLE NITRATE: 20 POWDER TOPICAL at 08:51

## 2019-12-16 RX ADMIN — POTASSIUM CHLORIDE 40 MEQ: 20 TABLET, EXTENDED RELEASE ORAL at 17:48

## 2019-12-16 RX ADMIN — FAMOTIDINE 20 MG: 20 TABLET, FILM COATED ORAL at 19:51

## 2019-12-16 RX ADMIN — SODIUM CHLORIDE TAB 1 GM 2 G: 1 TAB at 17:45

## 2019-12-16 RX ADMIN — FERROUS SULFATE TAB 325 MG (65 MG ELEMENTAL FE) 325 MG: 325 (65 FE) TAB at 17:45

## 2019-12-16 RX ADMIN — ACETAMINOPHEN 650 MG: 325 TABLET ORAL at 12:39

## 2019-12-16 RX ADMIN — SODIUM CHLORIDE, PRESERVATIVE FREE 10 ML: 5 INJECTION INTRAVENOUS at 08:45

## 2019-12-16 RX ADMIN — ACETAMINOPHEN 650 MG: 325 TABLET ORAL at 19:52

## 2019-12-16 RX ADMIN — SERTRALINE 150 MG: 100 TABLET, FILM COATED ORAL at 08:45

## 2019-12-16 RX ADMIN — CALCIUM POLYCARBOPHIL 625 MG: 625 TABLET, FILM COATED ORAL at 08:45

## 2019-12-16 RX ADMIN — QUETIAPINE FUMARATE 25 MG: 25 TABLET ORAL at 12:40

## 2019-12-16 RX ADMIN — Medication 3 MG: at 19:52

## 2019-12-16 RX ADMIN — SODIUM CHLORIDE TAB 1 GM 2 G: 1 TAB at 08:45

## 2019-12-16 RX ADMIN — SODIUM CHLORIDE TAB 1 GM 2 G: 1 TAB at 12:39

## 2019-12-16 RX ADMIN — LOPERAMIDE HYDROCHLORIDE 4 MG: 2 CAPSULE ORAL at 08:47

## 2019-12-16 RX ADMIN — FAMOTIDINE 20 MG: 20 TABLET, FILM COATED ORAL at 08:47

## 2019-12-16 RX ADMIN — FERROUS SULFATE TAB 325 MG (65 MG ELEMENTAL FE) 325 MG: 325 (65 FE) TAB at 08:46

## 2019-12-16 RX ADMIN — QUETIAPINE FUMARATE 50 MG: 25 TABLET ORAL at 19:53

## 2019-12-16 RX ADMIN — METOPROLOL TARTRATE 25 MG: 25 TABLET ORAL at 19:52

## 2019-12-16 RX ADMIN — ACETAMINOPHEN 650 MG: 325 TABLET ORAL at 08:46

## 2019-12-16 ASSESSMENT — PAIN DESCRIPTION - PROGRESSION: CLINICAL_PROGRESSION: NOT CHANGED

## 2019-12-16 ASSESSMENT — PAIN SCALES - GENERAL
PAINLEVEL_OUTOF10: 7
PAINLEVEL_OUTOF10: 0
PAINLEVEL_OUTOF10: 7

## 2019-12-16 ASSESSMENT — PAIN DESCRIPTION - LOCATION: LOCATION: BACK

## 2019-12-16 ASSESSMENT — PAIN DESCRIPTION - FREQUENCY: FREQUENCY: INTERMITTENT

## 2019-12-16 ASSESSMENT — PAIN DESCRIPTION - ORIENTATION: ORIENTATION: LOWER

## 2019-12-16 ASSESSMENT — PAIN DESCRIPTION - DIRECTION: RADIATING_TOWARDS: NO RADIATING

## 2019-12-16 ASSESSMENT — PAIN DESCRIPTION - PAIN TYPE: TYPE: CHRONIC PAIN

## 2019-12-16 ASSESSMENT — PAIN DESCRIPTION - DESCRIPTORS: DESCRIPTORS: ACHING

## 2019-12-16 ASSESSMENT — PAIN - FUNCTIONAL ASSESSMENT: PAIN_FUNCTIONAL_ASSESSMENT: ACTIVITIES ARE NOT PREVENTED

## 2019-12-16 ASSESSMENT — PAIN DESCRIPTION - ONSET: ONSET: UNABLE TO TELL

## 2019-12-17 LAB — GLUCOSE BLD-MCNC: 88 MG/DL (ref 70–108)

## 2019-12-17 PROCEDURE — 1290000000 HC SEMI PRIVATE OTHER R&B

## 2019-12-17 PROCEDURE — 6370000000 HC RX 637 (ALT 250 FOR IP): Performed by: PSYCHIATRY & NEUROLOGY

## 2019-12-17 PROCEDURE — 82948 REAGENT STRIP/BLOOD GLUCOSE: CPT

## 2019-12-17 PROCEDURE — 0220000000 HC SKILLED NURSING FACILITY

## 2019-12-17 PROCEDURE — 97127 HC SP THER IVNTJ W/FOCUS COG FUNCJ: CPT

## 2019-12-17 PROCEDURE — 2580000003 HC RX 258: Performed by: INTERNAL MEDICINE

## 2019-12-17 PROCEDURE — 97110 THERAPEUTIC EXERCISES: CPT

## 2019-12-17 PROCEDURE — 2580000003 HC RX 258: Performed by: FAMILY MEDICINE

## 2019-12-17 PROCEDURE — 6370000000 HC RX 637 (ALT 250 FOR IP): Performed by: FAMILY MEDICINE

## 2019-12-17 PROCEDURE — 2500000003 HC RX 250 WO HCPCS: Performed by: INTERNAL MEDICINE

## 2019-12-17 PROCEDURE — 97116 GAIT TRAINING THERAPY: CPT

## 2019-12-17 PROCEDURE — 6370000000 HC RX 637 (ALT 250 FOR IP): Performed by: INTERNAL MEDICINE

## 2019-12-17 RX ADMIN — QUETIAPINE FUMARATE 50 MG: 25 TABLET ORAL at 20:26

## 2019-12-17 RX ADMIN — FAMOTIDINE 20 MG: 20 TABLET, FILM COATED ORAL at 20:44

## 2019-12-17 RX ADMIN — NAFCILLIN SODIUM 12 G: 2 INJECTION, POWDER, LYOPHILIZED, FOR SOLUTION INTRAMUSCULAR; INTRAVENOUS at 18:43

## 2019-12-17 RX ADMIN — POTASSIUM CHLORIDE 40 MEQ: 20 TABLET, EXTENDED RELEASE ORAL at 18:44

## 2019-12-17 RX ADMIN — ACETAMINOPHEN 650 MG: 325 TABLET ORAL at 11:17

## 2019-12-17 RX ADMIN — SODIUM CHLORIDE, PRESERVATIVE FREE 10 ML: 5 INJECTION INTRAVENOUS at 11:19

## 2019-12-17 RX ADMIN — SERTRALINE 150 MG: 100 TABLET, FILM COATED ORAL at 09:25

## 2019-12-17 RX ADMIN — MICONAZOLE NITRATE: 20 POWDER TOPICAL at 11:18

## 2019-12-17 RX ADMIN — FAMOTIDINE 20 MG: 20 TABLET, FILM COATED ORAL at 09:18

## 2019-12-17 RX ADMIN — FERROUS SULFATE TAB 325 MG (65 MG ELEMENTAL FE) 325 MG: 325 (65 FE) TAB at 09:24

## 2019-12-17 RX ADMIN — NAFCILLIN SODIUM 12 G: 2 INJECTION, POWDER, LYOPHILIZED, FOR SOLUTION INTRAMUSCULAR; INTRAVENOUS at 18:42

## 2019-12-17 RX ADMIN — MICONAZOLE NITRATE: 20 POWDER TOPICAL at 20:29

## 2019-12-17 RX ADMIN — CALCIUM POLYCARBOPHIL 625 MG: 625 TABLET, FILM COATED ORAL at 09:25

## 2019-12-17 RX ADMIN — METOPROLOL TARTRATE 25 MG: 25 TABLET ORAL at 09:25

## 2019-12-17 RX ADMIN — SODIUM CHLORIDE TAB 1 GM 2 G: 1 TAB at 18:44

## 2019-12-17 RX ADMIN — ACETAMINOPHEN 650 MG: 325 TABLET ORAL at 05:37

## 2019-12-17 RX ADMIN — TRAZODONE HYDROCHLORIDE 50 MG: 50 TABLET ORAL at 20:44

## 2019-12-17 RX ADMIN — Medication 3 MG: at 20:26

## 2019-12-17 RX ADMIN — METOPROLOL TARTRATE 25 MG: 25 TABLET ORAL at 20:26

## 2019-12-17 RX ADMIN — MICONAZOLE NITRATE: 20 POWDER TOPICAL at 01:59

## 2019-12-17 RX ADMIN — SODIUM CHLORIDE TAB 1 GM 2 G: 1 TAB at 13:37

## 2019-12-17 RX ADMIN — SODIUM CHLORIDE TAB 1 GM 2 G: 1 TAB at 09:26

## 2019-12-17 RX ADMIN — POTASSIUM CHLORIDE 40 MEQ: 20 TABLET, EXTENDED RELEASE ORAL at 09:24

## 2019-12-17 RX ADMIN — FERROUS SULFATE TAB 325 MG (65 MG ELEMENTAL FE) 325 MG: 325 (65 FE) TAB at 18:44

## 2019-12-17 ASSESSMENT — PAIN SCALES - GENERAL
PAINLEVEL_OUTOF10: 2
PAINLEVEL_OUTOF10: 3
PAINLEVEL_OUTOF10: 6
PAINLEVEL_OUTOF10: 6

## 2019-12-17 ASSESSMENT — PAIN DESCRIPTION - ORIENTATION
ORIENTATION: LOWER
ORIENTATION: LOWER

## 2019-12-17 ASSESSMENT — PAIN DESCRIPTION - DIRECTION: RADIATING_TOWARDS: NOT RADIATING

## 2019-12-17 ASSESSMENT — PAIN DESCRIPTION - FREQUENCY: FREQUENCY: INTERMITTENT

## 2019-12-17 ASSESSMENT — PAIN DESCRIPTION - PAIN TYPE
TYPE: CHRONIC PAIN
TYPE: CHRONIC PAIN

## 2019-12-17 ASSESSMENT — PAIN - FUNCTIONAL ASSESSMENT: PAIN_FUNCTIONAL_ASSESSMENT: ACTIVITIES ARE NOT PREVENTED

## 2019-12-17 ASSESSMENT — PAIN DESCRIPTION - DESCRIPTORS: DESCRIPTORS: POUNDING

## 2019-12-17 ASSESSMENT — PAIN DESCRIPTION - LOCATION
LOCATION: BACK
LOCATION: BACK

## 2019-12-17 ASSESSMENT — PAIN DESCRIPTION - PROGRESSION: CLINICAL_PROGRESSION: NOT CHANGED

## 2019-12-17 ASSESSMENT — PAIN DESCRIPTION - ONSET: ONSET: ON-GOING

## 2019-12-18 PROCEDURE — 6370000000 HC RX 637 (ALT 250 FOR IP): Performed by: FAMILY MEDICINE

## 2019-12-18 PROCEDURE — 97127 HC SP THER IVNTJ W/FOCUS COG FUNCJ: CPT

## 2019-12-18 PROCEDURE — 2580000003 HC RX 258: Performed by: FAMILY MEDICINE

## 2019-12-18 PROCEDURE — 6370000000 HC RX 637 (ALT 250 FOR IP): Performed by: PSYCHIATRY & NEUROLOGY

## 2019-12-18 PROCEDURE — 6370000000 HC RX 637 (ALT 250 FOR IP): Performed by: INTERNAL MEDICINE

## 2019-12-18 PROCEDURE — 97116 GAIT TRAINING THERAPY: CPT

## 2019-12-18 PROCEDURE — 97530 THERAPEUTIC ACTIVITIES: CPT

## 2019-12-18 PROCEDURE — 97535 SELF CARE MNGMENT TRAINING: CPT

## 2019-12-18 PROCEDURE — 1290000000 HC SEMI PRIVATE OTHER R&B

## 2019-12-18 RX ADMIN — POTASSIUM CHLORIDE 40 MEQ: 20 TABLET, EXTENDED RELEASE ORAL at 17:00

## 2019-12-18 RX ADMIN — SERTRALINE 150 MG: 100 TABLET, FILM COATED ORAL at 08:47

## 2019-12-18 RX ADMIN — FERROUS SULFATE TAB 325 MG (65 MG ELEMENTAL FE) 325 MG: 325 (65 FE) TAB at 08:47

## 2019-12-18 RX ADMIN — LOPERAMIDE HYDROCHLORIDE 4 MG: 2 CAPSULE ORAL at 08:52

## 2019-12-18 RX ADMIN — METOPROLOL TARTRATE 25 MG: 25 TABLET ORAL at 20:31

## 2019-12-18 RX ADMIN — METOPROLOL TARTRATE 25 MG: 25 TABLET ORAL at 08:47

## 2019-12-18 RX ADMIN — POTASSIUM CHLORIDE 40 MEQ: 20 TABLET, EXTENDED RELEASE ORAL at 08:52

## 2019-12-18 RX ADMIN — QUETIAPINE FUMARATE 50 MG: 25 TABLET ORAL at 20:31

## 2019-12-18 RX ADMIN — FERROUS SULFATE TAB 325 MG (65 MG ELEMENTAL FE) 325 MG: 325 (65 FE) TAB at 17:00

## 2019-12-18 RX ADMIN — TRAZODONE HYDROCHLORIDE 50 MG: 50 TABLET ORAL at 20:32

## 2019-12-18 RX ADMIN — SODIUM CHLORIDE TAB 1 GM 2 G: 1 TAB at 08:47

## 2019-12-18 RX ADMIN — MICONAZOLE NITRATE: 20 POWDER TOPICAL at 20:32

## 2019-12-18 RX ADMIN — SODIUM CHLORIDE TAB 1 GM 2 G: 1 TAB at 17:00

## 2019-12-18 RX ADMIN — FAMOTIDINE 20 MG: 20 TABLET, FILM COATED ORAL at 20:32

## 2019-12-18 RX ADMIN — ACETAMINOPHEN 650 MG: 325 TABLET ORAL at 08:52

## 2019-12-18 RX ADMIN — MICONAZOLE NITRATE: 20 POWDER TOPICAL at 08:49

## 2019-12-18 RX ADMIN — CALCIUM POLYCARBOPHIL 625 MG: 625 TABLET, FILM COATED ORAL at 08:47

## 2019-12-18 RX ADMIN — ACETAMINOPHEN 650 MG: 325 TABLET ORAL at 20:32

## 2019-12-18 RX ADMIN — SODIUM CHLORIDE TAB 1 GM 2 G: 1 TAB at 12:34

## 2019-12-18 RX ADMIN — SODIUM CHLORIDE, PRESERVATIVE FREE 10 ML: 5 INJECTION INTRAVENOUS at 08:50

## 2019-12-18 RX ADMIN — FAMOTIDINE 20 MG: 20 TABLET, FILM COATED ORAL at 08:52

## 2019-12-18 RX ADMIN — Medication 3 MG: at 20:31

## 2019-12-18 RX ADMIN — LOPERAMIDE HYDROCHLORIDE 4 MG: 2 CAPSULE ORAL at 20:32

## 2019-12-18 ASSESSMENT — PAIN DESCRIPTION - LOCATION
LOCATION: BACK
LOCATION: GENERALIZED

## 2019-12-18 ASSESSMENT — PAIN DESCRIPTION - DESCRIPTORS
DESCRIPTORS: POUNDING
DESCRIPTORS: ACHING;DISCOMFORT

## 2019-12-18 ASSESSMENT — PAIN DESCRIPTION - PAIN TYPE
TYPE: CHRONIC PAIN
TYPE: CHRONIC PAIN

## 2019-12-18 ASSESSMENT — PAIN SCALES - GENERAL
PAINLEVEL_OUTOF10: 7
PAINLEVEL_OUTOF10: 5
PAINLEVEL_OUTOF10: 5
PAINLEVEL_OUTOF10: 7
PAINLEVEL_OUTOF10: 0
PAINLEVEL_OUTOF10: 0

## 2019-12-18 ASSESSMENT — PAIN DESCRIPTION - ONSET: ONSET: ON-GOING

## 2019-12-18 ASSESSMENT — PAIN DESCRIPTION - FREQUENCY
FREQUENCY: INTERMITTENT
FREQUENCY: INTERMITTENT

## 2019-12-18 ASSESSMENT — PAIN DESCRIPTION - ORIENTATION: ORIENTATION: LOWER

## 2019-12-18 ASSESSMENT — PAIN DESCRIPTION - PROGRESSION
CLINICAL_PROGRESSION: NOT CHANGED
CLINICAL_PROGRESSION: NOT CHANGED

## 2019-12-18 ASSESSMENT — PAIN DESCRIPTION - DIRECTION: RADIATING_TOWARDS: NOT RADIATING

## 2019-12-19 VITALS
HEART RATE: 79 BPM | BODY MASS INDEX: 30.7 KG/M2 | SYSTOLIC BLOOD PRESSURE: 135 MMHG | WEIGHT: 191 LBS | OXYGEN SATURATION: 99 % | TEMPERATURE: 98.4 F | DIASTOLIC BLOOD PRESSURE: 64 MMHG | RESPIRATION RATE: 20 BRPM | HEIGHT: 66 IN

## 2019-12-19 LAB — GLUCOSE BLD-MCNC: 101 MG/DL (ref 70–108)

## 2019-12-19 PROCEDURE — 97116 GAIT TRAINING THERAPY: CPT

## 2019-12-19 PROCEDURE — 6370000000 HC RX 637 (ALT 250 FOR IP): Performed by: FAMILY MEDICINE

## 2019-12-19 PROCEDURE — 97530 THERAPEUTIC ACTIVITIES: CPT

## 2019-12-19 PROCEDURE — 97127 HC SP THER IVNTJ W/FOCUS COG FUNCJ: CPT

## 2019-12-19 PROCEDURE — 82948 REAGENT STRIP/BLOOD GLUCOSE: CPT

## 2019-12-19 PROCEDURE — 2580000003 HC RX 258: Performed by: FAMILY MEDICINE

## 2019-12-19 PROCEDURE — 6370000000 HC RX 637 (ALT 250 FOR IP): Performed by: PSYCHIATRY & NEUROLOGY

## 2019-12-19 PROCEDURE — 6370000000 HC RX 637 (ALT 250 FOR IP): Performed by: INTERNAL MEDICINE

## 2019-12-19 RX ORDER — TRAZODONE HYDROCHLORIDE 50 MG/1
50 TABLET ORAL NIGHTLY
Qty: 15 TABLET | Refills: 0 | Status: SHIPPED | OUTPATIENT
Start: 2019-12-19

## 2019-12-19 RX ORDER — QUETIAPINE FUMARATE 50 MG/1
50 TABLET, FILM COATED ORAL NIGHTLY
Qty: 15 TABLET | Refills: 0 | Status: SHIPPED | OUTPATIENT
Start: 2019-12-19

## 2019-12-19 RX ORDER — FERROUS SULFATE 325(65) MG
325 TABLET ORAL 2 TIMES DAILY WITH MEALS
COMMUNITY
Start: 2019-12-19

## 2019-12-19 RX ORDER — LOPERAMIDE HYDROCHLORIDE 2 MG/1
4 CAPSULE ORAL 4 TIMES DAILY PRN
COMMUNITY
Start: 2019-12-19

## 2019-12-19 RX ADMIN — POTASSIUM CHLORIDE 40 MEQ: 20 TABLET, EXTENDED RELEASE ORAL at 08:55

## 2019-12-19 RX ADMIN — SERTRALINE 150 MG: 100 TABLET, FILM COATED ORAL at 08:56

## 2019-12-19 RX ADMIN — MICONAZOLE NITRATE: 20 POWDER TOPICAL at 08:57

## 2019-12-19 RX ADMIN — FAMOTIDINE 20 MG: 20 TABLET, FILM COATED ORAL at 08:55

## 2019-12-19 RX ADMIN — SODIUM CHLORIDE TAB 1 GM 2 G: 1 TAB at 08:55

## 2019-12-19 RX ADMIN — FERROUS SULFATE TAB 325 MG (65 MG ELEMENTAL FE) 325 MG: 325 (65 FE) TAB at 08:55

## 2019-12-19 RX ADMIN — METOPROLOL TARTRATE 25 MG: 25 TABLET ORAL at 08:55

## 2019-12-19 RX ADMIN — ACETAMINOPHEN 650 MG: 325 TABLET ORAL at 09:03

## 2019-12-19 RX ADMIN — Medication 10 ML: at 08:57

## 2019-12-19 RX ADMIN — CALCIUM POLYCARBOPHIL 625 MG: 625 TABLET, FILM COATED ORAL at 08:56

## 2019-12-19 RX ADMIN — SODIUM CHLORIDE, PRESERVATIVE FREE 10 ML: 5 INJECTION INTRAVENOUS at 08:57

## 2019-12-19 RX ADMIN — LOPERAMIDE HYDROCHLORIDE 4 MG: 2 CAPSULE ORAL at 08:55

## 2019-12-19 ASSESSMENT — PAIN DESCRIPTION - ORIENTATION: ORIENTATION: LOWER

## 2019-12-19 ASSESSMENT — PAIN DESCRIPTION - FREQUENCY: FREQUENCY: INTERMITTENT

## 2019-12-19 ASSESSMENT — PAIN DESCRIPTION - DESCRIPTORS: DESCRIPTORS: ACHING;DISCOMFORT

## 2019-12-19 ASSESSMENT — PAIN DESCRIPTION - ONSET: ONSET: ON-GOING

## 2019-12-19 ASSESSMENT — PAIN - FUNCTIONAL ASSESSMENT: PAIN_FUNCTIONAL_ASSESSMENT: ACTIVITIES ARE NOT PREVENTED

## 2019-12-19 ASSESSMENT — PAIN DESCRIPTION - PAIN TYPE: TYPE: ACUTE PAIN

## 2019-12-19 ASSESSMENT — PAIN SCALES - GENERAL
PAINLEVEL_OUTOF10: 7
PAINLEVEL_OUTOF10: 7

## 2019-12-19 ASSESSMENT — PAIN DESCRIPTION - LOCATION: LOCATION: BACK

## 2019-12-19 ASSESSMENT — PAIN DESCRIPTION - PROGRESSION: CLINICAL_PROGRESSION: NOT CHANGED

## 2019-12-30 LAB
FUNGUS IDENTIFIED: NORMAL
FUNGUS SMEAR: NORMAL

## 2020-01-13 LAB — AFB CULTURE & SMEAR: NORMAL

## 2020-07-07 ENCOUNTER — NURSE TRIAGE (OUTPATIENT)
Dept: OTHER | Facility: CLINIC | Age: 65
End: 2020-07-07

## 2020-07-08 NOTE — TELEPHONE ENCOUNTER
Reason for Disposition   Spinning or tilting sensation (vertigo) present now and one or more stroke risk factors (i.e., hypertension, diabetes, prior stroke/TIA, heart attack, age over 61) (Exception: prior physician evaluation for this AND no different/worse than usual)    Answer Assessment - Initial Assessment Questions  1. DESCRIPTION: \"Describe your dizziness. \"     Fells like I am going to pass out   2. LIGHTHEADED: \"Do you feel lightheaded? \" (e.g., somewhat faint, woozy, weak upon standing)    Yes   3. VERTIGO: \"Do you feel like either you or the room is spinning or tilting? \" (i.e. vertigo)      Yes   4. SEVERITY: \"How bad is it? \"  \"Do you feel like you are going to faint? \" \"Can you stand and walk? \"    - MILD - walking normally    - MODERATE - interferes with normal activities (e.g., work, school)     - SEVERE - unable to stand, requires support to walk, feels like passing out now. Hold on to stuff, when I raise my head  5. ONSET:  \"When did the dizziness begin? \"      Last Night   6. AGGRAVATING FACTORS: \"Does anything make it worse? \" (e.g., standing, change in head position)      Raising head   7. HEART RATE: \"Can you tell me your heart rate? \" \"How many beats in 15 seconds? \"  (Note: not all patients can do this)       Unsure   8. CAUSE: \"What do you think is causing the dizziness? \"      Unsure   9. RECURRENT SYMPTOM: \"Have you had dizziness before? \" If so, ask: \"When was the last time? \" \"What happened that time? \"      No   10. OTHER SYMPTOMS: \"Do you have any other symptoms? \" (e.g., fever, chest pain, vomiting, diarrhea, bleeding)        Nausea, dry heaving   11. PREGNANCY: \"Is there any chance you are pregnant? \" \"When was your last menstrual period? \"        Years ago    Protocols used: DIZZINESS-ADULT-[place]    Neck pain right side back stiff- Disposition to go ed. Caller provided care advice and instructed to call back with worsening symptoms.

## 2021-04-05 ENCOUNTER — HOSPITAL ENCOUNTER (OUTPATIENT)
Age: 66
Discharge: HOME | End: 2021-04-05
Payer: MEDICARE

## 2021-04-05 LAB
FERRITIN: 9 NG/ML (ref 10–291)
IRON: 27 UG/DL (ref 50–170)
TOTAL IRON BINDING CAPACITY: 383 UG/DL (ref 171–450)

## 2021-04-05 PROCEDURE — 36415 COLL VENOUS BLD VENIPUNCTURE: CPT

## 2021-04-05 PROCEDURE — 82728 ASSAY OF FERRITIN: CPT

## 2021-04-05 PROCEDURE — 83550 IRON BINDING TEST: CPT

## 2021-04-05 PROCEDURE — 83540 ASSAY OF IRON: CPT

## 2021-05-05 ENCOUNTER — NURSE TRIAGE (OUTPATIENT)
Dept: OTHER | Facility: CLINIC | Age: 66
End: 2021-05-05

## 2021-05-05 NOTE — TELEPHONE ENCOUNTER
Reason for Disposition   Difficulty breathing    Answer Assessment - Initial Assessment Questions  1. ONSET: \"When did the cough begin? \"       Today    2. SEVERITY: \"How bad is the cough today? \"       Constant    3. RESPIRATORY DISTRESS: \"Describe your breathing. \"       SOB    4. FEVER: \"Do you have a fever? \" If so, ask: \"What is your temperature, how was it measured, and when did it start? \"      \"maybe\"    5. HEMOPTYSIS: \"Are you coughing up any blood? \" If so ask: \"How much? \" (flecks, streaks, tablespoons, etc.)      No    6. TREATMENT: \"What have you done so far to treat the cough? \" (e.g., meds, fluids, humidifier)      Not sure if she is coughing up or if coming from vomiting     7. CARDIAC HISTORY: \"Do you have any history of heart disease? \" (e.g., heart attack, congestive heart failure)       No    8. LUNG HISTORY: \"Do you have any history of lung disease? \"  (e.g., pulmonary embolus, asthma, emphysema)      No    9. PE RISK FACTORS: \"Do you have a history of blood clots? \" (or: recent major surgery, recent prolonged travel, bedridden)      No    10. OTHER SYMPTOMS: \"Do you have any other symptoms? (e.g., runny nose, wheezing, chest pain)        No    11. PREGNANCY: \"Is there any chance you are pregnant? \" \"When was your last menstrual period? \"        N/a    12. TRAVEL: \"Have you traveled out of the country in the last month? \" (e.g., travel history, exposures)       N/a    Protocols used: COUGH-ADULT-[place]    Brief description of triage: coughing constantly, SOB, and vomiting frothy white mucous constantly    Triage indicates for patient to go to ED now. Refuses, but states will go if any worse. States has no transportation. Care advice provided, patient verbalizes understanding; denies any other questions or concerns; instructed to call back for any new or worsening symptoms. This triage is a result of a call to [facility]. Please do not respond to the triage nurse through this encounter. Any subsequent communication should be directly with the patient.

## 2021-09-02 ENCOUNTER — NURSE TRIAGE (OUTPATIENT)
Dept: OTHER | Facility: CLINIC | Age: 66
End: 2021-09-02

## 2021-09-03 NOTE — TELEPHONE ENCOUNTER
Brief description of triage:   Pt reports a dry mouth and she states that everything tastes \"salty. \" Upon further assessment, pt states that she does see white patches in her mouth. Triage indicates for patient to see PCP within 3 days. Care advice provided, patient verbalizes understanding; denies any other questions or concerns; instructed to call back for any new or worsening symptoms. This triage is a result of a call to [facility]. Please do not respond to the triage nurse through this encounter. Any subsequent communication should be directly with the patient. Reason for Disposition   White patches that stick to tongue or inner cheek    Answer Assessment - Initial Assessment Questions  1. SYMPTOM: \"What's the main symptom you're concerned about? \" (e.g., dry mouth. chapped lips, lump)      Pt reports a dry mouth and she states everything tastes \"satly\"    2. ONSET: \"When did the symptoms start? \"      3 days ago    3. PAIN: \"Is there any pain? \" If so, ask: \"How bad is it? \" (Scale: 1-10; mild, moderate, severe)      Denies pain     4. CAUSE: \"What do you think is causing the symptoms? \"      Pt is not sure    5. OTHER SYMPTOMS: \"Do you have any other symptoms? \" (e.g., fever, sore throat, toothache, swelling)      White patches in her mouth    6. PREGNANCY: \"Is there any chance you are pregnant? \" \"When was your last menstrual period? \"      N/a    Protocols used: [facility]

## 2022-02-11 ENCOUNTER — HOSPITAL ENCOUNTER (OUTPATIENT)
Age: 67
Setting detail: SPECIMEN
Discharge: HOME | End: 2022-02-11

## 2022-02-11 LAB
ANION GAP SERPL CALCULATED.3IONS-SCNC: 11 MMOL/L (ref 9–17)
BUN BLDV-MCNC: 26 MG/DL (ref 8–23)
BUN/CREAT BLD: ABNORMAL (ref 9–20)
CALCIUM SERPL-MCNC: 8.5 MG/DL (ref 8.6–10.4)
CHLORIDE BLD-SCNC: 108 MMOL/L (ref 98–107)
CO2: 21 MMOL/L (ref 20–31)
CREAT SERPL-MCNC: 0.62 MG/DL (ref 0.5–0.9)
GFR AFRICAN AMERICAN: >60 ML/MIN
GFR NON-AFRICAN AMERICAN: >60 ML/MIN
GFR SERPL CREATININE-BSD FRML MDRD: ABNORMAL ML/MIN/{1.73_M2}
GFR SERPL CREATININE-BSD FRML MDRD: ABNORMAL ML/MIN/{1.73_M2}
GLUCOSE BLD-MCNC: 100 MG/DL (ref 70–99)
HCT VFR BLD CALC: 27.5 % (ref 36.3–47.1)
HEMOGLOBIN: 8 G/DL (ref 11.9–15.1)
MCH RBC QN AUTO: 24.7 PG (ref 25.2–33.5)
MCHC RBC AUTO-ENTMCNC: 29.1 G/DL (ref 28.4–34.8)
MCV RBC AUTO: 84.9 FL (ref 82.6–102.9)
NRBC AUTOMATED: 0 PER 100 WBC
PDW BLD-RTO: 28.2 % (ref 11.8–14.4)
PLATELET # BLD: 327 K/UL (ref 138–453)
PMV BLD AUTO: 10.2 FL (ref 8.1–13.5)
POTASSIUM SERPL-SCNC: 5.2 MMOL/L (ref 3.7–5.3)
RBC # BLD: 3.24 M/UL (ref 3.95–5.11)
SODIUM BLD-SCNC: 140 MMOL/L (ref 135–144)
WBC # BLD: 7 K/UL (ref 3.5–11.3)

## 2022-02-11 PROCEDURE — P9603 ONE-WAY ALLOW PRORATED MILES: HCPCS

## 2022-02-11 PROCEDURE — 36415 COLL VENOUS BLD VENIPUNCTURE: CPT

## 2022-02-11 PROCEDURE — 80048 BASIC METABOLIC PNL TOTAL CA: CPT

## 2022-02-11 PROCEDURE — 85027 COMPLETE CBC AUTOMATED: CPT

## 2022-02-15 ENCOUNTER — HOSPITAL ENCOUNTER (OUTPATIENT)
Age: 67
Setting detail: SPECIMEN
Discharge: HOME | End: 2022-02-15
Payer: MEDICARE

## 2022-02-15 LAB
ANION GAP SERPL CALCULATED.3IONS-SCNC: 20 MMOL/L (ref 9–17)
BUN BLDV-MCNC: 18 MG/DL (ref 8–23)
BUN/CREAT BLD: ABNORMAL (ref 9–20)
CALCIUM SERPL-MCNC: 8.8 MG/DL (ref 8.6–10.4)
CHLORIDE BLD-SCNC: 102 MMOL/L (ref 98–107)
CO2: 18 MMOL/L (ref 20–31)
CREAT SERPL-MCNC: 0.63 MG/DL (ref 0.5–0.9)
GFR AFRICAN AMERICAN: >60 ML/MIN
GFR NON-AFRICAN AMERICAN: >60 ML/MIN
GFR SERPL CREATININE-BSD FRML MDRD: ABNORMAL ML/MIN/{1.73_M2}
GFR SERPL CREATININE-BSD FRML MDRD: ABNORMAL ML/MIN/{1.73_M2}
GLUCOSE BLD-MCNC: 123 MG/DL (ref 70–99)
HCT VFR BLD CALC: 27.2 % (ref 36.3–47.1)
HEMOGLOBIN: 8 G/DL (ref 11.9–15.1)
MCH RBC QN AUTO: 25.1 PG (ref 25.2–33.5)
MCHC RBC AUTO-ENTMCNC: 29.4 G/DL (ref 28.4–34.8)
MCV RBC AUTO: 85.3 FL (ref 82.6–102.9)
NRBC AUTOMATED: 0 PER 100 WBC
PDW BLD-RTO: 26.5 % (ref 11.8–14.4)
PLATELET # BLD: 294 K/UL (ref 138–453)
PMV BLD AUTO: 10.5 FL (ref 8.1–13.5)
POTASSIUM SERPL-SCNC: 4.1 MMOL/L (ref 3.7–5.3)
RBC # BLD: 3.19 M/UL (ref 3.95–5.11)
SODIUM BLD-SCNC: 140 MMOL/L (ref 135–144)
WBC # BLD: 5.6 K/UL (ref 3.5–11.3)

## 2022-02-15 PROCEDURE — 85027 COMPLETE CBC AUTOMATED: CPT

## 2022-02-15 PROCEDURE — 36415 COLL VENOUS BLD VENIPUNCTURE: CPT

## 2022-02-15 PROCEDURE — 80048 BASIC METABOLIC PNL TOTAL CA: CPT

## 2022-02-15 PROCEDURE — P9603 ONE-WAY ALLOW PRORATED MILES: HCPCS

## 2022-10-25 ENCOUNTER — TRANSCRIBE ORDERS (OUTPATIENT)
Dept: ADMINISTRATIVE | Age: 67
End: 2022-10-25

## 2022-10-25 DIAGNOSIS — M48.061 SPINAL STENOSIS, LUMBAR REGION WITHOUT NEUROGENIC CLAUDICATION: ICD-10-CM

## 2022-10-25 DIAGNOSIS — M54.16 RADICULOPATHY, LUMBAR REGION: Primary | ICD-10-CM

## 2022-10-25 DIAGNOSIS — M51.37 OTHER INTERVERTEBRAL DISC DEGENERATION, LUMBOSACRAL REGION: ICD-10-CM

## 2022-11-21 ENCOUNTER — HOSPITAL ENCOUNTER (OUTPATIENT)
Dept: MRI IMAGING | Age: 67
Discharge: HOME | End: 2022-11-21
Payer: MEDICARE

## 2022-11-21 DIAGNOSIS — M54.16 RADICULOPATHY, LUMBAR REGION: ICD-10-CM

## 2022-11-21 DIAGNOSIS — M51.37 OTHER INTERVERTEBRAL DISC DEGENERATION, LUMBOSACRAL REGION: ICD-10-CM

## 2022-11-21 DIAGNOSIS — M48.061 SPINAL STENOSIS, LUMBAR REGION WITHOUT NEUROGENIC CLAUDICATION: ICD-10-CM

## 2022-11-21 PROCEDURE — 72148 MRI LUMBAR SPINE W/O DYE: CPT

## 2023-01-20 ENCOUNTER — HOSPITAL ENCOUNTER (OUTPATIENT)
Dept: INTERVENTIONAL RADIOLOGY/VASCULAR | Age: 68
Discharge: HOME | End: 2023-01-20
Payer: MEDICARE

## 2023-01-20 VITALS
HEART RATE: 84 BPM | SYSTOLIC BLOOD PRESSURE: 145 MMHG | RESPIRATION RATE: 18 BRPM | WEIGHT: 203 LBS | DIASTOLIC BLOOD PRESSURE: 88 MMHG | BODY MASS INDEX: 32.62 KG/M2 | HEIGHT: 66 IN | OXYGEN SATURATION: 95 % | TEMPERATURE: 98.1 F

## 2023-01-20 DIAGNOSIS — R52 PAIN: ICD-10-CM

## 2023-01-20 PROCEDURE — 64483 NJX AA&/STRD TFRM EPI L/S 1: CPT

## 2023-01-20 PROCEDURE — 2500000003 HC RX 250 WO HCPCS: Performed by: RADIOLOGY

## 2023-01-20 PROCEDURE — 2709999900 IR FLUORO GUIDED NEEDLE PLACEMENT

## 2023-01-20 PROCEDURE — 6360000004 HC RX CONTRAST MEDICATION: Performed by: RADIOLOGY

## 2023-01-20 PROCEDURE — 6360000002 HC RX W HCPCS: Performed by: RADIOLOGY

## 2023-01-20 RX ORDER — ASCORBIC ACID 500 MG
250 TABLET ORAL DAILY
COMMUNITY

## 2023-01-20 RX ORDER — LANOLIN ALCOHOL/MO/W.PET/CERES
5 CREAM (GRAM) TOPICAL DAILY
COMMUNITY

## 2023-01-20 RX ORDER — DEXAMETHASONE SODIUM PHOSPHATE 4 MG/ML
4 INJECTION, SOLUTION INTRA-ARTICULAR; INTRALESIONAL; INTRAMUSCULAR; INTRAVENOUS; SOFT TISSUE ONCE
Status: COMPLETED | OUTPATIENT
Start: 2023-01-20 | End: 2023-01-20

## 2023-01-20 RX ORDER — BUPIVACAINE HYDROCHLORIDE 2.5 MG/ML
5 INJECTION, SOLUTION EPIDURAL; INFILTRATION; INTRACAUDAL ONCE
Status: COMPLETED | OUTPATIENT
Start: 2023-01-20 | End: 2023-01-20

## 2023-01-20 RX ORDER — TRAMADOL HYDROCHLORIDE 50 MG/1
50 TABLET ORAL EVERY 6 HOURS PRN
COMMUNITY

## 2023-01-20 RX ORDER — MAGNESIUM HYDROXIDE/ALUMINUM HYDROXICE/SIMETHICONE 120; 1200; 1200 MG/30ML; MG/30ML; MG/30ML
5 SUSPENSION ORAL EVERY 6 HOURS PRN
COMMUNITY

## 2023-01-20 RX ORDER — NITROFURANTOIN MACROCRYSTALS 100 MG/1
100 CAPSULE ORAL 2 TIMES DAILY
COMMUNITY

## 2023-01-20 RX ORDER — VENLAFAXINE 25 MG/1
25 TABLET ORAL 3 TIMES DAILY
COMMUNITY

## 2023-01-20 RX ORDER — HYDROXYZINE HYDROCHLORIDE 25 MG/1
25 TABLET, FILM COATED ORAL 2 TIMES DAILY PRN
COMMUNITY

## 2023-01-20 RX ORDER — GABAPENTIN 100 MG/1
100 CAPSULE ORAL NIGHTLY
COMMUNITY

## 2023-01-20 RX ORDER — NITROGLYCERIN 0.4 MG/1
0.4 TABLET SUBLINGUAL EVERY 5 MIN PRN
COMMUNITY

## 2023-01-20 RX ADMIN — IOHEXOL 1 ML: 180 INJECTION INTRAVENOUS at 14:09

## 2023-01-20 RX ADMIN — BUPIVACAINE HYDROCHLORIDE 2 ML: 2.5 INJECTION, SOLUTION EPIDURAL; INFILTRATION; INTRACAUDAL; PERINEURAL at 14:09

## 2023-01-20 RX ADMIN — DEXAMETHASONE SODIUM PHOSPHATE 4 MG: 4 INJECTION, SOLUTION INTRAMUSCULAR; INTRAVENOUS at 14:10

## 2023-01-20 ASSESSMENT — PAIN SCALES - GENERAL: PAINLEVEL_OUTOF10: 8

## 2023-01-20 ASSESSMENT — PAIN DESCRIPTION - ORIENTATION: ORIENTATION: MID

## 2023-01-20 ASSESSMENT — PAIN DESCRIPTION - LOCATION: LOCATION: BACK

## 2023-01-20 ASSESSMENT — PAIN DESCRIPTION - DESCRIPTORS: DESCRIPTORS: ACHING;SHARP

## 2023-01-20 NOTE — DISCHARGE INSTRUCTIONS
NERVE BLOCK DISCHARGE INSTRUCTION    1. Take it easy and rest the remainder of the day. 2.  Do not drive for the remainder of the day. 3.  You may use ice on the area of the injection to help alleviate discomfort. Avoid heat for the remainder of the day. 4.  Do not soak in water or use a tub bath or hot tub for the remainder of the day. 5.  You may resume normal eating and drinking. 6. This evening you may resume your pain medications and any other medications you had stopped prior to the injection. 7.  Resume activities starting tomorrow in gradual manner. You may resume physical therapy. 8. Follow up with ordering doctor if you continue to have pain. 9.  If you do have another nerve block ordered, follow instructions below:  Bring someone to drive you home. Nothing to eat or drink 2 hours prior. No blood thinners or aspirin products 5 days prior. 8.  Notify Radiology ([phone]), or go to the emergency room immediately if you develop any of the following symptoms: fever, chills, changes in mental status, severe pain, difficulty breathing, prolonged, severe headache, numbness or weakness in your arms or legs, loss of control of your bladder or bowel, excessive redness, swelling, or drainage from the area of injection.

## 2023-01-20 NOTE — H&P
Formulation and discussion of sedation / procedure plans, risks, benefits, side effects and alternatives with patient and/or responsible adult completed.    Electronically signed by Chavo Quiles MD on 1/20/2023 at 1:47 PM

## 2023-01-20 NOTE — OP NOTE
Department of Radiology  Post Procedure Progress Note    Pre-Procedure Diagnosis:  Lumbar radiculopathy     Procedure Performed:  Epidural block/steroid injection      Anesthesia: local     Findings: successful    Immediate Complications:  None    Estimated Blood Loss: minimal    SEE DICTATED PROCEDURE NOTE FOR COMPLETE DETAILS.       Electronically signed by Sharin Hammans, MD on 1/20/2023 at 2:12 PM

## 2023-01-20 NOTE — PROGRESS NOTES
1301 Patient arrived to [facility] ambulatory for nerve block L5-S1 extraforaminal nerve block. Right hand side  Oriented to room and call light  PT RIGHTS AND RESPONSIBILITIES OFFERED TO PT. She has been NPO, she does not take any blood thinners and and she has transport to bring her back to assisted living. Pedal push and pull are strong and equal.  She states she has numbness in lower back, right leg, and both feet    1425 Patient returned to [facility]. Dressing is clean, dry, intact. Pedal push and pull are strong and equal.  She denies any new numbness. Drink provided    1440 dressing is clean, dry, intact. She denies complaints. Pedal push and pull are strong and equal.  She denies any new numbness. Discharge instructions given and explained and she denies questions.   Discharged ambulatory     __M__ Safety:       (Environmental)  [place] to environment  Ensure ID band is correct and in place/ allergy band as needed  Assess for fall risk  Initiate fall precautions as applicable (fall band, side rails, etc.)  Call light within reach  Bed in low position/ wheels locked    _M___ Pain:       Assess pain level and characteristics  Administer analgesics as ordered  Assess effectiveness of pain management and report to MD as needed    _M___ Knowledge Deficit:  Assess baseline knowledge  Provide teaching at level of understanding  Provide teaching via preferred learning method  Evaluate teaching effectiveness    _M___ Hemodynamic/Respiratory Status:       (Pre and Post Procedure Monitoring)  Assess/Monitor vital signs and LOC  Assess Baseline SpO2 prior to any sedation  Obtain weight/height  Assess vital signs/ LOC until patient meets discharge criteria  Monitor procedure site and notify MD of any issues
25 [facility] Report from [place], [facility] Dennys Amandeep Smith [place] to clarify order: transforaminal right L5-S1.   1406 Procedure started with Dr. Isabell Ji  [phone] [id number] Procedure completed; patient tolerated well. Band aid to lower back; no bleeding noted. 1412 Patient on cart; comfort ensured. 1415 Patient taken to OPN via cart.
56 Pt in specials radiology for extraforaminal nerve root block right L5-S1. Explained procedure to pt and pt verbalizes understanding. Consent signed. 25 Nahum Maloney Report to [facility].
0 = independent

## 2025-08-08 ENCOUNTER — HOSPITAL ENCOUNTER (INPATIENT)
Age: 70
LOS: 3 days | Discharge: HOME | DRG: 378 | End: 2025-08-11
Attending: STUDENT IN AN ORGANIZED HEALTH CARE EDUCATION/TRAINING PROGRAM | Admitting: STUDENT IN AN ORGANIZED HEALTH CARE EDUCATION/TRAINING PROGRAM
Payer: MEDICARE

## 2025-08-08 DIAGNOSIS — E87.6 HYPOKALEMIA: ICD-10-CM

## 2025-08-08 DIAGNOSIS — I48.0 PAROXYSMAL ATRIAL FIBRILLATION (HCC): ICD-10-CM

## 2025-08-08 DIAGNOSIS — D62 ACUTE BLOOD LOSS ANEMIA: ICD-10-CM

## 2025-08-08 DIAGNOSIS — I48.91 ATRIAL FIBRILLATION, UNSPECIFIED TYPE (HCC): Primary | ICD-10-CM

## 2025-08-08 DIAGNOSIS — I48.0 PAF (PAROXYSMAL ATRIAL FIBRILLATION) (HCC): ICD-10-CM

## 2025-08-08 PROBLEM — K26.9 DUODENAL ULCER: Status: ACTIVE | Noted: 2025-08-08

## 2025-08-08 LAB
ANION GAP SERPL CALC-SCNC: 16 MEQ/L (ref 8–16)
BASOPHILS ABSOLUTE: 0.1 THOU/MM3 (ref 0–0.1)
BASOPHILS NFR BLD AUTO: 0.2 %
BUN SERPL-MCNC: 37 MG/DL (ref 8–23)
CALCIUM SERPL-MCNC: 8.1 MG/DL (ref 8.8–10.2)
CHLORIDE SERPL-SCNC: 103 MEQ/L (ref 98–111)
CO2 SERPL-SCNC: 14 MEQ/L (ref 22–29)
CREAT SERPL-MCNC: 1 MG/DL (ref 0.5–0.9)
DEPRECATED RDW RBC AUTO: 47.8 FL (ref 35–45)
EOSINOPHIL NFR BLD AUTO: 0 %
EOSINOPHILS ABSOLUTE: 0 THOU/MM3 (ref 0–0.4)
ERYTHROCYTE [DISTWIDTH] IN BLOOD BY AUTOMATED COUNT: 13.4 % (ref 11.5–14.5)
GFR SERPL CREATININE-BSD FRML MDRD: 60 ML/MIN/1.73M2
GLUCOSE BLD STRIP.AUTO-MCNC: 311 MG/DL (ref 70–108)
GLUCOSE SERPL-MCNC: 321 MG/DL (ref 74–109)
HCT VFR BLD AUTO: 28.2 % (ref 37–47)
HGB BLD-MCNC: 8.9 GM/DL (ref 12–16)
IMM GRANULOCYTES # BLD AUTO: 0.29 THOU/MM3 (ref 0–0.07)
IMM GRANULOCYTES NFR BLD AUTO: 1 %
LACTATE SERPL-SCNC: 4.3 MMOL/L (ref 0.5–2.2)
LYMPHOCYTES ABSOLUTE: 0.9 THOU/MM3 (ref 1–4.8)
LYMPHOCYTES NFR BLD AUTO: 3.2 %
MCH RBC QN AUTO: 31 PG (ref 26–33)
MCHC RBC AUTO-ENTMCNC: 31.6 GM/DL (ref 32.2–35.5)
MCV RBC AUTO: 98.3 FL (ref 81–99)
MONOCYTES ABSOLUTE: 1.1 THOU/MM3 (ref 0.4–1.3)
MONOCYTES NFR BLD AUTO: 3.8 %
NEUTROPHILS ABSOLUTE: 26.1 THOU/MM3 (ref 1.8–7.7)
NEUTROPHILS NFR BLD AUTO: 91.8 %
NRBC BLD AUTO-RTO: 0 /100 WBC
PLATELET # BLD AUTO: 247 THOU/MM3 (ref 130–400)
PMV BLD AUTO: 10.4 FL (ref 9.4–12.4)
POLYCHROMASIA BLD QL SMEAR: ABNORMAL
POTASSIUM SERPL-SCNC: 4.7 MEQ/L (ref 3.5–5.2)
RBC # BLD AUTO: 2.87 MILL/MM3 (ref 4.2–5.4)
SCAN OF BLOOD SMEAR: NORMAL
SODIUM SERPL-SCNC: 133 MEQ/L (ref 135–145)
TOXIC GRANULES BLD QL SMEAR: PRESENT
WBC # BLD AUTO: 28.4 THOU/MM3 (ref 4.8–10.8)

## 2025-08-08 PROCEDURE — 6360000002 HC RX W HCPCS: Performed by: PHYSICIAN ASSISTANT

## 2025-08-08 PROCEDURE — 2500000003 HC RX 250 WO HCPCS: Performed by: PHYSICIAN ASSISTANT

## 2025-08-08 PROCEDURE — 6370000000 HC RX 637 (ALT 250 FOR IP): Performed by: PHYSICIAN ASSISTANT

## 2025-08-08 PROCEDURE — 85025 COMPLETE CBC W/AUTO DIFF WBC: CPT

## 2025-08-08 PROCEDURE — 36415 COLL VENOUS BLD VENIPUNCTURE: CPT

## 2025-08-08 PROCEDURE — 83605 ASSAY OF LACTIC ACID: CPT

## 2025-08-08 PROCEDURE — 80048 BASIC METABOLIC PNL TOTAL CA: CPT

## 2025-08-08 PROCEDURE — 82948 REAGENT STRIP/BLOOD GLUCOSE: CPT

## 2025-08-08 PROCEDURE — 1200000003 HC TELEMETRY R&B

## 2025-08-08 PROCEDURE — 99223 1ST HOSP IP/OBS HIGH 75: CPT | Performed by: PHYSICIAN ASSISTANT

## 2025-08-08 RX ORDER — PANTOPRAZOLE SODIUM 40 MG/10ML
40 INJECTION, POWDER, LYOPHILIZED, FOR SOLUTION INTRAVENOUS 2 TIMES DAILY
Status: DISCONTINUED | OUTPATIENT
Start: 2025-08-08 | End: 2025-08-09

## 2025-08-08 RX ORDER — ACETAMINOPHEN 325 MG/1
650 TABLET ORAL EVERY 6 HOURS PRN
Status: DISCONTINUED | OUTPATIENT
Start: 2025-08-08 | End: 2025-08-11 | Stop reason: HOSPADM

## 2025-08-08 RX ORDER — MIDAZOLAM HYDROCHLORIDE 2 MG/2ML
1 INJECTION, SOLUTION INTRAMUSCULAR; INTRAVENOUS ONCE
Status: DISCONTINUED | OUTPATIENT
Start: 2025-08-08 | End: 2025-08-08

## 2025-08-08 RX ORDER — SODIUM CHLORIDE 0.9 % (FLUSH) 0.9 %
10 SYRINGE (ML) INJECTION PRN
Status: DISCONTINUED | OUTPATIENT
Start: 2025-08-08 | End: 2025-08-11 | Stop reason: HOSPADM

## 2025-08-08 RX ORDER — INSULIN LISPRO 100 [IU]/ML
0-8 INJECTION, SOLUTION INTRAVENOUS; SUBCUTANEOUS
Status: DISCONTINUED | OUTPATIENT
Start: 2025-08-08 | End: 2025-08-09

## 2025-08-08 RX ORDER — QUETIAPINE FUMARATE 100 MG/1
100 TABLET, FILM COATED ORAL ONCE
Status: COMPLETED | OUTPATIENT
Start: 2025-08-08 | End: 2025-08-08

## 2025-08-08 RX ORDER — GLUCAGON 1 MG/ML
1 KIT INJECTION PRN
Status: DISCONTINUED | OUTPATIENT
Start: 2025-08-08 | End: 2025-08-11 | Stop reason: HOSPADM

## 2025-08-08 RX ORDER — MAGNESIUM SULFATE IN WATER 40 MG/ML
2000 INJECTION, SOLUTION INTRAVENOUS PRN
Status: DISCONTINUED | OUTPATIENT
Start: 2025-08-08 | End: 2025-08-11 | Stop reason: HOSPADM

## 2025-08-08 RX ORDER — ONDANSETRON 2 MG/ML
4 INJECTION INTRAMUSCULAR; INTRAVENOUS EVERY 6 HOURS PRN
Status: DISCONTINUED | OUTPATIENT
Start: 2025-08-08 | End: 2025-08-11 | Stop reason: HOSPADM

## 2025-08-08 RX ORDER — SODIUM CHLORIDE 0.9 % (FLUSH) 0.9 %
10 SYRINGE (ML) INJECTION EVERY 12 HOURS SCHEDULED
Status: DISCONTINUED | OUTPATIENT
Start: 2025-08-08 | End: 2025-08-11 | Stop reason: HOSPADM

## 2025-08-08 RX ORDER — ONDANSETRON 4 MG/1
4 TABLET, ORALLY DISINTEGRATING ORAL EVERY 8 HOURS PRN
Status: DISCONTINUED | OUTPATIENT
Start: 2025-08-08 | End: 2025-08-11 | Stop reason: HOSPADM

## 2025-08-08 RX ORDER — POTASSIUM CHLORIDE 7.45 MG/ML
10 INJECTION INTRAVENOUS PRN
Status: DISCONTINUED | OUTPATIENT
Start: 2025-08-08 | End: 2025-08-11 | Stop reason: HOSPADM

## 2025-08-08 RX ORDER — POLYETHYLENE GLYCOL 3350 17 G/17G
17 POWDER, FOR SOLUTION ORAL DAILY PRN
Status: DISCONTINUED | OUTPATIENT
Start: 2025-08-08 | End: 2025-08-11 | Stop reason: HOSPADM

## 2025-08-08 RX ORDER — ACETAMINOPHEN 650 MG/1
650 SUPPOSITORY RECTAL EVERY 6 HOURS PRN
Status: DISCONTINUED | OUTPATIENT
Start: 2025-08-08 | End: 2025-08-11 | Stop reason: HOSPADM

## 2025-08-08 RX ORDER — METOPROLOL TARTRATE 1 MG/ML
2.5 INJECTION, SOLUTION INTRAVENOUS EVERY 6 HOURS
Status: DISCONTINUED | OUTPATIENT
Start: 2025-08-08 | End: 2025-08-09

## 2025-08-08 RX ORDER — SODIUM CHLORIDE 9 MG/ML
INJECTION, SOLUTION INTRAVENOUS PRN
Status: DISCONTINUED | OUTPATIENT
Start: 2025-08-08 | End: 2025-08-11 | Stop reason: HOSPADM

## 2025-08-08 RX ORDER — POTASSIUM CHLORIDE 1500 MG/1
40 TABLET, EXTENDED RELEASE ORAL PRN
Status: DISCONTINUED | OUTPATIENT
Start: 2025-08-08 | End: 2025-08-11 | Stop reason: HOSPADM

## 2025-08-08 RX ORDER — DEXTROSE MONOHYDRATE 100 MG/ML
INJECTION, SOLUTION INTRAVENOUS CONTINUOUS PRN
Status: DISCONTINUED | OUTPATIENT
Start: 2025-08-08 | End: 2025-08-11 | Stop reason: HOSPADM

## 2025-08-08 RX ORDER — HYDROXYZINE HYDROCHLORIDE 25 MG/1
25 TABLET, FILM COATED ORAL ONCE
Status: COMPLETED | OUTPATIENT
Start: 2025-08-08 | End: 2025-08-08

## 2025-08-08 RX ORDER — DIPHENHYDRAMINE HYDROCHLORIDE 50 MG/ML
50 INJECTION, SOLUTION INTRAMUSCULAR; INTRAVENOUS ONCE
Status: DISCONTINUED | OUTPATIENT
Start: 2025-08-08 | End: 2025-08-08

## 2025-08-08 RX ADMIN — QUETIAPINE FUMARATE 100 MG: 100 TABLET ORAL at 21:06

## 2025-08-08 RX ADMIN — INSULIN LISPRO 6 UNITS: 100 INJECTION, SOLUTION INTRAVENOUS; SUBCUTANEOUS at 20:35

## 2025-08-08 RX ADMIN — SODIUM CHLORIDE, PRESERVATIVE FREE 10 ML: 5 INJECTION INTRAVENOUS at 20:05

## 2025-08-08 RX ADMIN — HYDROXYZINE HYDROCHLORIDE 25 MG: 25 TABLET ORAL at 22:10

## 2025-08-08 RX ADMIN — PANTOPRAZOLE SODIUM 40 MG: 40 INJECTION, POWDER, LYOPHILIZED, FOR SOLUTION INTRAVENOUS at 21:07

## 2025-08-08 RX ADMIN — METOPROLOL TARTRATE 2.5 MG: 1 INJECTION, SOLUTION INTRAVENOUS at 20:05

## 2025-08-08 RX ADMIN — Medication 6 MG: at 20:33

## 2025-08-08 ASSESSMENT — PAIN SCALES - GENERAL: PAINLEVEL_OUTOF10: 0

## 2025-08-09 ENCOUNTER — APPOINTMENT (OUTPATIENT)
Dept: INTERVENTIONAL RADIOLOGY/VASCULAR | Age: 70
DRG: 378 | End: 2025-08-09
Attending: STUDENT IN AN ORGANIZED HEALTH CARE EDUCATION/TRAINING PROGRAM
Payer: MEDICARE

## 2025-08-09 PROBLEM — Z86.73 HISTORY OF TIA (TRANSIENT ISCHEMIC ATTACK): Status: ACTIVE | Noted: 2025-08-09

## 2025-08-09 PROBLEM — D62 ACUTE BLOOD LOSS ANEMIA: Status: ACTIVE | Noted: 2025-08-09

## 2025-08-09 PROBLEM — E87.20 METABOLIC ACIDOSIS: Status: ACTIVE | Noted: 2025-08-09

## 2025-08-09 PROBLEM — E87.20 LACTIC ACIDOSIS: Status: ACTIVE | Noted: 2025-08-09

## 2025-08-09 PROBLEM — G93.41 ACUTE METABOLIC ENCEPHALOPATHY: Status: ACTIVE | Noted: 2025-08-09

## 2025-08-09 PROBLEM — I48.0 PAF (PAROXYSMAL ATRIAL FIBRILLATION) (HCC): Status: ACTIVE | Noted: 2025-08-09

## 2025-08-09 LAB
ABO GROUP BLD: NORMAL
ANION GAP SERPL CALC-SCNC: 13 MEQ/L (ref 8–16)
BACTERIA: ABNORMAL
BASOPHILS ABSOLUTE: 0.1 THOU/MM3 (ref 0–0.1)
BASOPHILS NFR BLD AUTO: 0.3 %
BILIRUB UR QL STRIP: NEGATIVE
BUN SERPL-MCNC: 33 MG/DL (ref 8–23)
CALCIUM SERPL-MCNC: 7.6 MG/DL (ref 8.8–10.2)
CASTS #/AREA URNS LPF: ABNORMAL /LPF
CASTS #/AREA URNS LPF: ABNORMAL /LPF
CHARACTER UR: CLEAR
CHARCOAL URNS QL MICRO: ABNORMAL
CHLORIDE SERPL-SCNC: 107 MEQ/L (ref 98–111)
CO2 SERPL-SCNC: 19 MEQ/L (ref 22–29)
COLOR UR: YELLOW
CREAT SERPL-MCNC: 0.9 MG/DL (ref 0.5–0.9)
CRYSTALS URNS QL MICRO: ABNORMAL
DEPRECATED RDW RBC AUTO: 45.1 FL (ref 35–45)
EKG ATRIAL RATE: 104 BPM
EKG P AXIS: -100 DEGREES
EKG P-R INTERVAL: 160 MS
EKG Q-T INTERVAL: 306 MS
EKG QRS DURATION: 76 MS
EKG QTC CALCULATION (BAZETT): 402 MS
EKG T AXIS: 137 DEGREES
EKG VENTRICULAR RATE: 104 BPM
EOSINOPHIL NFR BLD AUTO: 0.3 %
EOSINOPHILS ABSOLUTE: 0.1 THOU/MM3 (ref 0–0.4)
EPITHELIAL CELLS, UA: ABNORMAL /HPF
ERYTHROCYTE [DISTWIDTH] IN BLOOD BY AUTOMATED COUNT: 13.8 % (ref 11.5–14.5)
GFR SERPL CREATININE-BSD FRML MDRD: 69 ML/MIN/1.73M2
GLUCOSE BLD STRIP.AUTO-MCNC: 126 MG/DL (ref 70–108)
GLUCOSE BLD STRIP.AUTO-MCNC: 164 MG/DL (ref 70–108)
GLUCOSE BLD STRIP.AUTO-MCNC: 170 MG/DL (ref 70–108)
GLUCOSE BLD STRIP.AUTO-MCNC: 237 MG/DL (ref 70–108)
GLUCOSE BLD STRIP.AUTO-MCNC: 241 MG/DL (ref 70–108)
GLUCOSE SERPL-MCNC: 212 MG/DL (ref 74–109)
GLUCOSE UR QL STRIP.AUTO: 500 MG/DL
HCT VFR BLD AUTO: 19.7 % (ref 37–47)
HCT VFR BLD AUTO: 20.2 % (ref 37–47)
HCT VFR BLD AUTO: 24.3 % (ref 37–47)
HGB BLD-MCNC: 6.5 GM/DL (ref 12–16)
HGB BLD-MCNC: 6.6 GM/DL (ref 12–16)
HGB BLD-MCNC: 8 GM/DL (ref 12–16)
HGB UR QL STRIP.AUTO: ABNORMAL
IAT IGG-SP REAG SERPL QL: NORMAL
IMM GRANULOCYTES # BLD AUTO: 0.15 THOU/MM3 (ref 0–0.07)
IMM GRANULOCYTES NFR BLD AUTO: 0.7 %
KETONES UR QL STRIP.AUTO: NEGATIVE
LACTATE SERPL-SCNC: 2 MMOL/L (ref 0.5–2.2)
LACTATE SERPL-SCNC: 2.6 MMOL/L (ref 0.5–2.2)
LACTATE SERPL-SCNC: 3.9 MMOL/L (ref 0.5–2.2)
LEUKOCYTE ESTERASE UR QL STRIP.AUTO: ABNORMAL
LYMPHOCYTES ABSOLUTE: 3.1 THOU/MM3 (ref 1–4.8)
LYMPHOCYTES NFR BLD AUTO: 14.5 %
MCH RBC QN AUTO: 30.5 PG (ref 26–33)
MCHC RBC AUTO-ENTMCNC: 33 GM/DL (ref 32.2–35.5)
MCV RBC AUTO: 92.5 FL (ref 81–99)
MONOCYTES ABSOLUTE: 1.2 THOU/MM3 (ref 0.4–1.3)
MONOCYTES NFR BLD AUTO: 5.4 %
NEUTROPHILS ABSOLUTE: 16.9 THOU/MM3 (ref 1.8–7.7)
NEUTROPHILS NFR BLD AUTO: 78.8 %
NITRITE UR QL STRIP.AUTO: NEGATIVE
NRBC BLD AUTO-RTO: 0 /100 WBC
PH UR STRIP.AUTO: 5.5 [PH] (ref 5–9)
PLATELET # BLD AUTO: 209 THOU/MM3 (ref 130–400)
PMV BLD AUTO: 10.4 FL (ref 9.4–12.4)
POTASSIUM SERPL-SCNC: 3.8 MEQ/L (ref 3.5–5.2)
PROT UR STRIP.AUTO-MCNC: NEGATIVE MG/DL
RBC # BLD AUTO: 2.13 MILL/MM3 (ref 4.2–5.4)
RBC #/AREA URNS HPF: ABNORMAL /HPF
RENAL EPI CELLS #/AREA URNS HPF: ABNORMAL /[HPF]
RH BLD: NORMAL
SODIUM SERPL-SCNC: 139 MEQ/L (ref 135–145)
SPECIFIC GRAVITY UA: 1.02 (ref 1–1.03)
UROBILINOGEN, URINE: 0.2 EU/DL (ref 0–1)
WBC # BLD AUTO: 21.4 THOU/MM3 (ref 4.8–10.8)
WBC #/AREA URNS HPF: ABNORMAL /HPF
YEAST LIKE FUNGI URNS QL MICRO: ABNORMAL

## 2025-08-09 PROCEDURE — 2580000003 HC RX 258: Performed by: PHYSICIAN ASSISTANT

## 2025-08-09 PROCEDURE — 30233N1 TRANSFUSION OF NONAUTOLOGOUS RED BLOOD CELLS INTO PERIPHERAL VEIN, PERCUTANEOUS APPROACH: ICD-10-PCS | Performed by: PHYSICIAN ASSISTANT

## 2025-08-09 PROCEDURE — 2500000003 HC RX 250 WO HCPCS: Performed by: PHYSICIAN ASSISTANT

## 2025-08-09 PROCEDURE — 36430 TRANSFUSION BLD/BLD COMPNT: CPT

## 2025-08-09 PROCEDURE — 86885 COOMBS TEST INDIRECT QUAL: CPT

## 2025-08-09 PROCEDURE — 36415 COLL VENOUS BLD VENIPUNCTURE: CPT

## 2025-08-09 PROCEDURE — 93005 ELECTROCARDIOGRAM TRACING: CPT | Performed by: PHYSICIAN ASSISTANT

## 2025-08-09 PROCEDURE — 6370000000 HC RX 637 (ALT 250 FOR IP): Performed by: PHYSICIAN ASSISTANT

## 2025-08-09 PROCEDURE — 2580000003 HC RX 258: Performed by: INTERNAL MEDICINE

## 2025-08-09 PROCEDURE — 80048 BASIC METABOLIC PNL TOTAL CA: CPT

## 2025-08-09 PROCEDURE — 83605 ASSAY OF LACTIC ACID: CPT

## 2025-08-09 PROCEDURE — 86900 BLOOD TYPING SEROLOGIC ABO: CPT

## 2025-08-09 PROCEDURE — 99233 SBSQ HOSP IP/OBS HIGH 50: CPT | Performed by: PHYSICIAN ASSISTANT

## 2025-08-09 PROCEDURE — 86923 COMPATIBILITY TEST ELECTRIC: CPT

## 2025-08-09 PROCEDURE — 82948 REAGENT STRIP/BLOOD GLUCOSE: CPT

## 2025-08-09 PROCEDURE — 81001 URINALYSIS AUTO W/SCOPE: CPT

## 2025-08-09 PROCEDURE — P9016 RBC LEUKOCYTES REDUCED: HCPCS

## 2025-08-09 PROCEDURE — 85025 COMPLETE CBC W/AUTO DIFF WBC: CPT

## 2025-08-09 PROCEDURE — 86901 BLOOD TYPING SEROLOGIC RH(D): CPT

## 2025-08-09 PROCEDURE — 93010 ELECTROCARDIOGRAM REPORT: CPT | Performed by: INTERNAL MEDICINE

## 2025-08-09 PROCEDURE — 85014 HEMATOCRIT: CPT

## 2025-08-09 PROCEDURE — 6360000002 HC RX W HCPCS: Performed by: INTERNAL MEDICINE

## 2025-08-09 PROCEDURE — 85018 HEMOGLOBIN: CPT

## 2025-08-09 PROCEDURE — 1200000003 HC TELEMETRY R&B

## 2025-08-09 PROCEDURE — 6360000002 HC RX W HCPCS: Performed by: PHYSICIAN ASSISTANT

## 2025-08-09 RX ORDER — MAGNESIUM HYDROXIDE/ALUMINUM HYDROXICE/SIMETHICONE 120; 1200; 1200 MG/30ML; MG/30ML; MG/30ML
10 SUSPENSION ORAL EVERY 4 HOURS PRN
Status: DISCONTINUED | OUTPATIENT
Start: 2025-08-09 | End: 2025-08-11 | Stop reason: HOSPADM

## 2025-08-09 RX ORDER — HYDROXYZINE HYDROCHLORIDE 25 MG/1
25 TABLET, FILM COATED ORAL 2 TIMES DAILY PRN
Status: DISCONTINUED | OUTPATIENT
Start: 2025-08-09 | End: 2025-08-11 | Stop reason: HOSPADM

## 2025-08-09 RX ORDER — THIAMINE HYDROCHLORIDE 100 MG/ML
200 INJECTION, SOLUTION INTRAMUSCULAR; INTRAVENOUS ONCE
Status: COMPLETED | OUTPATIENT
Start: 2025-08-09 | End: 2025-08-09

## 2025-08-09 RX ORDER — FERROUS GLUCONATE 324(38)MG
324 TABLET ORAL
Status: DISCONTINUED | OUTPATIENT
Start: 2025-08-09 | End: 2025-08-11 | Stop reason: HOSPADM

## 2025-08-09 RX ORDER — PANTOPRAZOLE SODIUM 40 MG/10ML
40 INJECTION, POWDER, LYOPHILIZED, FOR SOLUTION INTRAVENOUS ONCE
Status: COMPLETED | OUTPATIENT
Start: 2025-08-09 | End: 2025-08-09

## 2025-08-09 RX ORDER — FLUTICASONE PROPIONATE 50 MCG
1 SPRAY, SUSPENSION (ML) NASAL DAILY PRN
Status: DISCONTINUED | OUTPATIENT
Start: 2025-08-09 | End: 2025-08-11 | Stop reason: HOSPADM

## 2025-08-09 RX ORDER — GABAPENTIN 100 MG/1
100 CAPSULE ORAL NIGHTLY
Status: DISCONTINUED | OUTPATIENT
Start: 2025-08-09 | End: 2025-08-11 | Stop reason: HOSPADM

## 2025-08-09 RX ORDER — MAGNESIUM HYDROXIDE/ALUMINUM HYDROXICE/SIMETHICONE 120; 1200; 1200 MG/30ML; MG/30ML; MG/30ML
10 SUSPENSION ORAL EVERY 4 HOURS PRN
COMMUNITY

## 2025-08-09 RX ORDER — QUETIAPINE FUMARATE 25 MG/1
50 TABLET, FILM COATED ORAL NIGHTLY
Status: DISCONTINUED | OUTPATIENT
Start: 2025-08-09 | End: 2025-08-09

## 2025-08-09 RX ORDER — SERTRALINE HYDROCHLORIDE 100 MG/1
100 TABLET, FILM COATED ORAL DAILY
Status: DISCONTINUED | OUTPATIENT
Start: 2025-08-09 | End: 2025-08-09

## 2025-08-09 RX ORDER — NITROGLYCERIN 0.4 MG/1
0.4 TABLET SUBLINGUAL EVERY 5 MIN PRN
Status: DISCONTINUED | OUTPATIENT
Start: 2025-08-09 | End: 2025-08-11 | Stop reason: HOSPADM

## 2025-08-09 RX ORDER — INSULIN GLARGINE 100 [IU]/ML
20 INJECTION, SOLUTION SUBCUTANEOUS EVERY MORNING
Status: DISCONTINUED | OUTPATIENT
Start: 2025-08-09 | End: 2025-08-09

## 2025-08-09 RX ORDER — POLYVINYL ALCOHOL 14 MG/ML
2 SOLUTION/ DROPS OPHTHALMIC 2 TIMES DAILY
Status: DISCONTINUED | OUTPATIENT
Start: 2025-08-09 | End: 2025-08-11 | Stop reason: HOSPADM

## 2025-08-09 RX ORDER — UREA 8.5 G/85G
CREAM TOPICAL NIGHTLY
COMMUNITY

## 2025-08-09 RX ORDER — ACETAMINOPHEN 325 MG/1
650 TABLET ORAL EVERY 8 HOURS PRN
COMMUNITY

## 2025-08-09 RX ORDER — VENLAFAXINE HYDROCHLORIDE 150 MG/1
150 CAPSULE, EXTENDED RELEASE ORAL DAILY
COMMUNITY

## 2025-08-09 RX ORDER — TRAZODONE HYDROCHLORIDE 50 MG/1
50 TABLET ORAL NIGHTLY
Status: DISCONTINUED | OUTPATIENT
Start: 2025-08-09 | End: 2025-08-11 | Stop reason: HOSPADM

## 2025-08-09 RX ORDER — INSULIN GLARGINE 100 [IU]/ML
10 INJECTION, SOLUTION SUBCUTANEOUS EVERY MORNING
Status: DISCONTINUED | OUTPATIENT
Start: 2025-08-09 | End: 2025-08-10

## 2025-08-09 RX ORDER — SODIUM CHLORIDE, SODIUM LACTATE, POTASSIUM CHLORIDE, CALCIUM CHLORIDE 600; 310; 30; 20 MG/100ML; MG/100ML; MG/100ML; MG/100ML
INJECTION, SOLUTION INTRAVENOUS CONTINUOUS
Status: DISCONTINUED | OUTPATIENT
Start: 2025-08-09 | End: 2025-08-10

## 2025-08-09 RX ORDER — VENLAFAXINE 50 MG/1
25 TABLET ORAL 3 TIMES DAILY
Status: DISCONTINUED | OUTPATIENT
Start: 2025-08-09 | End: 2025-08-09

## 2025-08-09 RX ORDER — MINERAL OIL/HYDROPHIL PETROLAT
OINTMENT (GRAM) TOPICAL DAILY
COMMUNITY

## 2025-08-09 RX ORDER — VENLAFAXINE HYDROCHLORIDE 150 MG/1
150 CAPSULE, EXTENDED RELEASE ORAL DAILY
Status: DISCONTINUED | OUTPATIENT
Start: 2025-08-09 | End: 2025-08-11 | Stop reason: HOSPADM

## 2025-08-09 RX ORDER — CHOLESTYRAMINE 4 G/9G
1 POWDER, FOR SUSPENSION ORAL 2 TIMES DAILY PRN
COMMUNITY

## 2025-08-09 RX ORDER — LANOLIN ALCOHOL/MO/W.PET/CERES
1000 CREAM (GRAM) TOPICAL DAILY
COMMUNITY

## 2025-08-09 RX ORDER — MENTHOL AND ZINC OXIDE .44; 20.625 G/100G; G/100G
OINTMENT TOPICAL PRN
COMMUNITY

## 2025-08-09 RX ORDER — FERROUS GLUCONATE 324(38)MG
324 TABLET ORAL
COMMUNITY

## 2025-08-09 RX ORDER — SODIUM CHLORIDE, SODIUM LACTATE, POTASSIUM CHLORIDE, AND CALCIUM CHLORIDE .6; .31; .03; .02 G/100ML; G/100ML; G/100ML; G/100ML
1000 INJECTION, SOLUTION INTRAVENOUS ONCE
Status: COMPLETED | OUTPATIENT
Start: 2025-08-09 | End: 2025-08-09

## 2025-08-09 RX ORDER — MENTHOL 40 MG/ML
GEL TOPICAL EVERY 4 HOURS PRN
COMMUNITY

## 2025-08-09 RX ORDER — ACETAMINOPHEN 500 MG
500 TABLET ORAL 2 TIMES DAILY
COMMUNITY

## 2025-08-09 RX ORDER — ATORVASTATIN CALCIUM 80 MG/1
80 TABLET, FILM COATED ORAL NIGHTLY
Status: DISCONTINUED | OUTPATIENT
Start: 2025-08-09 | End: 2025-08-09

## 2025-08-09 RX ORDER — INSULIN LISPRO 100 [IU]/ML
0-4 INJECTION, SOLUTION INTRAVENOUS; SUBCUTANEOUS 4 TIMES DAILY
Status: DISCONTINUED | OUTPATIENT
Start: 2025-08-09 | End: 2025-08-11 | Stop reason: HOSPADM

## 2025-08-09 RX ORDER — TRAMADOL HYDROCHLORIDE 50 MG/1
50 TABLET ORAL 2 TIMES DAILY
Status: DISCONTINUED | OUTPATIENT
Start: 2025-08-09 | End: 2025-08-11 | Stop reason: HOSPADM

## 2025-08-09 RX ORDER — THIAMINE HYDROCHLORIDE 100 MG/ML
100 INJECTION, SOLUTION INTRAMUSCULAR; INTRAVENOUS DAILY
Status: DISCONTINUED | OUTPATIENT
Start: 2025-08-10 | End: 2025-08-09

## 2025-08-09 RX ORDER — INSULIN GLARGINE 100 [IU]/ML
20 INJECTION, SOLUTION SUBCUTANEOUS EVERY MORNING
COMMUNITY

## 2025-08-09 RX ORDER — CHOLESTYRAMINE 4 G/9G
1 POWDER, FOR SUSPENSION ORAL 2 TIMES DAILY PRN
Status: DISCONTINUED | OUTPATIENT
Start: 2025-08-09 | End: 2025-08-11 | Stop reason: HOSPADM

## 2025-08-09 RX ORDER — SODIUM CHLORIDE 9 MG/ML
INJECTION, SOLUTION INTRAVENOUS PRN
Status: DISCONTINUED | OUTPATIENT
Start: 2025-08-09 | End: 2025-08-11 | Stop reason: HOSPADM

## 2025-08-09 RX ORDER — METOPROLOL TARTRATE 25 MG/1
25 TABLET, FILM COATED ORAL 2 TIMES DAILY
Status: DISCONTINUED | OUTPATIENT
Start: 2025-08-09 | End: 2025-08-11 | Stop reason: HOSPADM

## 2025-08-09 RX ADMIN — TRAZODONE HYDROCHLORIDE 50 MG: 50 TABLET ORAL at 21:11

## 2025-08-09 RX ADMIN — TRAMADOL HYDROCHLORIDE 50 MG: 50 TABLET, COATED ORAL at 21:11

## 2025-08-09 RX ADMIN — TRAMADOL HYDROCHLORIDE 50 MG: 50 TABLET, COATED ORAL at 11:59

## 2025-08-09 RX ADMIN — SODIUM CHLORIDE, SODIUM LACTATE, POTASSIUM CHLORIDE, AND CALCIUM CHLORIDE: .6; .31; .03; .02 INJECTION, SOLUTION INTRAVENOUS at 11:09

## 2025-08-09 RX ADMIN — PANTOPRAZOLE SODIUM 8 MG/HR: 40 INJECTION, POWDER, FOR SOLUTION INTRAVENOUS at 11:09

## 2025-08-09 RX ADMIN — SODIUM CHLORIDE, SODIUM LACTATE, POTASSIUM CHLORIDE, AND CALCIUM CHLORIDE 1000 ML: .6; .31; .03; .02 INJECTION, SOLUTION INTRAVENOUS at 00:06

## 2025-08-09 RX ADMIN — POLYVINYL ALCOHOL 2 DROP: 1.4 SOLUTION/ DROPS OPHTHALMIC at 12:02

## 2025-08-09 RX ADMIN — THIAMINE HYDROCHLORIDE 200 MG: 100 INJECTION, SOLUTION INTRAMUSCULAR; INTRAVENOUS at 07:56

## 2025-08-09 RX ADMIN — INSULIN LISPRO 1 UNITS: 100 INJECTION, SOLUTION INTRAVENOUS; SUBCUTANEOUS at 13:27

## 2025-08-09 RX ADMIN — POLYVINYL ALCOHOL 2 DROP: 1.4 SOLUTION/ DROPS OPHTHALMIC at 21:12

## 2025-08-09 RX ADMIN — PANTOPRAZOLE SODIUM 40 MG: 40 INJECTION, POWDER, LYOPHILIZED, FOR SOLUTION INTRAVENOUS at 11:13

## 2025-08-09 RX ADMIN — Medication 6 MG: at 21:11

## 2025-08-09 RX ADMIN — PANTOPRAZOLE SODIUM 40 MG: 40 INJECTION, POWDER, LYOPHILIZED, FOR SOLUTION INTRAVENOUS at 09:12

## 2025-08-09 RX ADMIN — METOPROLOL TARTRATE 25 MG: 25 TABLET, FILM COATED ORAL at 21:11

## 2025-08-09 RX ADMIN — SODIUM CHLORIDE, PRESERVATIVE FREE 10 ML: 5 INJECTION INTRAVENOUS at 21:12

## 2025-08-09 RX ADMIN — GABAPENTIN 100 MG: 100 CAPSULE ORAL at 21:11

## 2025-08-09 RX ADMIN — SODIUM CHLORIDE, SODIUM LACTATE, POTASSIUM CHLORIDE, AND CALCIUM CHLORIDE 1000 ML: .6; .31; .03; .02 INJECTION, SOLUTION INTRAVENOUS at 02:21

## 2025-08-09 RX ADMIN — SODIUM CHLORIDE, SODIUM LACTATE, POTASSIUM CHLORIDE, AND CALCIUM CHLORIDE: .6; .31; .03; .02 INJECTION, SOLUTION INTRAVENOUS at 19:32

## 2025-08-09 RX ADMIN — VENLAFAXINE HYDROCHLORIDE 150 MG: 150 CAPSULE, EXTENDED RELEASE ORAL at 12:02

## 2025-08-09 RX ADMIN — HYDROXYZINE HYDROCHLORIDE 25 MG: 25 TABLET ORAL at 21:11

## 2025-08-09 RX ADMIN — PANTOPRAZOLE SODIUM 8 MG/HR: 40 INJECTION, POWDER, FOR SOLUTION INTRAVENOUS at 21:10

## 2025-08-09 RX ADMIN — INSULIN LISPRO 1 UNITS: 100 INJECTION, SOLUTION INTRAVENOUS; SUBCUTANEOUS at 09:12

## 2025-08-09 ASSESSMENT — PAIN DESCRIPTION - ORIENTATION
ORIENTATION: MID;LOWER
ORIENTATION: RIGHT

## 2025-08-09 ASSESSMENT — PAIN DESCRIPTION - FREQUENCY: FREQUENCY: INTERMITTENT

## 2025-08-09 ASSESSMENT — PAIN DESCRIPTION - DESCRIPTORS
DESCRIPTORS: ACHING
DESCRIPTORS: ACHING

## 2025-08-09 ASSESSMENT — PAIN SCALES - GENERAL
PAINLEVEL_OUTOF10: 5
PAINLEVEL_OUTOF10: 0
PAINLEVEL_OUTOF10: 0
PAINLEVEL_OUTOF10: 5
PAINLEVEL_OUTOF10: 5
PAINLEVEL_OUTOF10: 0

## 2025-08-09 ASSESSMENT — PAIN - FUNCTIONAL ASSESSMENT
PAIN_FUNCTIONAL_ASSESSMENT: 0-10
PAIN_FUNCTIONAL_ASSESSMENT: ACTIVITIES ARE NOT PREVENTED
PAIN_FUNCTIONAL_ASSESSMENT: 0-10

## 2025-08-09 ASSESSMENT — PAIN DESCRIPTION - LOCATION
LOCATION: BACK
LOCATION: KNEE

## 2025-08-09 ASSESSMENT — PAIN DESCRIPTION - ONSET: ONSET: ON-GOING

## 2025-08-09 ASSESSMENT — PAIN DESCRIPTION - PAIN TYPE: TYPE: CHRONIC PAIN

## 2025-08-10 LAB
ANION GAP SERPL CALC-SCNC: 9 MEQ/L (ref 8–16)
BASOPHILS ABSOLUTE: 0.1 THOU/MM3 (ref 0–0.1)
BASOPHILS NFR BLD AUTO: 0.5 %
BUN SERPL-MCNC: 22 MG/DL (ref 8–23)
CALCIUM SERPL-MCNC: 7.9 MG/DL (ref 8.8–10.2)
CHLORIDE SERPL-SCNC: 107 MEQ/L (ref 98–111)
CO2 SERPL-SCNC: 21 MEQ/L (ref 22–29)
CREAT SERPL-MCNC: 0.8 MG/DL (ref 0.5–0.9)
DEPRECATED MEAN GLUCOSE BLD GHB EST-ACNC: 168 MG/DL (ref 70–126)
DEPRECATED RDW RBC AUTO: 51.2 FL (ref 35–45)
EOSINOPHIL NFR BLD AUTO: 2.6 %
EOSINOPHILS ABSOLUTE: 0.4 THOU/MM3 (ref 0–0.4)
ERYTHROCYTE [DISTWIDTH] IN BLOOD BY AUTOMATED COUNT: 15.3 % (ref 11.5–14.5)
GFR SERPL CREATININE-BSD FRML MDRD: 79 ML/MIN/1.73M2
GLUCOSE BLD STRIP.AUTO-MCNC: 109 MG/DL (ref 70–108)
GLUCOSE BLD STRIP.AUTO-MCNC: 129 MG/DL (ref 70–108)
GLUCOSE BLD STRIP.AUTO-MCNC: 150 MG/DL (ref 70–108)
GLUCOSE BLD STRIP.AUTO-MCNC: 249 MG/DL (ref 70–108)
GLUCOSE SERPL-MCNC: 128 MG/DL (ref 74–109)
HBA1C MFR BLD HPLC: 7.6 % (ref 4–6)
HCT VFR BLD AUTO: 24.2 % (ref 37–47)
HCT VFR BLD AUTO: 24.5 % (ref 37–47)
HCT VFR BLD AUTO: 24.6 % (ref 37–47)
HGB BLD-MCNC: 7.8 GM/DL (ref 12–16)
HGB BLD-MCNC: 7.9 GM/DL (ref 12–16)
HGB BLD-MCNC: 7.9 GM/DL (ref 12–16)
IMM GRANULOCYTES # BLD AUTO: 0.2 THOU/MM3 (ref 0–0.07)
IMM GRANULOCYTES NFR BLD AUTO: 1.3 %
LYMPHOCYTES ABSOLUTE: 2.1 THOU/MM3 (ref 1–4.8)
LYMPHOCYTES NFR BLD AUTO: 13.8 %
MAGNESIUM SERPL-MCNC: 1.8 MG/DL (ref 1.6–2.6)
MCH RBC QN AUTO: 30.4 PG (ref 26–33)
MCHC RBC AUTO-ENTMCNC: 32.2 GM/DL (ref 32.2–35.5)
MCV RBC AUTO: 94.2 FL (ref 81–99)
MONOCYTES ABSOLUTE: 0.9 THOU/MM3 (ref 0.4–1.3)
MONOCYTES NFR BLD AUTO: 5.8 %
NEUTROPHILS ABSOLUTE: 11.7 THOU/MM3 (ref 1.8–7.7)
NEUTROPHILS NFR BLD AUTO: 76 %
NRBC BLD AUTO-RTO: 0 /100 WBC
PLATELET # BLD AUTO: 186 THOU/MM3 (ref 130–400)
PMV BLD AUTO: 10.4 FL (ref 9.4–12.4)
POTASSIUM SERPL-SCNC: 3.4 MEQ/L (ref 3.5–5.2)
RBC # BLD AUTO: 2.6 MILL/MM3 (ref 4.2–5.4)
SODIUM SERPL-SCNC: 137 MEQ/L (ref 135–145)
TSH SERPL DL<=0.05 MIU/L-ACNC: 1.78 UIU/ML (ref 0.27–4.2)
WBC # BLD AUTO: 15.4 THOU/MM3 (ref 4.8–10.8)

## 2025-08-10 PROCEDURE — 6370000000 HC RX 637 (ALT 250 FOR IP): Performed by: PHYSICIAN ASSISTANT

## 2025-08-10 PROCEDURE — 85014 HEMATOCRIT: CPT

## 2025-08-10 PROCEDURE — 2580000003 HC RX 258: Performed by: PHYSICIAN ASSISTANT

## 2025-08-10 PROCEDURE — 2580000003 HC RX 258: Performed by: INTERNAL MEDICINE

## 2025-08-10 PROCEDURE — 6360000002 HC RX W HCPCS: Performed by: INTERNAL MEDICINE

## 2025-08-10 PROCEDURE — 83036 HEMOGLOBIN GLYCOSYLATED A1C: CPT

## 2025-08-10 PROCEDURE — 99232 SBSQ HOSP IP/OBS MODERATE 35: CPT | Performed by: PHYSICIAN ASSISTANT

## 2025-08-10 PROCEDURE — 82948 REAGENT STRIP/BLOOD GLUCOSE: CPT

## 2025-08-10 PROCEDURE — 83735 ASSAY OF MAGNESIUM: CPT

## 2025-08-10 PROCEDURE — 85018 HEMOGLOBIN: CPT

## 2025-08-10 PROCEDURE — 1200000003 HC TELEMETRY R&B

## 2025-08-10 PROCEDURE — 80048 BASIC METABOLIC PNL TOTAL CA: CPT

## 2025-08-10 PROCEDURE — 36415 COLL VENOUS BLD VENIPUNCTURE: CPT

## 2025-08-10 PROCEDURE — 85025 COMPLETE CBC W/AUTO DIFF WBC: CPT

## 2025-08-10 PROCEDURE — 84443 ASSAY THYROID STIM HORMONE: CPT

## 2025-08-10 RX ORDER — SODIUM CHLORIDE 9 MG/ML
INJECTION, SOLUTION INTRAVENOUS CONTINUOUS
Status: DISCONTINUED | OUTPATIENT
Start: 2025-08-11 | End: 2025-08-11

## 2025-08-10 RX ORDER — INSULIN GLARGINE 100 [IU]/ML
20 INJECTION, SOLUTION SUBCUTANEOUS EVERY MORNING
Status: DISCONTINUED | OUTPATIENT
Start: 2025-08-11 | End: 2025-08-11 | Stop reason: HOSPADM

## 2025-08-10 RX ADMIN — TRAMADOL HYDROCHLORIDE 50 MG: 50 TABLET, COATED ORAL at 09:41

## 2025-08-10 RX ADMIN — GABAPENTIN 100 MG: 100 CAPSULE ORAL at 21:30

## 2025-08-10 RX ADMIN — TRAZODONE HYDROCHLORIDE 50 MG: 50 TABLET ORAL at 21:29

## 2025-08-10 RX ADMIN — POLYVINYL ALCOHOL 2 DROP: 1.4 SOLUTION/ DROPS OPHTHALMIC at 21:29

## 2025-08-10 RX ADMIN — TRAMADOL HYDROCHLORIDE 50 MG: 50 TABLET, COATED ORAL at 21:29

## 2025-08-10 RX ADMIN — POLYVINYL ALCOHOL 2 DROP: 1.4 SOLUTION/ DROPS OPHTHALMIC at 09:41

## 2025-08-10 RX ADMIN — PANTOPRAZOLE SODIUM 8 MG/HR: 40 INJECTION, POWDER, FOR SOLUTION INTRAVENOUS at 16:55

## 2025-08-10 RX ADMIN — VENLAFAXINE HYDROCHLORIDE 150 MG: 150 CAPSULE, EXTENDED RELEASE ORAL at 09:41

## 2025-08-10 RX ADMIN — INSULIN LISPRO 1 UNITS: 100 INJECTION, SOLUTION INTRAVENOUS; SUBCUTANEOUS at 11:47

## 2025-08-10 RX ADMIN — SODIUM CHLORIDE, SODIUM LACTATE, POTASSIUM CHLORIDE, AND CALCIUM CHLORIDE: .6; .31; .03; .02 INJECTION, SOLUTION INTRAVENOUS at 09:41

## 2025-08-10 RX ADMIN — PANTOPRAZOLE SODIUM 8 MG/HR: 40 INJECTION, POWDER, FOR SOLUTION INTRAVENOUS at 07:38

## 2025-08-10 RX ADMIN — INSULIN GLARGINE 10 UNITS: 100 INJECTION, SOLUTION SUBCUTANEOUS at 09:41

## 2025-08-10 ASSESSMENT — PAIN SCALES - GENERAL
PAINLEVEL_OUTOF10: 0

## 2025-08-11 ENCOUNTER — APPOINTMENT (OUTPATIENT)
Age: 70
DRG: 378 | End: 2025-08-11
Attending: PHYSICIAN ASSISTANT
Payer: MEDICARE

## 2025-08-11 VITALS
TEMPERATURE: 97.3 F | BODY MASS INDEX: 37.03 KG/M2 | DIASTOLIC BLOOD PRESSURE: 50 MMHG | OXYGEN SATURATION: 95 % | HEART RATE: 77 BPM | SYSTOLIC BLOOD PRESSURE: 98 MMHG | HEIGHT: 66 IN | WEIGHT: 230.38 LBS | RESPIRATION RATE: 18 BRPM

## 2025-08-11 LAB
ANION GAP SERPL CALC-SCNC: 15 MEQ/L (ref 8–16)
BUN SERPL-MCNC: 12 MG/DL (ref 8–23)
CALCIUM SERPL-MCNC: 8 MG/DL (ref 8.8–10.2)
CHLORIDE SERPL-SCNC: 108 MEQ/L (ref 98–111)
CO2 SERPL-SCNC: 18 MEQ/L (ref 22–29)
CREAT SERPL-MCNC: 0.8 MG/DL (ref 0.5–0.9)
DEPRECATED RDW RBC AUTO: 49.8 FL (ref 35–45)
ECHO AO ASC DIAM: 3.4 CM
ECHO AO ASCENDING AORTA INDEX: 1.6 CM/M2
ECHO AV CUSP MM: 1.4 CM
ECHO AV PEAK GRADIENT: 13 MMHG
ECHO AV PEAK VELOCITY: 1.8 M/S
ECHO AV VELOCITY RATIO: 0.61
ECHO BSA: 2.1 M2
ECHO EST RA PRESSURE: 3 MMHG
ECHO LA AREA 2C: 16 CM2
ECHO LA AREA 4C: 17.4 CM2
ECHO LA DIAMETER INDEX: 1.7 CM/M2
ECHO LA DIAMETER: 3.6 CM
ECHO LA MAJOR AXIS: 5.5 CM
ECHO LA MINOR AXIS: 5.3 CM
ECHO LA VOL BP: 41 ML (ref 22–52)
ECHO LA VOL MOD A2C: 39 ML (ref 22–52)
ECHO LA VOL MOD A4C: 42 ML (ref 22–52)
ECHO LA VOL/BSA BIPLANE: 19 ML/M2 (ref 16–34)
ECHO LA VOLUME INDEX MOD A2C: 18 ML/M2 (ref 16–34)
ECHO LA VOLUME INDEX MOD A4C: 20 ML/M2 (ref 16–34)
ECHO LV E' LATERAL VELOCITY: 8.3 CM/S
ECHO LV E' SEPTAL VELOCITY: 8.3 CM/S
ECHO LV EF PHYSICIAN: 60 %
ECHO LV FRACTIONAL SHORTENING: 33 % (ref 28–44)
ECHO LV INTERNAL DIMENSION DIASTOLE INDEX: 2.12 CM/M2
ECHO LV INTERNAL DIMENSION DIASTOLIC: 4.5 CM (ref 3.9–5.3)
ECHO LV INTERNAL DIMENSION SYSTOLIC INDEX: 1.42 CM/M2
ECHO LV INTERNAL DIMENSION SYSTOLIC: 3 CM
ECHO LV ISOVOLUMETRIC RELAXATION TIME (IVRT): 70 MS
ECHO LV IVSD: 1.1 CM (ref 0.6–0.9)
ECHO LV MASS 2D: 164 G (ref 67–162)
ECHO LV MASS INDEX 2D: 77.3 G/M2 (ref 43–95)
ECHO LV POSTERIOR WALL DIASTOLIC: 1 CM (ref 0.6–0.9)
ECHO LV RELATIVE WALL THICKNESS RATIO: 0.44
ECHO LVOT PEAK GRADIENT: 5 MMHG
ECHO LVOT PEAK VELOCITY: 1.1 M/S
ECHO MV A VELOCITY: 0.91 M/S
ECHO MV E DECELERATION TIME (DT): 180 MS
ECHO MV E VELOCITY: 0.96 M/S
ECHO MV E/A RATIO: 1.05
ECHO MV E/E' LATERAL: 11.57
ECHO MV E/E' RATIO (AVERAGED): 11.57
ECHO MV E/E' SEPTAL: 11.57
ECHO PV MAX VELOCITY: 0.7 M/S
ECHO PV PEAK GRADIENT: 2 MMHG
ECHO RIGHT VENTRICULAR SYSTOLIC PRESSURE (RVSP): 38 MMHG
ECHO RV INTERNAL DIMENSION: 1.7 CM
ECHO RV TAPSE: 2.3 CM (ref 1.7–?)
ECHO TV E WAVE: 0.6 M/S
ECHO TV REGURGITANT MAX VELOCITY: 2.97 M/S
ECHO TV REGURGITANT PEAK GRADIENT: 35 MMHG
ERYTHROCYTE [DISTWIDTH] IN BLOOD BY AUTOMATED COUNT: 15.2 % (ref 11.5–14.5)
GFR SERPL CREATININE-BSD FRML MDRD: 79 ML/MIN/1.73M2
GLUCOSE BLD STRIP.AUTO-MCNC: 136 MG/DL (ref 70–108)
GLUCOSE BLD STRIP.AUTO-MCNC: 139 MG/DL (ref 70–108)
GLUCOSE BLD STRIP.AUTO-MCNC: 211 MG/DL (ref 70–108)
GLUCOSE SERPL-MCNC: 227 MG/DL (ref 74–109)
HCT VFR BLD AUTO: 25.1 % (ref 37–47)
HCT VFR BLD AUTO: 25.3 % (ref 37–47)
HGB BLD-MCNC: 8 GM/DL (ref 12–16)
HGB BLD-MCNC: 8.1 GM/DL (ref 12–16)
MCH RBC QN AUTO: 29.7 PG (ref 26–33)
MCHC RBC AUTO-ENTMCNC: 31.6 GM/DL (ref 32.2–35.5)
MCV RBC AUTO: 94.1 FL (ref 81–99)
PLATELET # BLD AUTO: 253 THOU/MM3 (ref 130–400)
PMV BLD AUTO: 10.8 FL (ref 9.4–12.4)
POTASSIUM SERPL-SCNC: 3.4 MEQ/L (ref 3.5–5.2)
RBC # BLD AUTO: 2.69 MILL/MM3 (ref 4.2–5.4)
SODIUM SERPL-SCNC: 141 MEQ/L (ref 135–145)
WBC # BLD AUTO: 14.6 THOU/MM3 (ref 4.8–10.8)

## 2025-08-11 PROCEDURE — 6360000002 HC RX W HCPCS: Performed by: INTERNAL MEDICINE

## 2025-08-11 PROCEDURE — 2580000003 HC RX 258: Performed by: INTERNAL MEDICINE

## 2025-08-11 PROCEDURE — 6360000004 HC RX CONTRAST MEDICATION: Performed by: INTERNAL MEDICINE

## 2025-08-11 PROCEDURE — 85027 COMPLETE CBC AUTOMATED: CPT

## 2025-08-11 PROCEDURE — C8929 TTE W OR WO FOL WCON,DOPPLER: HCPCS

## 2025-08-11 PROCEDURE — 80048 BASIC METABOLIC PNL TOTAL CA: CPT

## 2025-08-11 PROCEDURE — 99239 HOSP IP/OBS DSCHRG MGMT >30: CPT | Performed by: PHYSICIAN ASSISTANT

## 2025-08-11 PROCEDURE — 82948 REAGENT STRIP/BLOOD GLUCOSE: CPT

## 2025-08-11 PROCEDURE — 2T015 HOSPITALIST 2ND TOUCH: CPT | Performed by: PHYSICIAN ASSISTANT

## 2025-08-11 PROCEDURE — 36415 COLL VENOUS BLD VENIPUNCTURE: CPT

## 2025-08-11 PROCEDURE — 2580000003 HC RX 258

## 2025-08-11 PROCEDURE — 6370000000 HC RX 637 (ALT 250 FOR IP)

## 2025-08-11 PROCEDURE — 6370000000 HC RX 637 (ALT 250 FOR IP): Performed by: PHYSICIAN ASSISTANT

## 2025-08-11 PROCEDURE — 93306 TTE W/DOPPLER COMPLETE: CPT | Performed by: INTERNAL MEDICINE

## 2025-08-11 RX ORDER — ASCORBIC ACID 500 MG
250 TABLET ORAL DAILY
COMMUNITY
Start: 2025-08-11

## 2025-08-11 RX ORDER — METOPROLOL TARTRATE 25 MG/1
25 TABLET, FILM COATED ORAL 2 TIMES DAILY
Qty: 60 TABLET | Refills: 0 | Status: SHIPPED | OUTPATIENT
Start: 2025-08-11

## 2025-08-11 RX ORDER — TRAZODONE HYDROCHLORIDE 50 MG/1
50 TABLET ORAL NIGHTLY
COMMUNITY
Start: 2025-08-11

## 2025-08-11 RX ORDER — PANTOPRAZOLE SODIUM 40 MG/1
40 TABLET, DELAYED RELEASE ORAL
Status: DISCONTINUED | OUTPATIENT
Start: 2025-08-11 | End: 2025-08-11 | Stop reason: HOSPADM

## 2025-08-11 RX ORDER — PANTOPRAZOLE SODIUM 40 MG/1
40 TABLET, DELAYED RELEASE ORAL
Qty: 60 TABLET | Refills: 5 | Status: SHIPPED | OUTPATIENT
Start: 2025-08-11

## 2025-08-11 RX ORDER — FLUTICASONE PROPIONATE 50 MCG
1 SPRAY, SUSPENSION (ML) NASAL DAILY PRN
COMMUNITY
Start: 2025-08-11

## 2025-08-11 RX ORDER — NITROGLYCERIN 0.4 MG/1
0.4 TABLET SUBLINGUAL EVERY 5 MIN PRN
COMMUNITY
Start: 2025-08-11

## 2025-08-11 RX ADMIN — VENLAFAXINE HYDROCHLORIDE 150 MG: 150 CAPSULE, EXTENDED RELEASE ORAL at 07:48

## 2025-08-11 RX ADMIN — PANTOPRAZOLE SODIUM 40 MG: 40 TABLET, DELAYED RELEASE ORAL at 15:56

## 2025-08-11 RX ADMIN — TRAMADOL HYDROCHLORIDE 50 MG: 50 TABLET, COATED ORAL at 07:49

## 2025-08-11 RX ADMIN — PANTOPRAZOLE SODIUM 8 MG/HR: 40 INJECTION, POWDER, FOR SOLUTION INTRAVENOUS at 06:33

## 2025-08-11 RX ADMIN — METOPROLOL TARTRATE 25 MG: 25 TABLET, FILM COATED ORAL at 07:48

## 2025-08-11 RX ADMIN — INSULIN GLARGINE 20 UNITS: 100 INJECTION, SOLUTION SUBCUTANEOUS at 07:49

## 2025-08-11 RX ADMIN — SULFUR HEXAFLUORIDE 2 ML: KIT at 11:47

## 2025-08-11 RX ADMIN — POLYVINYL ALCOHOL 2 DROP: 1.4 SOLUTION/ DROPS OPHTHALMIC at 07:49

## 2025-08-11 RX ADMIN — POTASSIUM BICARBONATE 40 MEQ: 782 TABLET, EFFERVESCENT ORAL at 12:23

## 2025-08-11 RX ADMIN — INSULIN LISPRO 1 UNITS: 100 INJECTION, SOLUTION INTRAVENOUS; SUBCUTANEOUS at 12:23

## 2025-08-11 RX ADMIN — SODIUM CHLORIDE: 0.9 INJECTION, SOLUTION INTRAVENOUS at 01:12

## 2025-08-11 ASSESSMENT — PAIN SCALES - GENERAL: PAINLEVEL_OUTOF10: 0

## (undated) DEVICE — GOWN,SIRUS,NONRNF,SETINSLV,XL,20/CS: Brand: MEDLINE

## (undated) DEVICE — NEEDLE SPNL L3.5IN PNK HUB S STL REG WALL FIT STYL W/ QNCKE

## (undated) DEVICE — 1010 S-DRAPE TOWEL DRAPE 10/BX: Brand: STERI-DRAPE™

## (undated) DEVICE — DRAIN SURG 10FR PVC TB W/ TRCR MID PERF NO RESVR HUBLESS

## (undated) DEVICE — SOLUTION IV 1000ML LAC RINGERS PH 6.5 INJ USP VIAFLX PLAS

## (undated) DEVICE — Device

## (undated) DEVICE — GLOVE SURG SZ 65 THK91MIL LTX FREE SYN POLYISOPRENE

## (undated) DEVICE — GLOVE ORANGE PI 8   MSG9080

## (undated) DEVICE — SPONGE LAP W18XL18IN WHT COT 4 PLY FLD STRUNG RADPQ DISP ST

## (undated) DEVICE — PREP SOL PVP IODINE 4%  4 OZ/BTL

## (undated) DEVICE — GLOVE ORANGE PI 8 1/2   MSG9085

## (undated) DEVICE — Z DISCONTINUED BY MEDLINE USE 2711682 TRAY SKIN PREP DRY W/ PREM GLV

## (undated) DEVICE — GLOVE SURG SZ 85 L12IN THK75MIL DK GRN LTX FREE

## (undated) DEVICE — 3M™ STERI-DRAPE™ INSTRUMENT POUCH 1018: Brand: STERI-DRAPE™

## (undated) DEVICE — SUTURE ETHLN SZ 2-0 L30IN NONABSORBABLE BLK L36MM FSLX 3/8 1674H

## (undated) DEVICE — ROYAL SILK SURGICAL GOWN, XXL: Brand: CONVERTORS

## (undated) DEVICE — KIT EVAC 400CC PVC RADPQ Y CONN

## (undated) DEVICE — TOTAL TRAY, 16FR 10ML SIL FOLEY, URN: Brand: MEDLINE

## (undated) DEVICE — GAUZE,SPONGE,4"X4",12PLY,STERILE,LF,2'S: Brand: MEDLINE

## (undated) DEVICE — INTENDED FOR TISSUE SEPARATION, AND OTHER PROCEDURES THAT REQUIRE A SHARP SURGICAL BLADE TO PUNCTURE OR CUT.: Brand: BARD-PARKER ® CARBON RIB-BACK BLADES

## (undated) DEVICE — SOLUTION IV 1000ML 0.9% SOD CHL PH 5 INJ USP VIAFLX PLAS

## (undated) DEVICE — TUBING, SUCTION, 1/4" X 20', STRAIGHT: Brand: MEDLINE INDUSTRIES, INC.

## (undated) DEVICE — CATHETER EVD L35CM LUMN ID1.5MM DEPTH MRK 3-15CM FOR EXT

## (undated) DEVICE — CONMED DISPOSABLE BIPOLAR CABLE, 10' (3.05M): Brand: CONMED

## (undated) DEVICE — 3M™ IOBAN™ 2 ANTIMICROBIAL INCISE DRAPE 6650EZ: Brand: IOBAN™ 2

## (undated) DEVICE — SOLUTION IV IRRIG POUR BRL 0.9% SODIUM CHL 2F7124

## (undated) DEVICE — MARKER,SKIN,WI/RULER AND LABELS: Brand: MEDLINE

## (undated) DEVICE — BIPOLAR SEALER 23-112-1 AQM 6.0: Brand: AQUAMANTYS ®

## (undated) DEVICE — DRAPE C ARM W36XL30IN RECTANG BND BG AND TAPE

## (undated) DEVICE — SPONGE GZ W4XL4IN COT 12 PLY TYP VII WVN C FLD DSGN

## (undated) DEVICE — TOWEL,OR,DSP,ST,BLUE,STD,4/PK,20PK/CS: Brand: MEDLINE

## (undated) DEVICE — GAUZE,SPONGE,8"X4",12PLY,XRAY,STRL,LF: Brand: MEDLINE

## (undated) DEVICE — 3M™ STERI-STRIP™ COMPOUND BENZOIN TINCTURE 40 BAGS/CARTON 4 CARTONS/CASE C1544: Brand: 3M™ STERI-STRIP™

## (undated) DEVICE — SYRINGE IRRIG 60ML SFT PLIABLE BLB EZ TO GRP 1 HND USE W/

## (undated) DEVICE — CODMAN® SURGICAL PATTIES 1/2" X 1/2" (1.27CM X 1.27CM): Brand: CODMAN®

## (undated) DEVICE — ACCUDRAIN® EXTERNAL CSF DRAINAGE SYSTEM WITH ANTI-REFLUX VALVE: Brand: ACCUDRAIN®

## (undated) DEVICE — JCKSON TBL POSTNER NO HD REST: Brand: MEDLINE INDUSTRIES, INC.

## (undated) DEVICE — BLADE CLP TAPR HD WET DRY CAPABILITY GTT IN CHARGING USE

## (undated) DEVICE — SUTURE VCRL SZ 0 L45CM ABSRB VLT OS-6 L36.4MM 1/2 CIR REV J711T

## (undated) DEVICE — SYRINGE,EAR/ULCER, 2 OZ, STERILE: Brand: MEDLINE

## (undated) DEVICE — TAPE SURG W4INXL11YD 2IN PERF LINERLESS NONWOVEN MEDFIX EZ

## (undated) DEVICE — SUTURE PDS II SZ 1 L36IN ABSRB VLT L48MM CTX 1/2 CIR Z371T

## (undated) DEVICE — YANKAUER,FLEXIBLE HANDLE,REGLR CAPACITY: Brand: MEDLINE INDUSTRIES, INC.

## (undated) DEVICE — CODMAN® SURGICAL PATTIES 1/2" X1 1/2" (1.27CM X 3.81CM): Brand: CODMAN®

## (undated) DEVICE — SOLUTION IV IRRIG WATER 1000ML POUR BRL 2F7114

## (undated) DEVICE — YANKAUER,BULB TIP,W/O VENT,RIGID,STERILE: Brand: MEDLINE